# Patient Record
Sex: MALE | Race: WHITE | NOT HISPANIC OR LATINO | Employment: OTHER | URBAN - METROPOLITAN AREA
[De-identification: names, ages, dates, MRNs, and addresses within clinical notes are randomized per-mention and may not be internally consistent; named-entity substitution may affect disease eponyms.]

---

## 2017-01-10 ENCOUNTER — TELEPHONE (OUTPATIENT)
Dept: INTERNAL MEDICINE | Facility: CLINIC | Age: 72
End: 2017-01-10

## 2017-01-10 DIAGNOSIS — Z12.5 ENCOUNTER FOR SCREENING FOR MALIGNANT NEOPLASM OF PROSTATE: ICD-10-CM

## 2017-01-10 DIAGNOSIS — I10 ESSENTIAL HYPERTENSION: ICD-10-CM

## 2017-01-10 DIAGNOSIS — I25.10 CORONARY ARTERY DISEASE INVOLVING NATIVE CORONARY ARTERY OF NATIVE HEART WITHOUT ANGINA PECTORIS: Primary | ICD-10-CM

## 2017-01-10 NOTE — TELEPHONE ENCOUNTER
Patient lab orders are requested for future appointment. 5/23 or 5/24.      Please advise thanks.

## 2017-02-15 ENCOUNTER — OFFICE VISIT (OUTPATIENT)
Dept: DERMATOLOGY | Facility: CLINIC | Age: 72
End: 2017-02-15
Payer: MEDICARE

## 2017-02-15 VITALS — WEIGHT: 179 LBS | BODY MASS INDEX: 28.04 KG/M2

## 2017-02-15 DIAGNOSIS — L91.8 CUTANEOUS SKIN TAGS: ICD-10-CM

## 2017-02-15 DIAGNOSIS — L82.1 SEBORRHEIC KERATOSES: Primary | ICD-10-CM

## 2017-02-15 PROCEDURE — 1160F RVW MEDS BY RX/DR IN RCRD: CPT | Mod: S$GLB,,, | Performed by: DERMATOLOGY

## 2017-02-15 PROCEDURE — 99213 OFFICE O/P EST LOW 20 MIN: CPT | Mod: 25,S$GLB,, | Performed by: DERMATOLOGY

## 2017-02-15 PROCEDURE — 1159F MED LIST DOCD IN RCRD: CPT | Mod: S$GLB,,, | Performed by: DERMATOLOGY

## 2017-02-15 PROCEDURE — 1157F ADVNC CARE PLAN IN RCRD: CPT | Mod: S$GLB,,, | Performed by: DERMATOLOGY

## 2017-02-15 PROCEDURE — 99999 PR PBB SHADOW E&M-EST. PATIENT-LVL II: CPT | Mod: PBBFAC,,, | Performed by: DERMATOLOGY

## 2017-02-15 NOTE — MR AVS SNAPSHOT
[place] [facility]   [facility]  [place] [address]  Phone: [phone]  Fax: [phone]                  Drake Barraza   2/15/2017 10:10 AM   Office Visit    Description:  Male : 1945   Provider:  Vanessa Menon MD   Department:  [place] [facility]           Reason for Visit     Lesion     Skin Tags     Skin Check           Diagnoses this Visit        Comments    Seborrheic keratoses    -  Primary     Cutaneous skin tags                To Do List           Future Appointments        Provider Department Dept Phone    2017 8:00 AM LAB, [place] [facility] [phone]    2017 10:00 AM Catherine Middleton MD [facility] [address] [phone]      Goals (5 Years of Data)              11/23/16    5/19/16    11/10/15    HDL > 45   33  36  32    LDL CALC < 70   71.4  70.4  70.8      Follow-Up and Disposition     Return in about 1 year (around 2/15/2018).      [facility]     [facility] -  Assistance  Registered nurses in the [facility] provide clinical advisement, health education, appointment booking, and other advisory services.  Call for this free service at [phone].             Medications           Message regarding Medications     Verify the changes and/or additions to your medication regime listed below are the same as discussed with your clinician today.  If any of these changes or additions are incorrect, please notify your healthcare provider.             Verify that the below list of medications is an accurate representation of the medications you are currently taking.  If none reported, the list may be blank. If incorrect, please contact your healthcare provider. Carry this list with you in case of emergency.           Current Medications     aspirin 81 mg Tab Take 1 tablet by mouth Daily.      atorvastatin (LIPITOR) 40 MG tablet TAKE 1 TABLET EVERY DAY    CINNAMON BARK (CINNAMON  ORAL) Take 1 capsule by mouth once daily.    diclofenac (VOLTAREN) 50 MG EC tablet TAKE 1 TABLET EVERY 12 HOURS WITH FOOD AS NEEDED FOR ARTHRITIS    levothyroxine (SYNTHROID) 100 MCG tablet 1 tab daily for Thyroid    metoprolol tartrate (LOPRESSOR) 25 MG tablet Take 1 tablet (25 mg total) by mouth 2 (two) times daily.    nitroGLYCERIN (NITROSTAT) 0.4 MG SL tablet Place 1 tablet (0.4 mg total) under the tongue every 5 (five) minutes as needed for Chest pain.    omega-3 fatty acids-vitamin E (FISH OIL) 1,000 mg Cap Take 1 capsule by mouth Daily.      ramipril (ALTACE) 10 MG capsule TAKE 1 CAPSULE ONE TIME DAILY    resveratrol 100 mg Cap Take 1 capsule by mouth as needed.     tadalafil (CIALIS) 20 MG Tab Take 1 tablet (20 mg total) by mouth once daily.    UBIDECARENONE (COENZYME Q10) 100 mg Tab Take 1 tablet by mouth as needed.     vitamin D 185 MG Tab Take 185 mg by mouth once daily.             Clinical Reference Information           Your Vitals Were     Weight BMI             81.2 kg (179 lb) 28.04 kg/m2         Allergies as of 2/15/2017     Hydrocodone-acetaminophen      Immunizations Administered on Date of Encounter - 2/15/2017     None      Language Assistance Services     ATTENTION: Language assistance services are available, free of charge. Please call [phone].      ATENCIÓN: Si habla azra, tiene a childs disposición servicios gratuitos de asistencia lingüística. Llame al [phone].     SHERRON Ý: N?u b?n nói Ti?ng Vi?t, có các d?ch v? h? tr? ngôn ng? mi?n phí dành cho b?n. G?i s? [phone].         [facility] complies with applicable Federal civil rights laws and does not discriminate on the basis of race, color, national origin, age, disability, or sex.

## 2017-02-15 NOTE — PROGRESS NOTES
Subjective:       Patient ID:  Drake Barraza is a 71 y.o. male who presents for   Chief Complaint   Patient presents with    Lesion    Skin Tags    Skin Check     UBSE     HPI Comments: Irritated sk on left cheek from CPAP mask, also skin tags on neck and chest which are rubbed by collar no tx present for months.    Lesion     Skin Tags         Review of Systems   Constitutional: Negative for fever.   Skin: Negative for itching and rash.   Hematologic/Lymphatic: Does not bruise/bleed easily.        Objective:    Physical Exam   Constitutional: He appears well-developed and well-nourished. No distress.   Neurological: He is alert and oriented to person, place, and time. He is not disoriented.   Psychiatric: He has a normal mood and affect.   Skin:   Areas Examined (abnormalities noted in diagram):   Head / Face Inspection Performed  Neck Inspection Performed  Chest / Axilla Inspection Performed  Back Inspection Performed  RUE Inspected  LUE Inspection Performed                   Diagram Legend        Pigmented verrucoid papule/plaque c/w seborrheic keratosis        Pedunculated fleshy papule(s) c/w skin tag(s)       Erythematous-base heme-crusted tan verrucoid plaque consistent with inflamed seborrheic keratosis        Assessment / Plan:        Seborrheic keratoses  Reassurance..      Cutaneous skin tags  Verbal consent obtained. 5 lesions removed with scissor snip removal  No complications.               Return in about 1 year (around 2/15/2018).

## 2017-02-25 DIAGNOSIS — E03.4 HYPOTHYROIDISM DUE TO ACQUIRED ATROPHY OF THYROID: ICD-10-CM

## 2017-02-25 RX ORDER — METOPROLOL SUCCINATE 50 MG/1
TABLET, EXTENDED RELEASE ORAL
Qty: 90 TABLET | Refills: 3 | Status: SHIPPED | OUTPATIENT
Start: 2017-02-25 | End: 2017-03-03 | Stop reason: ALTCHOICE

## 2017-02-27 RX ORDER — LEVOTHYROXINE SODIUM 100 UG/1
TABLET ORAL
Qty: 90 TABLET | Refills: 0 | Status: SHIPPED | OUTPATIENT
Start: 2017-02-27 | End: 2017-05-20 | Stop reason: SDUPTHER

## 2017-03-03 ENCOUNTER — DOCUMENTATION ONLY (OUTPATIENT)
Dept: CARDIOLOGY | Facility: CLINIC | Age: 72
End: 2017-03-03

## 2017-03-03 ENCOUNTER — TELEPHONE (OUTPATIENT)
Dept: CARDIOLOGY | Facility: CLINIC | Age: 72
End: 2017-03-03

## 2017-03-03 NOTE — TELEPHONE ENCOUNTER
Di with [facility] pharmacy notified to cancel prescription for Metoprolol succinate 50mg from pt's profile. Pt no longer taking Metoprolol succinate.

## 2017-04-09 RX ORDER — RAMIPRIL 10 MG/1
CAPSULE ORAL
Qty: 90 CAPSULE | Refills: 3 | Status: SHIPPED | OUTPATIENT
Start: 2017-04-09 | End: 2018-04-02 | Stop reason: SDUPTHER

## 2017-05-20 DIAGNOSIS — E03.4 HYPOTHYROIDISM DUE TO ACQUIRED ATROPHY OF THYROID: ICD-10-CM

## 2017-05-22 RX ORDER — LEVOTHYROXINE SODIUM 100 UG/1
TABLET ORAL
Qty: 90 TABLET | Refills: 0 | Status: SHIPPED | OUTPATIENT
Start: 2017-05-22 | End: 2017-05-31

## 2017-05-23 ENCOUNTER — LAB VISIT (OUTPATIENT)
Dept: LAB | Facility: HOSPITAL | Age: 72
End: 2017-05-23
Attending: INTERNAL MEDICINE
Payer: MEDICARE

## 2017-05-23 DIAGNOSIS — Z12.5 ENCOUNTER FOR SCREENING FOR MALIGNANT NEOPLASM OF PROSTATE: ICD-10-CM

## 2017-05-23 DIAGNOSIS — I25.10 CORONARY ARTERY DISEASE INVOLVING NATIVE CORONARY ARTERY OF NATIVE HEART WITHOUT ANGINA PECTORIS: ICD-10-CM

## 2017-05-23 DIAGNOSIS — I10 ESSENTIAL HYPERTENSION: ICD-10-CM

## 2017-05-23 LAB
ALBUMIN SERPL BCP-MCNC: 4.1 G/DL
ALP SERPL-CCNC: 82 U/L
ALT SERPL W/O P-5'-P-CCNC: 48 U/L
ANION GAP SERPL CALC-SCNC: 9 MMOL/L
AST SERPL-CCNC: 37 U/L
BASOPHILS # BLD AUTO: 0.05 K/UL
BASOPHILS NFR BLD: 0.6 %
BILIRUB SERPL-MCNC: 0.9 MG/DL
BUN SERPL-MCNC: 21 MG/DL
CALCIUM SERPL-MCNC: 9.9 MG/DL
CHLORIDE SERPL-SCNC: 103 MMOL/L
CHOLEST/HDLC SERPL: 4 {RATIO}
CO2 SERPL-SCNC: 25 MMOL/L
COMPLEXED PSA SERPL-MCNC: 0.68 NG/ML
CREAT SERPL-MCNC: 1.2 MG/DL
DIFFERENTIAL METHOD: ABNORMAL
EOSINOPHIL # BLD AUTO: 0.2 K/UL
EOSINOPHIL NFR BLD: 2.7 %
ERYTHROCYTE [DISTWIDTH] IN BLOOD BY AUTOMATED COUNT: 13.4 %
EST. GFR  (AFRICAN AMERICAN): >60 ML/MIN/1.73 M^2
EST. GFR  (NON AFRICAN AMERICAN): >60 ML/MIN/1.73 M^2
GLUCOSE SERPL-MCNC: 127 MG/DL
HCT VFR BLD AUTO: 45.8 %
HDL/CHOLESTEROL RATIO: 25.2 %
HDLC SERPL-MCNC: 143 MG/DL
HDLC SERPL-MCNC: 36 MG/DL
HGB BLD-MCNC: 15.2 G/DL
LDLC SERPL CALC-MCNC: 74.8 MG/DL
LYMPHOCYTES # BLD AUTO: 3.4 K/UL
LYMPHOCYTES NFR BLD: 41.1 %
MCH RBC QN AUTO: 31.1 PG
MCHC RBC AUTO-ENTMCNC: 33.2 %
MCV RBC AUTO: 94 FL
MONOCYTES # BLD AUTO: 1.1 K/UL
MONOCYTES NFR BLD: 13.5 %
NEUTROPHILS # BLD AUTO: 3.5 K/UL
NEUTROPHILS NFR BLD: 42.1 %
NONHDLC SERPL-MCNC: 107 MG/DL
PLATELET # BLD AUTO: 185 K/UL
PMV BLD AUTO: 9.5 FL
POTASSIUM SERPL-SCNC: 5 MMOL/L
PROT SERPL-MCNC: 7.4 G/DL
RBC # BLD AUTO: 4.89 M/UL
SODIUM SERPL-SCNC: 137 MMOL/L
T4 FREE SERPL-MCNC: 0.94 NG/DL
TRIGL SERPL-MCNC: 161 MG/DL
TSH SERPL DL<=0.005 MIU/L-ACNC: 5.1 UIU/ML
WBC # BLD AUTO: 8.25 K/UL

## 2017-05-23 PROCEDURE — 80061 LIPID PANEL: CPT

## 2017-05-23 PROCEDURE — 84439 ASSAY OF FREE THYROXINE: CPT

## 2017-05-23 PROCEDURE — 36415 COLL VENOUS BLD VENIPUNCTURE: CPT | Mod: PO

## 2017-05-23 PROCEDURE — 84443 ASSAY THYROID STIM HORMONE: CPT

## 2017-05-23 PROCEDURE — 84153 ASSAY OF PSA TOTAL: CPT

## 2017-05-23 PROCEDURE — 80053 COMPREHEN METABOLIC PANEL: CPT

## 2017-05-23 PROCEDURE — 85025 COMPLETE CBC W/AUTO DIFF WBC: CPT | Mod: PO

## 2017-05-31 ENCOUNTER — OFFICE VISIT (OUTPATIENT)
Dept: INTERNAL MEDICINE | Facility: CLINIC | Age: 72
End: 2017-05-31
Payer: MEDICARE

## 2017-05-31 VITALS
OXYGEN SATURATION: 98 % | HEART RATE: 56 BPM | SYSTOLIC BLOOD PRESSURE: 114 MMHG | HEIGHT: 67 IN | BODY MASS INDEX: 28.09 KG/M2 | DIASTOLIC BLOOD PRESSURE: 80 MMHG | WEIGHT: 179 LBS

## 2017-05-31 DIAGNOSIS — E11.9 DIABETES MELLITUS WITHOUT COMPLICATION: Primary | ICD-10-CM

## 2017-05-31 DIAGNOSIS — Z29.9 PREVENTIVE MEASURE: ICD-10-CM

## 2017-05-31 DIAGNOSIS — E03.4 HYPOTHYROIDISM DUE TO ACQUIRED ATROPHY OF THYROID: ICD-10-CM

## 2017-05-31 DIAGNOSIS — Z00.00 ANNUAL PHYSICAL EXAM: ICD-10-CM

## 2017-05-31 DIAGNOSIS — M15.9 PRIMARY OSTEOARTHRITIS INVOLVING MULTIPLE JOINTS: ICD-10-CM

## 2017-05-31 PROCEDURE — 99397 PER PM REEVAL EST PAT 65+ YR: CPT | Mod: S$GLB,,, | Performed by: INTERNAL MEDICINE

## 2017-05-31 PROCEDURE — 99499 UNLISTED E&M SERVICE: CPT | Mod: S$GLB,,, | Performed by: INTERNAL MEDICINE

## 2017-05-31 PROCEDURE — 99999 PR PBB SHADOW E&M-EST. PATIENT-LVL IV: CPT | Mod: PBBFAC,,, | Performed by: INTERNAL MEDICINE

## 2017-05-31 RX ORDER — ETODOLAC 400 MG/1
400 TABLET, EXTENDED RELEASE ORAL DAILY
Qty: 90 TABLET | Refills: 0 | Status: SHIPPED | OUTPATIENT
Start: 2017-05-31 | End: 2017-10-27

## 2017-05-31 RX ORDER — LEVOTHYROXINE SODIUM 112 UG/1
112 TABLET ORAL DAILY
Qty: 90 TABLET | Refills: 1 | Status: SHIPPED | OUTPATIENT
Start: 2017-05-31 | End: 2017-12-30 | Stop reason: SDUPTHER

## 2017-05-31 NOTE — PROGRESS NOTES
Mr. Luke is a 72-year-old gentleman, known to myself, who presents today.    CHIEF COMPLAINT:  Annual wellness exam.    HISTORY OF PRESENT ILLNESS:  Mr. Luke presents noting that he has been having   a small amount of fatigue.  No chest pain, no shortness of breath, was concerned   when he saw the results of his lab work showing his thyroid to be slightly off   the normal.  He does note that he was told by his cardiologist, Dr. Mccoy to   hold his arthritis medicine due to his liver functions being slightly high, so   he has not been taking this recently.  He does get some arthritis in the ankle,   questions if there is something that he can try or should get this refilled.    Also needs to get updated on any appropriate health maintenance issues.  He is   taking medications as reviewed on medication card without problems.    PAST MEDICAL, SURGICAL, SOCIAL HISTORY:  Please see as thoroughly stated in EPIC   chart, which has been reviewed.    REVIEW OF SYSTEMS:  HEENT:  Negative for headaches or change in vision.  CARDIOPULMONARY:  No chest pain, no shortness of breath.  GASTROINTESTINAL:  No change in bowel habits.  No blood per rectum.  GENITOURINARY:  No significant BPH symptoms.  EXTREMITIES/RHEUMATOLOGIC:  Positive for discomfort in ankle felt to be due to   osteoarthritis, also osteoarthritis in the knees.  Rest of review of systems is noncontributory.    PHYSICAL EXAMINATION:  VITAL SIGNS:  Weight 179 pounds, blood pressure 114/80, pulse 56.  GENERAL:  The patient looks well.  HEENT:  Grossly unremarkable.  Sinuses nontender.  Pharynx clear.  NECK:  Supple.  Negative for masses or thyromegaly, negative for   lymphadenopathy, negative for carotid bruits.  LUNGS:  Clear bilaterally.  HEART:  Regular rate and rhythm without arrhythmias, murmurs or gallops.  ABDOMEN:  Positive bowel sounds, soft, nontender, no masses or organomegaly.  EXTREMITIES:  Negative for edema with normal 2+ DP pulses.  Of note, full    diabetic foot examination performed and showing decreased peripheral sensation   of the left foot greater than the right.  Circulation excellent.    WORKUP:  Lab work done on 05/23/2017 showing comprehensive chemistry to be   fairly unremarkable, although ALT a little high at 48.  Rest of liver functions   normal.  Renal function normal.  CBC is unremarkable.  Cholesterol 143 with LDL   74.  PSA normal.  TSH is slightly high at 5.1 with T4 on the lower side of   normal at 0.94.    ASSESSMENT AND PLAN:  1.  Primary hypothyroidism, slightly under supplemented.  A. We will increase Synthroid to 112 mcg per day.  B. Check TSH level in three months with phone review.  2.  Osteoarthritis with increased liver functions, possibly secondary to   Voltaren therapy.  A. We will change Voltaren to Lodine extended release 400 mg at one a day.  This   to be taken with food.  B. Repeat chemistry with liver panel in three months with lab and follow up at   that point.  3.  Coronary artery disease with the patient stable on present therapy, which   includes Altace 10 mg daily, Lopressor 25 mg two tabs daily, baby aspirin one a   day and Lipitor 40 mg daily.  4.  Health maintenance.  Screening:  Last colonoscopy in July 2016 and normal.  A. We will order screening abdominal aortic ultrasound.  B. Phone review after ultrasound and lab work in three months, which will   include comprehensive chemistry, TSH and hemoglobin A1c to follow up on history   of diabetes mellitus type 2.  5.  Diabetes mellitus type 2, non-insulin-dependent, with some decreased   sensation on extremity examination.  A. Hemoglobin A1c with next scheduled lab work.  B. Ensure the patient up-to-date on other health maintenance issues, go from   there.      FMS/HN  dd: 05/31/2017 18:10:12 (CDT)  td: 06/01/2017 12:59:38 (CDT)  Doc ID   #[id number]  Job ID #[id number]    CC:     This office note has been dictated.    Protective Sensation (w/ 10 gram monofilament):  Right:  Decreased  Left: Decreased    Visual Inspection:  Normal -  Bilateral    Pedal Pulses:   Right: Present  Left: Present    Posterior tibialis:   Right:Diminished  Left: Diminshed

## 2017-06-09 ENCOUNTER — TELEPHONE (OUTPATIENT)
Dept: INTERNAL MEDICINE | Facility: CLINIC | Age: 72
End: 2017-06-09

## 2017-06-09 NOTE — TELEPHONE ENCOUNTER
Notified Marlene green [facility] the correct dosage which was increased on 5/31/17 is 112 mg.

## 2017-06-09 NOTE — TELEPHONE ENCOUNTER
----- Message from Mary Carmen Mcdermott sent at 6/8/2017  1:00 PM CDT -----  Contact: Marycarmen with [facility], phone [phone]  [facility] needs to clarify the patient's current therapy on the levothyroxine.  [facility] has the patient has a prescription for Levothyroxine 112 mcg tablet, as well as 100 mcg tablet, and both are active prescriptions.  Please call call and clarify.  Fax [phone].       Thanks!

## 2017-06-14 ENCOUNTER — TELEPHONE (OUTPATIENT)
Dept: INTERNAL MEDICINE | Facility: CLINIC | Age: 72
End: 2017-06-14

## 2017-06-14 DIAGNOSIS — Z29.9 PREVENTIVE MEASURE: Primary | ICD-10-CM

## 2017-06-21 RX ORDER — METOPROLOL TARTRATE 25 MG/1
TABLET, FILM COATED ORAL
Qty: 180 TABLET | Refills: 3 | Status: SHIPPED | OUTPATIENT
Start: 2017-06-21 | End: 2018-07-08 | Stop reason: SDUPTHER

## 2017-08-24 ENCOUNTER — PATIENT MESSAGE (OUTPATIENT)
Dept: INTERNAL MEDICINE | Facility: CLINIC | Age: 72
End: 2017-08-24

## 2017-08-30 ENCOUNTER — LAB VISIT (OUTPATIENT)
Dept: LAB | Facility: HOSPITAL | Age: 72
End: 2017-08-30
Attending: INTERNAL MEDICINE
Payer: MEDICARE

## 2017-08-30 DIAGNOSIS — M15.9 PRIMARY OSTEOARTHRITIS INVOLVING MULTIPLE JOINTS: ICD-10-CM

## 2017-08-30 DIAGNOSIS — E11.9 DIABETES MELLITUS WITHOUT COMPLICATION: ICD-10-CM

## 2017-08-30 DIAGNOSIS — E03.4 HYPOTHYROIDISM DUE TO ACQUIRED ATROPHY OF THYROID: ICD-10-CM

## 2017-08-30 LAB
ALBUMIN SERPL BCP-MCNC: 3.8 G/DL
ALP SERPL-CCNC: 81 U/L
ALT SERPL W/O P-5'-P-CCNC: 43 U/L
ANION GAP SERPL CALC-SCNC: 8 MMOL/L
AST SERPL-CCNC: 36 U/L
BILIRUB SERPL-MCNC: 0.6 MG/DL
BUN SERPL-MCNC: 20 MG/DL
CALCIUM SERPL-MCNC: 9.7 MG/DL
CHLORIDE SERPL-SCNC: 103 MMOL/L
CO2 SERPL-SCNC: 27 MMOL/L
CREAT SERPL-MCNC: 1.1 MG/DL
EST. GFR  (AFRICAN AMERICAN): >60 ML/MIN/1.73 M^2
EST. GFR  (NON AFRICAN AMERICAN): >60 ML/MIN/1.73 M^2
ESTIMATED AVG GLUCOSE: 131 MG/DL
GLUCOSE SERPL-MCNC: 163 MG/DL
HBA1C MFR BLD HPLC: 6.2 %
POTASSIUM SERPL-SCNC: 4.6 MMOL/L
PROT SERPL-MCNC: 6.9 G/DL
SODIUM SERPL-SCNC: 138 MMOL/L
TSH SERPL DL<=0.005 MIU/L-ACNC: 3.26 UIU/ML

## 2017-08-30 PROCEDURE — 83036 HEMOGLOBIN GLYCOSYLATED A1C: CPT

## 2017-08-30 PROCEDURE — 36415 COLL VENOUS BLD VENIPUNCTURE: CPT | Mod: PO

## 2017-08-30 PROCEDURE — 84443 ASSAY THYROID STIM HORMONE: CPT

## 2017-08-30 PROCEDURE — 80053 COMPREHEN METABOLIC PANEL: CPT

## 2017-08-31 ENCOUNTER — PATIENT MESSAGE (OUTPATIENT)
Dept: INTERNAL MEDICINE | Facility: CLINIC | Age: 72
End: 2017-08-31

## 2017-09-04 PROBLEM — Z00.00 ANNUAL PHYSICAL EXAM: Status: RESOLVED | Noted: 2017-05-31 | Resolved: 2017-09-04

## 2017-10-20 ENCOUNTER — LAB VISIT (OUTPATIENT)
Dept: LAB | Facility: HOSPITAL | Age: 72
End: 2017-10-20
Attending: INTERNAL MEDICINE
Payer: MEDICARE

## 2017-10-20 DIAGNOSIS — E78.00 HYPERCHOLESTEREMIA: Chronic | ICD-10-CM

## 2017-10-20 DIAGNOSIS — I25.10 CORONARY ARTERY DISEASE INVOLVING NATIVE CORONARY ARTERY OF NATIVE HEART WITHOUT ANGINA PECTORIS: ICD-10-CM

## 2017-10-20 LAB
ALT SERPL W/O P-5'-P-CCNC: 44 U/L
ANION GAP SERPL CALC-SCNC: 7 MMOL/L
AST SERPL-CCNC: 36 U/L
BUN SERPL-MCNC: 15 MG/DL
CALCIUM SERPL-MCNC: 9.7 MG/DL
CHLORIDE SERPL-SCNC: 105 MMOL/L
CHOLEST SERPL-MCNC: 119 MG/DL
CHOLEST/HDLC SERPL: 3.5 {RATIO}
CO2 SERPL-SCNC: 27 MMOL/L
CREAT SERPL-MCNC: 1 MG/DL
EST. GFR  (AFRICAN AMERICAN): >60 ML/MIN/1.73 M^2
EST. GFR  (NON AFRICAN AMERICAN): >60 ML/MIN/1.73 M^2
GLUCOSE SERPL-MCNC: 144 MG/DL
HDLC SERPL-MCNC: 34 MG/DL
HDLC SERPL: 28.6 %
LDLC SERPL CALC-MCNC: 55.2 MG/DL
NONHDLC SERPL-MCNC: 85 MG/DL
POTASSIUM SERPL-SCNC: 4.7 MMOL/L
SODIUM SERPL-SCNC: 139 MMOL/L
TRIGL SERPL-MCNC: 149 MG/DL

## 2017-10-20 PROCEDURE — 36415 COLL VENOUS BLD VENIPUNCTURE: CPT | Mod: PO

## 2017-10-20 PROCEDURE — 80061 LIPID PANEL: CPT

## 2017-10-20 PROCEDURE — 84450 TRANSFERASE (AST) (SGOT): CPT

## 2017-10-20 PROCEDURE — 84460 ALANINE AMINO (ALT) (SGPT): CPT

## 2017-10-20 PROCEDURE — 80048 BASIC METABOLIC PNL TOTAL CA: CPT

## 2017-10-27 ENCOUNTER — OFFICE VISIT (OUTPATIENT)
Dept: CARDIOLOGY | Facility: CLINIC | Age: 72
End: 2017-10-27
Payer: MEDICARE

## 2017-10-27 VITALS
HEIGHT: 67 IN | WEIGHT: 179.38 LBS | SYSTOLIC BLOOD PRESSURE: 136 MMHG | BODY MASS INDEX: 28.16 KG/M2 | HEART RATE: 56 BPM | DIASTOLIC BLOOD PRESSURE: 76 MMHG

## 2017-10-27 DIAGNOSIS — E78.00 HYPERCHOLESTEREMIA: ICD-10-CM

## 2017-10-27 DIAGNOSIS — I25.10 CORONARY ARTERY DISEASE INVOLVING NATIVE CORONARY ARTERY OF NATIVE HEART WITHOUT ANGINA PECTORIS: Primary | ICD-10-CM

## 2017-10-27 DIAGNOSIS — N52.9 IMPOTENCE: ICD-10-CM

## 2017-10-27 DIAGNOSIS — Z98.61 POST PTCA: ICD-10-CM

## 2017-10-27 DIAGNOSIS — I25.2 OLD MYOCARDIAL INFARCT: ICD-10-CM

## 2017-10-27 DIAGNOSIS — I10 ESSENTIAL HYPERTENSION: ICD-10-CM

## 2017-10-27 DIAGNOSIS — G47.30 SLEEP APNEA, UNSPECIFIED TYPE: Chronic | ICD-10-CM

## 2017-10-27 PROCEDURE — 99999 PR PBB SHADOW E&M-EST. PATIENT-LVL III: CPT | Mod: PBBFAC,,, | Performed by: INTERNAL MEDICINE

## 2017-10-27 PROCEDURE — 99499 UNLISTED E&M SERVICE: CPT | Mod: S$GLB,,, | Performed by: INTERNAL MEDICINE

## 2017-10-27 PROCEDURE — 93000 ELECTROCARDIOGRAM COMPLETE: CPT | Mod: S$GLB,,, | Performed by: INTERNAL MEDICINE

## 2017-10-27 PROCEDURE — 99214 OFFICE O/P EST MOD 30 MIN: CPT | Mod: S$GLB,,, | Performed by: INTERNAL MEDICINE

## 2017-10-27 RX ORDER — TADALAFIL 20 MG/1
20 TABLET ORAL DAILY
Qty: 10 TABLET | Refills: 11 | Status: SHIPPED | OUTPATIENT
Start: 2017-10-27 | End: 2018-11-08 | Stop reason: ALTCHOICE

## 2017-10-27 NOTE — PROGRESS NOTES
Subjective:   Patient ID:  Drake Barraza is a 72 y.o. male who presents for follow-up of Coronary Artery Disease      Problem List:  CAD   Inf MI 2003   RCA and LAD stents 2003   Hypercholesteremia   HTN  Sleep apnea   Hypothyroidism   Hyperglycemia    HPI:   Drake Barraza feels generally well. He does not report angina or shortness of breath with exertion. He has not required S/L NTG.    On 40 mg of atorvastatin. LDL is <70. The HDL remains in the 30s.   He stays physically active but has not been exercising. He reports erectile dysfunction.  Using CPAP. BP is controlled w an ACE inhibitor and beta-blocker.      Review of Systems   Constitution: Negative for weakness, malaise/fatigue, weight gain and weight loss.   Cardiovascular: Negative for chest pain, claudication, dyspnea on exertion, irregular heartbeat, leg swelling, orthopnea, palpitations, paroxysmal nocturnal dyspnea and syncope.   Respiratory: Negative for cough, sputum production and wheezing.    Musculoskeletal: Positive for arthritis (both knees). Negative for falls, joint pain, muscle cramps, muscle weakness and myalgias.   Gastrointestinal: Negative for abdominal pain, heartburn and melena.   Genitourinary: Negative for frequency, hematuria and nocturia.   Neurological: Negative for dizziness, light-headedness, loss of balance and paresthesias.   Psychiatric/Behavioral: Negative for depression.       Current Outpatient Prescriptions   Medication Sig    aspirin 81 mg Tab Take 1 tablet by mouth Daily.      atorvastatin (LIPITOR) 40 MG tablet TAKE 1 TABLET EVERY DAY    CINNAMON BARK (CINNAMON ORAL) Take 1 capsule by mouth once daily.    levothyroxine (SYNTHROID) 112 MCG tablet Take 1 tablet (112 mcg total) by mouth once daily.    metoprolol tartrate (LOPRESSOR) 25 MG tablet TAKE 1 TABLET TWICE DAILY    nitroGLYCERIN (NITROSTAT) 0.4 MG SL tablet Place 1 tablet (0.4 mg total) under the tongue every 5 (five) minutes as needed for Chest pain.  "   omega-3 fatty acids-vitamin E (FISH OIL) 1,000 mg Cap Take 1 capsule by mouth Daily.      ramipril (ALTACE) 10 MG capsule TAKE 1 CAPSULE EVERY DAY    tadalafil (CIALIS) 20 MG Tab Take 1 tablet (20 mg total) by mouth once daily not using    UBIDECARENONE (COENZYME Q10) 100 mg Tab Take 1 tablet by mouth as needed.     vitamin D 185 MG Tab Take 185 mg by mouth once daily.           Social History   Substance Use Topics    Smoking status: Never Smoker    Smokeless tobacco: Not on file    Alcohol use Yes      Comment: a few glasses of wine a week         Objective:     Physical Exam   Constitutional: He is oriented to person, place, and time. He appears well-developed and well-nourished.   /76   Pulse (!) 56   Ht 5' 7" (1.702 m)   Wt 81.4 kg (179 lb 5.5 oz)   BMI 28.09 kg/m²      HENT:   Head: Normocephalic and atraumatic.   Neck: No JVD present. Carotid bruit is not present.   Cardiovascular: Normal rate, regular rhythm, S1 normal and S2 normal.  Exam reveals no gallop.    No murmur heard.  Pulses:       Radial pulses are 2+ on the right side, and 2+ on the left side.        Posterior tibial pulses are 2+ on the right side, and 2+ on the left side.   Pulmonary/Chest: Effort normal. He has no wheezes. He has no rales. Chest wall is not dull to percussion.   Abdominal: Soft. There is no splenomegaly or hepatomegaly. There is no tenderness.   Musculoskeletal:        Right lower leg: He exhibits no edema.        Left lower leg: He exhibits no edema.   Neurological: He is alert and oriented to person, place, and time. Gait normal.   Skin: Skin is warm and dry. No bruising noted. No cyanosis. Nails show no clubbing.   Psychiatric: He has a normal mood and affect. His speech is normal and behavior is normal. Judgment and thought content normal. Cognition and memory are normal.           Lab Results   Component Value Date    CHOL 119 (L) 10/20/2017    HDL 34 (L) 10/20/2017    LDLCALC 55.2 (L) 10/20/2017    " TRIG 149 10/20/2017    CHOLHDL 28.6 10/20/2017     Lab Results   Component Value Date     (H) 10/20/2017    CREATININE 1.0 10/20/2017    BUN 15 10/20/2017     10/20/2017    K 4.7 10/20/2017     10/20/2017    CO2 27 10/20/2017     Lab Results   Component Value Date    ALT 44 10/20/2017    AST 36 10/20/2017    ALKPHOS 81 08/30/2017    BILITOT 0.6 08/30/2017       ECG today reviewed by me. It reveals sinus bradycardia at 52 bpm with no ST or T abnormality.      Assessment and Plan:     1. Coronary artery disease involving native coronary artery of native heart without angina pectoris    2. 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 S/P RCA and LAD stents 2003    4. Hypercholesteremia    5. Erectile dysfunction    6. Sleep apnea, unspecified type    7. Essential hypertension        Coronary artery disease involving native coronary artery of native heart without angina pectoris  Stable.  Continue same meds.     Hypercholesteremia  Stable.  Continue same meds.     Erectile dysfunction  Counseled about the potential for severe hypotension and possible death from the simultaneous use of a type 5 phoshodiesterase (PDE) inhibitor and nitrate.  -     tadalafil (CIALIS) 20 MG Tab; Take 1/2 of a 20 mg  tablet (20 mg total) by mouth once daily.  Dispense: 10 tablet; Refill: 11     RTC in 1 yr

## 2017-10-27 NOTE — ASSESSMENT & PLAN NOTE
Counseled about the potential for severe hypotension and possible death from the simultaneous use of a type 5 phoshodiesterase (PDE) inhibitor and nitrate.

## 2017-11-02 ENCOUNTER — PATIENT MESSAGE (OUTPATIENT)
Dept: INTERNAL MEDICINE | Facility: CLINIC | Age: 72
End: 2017-11-02

## 2017-11-08 ENCOUNTER — TELEPHONE (OUTPATIENT)
Dept: INTERNAL MEDICINE | Facility: CLINIC | Age: 72
End: 2017-11-08

## 2017-11-08 DIAGNOSIS — G47.33 OSA (OBSTRUCTIVE SLEEP APNEA): Primary | ICD-10-CM

## 2017-11-08 NOTE — TELEPHONE ENCOUNTER
GATO Perez printed CPAP supplies order.  Please fax to [facility] as pt has requested.  Thanks.

## 2017-11-08 NOTE — TELEPHONE ENCOUNTER
----- Message from Nette Dugan sent at 11/8/2017 12:27 PM CST -----  Contact: self/[phone]  Patient called in regards needing to talk with Dr Middleton about the rx for cpad.please call and advise.     Thank you.

## 2017-11-08 NOTE — TELEPHONE ENCOUNTER
Spoke with pt and pt states he needs his Cpap supplies to be sent to [facility]. Phone number ([phone]) please print orders.    Thanks Ana.

## 2017-12-30 DIAGNOSIS — E03.4 HYPOTHYROIDISM DUE TO ACQUIRED ATROPHY OF THYROID: ICD-10-CM

## 2017-12-31 RX ORDER — LEVOTHYROXINE SODIUM 112 UG/1
TABLET ORAL
Qty: 90 TABLET | Refills: 1 | Status: SHIPPED | OUTPATIENT
Start: 2017-12-31 | End: 2018-01-02 | Stop reason: SDUPTHER

## 2017-12-31 RX ORDER — ATORVASTATIN CALCIUM 40 MG/1
TABLET, FILM COATED ORAL
Qty: 90 TABLET | Refills: 3 | Status: SHIPPED | OUTPATIENT
Start: 2017-12-31 | End: 2019-01-08 | Stop reason: SDUPTHER

## 2018-01-02 ENCOUNTER — TELEPHONE (OUTPATIENT)
Dept: INTERNAL MEDICINE | Facility: CLINIC | Age: 73
End: 2018-01-02

## 2018-01-02 DIAGNOSIS — I25.10 CORONARY ARTERY DISEASE, ANGINA PRESENCE UNSPECIFIED, UNSPECIFIED VESSEL OR LESION TYPE, UNSPECIFIED WHETHER NATIVE OR TRANSPLANTED HEART: Primary | ICD-10-CM

## 2018-01-02 DIAGNOSIS — E11.9 DIABETES MELLITUS WITHOUT COMPLICATION: ICD-10-CM

## 2018-01-02 DIAGNOSIS — Z12.5 ENCOUNTER FOR SCREENING FOR MALIGNANT NEOPLASM OF PROSTATE: ICD-10-CM

## 2018-01-02 DIAGNOSIS — E03.4 HYPOTHYROIDISM DUE TO ACQUIRED ATROPHY OF THYROID: ICD-10-CM

## 2018-01-02 RX ORDER — LEVOTHYROXINE SODIUM 112 UG/1
TABLET ORAL
Qty: 90 TABLET | Refills: 2 | Status: SHIPPED | OUTPATIENT
Start: 2018-01-02 | End: 2018-01-16 | Stop reason: SDUPTHER

## 2018-01-02 NOTE — TELEPHONE ENCOUNTER
----- Message from Eva Rajan sent at 1/2/2018 10:18 AM CST -----  Contact: Self,  Call [phone]  You sent a refill for his Levothyroxine (SYNTHROID) 112 MCG tablet on 12/31/17. Please refill this for 90 days and three refill for the year. If you can not do it this way please call him and explain to him why.

## 2018-01-16 ENCOUNTER — TELEPHONE (OUTPATIENT)
Dept: INTERNAL MEDICINE | Facility: CLINIC | Age: 73
End: 2018-01-16

## 2018-01-16 DIAGNOSIS — E03.4 HYPOTHYROIDISM DUE TO ACQUIRED ATROPHY OF THYROID: ICD-10-CM

## 2018-01-16 RX ORDER — LEVOTHYROXINE SODIUM 112 UG/1
TABLET ORAL
Qty: 90 TABLET | Refills: 1 | Status: SHIPPED | OUTPATIENT
Start: 2018-01-16 | End: 2018-07-08 | Stop reason: SDUPTHER

## 2018-01-16 NOTE — TELEPHONE ENCOUNTER
Spoke with patient, wants his Thyroid medication to be sent to [facility] for 90 day supply with 3 refills.  He states he would like to talk to doctor.

## 2018-01-16 NOTE — TELEPHONE ENCOUNTER
Spoke with pt and have repeat erx'd into [employer]: Synthroid 112ug tabs at 1 QD #90 tabs and 1 Refill.  Thanks

## 2018-01-16 NOTE — TELEPHONE ENCOUNTER
----- Message from Sarai Mcknight sent at 1/16/2018  9:28 AM CST -----  Contact: Patient [phone]  Patient is requesting a call about a medication. Medication was denied.    Please call and advise.    Thank You

## 2018-04-03 RX ORDER — RAMIPRIL 10 MG/1
CAPSULE ORAL
Qty: 90 CAPSULE | Refills: 3 | Status: SHIPPED | OUTPATIENT
Start: 2018-04-03 | End: 2019-04-13 | Stop reason: SDUPTHER

## 2018-04-23 ENCOUNTER — PES CALL (OUTPATIENT)
Dept: ADMINISTRATIVE | Facility: CLINIC | Age: 73
End: 2018-04-23

## 2018-04-26 ENCOUNTER — LAB VISIT (OUTPATIENT)
Dept: LAB | Facility: HOSPITAL | Age: 73
End: 2018-04-26
Attending: INTERNAL MEDICINE
Payer: MEDICARE

## 2018-04-26 DIAGNOSIS — E11.9 DIABETES MELLITUS WITHOUT COMPLICATION: ICD-10-CM

## 2018-04-26 DIAGNOSIS — Z12.5 ENCOUNTER FOR SCREENING FOR MALIGNANT NEOPLASM OF PROSTATE: ICD-10-CM

## 2018-04-26 DIAGNOSIS — E03.4 HYPOTHYROIDISM DUE TO ACQUIRED ATROPHY OF THYROID: ICD-10-CM

## 2018-04-26 DIAGNOSIS — I25.10 CORONARY ARTERY DISEASE, ANGINA PRESENCE UNSPECIFIED, UNSPECIFIED VESSEL OR LESION TYPE, UNSPECIFIED WHETHER NATIVE OR TRANSPLANTED HEART: ICD-10-CM

## 2018-04-26 LAB
ALBUMIN SERPL BCP-MCNC: 4 G/DL
ALP SERPL-CCNC: 84 U/L
ALT SERPL W/O P-5'-P-CCNC: 43 U/L
ANION GAP SERPL CALC-SCNC: 9 MMOL/L
AST SERPL-CCNC: 40 U/L
BASOPHILS # BLD AUTO: 0.06 K/UL
BASOPHILS NFR BLD: 1 %
BILIRUB SERPL-MCNC: 1.1 MG/DL
BUN SERPL-MCNC: 20 MG/DL
CALCIUM SERPL-MCNC: 9.7 MG/DL
CHLORIDE SERPL-SCNC: 105 MMOL/L
CHOLEST SERPL-MCNC: 118 MG/DL
CHOLEST/HDLC SERPL: 3.2 {RATIO}
CO2 SERPL-SCNC: 26 MMOL/L
COMPLEXED PSA SERPL-MCNC: 0.88 NG/ML
CREAT SERPL-MCNC: 1.1 MG/DL
DIFFERENTIAL METHOD: ABNORMAL
EOSINOPHIL # BLD AUTO: 0.2 K/UL
EOSINOPHIL NFR BLD: 3.2 %
ERYTHROCYTE [DISTWIDTH] IN BLOOD BY AUTOMATED COUNT: 13.2 %
EST. GFR  (AFRICAN AMERICAN): >60 ML/MIN/1.73 M^2
EST. GFR  (NON AFRICAN AMERICAN): >60 ML/MIN/1.73 M^2
ESTIMATED AVG GLUCOSE: 131 MG/DL
GLUCOSE SERPL-MCNC: 139 MG/DL
HBA1C MFR BLD HPLC: 6.2 %
HCT VFR BLD AUTO: 44.9 %
HDLC SERPL-MCNC: 37 MG/DL
HDLC SERPL: 31.4 %
HGB BLD-MCNC: 14.8 G/DL
IMM GRANULOCYTES # BLD AUTO: 0.02 K/UL
IMM GRANULOCYTES NFR BLD AUTO: 0.3 %
LDLC SERPL CALC-MCNC: 63 MG/DL
LYMPHOCYTES # BLD AUTO: 2.2 K/UL
LYMPHOCYTES NFR BLD: 35.7 %
MCH RBC QN AUTO: 31.6 PG
MCHC RBC AUTO-ENTMCNC: 33 G/DL
MCV RBC AUTO: 96 FL
MONOCYTES # BLD AUTO: 0.9 K/UL
MONOCYTES NFR BLD: 14 %
NEUTROPHILS # BLD AUTO: 2.8 K/UL
NEUTROPHILS NFR BLD: 45.8 %
NONHDLC SERPL-MCNC: 81 MG/DL
NRBC BLD-RTO: 0 /100 WBC
PLATELET # BLD AUTO: 191 K/UL
PMV BLD AUTO: 9.5 FL
POTASSIUM SERPL-SCNC: 4.3 MMOL/L
PROT SERPL-MCNC: 7 G/DL
RBC # BLD AUTO: 4.68 M/UL
SODIUM SERPL-SCNC: 140 MMOL/L
TRIGL SERPL-MCNC: 90 MG/DL
TSH SERPL DL<=0.005 MIU/L-ACNC: 3.34 UIU/ML
WBC # BLD AUTO: 6.16 K/UL

## 2018-04-26 PROCEDURE — 80053 COMPREHEN METABOLIC PANEL: CPT

## 2018-04-26 PROCEDURE — 84153 ASSAY OF PSA TOTAL: CPT

## 2018-04-26 PROCEDURE — 85025 COMPLETE CBC W/AUTO DIFF WBC: CPT

## 2018-04-26 PROCEDURE — 84443 ASSAY THYROID STIM HORMONE: CPT

## 2018-04-26 PROCEDURE — 80061 LIPID PANEL: CPT

## 2018-04-26 PROCEDURE — 36415 COLL VENOUS BLD VENIPUNCTURE: CPT | Mod: PO

## 2018-04-26 PROCEDURE — 83036 HEMOGLOBIN GLYCOSYLATED A1C: CPT

## 2018-05-03 ENCOUNTER — OFFICE VISIT (OUTPATIENT)
Dept: INTERNAL MEDICINE | Facility: CLINIC | Age: 73
End: 2018-05-03
Payer: MEDICARE

## 2018-05-03 VITALS
HEIGHT: 67 IN | OXYGEN SATURATION: 98 % | SYSTOLIC BLOOD PRESSURE: 116 MMHG | DIASTOLIC BLOOD PRESSURE: 60 MMHG | WEIGHT: 178.56 LBS | BODY MASS INDEX: 28.02 KG/M2 | HEART RATE: 62 BPM

## 2018-05-03 DIAGNOSIS — E03.4 HYPOTHYROIDISM DUE TO ACQUIRED ATROPHY OF THYROID: ICD-10-CM

## 2018-05-03 DIAGNOSIS — Z00.00 ANNUAL PHYSICAL EXAM: Primary | ICD-10-CM

## 2018-05-03 DIAGNOSIS — R73.9 HYPERGLYCEMIA: ICD-10-CM

## 2018-05-03 DIAGNOSIS — I10 ESSENTIAL HYPERTENSION: ICD-10-CM

## 2018-05-03 PROCEDURE — 99397 PER PM REEVAL EST PAT 65+ YR: CPT | Mod: S$GLB,,, | Performed by: INTERNAL MEDICINE

## 2018-05-03 PROCEDURE — 99499 UNLISTED E&M SERVICE: CPT | Mod: S$PBB,,, | Performed by: INTERNAL MEDICINE

## 2018-05-03 PROCEDURE — 3078F DIAST BP <80 MM HG: CPT | Mod: CPTII,S$GLB,, | Performed by: INTERNAL MEDICINE

## 2018-05-03 PROCEDURE — 99999 PR PBB SHADOW E&M-EST. PATIENT-LVL III: CPT | Mod: PBBFAC,,, | Performed by: INTERNAL MEDICINE

## 2018-05-03 PROCEDURE — 3074F SYST BP LT 130 MM HG: CPT | Mod: CPTII,S$GLB,, | Performed by: INTERNAL MEDICINE

## 2018-05-03 NOTE — PROGRESS NOTES
Mr. Barraza is a very sweet 73-year-old gentleman, known to myself, who presents   today for scheduled appointment.    CHIEF COMPLAINT:  Annual wellness exam and follow up of prediabetes and   hyperlipidemia.    HISTORY OF PRESENT ILLNESS:  Mr. Barraza presents for followup of the above.  He   notes that for the most part he has been doing well.  No problems.  He is   taking his medications without difficulty.  He did have lab work done.  He has   had no chest pain, no shortness of breath.  He follows with Dr. Mccoy once a   year in Cardiology.    PAST MEDICAL, SURGICAL, SOCIAL HISTORY:  Please see as thoroughly stated in EPIC   chart, which has been reviewed.    REVIEW OF SYSTEMS:  HEENT:  Negative for headaches, change in vision.  CARDIOPULMONARY:  No chest pain.  No shortness of breath.  GASTROINTESTINAL:  No change in bowel habits.  No blood per rectum.  GENITOURINARY:  No dysuria.  No BPH symptoms.  EXTREMITIES:  Positive for some mild knee aching consistent with osteoarthritis.  Rest of review of systems is noncontributory.    PHYSICAL EXAMINATION:  VITAL SIGNS:  With weight 178 pounds, blood pressure 116/60, pulse 62.  GENERAL:  The patient looks well, appears comfortable.  HEENT:  Grossly normal.  NECK:  Supple, negative for masses, thyromegaly, negative for lymphadenopathy.  LUNGS:  Clear bilaterally.  HEART:  Regular rate and rhythm without arrhythmias, murmurs, gallops.    Ventricular heart rate is 60.  ABDOMEN:  Soft, nontender.  EXTREMITIES:  Negative edema with normal 2+ DP pulses.  UROLOGICAL:  With external genitalia within normal limits.  Testes, penis   normal.  No inguinal hernias.    WORKUP:  With lab work April 26th showing hemoglobin A1c normal at 6.2.  TSH   normal.  PSA normal at 0.88.  Cholesterol is 118 with LDL 63.  CBC normal.    Comprehensive chemistry is unremarkable with liver functions normal and renal   function excellent.    ASSESSMENT AND PLAN:  1.  Coronary artery  disease/hyperlipidemia controlled.  A.  Continue on baby aspirin one a day and Lipitor 40 mg daily.  B.  Annual checkups with Dr. Mccoy in Cardiology.  2.  Prediabetes mellitus, controlled with diet.  A.  Continue to monitor with annual hemoglobin A1c with the patient to continue   on ADA diet.  3.  Essential hypertension, controlled.  A.  Continue on Altace 10 mg daily.  B.  Return to clinic by one year.  4.  Health maintenance.  Screening:  Last colonoscopy July 2016 and normal with the patient status post full physical   exam today.  A.  I have recommended immunizations including the shingles or Shingrix vaccine.  B.  Return to clinic in one year with fasting prior lab work or see the patient   sooner if needed.      FMS/HN  dd: 05/03/2018 14:16:15 (CDT)  td: 05/04/2018 05:43:50 (CDT)  Doc ID   #[id number]  Job ID #[id number]    CC:     This office note has been dictated.    Answers for HPI/ROS submitted by the patient on 5/1/2018   activity change: No  unexpected weight change: No  neck pain: No  hearing loss: No  rhinorrhea: No  trouble swallowing: No  eye discharge: No  visual disturbance: No  chest tightness: No  wheezing: No  chest pain: No  palpitations: No  blood in stool: No  constipation: No  vomiting: No  diarrhea: No  polydipsia: No  polyuria: No  difficulty urinating: No  urgency: No  hematuria: No  joint swelling: No  arthralgias: No  headaches: No  weakness: No  confusion: No  dysphoric mood: No

## 2018-06-19 ENCOUNTER — TELEPHONE (OUTPATIENT)
Dept: DERMATOLOGY | Facility: CLINIC | Age: 73
End: 2018-06-19

## 2018-06-19 NOTE — TELEPHONE ENCOUNTER
----- Message from Cara Martinez sent at 6/19/2018  9:57 AM CDT -----  Contact: patient  Please call above patient needs to get in before august waiting on a call from the nurse

## 2018-07-08 DIAGNOSIS — E03.4 HYPOTHYROIDISM DUE TO ACQUIRED ATROPHY OF THYROID: ICD-10-CM

## 2018-07-08 RX ORDER — METOPROLOL TARTRATE 25 MG/1
TABLET, FILM COATED ORAL
Qty: 180 TABLET | Refills: 3 | Status: SHIPPED | OUTPATIENT
Start: 2018-07-08 | End: 2019-08-17 | Stop reason: SDUPTHER

## 2018-07-09 RX ORDER — LEVOTHYROXINE SODIUM 112 UG/1
TABLET ORAL
Qty: 90 TABLET | Refills: 1 | Status: SHIPPED | OUTPATIENT
Start: 2018-07-09 | End: 2019-01-08 | Stop reason: SDUPTHER

## 2018-08-06 PROBLEM — Z00.00 ANNUAL PHYSICAL EXAM: Status: RESOLVED | Noted: 2017-05-31 | Resolved: 2018-08-06

## 2018-09-14 ENCOUNTER — OFFICE VISIT (OUTPATIENT)
Dept: DERMATOLOGY | Facility: CLINIC | Age: 73
End: 2018-09-14
Payer: MEDICARE

## 2018-09-14 VITALS — WEIGHT: 178 LBS | BODY MASS INDEX: 27.88 KG/M2

## 2018-09-14 DIAGNOSIS — L82.1 SEBORRHEIC KERATOSES: Primary | ICD-10-CM

## 2018-09-14 DIAGNOSIS — R20.9 DISTURBANCE OF SKIN SENSATION: ICD-10-CM

## 2018-09-14 DIAGNOSIS — L91.8 CUTANEOUS SKIN TAGS: ICD-10-CM

## 2018-09-14 PROCEDURE — 11200 RMVL SKIN TAGS UP TO&INC 15: CPT | Mod: S$PBB,,, | Performed by: DERMATOLOGY

## 2018-09-14 PROCEDURE — 11200 RMVL SKIN TAGS UP TO&INC 15: CPT | Mod: PBBFAC,PO | Performed by: DERMATOLOGY

## 2018-09-14 PROCEDURE — 1101F PT FALLS ASSESS-DOCD LE1/YR: CPT | Mod: CPTII,,, | Performed by: DERMATOLOGY

## 2018-09-14 PROCEDURE — 99999 PR PBB SHADOW E&M-EST. PATIENT-LVL II: CPT | Mod: PBBFAC,,, | Performed by: DERMATOLOGY

## 2018-09-14 PROCEDURE — 99212 OFFICE O/P EST SF 10 MIN: CPT | Mod: PBBFAC,PO | Performed by: DERMATOLOGY

## 2018-09-14 PROCEDURE — 99213 OFFICE O/P EST LOW 20 MIN: CPT | Mod: 25,S$PBB,, | Performed by: DERMATOLOGY

## 2018-09-14 NOTE — PROGRESS NOTES
Subjective:       Patient ID:  Drake Barraza is a 73 y.o. male who presents for   Chief Complaint   Patient presents with    Skin Check     UBSE    Skin Tags     neck and chest     History of Present Illness: The patient presents with chief complaint of skin tags.  Location: neck and right axilla  Duration: months  Signs/Symptoms: irritated    Prior treatments: none          Review of Systems   Constitutional: Negative for fever.   Skin: Negative for itching and rash.   Hematologic/Lymphatic: Does not bruise/bleed easily.        Objective:    Physical Exam   Constitutional: He appears well-developed and well-nourished. No distress.   Neurological: He is alert and oriented to person, place, and time. He is not disoriented.   Psychiatric: He has a normal mood and affect.   Skin:   Areas Examined (abnormalities noted in diagram):   Scalp / Hair Palpated and Inspected  Head / Face Inspection Performed  Neck Inspection Performed  Chest / Axilla Inspection Performed  Back Inspection Performed  RUE Inspected  LUE Inspection Performed                   Diagram Legend     Erythematous scaling macule/papule c/w actinic keratosis       Vascular papule c/w angioma      Pigmented verrucoid papule/plaque c/w seborrheic keratosis      Yellow umbilicated papule c/w sebaceous hyperplasia      Irregularly shaped tan macule c/w lentigo     1-2 mm smooth white papules consistent with Milia      Movable subcutaneous cyst with punctum c/w epidermal inclusion cyst      Subcutaneous movable cyst c/w pilar cyst      Firm pink to brown papule c/w dermatofibroma      Pedunculated fleshy papule(s) c/w skin tag(s)      Evenly pigmented macule c/w junctional nevus     Mildly variegated pigmented, slightly irregular-bordered macule c/w mildly atypical nevus      Flesh colored to evenly pigmented papule c/w intradermal nevus       Pink pearly papule/plaque c/w basal cell carcinoma      Erythematous hyperkeratotic cursted plaque c/w SCC       Surgical scar with no sign of skin cancer recurrence      Open and closed comedones      Inflammatory papules and pustules      Verrucoid papule consistent consistent with wart     Erythematous eczematous patches and plaques     Dystrophic onycholytic nail with subungual debris c/w onychomycosis     Umbilicated papule    Erythematous-base heme-crusted tan verrucoid plaque consistent with inflamed seborrheic keratosis     Erythematous Silvery Scaling Plaque c/w Psoriasis     See annotation      Assessment / Plan:        Seborrheic keratoses  Brochure provided  reassurance      Cutaneous skin tags  Verbal consent obtained. 5 lesions removed with scissor snip removal after anesthesia with 1% lidocaine with epinephrine. Hemostasis achieved with  hyfrecation. No complications.

## 2018-09-26 ENCOUNTER — PES CALL (OUTPATIENT)
Dept: ADMINISTRATIVE | Facility: CLINIC | Age: 73
End: 2018-09-26

## 2018-10-10 ENCOUNTER — TELEPHONE (OUTPATIENT)
Dept: DERMATOLOGY | Facility: CLINIC | Age: 73
End: 2018-10-10

## 2018-10-10 NOTE — TELEPHONE ENCOUNTER
----- Message from Vanessa Menon MD sent at 10/10/2018  1:04 PM CDT -----  Contact: pts wife at [phone]  Some companies pay and some don't we can't predict it.  However you can tell him I froze some lesions on forehead and scalp.with no charge.  ----- Message -----  From: Cara Braswell MA  Sent: 10/10/2018  12:12 PM  To: MD Dr. Sinan Quick Mrs. Barraza stated that she was billed to much for skin tags so Informed to her billing number and explain we can't control what insurance will pay or won't pay just wanted you to know her concerns.  ----- Message -----  From: Georgia Jorge  Sent: 10/10/2018  11:18 AM  To: Sinan CELIS [facility] [place] pt-Pt was seen Oct. 14.  Date of service is October 14.  Wants to know if it was billed wrong.  Bill is $72.00   Had skin tags removed.  Or if something wan's covered.  Paid $45.00.

## 2018-11-01 ENCOUNTER — LAB VISIT (OUTPATIENT)
Dept: LAB | Facility: HOSPITAL | Age: 73
End: 2018-11-01
Attending: INTERNAL MEDICINE
Payer: MEDICARE

## 2018-11-01 DIAGNOSIS — E78.00 HYPERCHOLESTEREMIA: ICD-10-CM

## 2018-11-01 DIAGNOSIS — I25.10 CORONARY ARTERY DISEASE INVOLVING NATIVE CORONARY ARTERY OF NATIVE HEART WITHOUT ANGINA PECTORIS: ICD-10-CM

## 2018-11-01 LAB
ALT SERPL W/O P-5'-P-CCNC: 35 U/L
ANION GAP SERPL CALC-SCNC: 7 MMOL/L
AST SERPL-CCNC: 34 U/L
BUN SERPL-MCNC: 23 MG/DL
CALCIUM SERPL-MCNC: 9.6 MG/DL
CHLORIDE SERPL-SCNC: 104 MMOL/L
CHOLEST SERPL-MCNC: 120 MG/DL
CHOLEST/HDLC SERPL: 3.2 {RATIO}
CO2 SERPL-SCNC: 27 MMOL/L
CREAT SERPL-MCNC: 1.1 MG/DL
EST. GFR  (AFRICAN AMERICAN): >60 ML/MIN/1.73 M^2
EST. GFR  (NON AFRICAN AMERICAN): >60 ML/MIN/1.73 M^2
GLUCOSE SERPL-MCNC: 141 MG/DL
HDLC SERPL-MCNC: 37 MG/DL
HDLC SERPL: 30.8 %
LDLC SERPL CALC-MCNC: 63.8 MG/DL
NONHDLC SERPL-MCNC: 83 MG/DL
POTASSIUM SERPL-SCNC: 4.4 MMOL/L
SODIUM SERPL-SCNC: 138 MMOL/L
TRIGL SERPL-MCNC: 96 MG/DL

## 2018-11-01 PROCEDURE — 80061 LIPID PANEL: CPT

## 2018-11-01 PROCEDURE — 36415 COLL VENOUS BLD VENIPUNCTURE: CPT | Mod: PO

## 2018-11-01 PROCEDURE — 84450 TRANSFERASE (AST) (SGOT): CPT

## 2018-11-01 PROCEDURE — 80048 BASIC METABOLIC PNL TOTAL CA: CPT

## 2018-11-01 PROCEDURE — 84460 ALANINE AMINO (ALT) (SGPT): CPT

## 2018-11-08 ENCOUNTER — OFFICE VISIT (OUTPATIENT)
Dept: CARDIOLOGY | Facility: CLINIC | Age: 73
End: 2018-11-08
Payer: MEDICARE

## 2018-11-08 VITALS
HEIGHT: 67 IN | SYSTOLIC BLOOD PRESSURE: 136 MMHG | BODY MASS INDEX: 28.18 KG/M2 | WEIGHT: 179.56 LBS | DIASTOLIC BLOOD PRESSURE: 84 MMHG

## 2018-11-08 DIAGNOSIS — N52.9 IMPOTENCE: ICD-10-CM

## 2018-11-08 DIAGNOSIS — I25.2 OLD MYOCARDIAL INFARCT: ICD-10-CM

## 2018-11-08 DIAGNOSIS — Z98.61 POST PTCA: ICD-10-CM

## 2018-11-08 DIAGNOSIS — E78.00 HYPERCHOLESTEREMIA: ICD-10-CM

## 2018-11-08 DIAGNOSIS — I10 ESSENTIAL HYPERTENSION: ICD-10-CM

## 2018-11-08 DIAGNOSIS — I25.10 CORONARY ARTERY DISEASE INVOLVING NATIVE CORONARY ARTERY OF NATIVE HEART WITHOUT ANGINA PECTORIS: Primary | ICD-10-CM

## 2018-11-08 PROCEDURE — 93010 ELECTROCARDIOGRAM REPORT: CPT | Mod: S$GLB,,, | Performed by: INTERNAL MEDICINE

## 2018-11-08 PROCEDURE — 3075F SYST BP GE 130 - 139MM HG: CPT | Mod: CPTII,S$GLB,, | Performed by: INTERNAL MEDICINE

## 2018-11-08 PROCEDURE — 1101F PT FALLS ASSESS-DOCD LE1/YR: CPT | Mod: CPTII,S$GLB,, | Performed by: INTERNAL MEDICINE

## 2018-11-08 PROCEDURE — 3079F DIAST BP 80-89 MM HG: CPT | Mod: CPTII,S$GLB,, | Performed by: INTERNAL MEDICINE

## 2018-11-08 PROCEDURE — 99999 PR PBB SHADOW E&M-EST. PATIENT-LVL III: CPT | Mod: PBBFAC,,, | Performed by: INTERNAL MEDICINE

## 2018-11-08 PROCEDURE — 93005 ELECTROCARDIOGRAM TRACING: CPT | Mod: S$GLB,,, | Performed by: INTERNAL MEDICINE

## 2018-11-08 PROCEDURE — 99214 OFFICE O/P EST MOD 30 MIN: CPT | Mod: S$GLB,,, | Performed by: INTERNAL MEDICINE

## 2018-11-08 RX ORDER — SILDENAFIL 100 MG/1
100 TABLET, FILM COATED ORAL DAILY PRN
Qty: 10 TABLET | Refills: 6 | Status: SHIPPED | OUTPATIENT
Start: 2018-11-08 | End: 2019-11-14 | Stop reason: SDUPTHER

## 2018-11-08 RX ORDER — MULTIVITAMIN
1 TABLET ORAL DAILY
COMMUNITY

## 2018-11-08 RX ORDER — NITROGLYCERIN 0.4 MG/1
0.4 TABLET SUBLINGUAL EVERY 5 MIN PRN
Qty: 100 TABLET | Refills: 2 | Status: SHIPPED | OUTPATIENT
Start: 2018-11-08 | End: 2020-11-17 | Stop reason: SDUPTHER

## 2018-11-08 NOTE — PATIENT INSTRUCTIONS
Viagra is generally safe. It is OK for you to use it. It is usually not covered by insurance and is available on a fee-for-service cost basis, and is relatively expensive. The method of use is 1 hour prior to anticipated intercourse. You should not use any more than one tablet in a 24 hour period. Side effects include possible headache, flushing, indigestion and transient changes in vision   You should not take any nitroglycerin or isosorbide if you take Viagra. This is extremely important.   If you seek medical care for any reason, you should let medical personnel know if you took Viagra with the prior 24 hours. Also blood levels of Viagra can be increased by the following meds: cimetidine, erythromycin, itraconazole or ketoconazole.   In rare cases there have been some deaths in patients after taking Viagra, felt due to the exertion of intercourse rather than the drug itself.           LDL - bad type - improves with diet and medications: typically statins; most other medications that lower LDL have not been proven to prevent heart attacks.  May not improve significantly with exercise alone.  Ideally less than 100 mg/dl.   But the lower the better. Statins (cholesterol lowering meds) lower LDL. If you have coronary artery disease or blockages anywhere else in the body, it should be well below 70 mg/dl.    HDL - good type - improves with exercise.  Ideally greater than 50 mg/dl. The proportion of HDL to the total cholesterol is more important than the absolute HDL.  This means a HDL of 45 out of a total cholesterol of 130 mg'dl is pretty good, but the same HDL out of a total of  200 mg/dl is not quite as good. A level of 30% or higher is ideal.    TGs (triglycerides) - also bad - can change very quickly and considerably with certain foods. Improve with diet, exercise and high dose fish oil.  In some cases a low carbohydrate diet will lower TGs better than a low fat diet.  Ideal range  mg/dl.    Sugar, fat and  cholesterol in food:     A sensible diet that limits the intake of sugars, saturated (bad) fats and trans fats while increasing the intake of unsaturated (good) fats and plant proteins is the basis of the current dietary recommendations.      We now recommend drastically reducing the intake of sugar. There is less emphasis on excluding fat.     Cholesterol in our food is no longer a significant concern, mainly because it is generally present in relatively small amounts. However please do not confuse this with the role of cholesterol in our blood and arteries. The liver converts certain foods into cholesterol.  It is this cholesterol and other fats that clogs up our arteries.       Most foods that are high in cholesterol are also high in saturated fat. But there is way more saturated fat than cholesterol in these foods. So its the saturated fat content that matters more than cholesterol. On the other hand, there are a handful of foods that are high in cholesterol but do not contain much saturated fat: eggs, shrimp, crab legs and crawfish are OK to eat.       Saturated fat is the bad fat - you should limit your intake of this. Deep fried foods, meats and other animal fats are high in saturated fat. Cookies, donuts and most dessert and cakes are usually high in both saturated fat and sugar.       Unsaturated fat is the good fat. It contains the same number of calories as saturated fat but these fats do not get deposited in our arteries. The Mediterranean style diet encourages the intake of unsaturated fat - olive oil, avocado and unsalted nuts. So instead of baking a piece of fish, it may be better to pan-blum it using olive oil.     You should eat a few servings of vegetables (and fruit as long as you are not diabetic) everyday. Substitute some plant proteins in place of meat: beans, lentils, quinoa and oatmeal. They are lean proteins.     Do not use stick butter or stick margarine. Butter that comes in a tub is soft  butter. It consists of 1/2 butter and 1/2 vegetable oil., either canola or olive oil It is fine to use that.       Trans fats should definitely be avoided. Most foods that are labelled as containing 0 gms of trans fat may still contain several hundred milligrams of trans fat: creamer, margarine, dough, deep fried foods, ready made frosting, potato, corn and torilla chips, cakes, cookies, pie crusts and crackers containing shortening made with hydrogenated vegetable oil.

## 2018-11-08 NOTE — PROGRESS NOTES
Subjective:   Patient ID:  Drake Barraza is a 73 y.o. male who presents for follow-up of Coronary Artery Disease      Problem List:  CAD   Inf MI 2003   RCA and LAD stents 2003   Hypercholesteremia   HTN  Sleep apnea   Hypothyroidism   Hyperglycemia    HPI:   Drake Barraza does not report angina or shortness of breath with exertion.     Lipids look good. On 40 mg of atorvastatin LDL is <70 mg/dl.   He did not like taking metoprolol succinate and switched back to the tartrate.      Review of Systems   Constitution: Negative for malaise/fatigue, weight gain and weight loss.   Cardiovascular: Negative for chest pain, dyspnea on exertion, leg swelling and palpitations.   Respiratory: Negative for cough and hemoptysis.    Hematologic/Lymphatic: Does not bruise/bleed easily.   Musculoskeletal: Negative for arthritis and muscle cramps.   Gastrointestinal: Negative for abdominal pain, heartburn and melena.   Genitourinary: Negative for hematuria and nocturia.   Neurological: Negative for headaches, light-headedness and seizures.   Psychiatric/Behavioral: Negative for depression.          Current Outpatient Medications   Medication Sig    aspirin 81 mg Tab Take 1 tablet by mouth Daily.      atorvastatin (LIPITOR) 40 MG tablet TAKE 1 TABLET EVERY DAY    CINNAMON BARK (CINNAMON ORAL) Take 1 capsule by mouth once daily.    levothyroxine (SYNTHROID) 112 MCG tablet TAKE 1 TABLET EVERY DAY    metoprolol tartrate (LOPRESSOR) 25 MG tablet TAKE 1 TABLET TWICE DAILY    multivitamin (ONE DAILY MULTIVITAMIN) per tablet Take 1 tablet by mouth once daily.    nitroGLYCERIN (NITROSTAT) 0.4 MG SL tablet Place 1 tablet (0.4 mg total) under the tongue every 5 (five) minutes as needed for Chest pain.    omega-3 fatty acids-vitamin E (FISH OIL) 1,000 mg Cap Take 1 capsule by mouth Daily.      ramipril (ALTACE) 10 MG capsule TAKE 1 CAPSULE EVERY DAY    tadalafil (CIALIS) 20 MG Tab Take 1 tablet (20 mg total) by mouth once daily.  "(Patient taking differently: Take 20 mg by mouth as needed. )    TURMERIC ROOT EXTRACT ORAL Take 1 capsule by mouth.    UBIDECARENONE (COENZYME Q10) 100 mg Tab Take 200 mg by mouth once daily.     vitamin D 185 MG Tab Take 185 mg by mouth once daily.           Social History     Tobacco Use    Smoking status: Never Smoker   Substance Use Topics    Alcohol use: Yes     Comment: a few glasses of wine a week    Drug use: No         Objective:     Physical Exam   Constitutional: He appears well-developed and well-nourished.   /84   Ht 5' 7" (1.702 m)   Wt 81.4 kg (179 lb 9 oz)   BMI 28.12 kg/m²      HENT:   Head: Normocephalic.   Cardiovascular: Normal rate, regular rhythm, S1 normal and S2 normal.   No murmur heard.  Pulses:       Radial pulses are 2+ on the right side, and 2+ on the left side.        Dorsalis pedis pulses are 2+ on the right side, and 2+ on the left side.   Pulmonary/Chest: Breath sounds normal.   Abdominal: Soft. He exhibits no mass.   Musculoskeletal:        Right lower leg: He exhibits no edema.        Left lower leg: He exhibits no edema.   Neurological: He is alert.   Psychiatric: He has a normal mood and affect. His speech is normal.           Lab Results   Component Value Date    CHOL 120 11/01/2018    HDL 37 (L) 11/01/2018    LDLCALC 63.8 11/01/2018    TRIG 96 11/01/2018    CHOLHDL 30.8 11/01/2018     Lab Results   Component Value Date     (H) 11/01/2018    CREATININE 1.1 11/01/2018    BUN 23 11/01/2018     11/01/2018    K 4.4 11/01/2018     11/01/2018    CO2 27 11/01/2018     Lab Results   Component Value Date    ALT 35 11/01/2018    AST 34 11/01/2018    ALKPHOS 84 04/26/2018    BILITOT 1.1 (H) 04/26/2018           Assessment and Plan:     1. Coronary artery disease involving native coronary artery of native heart without angina pectoris  2. 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 S/P RCA and LAD stents 2003  Stable. Continue same meds.   - nitroGLYCERIN " (NITROSTAT) 0.4 MG SL tablet; Place 1 tablet (0.4 mg total) under the tongue every 5 (five) minutes as needed for Chest pain.  Dispense: 100 tablet; Refill: 2    4. Hypercholesteremia  Stable. Continue same meds.   Cholesterol education.  Nutritional counseling.   - EKG 12-lead    5. Essential hypertension  Stable. Continue same meds.     6. Erectile dysfunction  Counseled about the potential for severe hypotension and possible death from the simultaneous use of a type 5 phoshodiesterase (PDE) inhibitor and nitrate.  - sildenafil (VIAGRA) 100 MG tablet; Take 1 tablet (100 mg total) by mouth daily as needed for Erectile Dysfunction.  Dispense: 10 tablet; Refill: 6      Follow-up in about 1 year (around 11/8/2019).

## 2019-01-08 DIAGNOSIS — E11.9 DIABETES MELLITUS WITHOUT COMPLICATION: ICD-10-CM

## 2019-01-08 DIAGNOSIS — E03.4 HYPOTHYROIDISM DUE TO ACQUIRED ATROPHY OF THYROID: ICD-10-CM

## 2019-01-08 DIAGNOSIS — Z12.5 PROSTATE CANCER SCREENING: ICD-10-CM

## 2019-01-08 DIAGNOSIS — I25.10 CORONARY ARTERY DISEASE, ANGINA PRESENCE UNSPECIFIED, UNSPECIFIED VESSEL OR LESION TYPE, UNSPECIFIED WHETHER NATIVE OR TRANSPLANTED HEART: Primary | ICD-10-CM

## 2019-01-08 RX ORDER — LEVOTHYROXINE SODIUM 112 UG/1
TABLET ORAL
Qty: 90 TABLET | Refills: 0 | Status: SHIPPED | OUTPATIENT
Start: 2019-01-08 | End: 2019-05-04 | Stop reason: SDUPTHER

## 2019-01-09 RX ORDER — ATORVASTATIN CALCIUM 40 MG/1
TABLET, FILM COATED ORAL
Qty: 90 TABLET | Refills: 3 | Status: SHIPPED | OUTPATIENT
Start: 2019-01-09 | End: 2019-11-01 | Stop reason: SDUPTHER

## 2019-01-09 NOTE — TELEPHONE ENCOUNTER
Eva, please schedule pt for a Physical in May with 1 week prior fasting lab.  I've placed lab orders.  Thanks

## 2019-01-16 ENCOUNTER — PES CALL (OUTPATIENT)
Dept: ADMINISTRATIVE | Facility: CLINIC | Age: 74
End: 2019-01-16

## 2019-01-21 ENCOUNTER — TELEPHONE (OUTPATIENT)
Dept: INTERNAL MEDICINE | Facility: CLINIC | Age: 74
End: 2019-01-21

## 2019-01-21 NOTE — TELEPHONE ENCOUNTER
Eva, please schedule pt for a Physical in May with 1 week prior fasting lab.  I've placed lab orders.  Thanks         Documentation      Patient scheduled for Phys appt/labs....mailed out to home address on filed.

## 2019-04-12 ENCOUNTER — OFFICE VISIT (OUTPATIENT)
Dept: URGENT CARE | Facility: CLINIC | Age: 74
End: 2019-04-12
Payer: MEDICARE

## 2019-04-12 VITALS
OXYGEN SATURATION: 99 % | BODY MASS INDEX: 26.68 KG/M2 | WEIGHT: 170 LBS | RESPIRATION RATE: 18 BRPM | HEIGHT: 67 IN | TEMPERATURE: 99 F | SYSTOLIC BLOOD PRESSURE: 138 MMHG | HEART RATE: 61 BPM | DIASTOLIC BLOOD PRESSURE: 74 MMHG

## 2019-04-12 DIAGNOSIS — B02.9 HERPES ZOSTER WITHOUT COMPLICATION: Primary | ICD-10-CM

## 2019-04-12 PROCEDURE — 3075F SYST BP GE 130 - 139MM HG: CPT | Mod: CPTII,S$GLB,, | Performed by: FAMILY MEDICINE

## 2019-04-12 PROCEDURE — 1101F PT FALLS ASSESS-DOCD LE1/YR: CPT | Mod: CPTII,S$GLB,, | Performed by: FAMILY MEDICINE

## 2019-04-12 PROCEDURE — 3075F PR MOST RECENT SYSTOLIC BLOOD PRESS GE 130-139MM HG: ICD-10-PCS | Mod: CPTII,S$GLB,, | Performed by: FAMILY MEDICINE

## 2019-04-12 PROCEDURE — 99213 OFFICE O/P EST LOW 20 MIN: CPT | Mod: S$GLB,,, | Performed by: FAMILY MEDICINE

## 2019-04-12 PROCEDURE — 1101F PR PT FALLS ASSESS DOC 0-1 FALLS W/OUT INJ PAST YR: ICD-10-PCS | Mod: CPTII,S$GLB,, | Performed by: FAMILY MEDICINE

## 2019-04-12 PROCEDURE — 99213 PR OFFICE/OUTPT VISIT, EST, LEVL III, 20-29 MIN: ICD-10-PCS | Mod: S$GLB,,, | Performed by: FAMILY MEDICINE

## 2019-04-12 PROCEDURE — 3078F DIAST BP <80 MM HG: CPT | Mod: CPTII,S$GLB,, | Performed by: FAMILY MEDICINE

## 2019-04-12 PROCEDURE — 3078F PR MOST RECENT DIASTOLIC BLOOD PRESSURE < 80 MM HG: ICD-10-PCS | Mod: CPTII,S$GLB,, | Performed by: FAMILY MEDICINE

## 2019-04-12 RX ORDER — VALACYCLOVIR HYDROCHLORIDE 1 G/1
1000 TABLET, FILM COATED ORAL 3 TIMES DAILY
Qty: 21 TABLET | Refills: 0 | Status: SHIPPED | OUTPATIENT
Start: 2019-04-12 | End: 2019-07-10 | Stop reason: CLARIF

## 2019-04-12 NOTE — PROGRESS NOTES
"Subjective:       Patient ID: Drake Barraza is a 74 y.o. male.    Vitals:  height is 5' 7" (1.702 m) and weight is 77.1 kg (170 lb). His oral temperature is 98.6 °F (37 °C). His blood pressure is 138/74 and his pulse is 61. His respiration is 18 and oxygen saturation is 99%.     Chief Complaint: Rash    This is a 74 y.o. male who presents today with a chief complaint of rash on right waistline, right buttock and right thigh for several days. He believes it may be shingles. He is using Mometasone Furoate 1% ointment.    Rash   This is a new problem. The current episode started in the past 7 days. The problem has been gradually worsening since onset. The affected locations include the right buttock, right upper leg and back. The rash is characterized by blistering, itchiness, pain and redness. He was exposed to nothing. Pertinent negatives include no cough, fever or sore throat. Past treatments include topical steroids. His past medical history is significant for allergies and varicella.       Constitution: Negative for chills and fever.   HENT: Negative for facial swelling and sore throat.    Neck: Negative for painful lymph nodes.   Eyes: Negative for eye itching and eyelid swelling.   Respiratory: Negative for cough.    Musculoskeletal: Negative for joint pain and joint swelling.   Skin: Positive for color change, rash and erythema. Negative for pale, wound, abrasion, laceration, lesion, skin thickening/induration, puncture wound, abscess, avulsion and hives.   Allergic/Immunologic: Negative for environmental allergies, immunocompromised state and hives.   Hematologic/Lymphatic: Negative for swollen lymph nodes.       Objective:      Physical Exam   Constitutional: He appears well-developed and well-nourished.   HENT:   Head: Normocephalic.   Eyes: Pupils are equal, round, and reactive to light. EOM are normal.   Neck: Normal range of motion. Neck supple.   Cardiovascular: Normal rate, regular rhythm and normal " heart sounds.   Skin: Rash (grouped vesicular lesions on right side hip extending down leg/gluteal region) noted. There is erythema.   Nursing note and vitals reviewed.      Assessment:       1. Herpes zoster without complication        Plan:         Herpes zoster without complication  -     valACYclovir (VALTREX) 1000 MG tablet; Take 1 tablet (1,000 mg total) by mouth 3 (three) times daily. for 7 days  Dispense: 21 tablet; Refill: 0

## 2019-04-12 NOTE — PATIENT INSTRUCTIONS

## 2019-04-15 RX ORDER — RAMIPRIL 10 MG/1
CAPSULE ORAL
Qty: 90 CAPSULE | Refills: 3 | Status: SHIPPED | OUTPATIENT
Start: 2019-04-15 | End: 2020-03-13

## 2019-05-04 DIAGNOSIS — E03.4 HYPOTHYROIDISM DUE TO ACQUIRED ATROPHY OF THYROID: ICD-10-CM

## 2019-05-06 RX ORDER — LEVOTHYROXINE SODIUM 112 UG/1
TABLET ORAL
Qty: 90 TABLET | Refills: 0 | Status: SHIPPED | OUTPATIENT
Start: 2019-05-06 | End: 2019-05-16 | Stop reason: SDUPTHER

## 2019-05-09 ENCOUNTER — LAB VISIT (OUTPATIENT)
Dept: LAB | Facility: HOSPITAL | Age: 74
End: 2019-05-09
Attending: INTERNAL MEDICINE
Payer: MEDICARE

## 2019-05-09 DIAGNOSIS — E03.4 HYPOTHYROIDISM DUE TO ACQUIRED ATROPHY OF THYROID: ICD-10-CM

## 2019-05-09 DIAGNOSIS — Z12.5 PROSTATE CANCER SCREENING: ICD-10-CM

## 2019-05-09 DIAGNOSIS — E11.9 DIABETES MELLITUS WITHOUT COMPLICATION: ICD-10-CM

## 2019-05-09 DIAGNOSIS — I25.10 CORONARY ARTERY DISEASE, ANGINA PRESENCE UNSPECIFIED, UNSPECIFIED VESSEL OR LESION TYPE, UNSPECIFIED WHETHER NATIVE OR TRANSPLANTED HEART: ICD-10-CM

## 2019-05-09 LAB
ALBUMIN SERPL BCP-MCNC: 3.9 G/DL (ref 3.5–5.2)
ALP SERPL-CCNC: 84 U/L (ref 55–135)
ALT SERPL W/O P-5'-P-CCNC: 33 U/L (ref 10–44)
ANION GAP SERPL CALC-SCNC: 8 MMOL/L (ref 8–16)
AST SERPL-CCNC: 30 U/L (ref 10–40)
BASOPHILS # BLD AUTO: 0.05 K/UL (ref 0–0.2)
BASOPHILS NFR BLD: 0.9 % (ref 0–1.9)
BILIRUB SERPL-MCNC: 0.6 MG/DL (ref 0.1–1)
BUN SERPL-MCNC: 22 MG/DL (ref 8–23)
CALCIUM SERPL-MCNC: 10 MG/DL (ref 8.7–10.5)
CHLORIDE SERPL-SCNC: 105 MMOL/L (ref 95–110)
CHOLEST SERPL-MCNC: 127 MG/DL (ref 120–199)
CHOLEST/HDLC SERPL: 3.5 {RATIO} (ref 2–5)
CO2 SERPL-SCNC: 26 MMOL/L (ref 23–29)
COMPLEXED PSA SERPL-MCNC: 0.77 NG/ML (ref 0–4)
CREAT SERPL-MCNC: 1 MG/DL (ref 0.5–1.4)
DIFFERENTIAL METHOD: ABNORMAL
EOSINOPHIL # BLD AUTO: 0.2 K/UL (ref 0–0.5)
EOSINOPHIL NFR BLD: 4.2 % (ref 0–8)
ERYTHROCYTE [DISTWIDTH] IN BLOOD BY AUTOMATED COUNT: 13.6 % (ref 11.5–14.5)
EST. GFR  (AFRICAN AMERICAN): >60 ML/MIN/1.73 M^2
EST. GFR  (NON AFRICAN AMERICAN): >60 ML/MIN/1.73 M^2
ESTIMATED AVG GLUCOSE: 137 MG/DL (ref 68–131)
GLUCOSE SERPL-MCNC: 135 MG/DL (ref 70–110)
HBA1C MFR BLD HPLC: 6.4 % (ref 4–5.6)
HCT VFR BLD AUTO: 46.2 % (ref 40–54)
HDLC SERPL-MCNC: 36 MG/DL (ref 40–75)
HDLC SERPL: 28.3 % (ref 20–50)
HGB BLD-MCNC: 15.1 G/DL (ref 14–18)
IMM GRANULOCYTES # BLD AUTO: 0.02 K/UL (ref 0–0.04)
IMM GRANULOCYTES NFR BLD AUTO: 0.3 % (ref 0–0.5)
LDLC SERPL CALC-MCNC: 76.4 MG/DL (ref 63–159)
LYMPHOCYTES # BLD AUTO: 1.9 K/UL (ref 1–4.8)
LYMPHOCYTES NFR BLD: 33.7 % (ref 18–48)
MCH RBC QN AUTO: 32 PG (ref 27–31)
MCHC RBC AUTO-ENTMCNC: 32.7 G/DL (ref 32–36)
MCV RBC AUTO: 98 FL (ref 82–98)
MONOCYTES # BLD AUTO: 0.8 K/UL (ref 0.3–1)
MONOCYTES NFR BLD: 14.4 % (ref 4–15)
NEUTROPHILS # BLD AUTO: 2.7 K/UL (ref 1.8–7.7)
NEUTROPHILS NFR BLD: 46.5 % (ref 38–73)
NONHDLC SERPL-MCNC: 91 MG/DL
NRBC BLD-RTO: 0 /100 WBC
PLATELET # BLD AUTO: 176 K/UL (ref 150–350)
PMV BLD AUTO: 9.8 FL (ref 9.2–12.9)
POTASSIUM SERPL-SCNC: 4.6 MMOL/L (ref 3.5–5.1)
PROT SERPL-MCNC: 6.9 G/DL (ref 6–8.4)
RBC # BLD AUTO: 4.72 M/UL (ref 4.6–6.2)
SODIUM SERPL-SCNC: 139 MMOL/L (ref 136–145)
T4 FREE SERPL-MCNC: 0.88 NG/DL (ref 0.71–1.51)
TRIGL SERPL-MCNC: 73 MG/DL (ref 30–150)
TSH SERPL DL<=0.005 MIU/L-ACNC: 4.44 UIU/ML (ref 0.4–4)
WBC # BLD AUTO: 5.76 K/UL (ref 3.9–12.7)

## 2019-05-09 PROCEDURE — 84443 ASSAY THYROID STIM HORMONE: CPT | Mod: HCNC

## 2019-05-09 PROCEDURE — 83036 HEMOGLOBIN GLYCOSYLATED A1C: CPT | Mod: HCNC

## 2019-05-09 PROCEDURE — 80061 LIPID PANEL: CPT | Mod: HCNC

## 2019-05-09 PROCEDURE — 84439 ASSAY OF FREE THYROXINE: CPT | Mod: HCNC

## 2019-05-09 PROCEDURE — 80053 COMPREHEN METABOLIC PANEL: CPT | Mod: HCNC

## 2019-05-09 PROCEDURE — 85025 COMPLETE CBC W/AUTO DIFF WBC: CPT | Mod: HCNC

## 2019-05-09 PROCEDURE — 84153 ASSAY OF PSA TOTAL: CPT | Mod: HCNC

## 2019-05-09 PROCEDURE — 36415 COLL VENOUS BLD VENIPUNCTURE: CPT | Mod: HCNC,PO

## 2019-05-16 ENCOUNTER — OFFICE VISIT (OUTPATIENT)
Dept: INTERNAL MEDICINE | Facility: CLINIC | Age: 74
End: 2019-05-16
Payer: MEDICARE

## 2019-05-16 VITALS
WEIGHT: 175.5 LBS | HEART RATE: 55 BPM | SYSTOLIC BLOOD PRESSURE: 116 MMHG | HEIGHT: 67 IN | BODY MASS INDEX: 27.55 KG/M2 | TEMPERATURE: 98 F | OXYGEN SATURATION: 98 % | DIASTOLIC BLOOD PRESSURE: 70 MMHG

## 2019-05-16 DIAGNOSIS — R73.03 PRE-DIABETES: ICD-10-CM

## 2019-05-16 DIAGNOSIS — I25.10 CORONARY ARTERY DISEASE INVOLVING NATIVE CORONARY ARTERY OF NATIVE HEART WITHOUT ANGINA PECTORIS: ICD-10-CM

## 2019-05-16 DIAGNOSIS — E03.4 HYPOTHYROIDISM DUE TO ACQUIRED ATROPHY OF THYROID: ICD-10-CM

## 2019-05-16 DIAGNOSIS — Z00.00 ANNUAL PHYSICAL EXAM: Primary | ICD-10-CM

## 2019-05-16 DIAGNOSIS — G47.30 SLEEP APNEA, UNSPECIFIED TYPE: Chronic | ICD-10-CM

## 2019-05-16 DIAGNOSIS — I10 ESSENTIAL HYPERTENSION: ICD-10-CM

## 2019-05-16 LAB
ALBUMIN/CREAT UR: 6.7 UG/MG (ref 0–30)
CREAT UR-MCNC: 75 MG/DL (ref 23–375)
MICROALBUMIN UR DL<=1MG/L-MCNC: 5 UG/ML

## 2019-05-16 PROCEDURE — 3078F DIAST BP <80 MM HG: CPT | Mod: HCNC,CPTII,S$GLB, | Performed by: INTERNAL MEDICINE

## 2019-05-16 PROCEDURE — 99499 RISK ADDL DX/OHS AUDIT: ICD-10-PCS | Mod: HCNC,S$GLB,, | Performed by: INTERNAL MEDICINE

## 2019-05-16 PROCEDURE — 3078F PR MOST RECENT DIASTOLIC BLOOD PRESSURE < 80 MM HG: ICD-10-PCS | Mod: HCNC,CPTII,S$GLB, | Performed by: INTERNAL MEDICINE

## 2019-05-16 PROCEDURE — 3074F PR MOST RECENT SYSTOLIC BLOOD PRESSURE < 130 MM HG: ICD-10-PCS | Mod: HCNC,CPTII,S$GLB, | Performed by: INTERNAL MEDICINE

## 2019-05-16 PROCEDURE — 82043 UR ALBUMIN QUANTITATIVE: CPT | Mod: HCNC

## 2019-05-16 PROCEDURE — 99397 PER PM REEVAL EST PAT 65+ YR: CPT | Mod: HCNC,S$GLB,, | Performed by: INTERNAL MEDICINE

## 2019-05-16 PROCEDURE — 99999 PR PBB SHADOW E&M-EST. PATIENT-LVL IV: ICD-10-PCS | Mod: PBBFAC,HCNC,, | Performed by: INTERNAL MEDICINE

## 2019-05-16 PROCEDURE — 3074F SYST BP LT 130 MM HG: CPT | Mod: HCNC,CPTII,S$GLB, | Performed by: INTERNAL MEDICINE

## 2019-05-16 PROCEDURE — 99499 UNLISTED E&M SERVICE: CPT | Mod: HCNC,S$GLB,, | Performed by: INTERNAL MEDICINE

## 2019-05-16 PROCEDURE — 99999 PR PBB SHADOW E&M-EST. PATIENT-LVL IV: CPT | Mod: PBBFAC,HCNC,, | Performed by: INTERNAL MEDICINE

## 2019-05-16 PROCEDURE — 99397 PR PREVENTIVE VISIT,EST,65 & OVER: ICD-10-PCS | Mod: HCNC,S$GLB,, | Performed by: INTERNAL MEDICINE

## 2019-05-16 RX ORDER — LEVOTHYROXINE SODIUM 125 UG/1
TABLET ORAL
Qty: 90 TABLET | Refills: 1 | Status: SHIPPED | OUTPATIENT
Start: 2019-05-16 | End: 2019-11-01 | Stop reason: SDUPTHER

## 2019-05-16 NOTE — PROGRESS NOTES
Subjective:       Patient ID: Drake Barraza is a 74 y.o. male.    Chief Complaint:   Annual Exam    HPI: Mr Barraza for annual f/u HTN/HLD/Hypothyroidism.  He is s/p Shingles of the right gluteal/thigh  Area 4/2019, which is resolved/nontender s/p treatment with valtrex. He does c/o fatigue but has been   Wearing his CPAP 6-8 hours nightly. He has no CP/SOB/Dizziness.    Past Medical, Surgical, Social History: Please see as stated in Epic chart which has been reviewed.    Current Outpatient Medications   Medication Sig Dispense Refill    aspirin 81 mg Tab Take 1 tablet by mouth Daily.        atorvastatin (LIPITOR) 40 MG tablet TAKE 1 TABLET EVERY DAY 90 tablet 3    CINNAMON BARK (CINNAMON ORAL) Take 1 capsule by mouth once daily.      levothyroxine (SYNTHROID) 125 MCG tablet 1 tab in AM on empty stomach 90 tablet 1    metoprolol tartrate (LOPRESSOR) 25 MG tablet TAKE 1 TABLET TWICE DAILY 180 tablet 3    multivitamin (ONE DAILY MULTIVITAMIN) per tablet Take 1 tablet by mouth once daily.      nitroGLYCERIN (NITROSTAT) 0.4 MG SL tablet Place 1 tablet (0.4 mg total) under the tongue every 5 (five) minutes as needed for Chest pain. 100 tablet 2    omega-3 fatty acids-vitamin E (FISH OIL) 1,000 mg Cap Take 1 capsule by mouth Daily.        ramipril (ALTACE) 10 MG capsule TAKE 1 CAPSULE EVERY DAY 90 capsule 3    sildenafil (VIAGRA) 100 MG tablet Take 1 tablet (100 mg total) by mouth daily as needed for Erectile Dysfunction. 10 tablet 6    TURMERIC ROOT EXTRACT ORAL Take 1 capsule by mouth.      UBIDECARENONE (COENZYME Q10) 100 mg Tab Take 200 mg by mouth once daily.       valACYclovir (VALTREX) 1000 MG tablet Take 1 tablet (1,000 mg total) by mouth 3 (three) times daily. for 7 days 21 tablet 0    vitamin D 185 MG Tab Take 185 mg by mouth once daily.         No current facility-administered medications for this visit.        Review of Systems   Constitutional: Positive for fatigue. Negative for activity  change and unexpected weight change.   HENT: Negative for hearing loss, rhinorrhea and trouble swallowing.    Eyes: Negative for discharge and visual disturbance.   Respiratory: Negative for chest tightness and wheezing.    Cardiovascular: Negative for chest pain and palpitations.   Gastrointestinal: Negative for blood in stool, constipation, diarrhea and vomiting.   Endocrine: Negative for polydipsia and polyuria.   Genitourinary: Negative for difficulty urinating, hematuria and urgency.   Musculoskeletal: Positive for arthralgias and joint swelling. Negative for neck pain.        +Bilateral knee OA/Stiffness   Neurological: Negative for weakness and headaches.   Psychiatric/Behavioral: Negative for confusion and dysphoric mood.   All other systems reviewed and are negative.      Objective:      Lab Results   Component Value Date    WBC 5.76 05/09/2019    HGB 15.1 05/09/2019    HCT 46.2 05/09/2019     05/09/2019    CHOL 127 05/09/2019    TRIG 73 05/09/2019    HDL 36 (L) 05/09/2019    ALT 33 05/09/2019    AST 30 05/09/2019     05/09/2019    K 4.6 05/09/2019     05/09/2019    CREATININE 1.0 05/09/2019    BUN 22 05/09/2019    CO2 26 05/09/2019    TSH 4.438 (H) 05/09/2019    PSA 0.77 05/09/2019    INR 1.0 08/11/2014    GLUF 104 04/03/2004    HGBA1C 6.4 (H) 05/09/2019     Physical Exam   Constitutional: He is oriented to person, place, and time. He appears well-developed and well-nourished.   HENT:   Head: Normocephalic and atraumatic.   Mouth/Throat: No oropharyngeal exudate.   Eyes: Conjunctivae and EOM are normal.   Neck: Normal range of motion. Neck supple. No JVD present. No thyromegaly present.   No masses     Cardiovascular: Normal rate and regular rhythm. Exam reveals no gallop.   No murmur heard.  Pulmonary/Chest: Effort normal and breath sounds normal. No respiratory distress. He has no wheezes. He exhibits no tenderness.   Abdominal: Soft. Bowel sounds are normal. He exhibits no distension  and no mass. There is no tenderness.   No Organomegaly   Musculoskeletal: He exhibits tenderness and deformity. He exhibits no edema.   Knees show OA type changes with mild pain on ROM/no warmth or effusion   Lymphadenopathy:     He has no cervical adenopathy.   Neurological: He is alert and oriented to person, place, and time. No cranial nerve deficit.   Skin: Skin is warm and dry. No rash noted.   Shingles rash resolved   Psychiatric: He has a normal mood and affect. Judgment normal.   Vitals reviewed.    Protective Sensation (w/ 10 gram monofilament):  Right: Intact  Left: Decreased    Visual Inspection:  Normal -  Bilateral    Pedal Pulses:   Right: Present  Left: Present    Posterior tibialis:   Right:Present  Left: Present      Assessment:       1. Annual physical exam    2. Hypothyroidism due to acquired atrophy of thyroid    3. Sleep apnea, unspecified type    4. Coronary artery disease involving native coronary artery of native heart without angina pectoris    5. Essential hypertension    6. Pre-diabetes        Plan:     Health Maintenance   Topic Date Due    Influenza Vaccine  08/01/2019    High Dose Statin  04/12/2020    Aspirin/Antiplatelet Therapy  04/12/2020    Lipid Panel  05/09/2024    TETANUS VACCINE  05/16/2024    Colonoscopy  07/27/2026    Hepatitis C Screening  Completed    Pneumococcal Vaccine (65+ Low/Medium Risk)  Completed        Drake was seen today for annual exam.    Diagnoses and all orders for this visit:    Annual physical exam    Hypothyroidism due to acquired atrophy of thyroid/undersupplemented  -     Increase Levothyroxine (SYNTHROID) to 125 MCG tablet; 1 tab in AM on empty stomach  -     TSH; Future x 6 weeks    Sleep apnea, unspecified type        -     Continue Home CPAP at 9cm/Face mask    Coronary artery disease involving native coronary artery of native heart without angina pectoris/stable        -      Continue Lipitor 40mg QD and Baby ASA QD        -      Annual  Cardiology f/u/Dr Mccoy    Essential hypertension/controlled        -      Continue Lopressor 25mg BID and Altace 10mg QD                *ACE Inhibitor study discussed/pt defers on change at present    Pre-diabetes/controlled with ADA diet  -     Microalbumin/creatinine urine ratio    Knee OA        - Orthopedics Appt/Dr [facility] pending

## 2019-05-31 ENCOUNTER — OFFICE VISIT (OUTPATIENT)
Dept: URGENT CARE | Facility: CLINIC | Age: 74
End: 2019-05-31
Payer: MEDICARE

## 2019-05-31 ENCOUNTER — TELEPHONE (OUTPATIENT)
Dept: ORTHOPEDICS | Facility: CLINIC | Age: 74
End: 2019-05-31

## 2019-05-31 VITALS
HEART RATE: 80 BPM | RESPIRATION RATE: 18 BRPM | SYSTOLIC BLOOD PRESSURE: 133 MMHG | OXYGEN SATURATION: 98 % | WEIGHT: 170 LBS | BODY MASS INDEX: 26.68 KG/M2 | HEIGHT: 67 IN | DIASTOLIC BLOOD PRESSURE: 85 MMHG | TEMPERATURE: 99 F

## 2019-05-31 DIAGNOSIS — S01.81XA LACERATION OF TEMPLE, INITIAL ENCOUNTER: Primary | ICD-10-CM

## 2019-05-31 PROCEDURE — 1101F PR PT FALLS ASSESS DOC 0-1 FALLS W/OUT INJ PAST YR: ICD-10-PCS | Mod: CPTII,S$GLB,, | Performed by: FAMILY MEDICINE

## 2019-05-31 PROCEDURE — 3079F DIAST BP 80-89 MM HG: CPT | Mod: CPTII,S$GLB,, | Performed by: FAMILY MEDICINE

## 2019-05-31 PROCEDURE — 99214 OFFICE O/P EST MOD 30 MIN: CPT | Mod: S$GLB,,, | Performed by: FAMILY MEDICINE

## 2019-05-31 PROCEDURE — 1101F PT FALLS ASSESS-DOCD LE1/YR: CPT | Mod: CPTII,S$GLB,, | Performed by: FAMILY MEDICINE

## 2019-05-31 PROCEDURE — 99214 PR OFFICE/OUTPT VISIT, EST, LEVL IV, 30-39 MIN: ICD-10-PCS | Mod: S$GLB,,, | Performed by: FAMILY MEDICINE

## 2019-05-31 PROCEDURE — 3075F PR MOST RECENT SYSTOLIC BLOOD PRESS GE 130-139MM HG: ICD-10-PCS | Mod: CPTII,S$GLB,, | Performed by: FAMILY MEDICINE

## 2019-05-31 PROCEDURE — 3079F PR MOST RECENT DIASTOLIC BLOOD PRESSURE 80-89 MM HG: ICD-10-PCS | Mod: CPTII,S$GLB,, | Performed by: FAMILY MEDICINE

## 2019-05-31 PROCEDURE — 3075F SYST BP GE 130 - 139MM HG: CPT | Mod: CPTII,S$GLB,, | Performed by: FAMILY MEDICINE

## 2019-05-31 NOTE — TELEPHONE ENCOUNTER
----- Message from Jose Caraballo sent at 5/31/2019  9:33 AM CDT -----  Contact: Wife - Samara  Needs Advice    Reason for call: Wife states that the patient need to be seen for traci knee pain Pt was last seen by Dr [facility] on 2/20/2015 only want to be seen by Dr [facility]        Communication Preference: Phone     Additional Information: n/a   we spoke  Will call next week when I can get soameone to open Dr's schedule

## 2019-05-31 NOTE — PATIENT INSTRUCTIONS
Face Laceration: Skin Glue  A laceration is a cut through the skin. A laceration on your face has been closed with skin glue. This is used on cuts that have smooth edges and are not infected. In some cases, a lower layer of skin may be sutured before skin glue is put on. The skin glue closes the cut within a few minutes. It also provides a water-resistant cover. No bandage is needed. Skin glue peels off on its own within 5 to 10 days.     Home care  · Your healthcare provider may prescribe an antibiotic. This is to help prevent infection. Follow all instructions for taking this medicine. Take the medicine every day until it is gone or you are told to stop. You should not have any left over.  · The healthcare provider may prescribe medicines for pain. Follow instructions for taking them.  · Follow the healthcare providers instructions on how to care for the cut.  · Keep the wound clean and dry. You may shower or bathe as usual, but do not use soaps, lotions, or ointments on the wound area. Do not scrub the wound. After bathing, pat the wound dry with a soft towel. Avoid soaking the cut in water.  · Do not scratch, rub, or pick at the film. Do not place tape directly over the film.  · Do not apply liquids (such as peroxide), ointments, or creams to the wound while the film is in place.  · Most facial skin wounds heal without problems. However, an infection sometimes occurs despite proper treatment. Therefore, watch for the signs of infection listed below.  Follow-up care  Follow up with your health care provider as advised. Stitches should be removed from the face within 5 days. Stitches and staples should be removed from other parts of the body within 7-14 days. If dissolving stitches were used in the mouth, these will fall out or dissolve without the need for removal. If tape closures were used, remove them yourself if they have not fallen off after 7 days. If skin glue was used, the film will fall off by itself  in 5-10 days. Notify your healthcare provider if you notice persistent numbness or weakness in the injured hand.  When to seek medical advice  Call your health care provider right away if any of these occur:  · Wound bleeds more than a small amount or bleeding doesn't stop  · Signs of infection:  ¨ Increasing pain in the wound  ¨ Increasing wound redness or swelling  ¨ Pus or bad odor coming from the wound  ¨ Fever of 100.4°F (38.ºC) or as directed by your healthcare provider  · Wound edges re-open  Date Last Reviewed: 6/14/2015 © 2000-2017 [employer]. [address] [place] [address]. All rights reserved. This information is not intended as a substitute for professional medical care. Always follow your healthcare professional's instructions.

## 2019-05-31 NOTE — PROGRESS NOTES
"Subjective:       Patient ID: Drake Barraza is a 74 y.o. male.    Vitals:  height is 5' 7" (1.702 m) and weight is 77.1 kg (170 lb). His oral temperature is 99.2 °F (37.3 °C). His blood pressure is 133/85 and his pulse is 80. His respiration is 18 and oxygen saturation is 98%.     Chief Complaint: Laceration (Right Side of Temple/Head)    This is a 74 y.o. male who presents today with a chief complaint of laceration to the right temple/forehead area.  Patient states he was struck by a bush.    Patient denies LOC, neck pain, or back pain.  Last Tetanus was 2014.      Laceration    The incident occurred 1 to 3 hours ago. The laceration is located on the scalp. The laceration mechanism was a blunt object. The pain is at a severity of 2/10. The pain is mild. The pain has been constant since onset. It is unknown if a foreign body is present. His tetanus status is UTD (2014).       Constitution: Negative for fatigue.   HENT: Negative for facial swelling and facial trauma.    Neck: Negative for neck stiffness.   Cardiovascular: Negative for chest trauma.   Eyes: Negative for eye trauma, double vision and blurred vision.   Gastrointestinal: Negative for abdominal trauma, abdominal pain and rectal bleeding.   Genitourinary: Negative for hematuria, genital trauma and pelvic pain.   Musculoskeletal: Negative for pain, trauma, joint swelling, abnormal ROM of joint and pain with walking.   Skin: Positive for laceration. Negative for color change, wound and abrasion.   Neurological: Negative for dizziness, history of vertigo, light-headedness, coordination disturbances, altered mental status and loss of consciousness.   Hematologic/Lymphatic: Negative for history of bleeding disorder.   Psychiatric/Behavioral: Negative for altered mental status.       Objective:      Physical Exam   Constitutional: He appears well-developed and well-nourished.   HENT:   Head: Normocephalic and atraumatic.   Cardiovascular: Normal rate, regular " rhythm and normal heart sounds.   Pulmonary/Chest: Effort normal and breath sounds normal.   Skin: Laceration (3cm almost linear laceration right temple) noted.   Nursing note and vitals reviewed.      Assessment:       1. Laceration of temple, initial encounter      with dermabond  Plan:         Laceration of temple, initial encounter  -     Laceration Repair    discusses wound care and signs and symptoms of infection

## 2019-05-31 NOTE — PROCEDURES
"Laceration Repair  Date/Time: 5/31/2019 4:05 PM  Performed by: Satinder Martinez MD  Authorized by: Satinder Martinez MD   Consent Done: Yes  Consent: Verbal consent obtained.  Risks and benefits: risks, benefits and alternatives were discussed  Consent given by: patient  Patient understanding: patient states understanding of the procedure being performed  Patient consent: the patient's understanding of the procedure matches consent given  Patient identity confirmed: verbally with patient  Time out: Immediately prior to procedure a "time out" was called to verify the correct patient, procedure, equipment, support staff and site/side marked as required.  Body area: head/neck  Laceration length: 3 cm  Foreign bodies: no foreign bodies  Tendon involvement: none  Nerve involvement: none  Vascular damage: no  Patient sedated: no  Preparation: Patient was prepped and draped in the usual sterile fashion.  Irrigation solution: saline  Irrigation method: syringe  Amount of cleaning: standard  Debridement: none  Degree of undermining: none  Skin closure: glue  Approximation: close  Approximation difficulty: simple  Dressing: open (no dressing)  Patient tolerance: Patient tolerated the procedure well with no immediate complications  Comments: Discussed wound care with patient and signs and symptoms of infection        "

## 2019-06-04 ENCOUNTER — TELEPHONE (OUTPATIENT)
Dept: ORTHOPEDICS | Facility: CLINIC | Age: 74
End: 2019-06-04

## 2019-06-13 ENCOUNTER — HOSPITAL ENCOUNTER (OUTPATIENT)
Dept: RADIOLOGY | Facility: HOSPITAL | Age: 74
Discharge: HOME | End: 2019-06-13
Attending: ORTHOPAEDIC SURGERY
Payer: MEDICARE

## 2019-06-13 ENCOUNTER — OFFICE VISIT (OUTPATIENT)
Dept: ORTHOPEDICS | Facility: CLINIC | Age: 74
End: 2019-06-13
Payer: MEDICARE

## 2019-06-13 VITALS
HEIGHT: 67 IN | SYSTOLIC BLOOD PRESSURE: 134 MMHG | BODY MASS INDEX: 27.42 KG/M2 | HEART RATE: 55 BPM | WEIGHT: 174.69 LBS | DIASTOLIC BLOOD PRESSURE: 82 MMHG

## 2019-06-13 DIAGNOSIS — M17.9 OSTEOARTHRITIS OF KNEE, UNSPECIFIED (CODE): ICD-10-CM

## 2019-06-13 DIAGNOSIS — M17.9 OSTEOARTHRITIS OF KNEE, UNSPECIFIED (CODE): Primary | ICD-10-CM

## 2019-06-13 PROCEDURE — 73562 X-RAY EXAM OF KNEE 3: CPT | Mod: TC,50,HCNC

## 2019-06-13 PROCEDURE — 99999 PR PBB SHADOW E&M-EST. PATIENT-LVL III: CPT | Mod: PBBFAC,HCNC,, | Performed by: ORTHOPAEDIC SURGERY

## 2019-06-13 PROCEDURE — 3079F PR MOST RECENT DIASTOLIC BLOOD PRESSURE 80-89 MM HG: ICD-10-PCS | Mod: HCNC,CPTII,S$GLB, | Performed by: ORTHOPAEDIC SURGERY

## 2019-06-13 PROCEDURE — 99204 OFFICE O/P NEW MOD 45 MIN: CPT | Mod: HCNC,S$GLB,, | Performed by: ORTHOPAEDIC SURGERY

## 2019-06-13 PROCEDURE — 73560 XR KNEE 1 OR 2 VIEW RIGHT: ICD-10-PCS | Mod: 26,HCNC,RT, | Performed by: RADIOLOGY

## 2019-06-13 PROCEDURE — 3075F SYST BP GE 130 - 139MM HG: CPT | Mod: HCNC,CPTII,S$GLB, | Performed by: ORTHOPAEDIC SURGERY

## 2019-06-13 PROCEDURE — 99204 PR OFFICE/OUTPT VISIT, NEW, LEVL IV, 45-59 MIN: ICD-10-PCS | Mod: HCNC,S$GLB,, | Performed by: ORTHOPAEDIC SURGERY

## 2019-06-13 PROCEDURE — 3079F DIAST BP 80-89 MM HG: CPT | Mod: HCNC,CPTII,S$GLB, | Performed by: ORTHOPAEDIC SURGERY

## 2019-06-13 PROCEDURE — 1101F PT FALLS ASSESS-DOCD LE1/YR: CPT | Mod: HCNC,CPTII,S$GLB, | Performed by: ORTHOPAEDIC SURGERY

## 2019-06-13 PROCEDURE — 73560 X-RAY EXAM OF KNEE 1 OR 2: CPT | Mod: TC,HCNC,RT

## 2019-06-13 PROCEDURE — 99999 PR PBB SHADOW E&M-EST. PATIENT-LVL III: ICD-10-PCS | Mod: PBBFAC,HCNC,, | Performed by: ORTHOPAEDIC SURGERY

## 2019-06-13 PROCEDURE — 73560 X-RAY EXAM OF KNEE 1 OR 2: CPT | Mod: 26,HCNC,RT, | Performed by: RADIOLOGY

## 2019-06-13 PROCEDURE — 73562 X-RAY EXAM OF KNEE 3: CPT | Mod: 26,50,HCNC, | Performed by: RADIOLOGY

## 2019-06-13 PROCEDURE — 1101F PR PT FALLS ASSESS DOC 0-1 FALLS W/OUT INJ PAST YR: ICD-10-PCS | Mod: HCNC,CPTII,S$GLB, | Performed by: ORTHOPAEDIC SURGERY

## 2019-06-13 PROCEDURE — 73562 XR KNEE ORTHO BILAT: ICD-10-PCS | Mod: 26,50,HCNC, | Performed by: RADIOLOGY

## 2019-06-13 PROCEDURE — 3075F PR MOST RECENT SYSTOLIC BLOOD PRESS GE 130-139MM HG: ICD-10-PCS | Mod: HCNC,CPTII,S$GLB, | Performed by: ORTHOPAEDIC SURGERY

## 2019-06-13 NOTE — PROGRESS NOTES
HPI:    Drake Barraza is a 74 y.o. male who is here today for   Chief Complaint   Patient presents with    Left Knee - Pain    Right Knee - Pain    I saw the patient back in 2015.  At that time the patient was nearing knee replacement.  Patient is now to the point where he is limited with his activity level.    Duration: 12 months  Intensity: severe  Character of pain: sharp  Location: He reports that the pain is predominately  medial  Patient's pain increases with activity.  Pain is increased with weightbearing and interferes with activities of daily living.    He has tried conservative management including NSAIDS, injections, and activity modification without relief.    He has discussed options with his family and wishes to schedule TKA.     PMSFSH reviewed per clinic record       Past Medical History:   Diagnosis Date    Arthritis     CAD (coronary artery disease) 2003    RCA Stent 11/2003, s/p LAD Stent 12/2003                                                       Hyperlipidemia     Hypertension     Old myocardial infarct 11/2003      Inferior NSTEMI     MICHELLE on CPAP     Home CPAP at 9cm/Face Mask    Thyroid disease           Current Outpatient Medications:     aspirin 81 mg Tab, Take 1 tablet by mouth Daily.  , Disp: , Rfl:     atorvastatin (LIPITOR) 40 MG tablet, TAKE 1 TABLET EVERY DAY, Disp: 90 tablet, Rfl: 3    levothyroxine (SYNTHROID) 125 MCG tablet, 1 tab in AM on empty stomach, Disp: 90 tablet, Rfl: 1    metoprolol tartrate (LOPRESSOR) 25 MG tablet, TAKE 1 TABLET TWICE DAILY, Disp: 180 tablet, Rfl: 3    multivitamin (ONE DAILY MULTIVITAMIN) per tablet, Take 1 tablet by mouth once daily., Disp: , Rfl:     nitroGLYCERIN (NITROSTAT) 0.4 MG SL tablet, Place 1 tablet (0.4 mg total) under the tongue every 5 (five) minutes as needed for Chest pain., Disp: 100 tablet, Rfl: 2    omega-3 fatty acids-vitamin E (FISH OIL) 1,000 mg Cap, Take 1 capsule by mouth Daily.  , Disp: , Rfl:     ramipril  "(ALTACE) 10 MG capsule, TAKE 1 CAPSULE EVERY DAY, Disp: 90 capsule, Rfl: 3    sildenafil (VIAGRA) 100 MG tablet, Take 1 tablet (100 mg total) by mouth daily as needed for Erectile Dysfunction., Disp: 10 tablet, Rfl: 6    TURMERIC ROOT EXTRACT ORAL, Take 1 capsule by mouth., Disp: , Rfl:     UBIDECARENONE (COENZYME Q10) 100 mg Tab, Take 200 mg by mouth once daily. , Disp: , Rfl:     vitamin D 185 MG Tab, Take 185 mg by mouth once daily.  , Disp: , Rfl:     CINNAMON BARK (CINNAMON ORAL), Take 1 capsule by mouth once daily., Disp: , Rfl:     valACYclovir (VALTREX) 1000 MG tablet, Take 1 tablet (1,000 mg total) by mouth 3 (three) times daily. for 7 days, Disp: 21 tablet, Rfl: 0     Review of patient's allergies indicates:   Allergen Reactions    Hydrocodone-acetaminophen Rash     Other reaction(s): constipated        ROS  Constitutional: Negative for fever, Negative for weight loss  HENT Negative for congestion  Cardiovascular: Negative chest pain  Respiratory: Negative Shortness of breath  Heme: Negative excessive bleeding  Skin:NegativeItching, Negative breakdown  Musculoskeletal:Negative for back pain, Positive for joint pain, Negative muscle pain, Negative muscle weakness  Neurological: Negative for numbness and paresthesias   Psychiatric/Behavioral: Negative altered mental status, Negative for depression    Physical Exam:   /82 (BP Location: Right arm, Patient Position: Sitting, BP Method: Medium (Automatic))   Pulse (!) 55   Ht 5' 7" (1.702 m)   Wt 79.2 kg (174 lb 11.4 oz)   BMI 27.36 kg/m²   General appearance: This is a well-developed, Well nourished male No obvious acute distress.  Psychiatric: normal mood and affect;  pleasant and conversant; judgment and thought content normal  Gait is coordinated. Patient has antalgic gait to the bilateral  Cardiovascular: There are no swelling or varicosities present.   Respiratory: normal respiratory effort   Lymphatic: no adenopathy   Neurologic: alert " and oriented to person, place, and time   Deep Tendon Reflexes are normal;  Coordination and Balance: Normal   Musculoskeletal   Neck    ROM shows normal flexion and extension and lateral rotation    Palpation: Non-tender    Stability is normal    Strength is normal    Skin is normal without masses and lesions    Sensation is intact to light touch   Back    ROM showsnormal flexion, extension and     rotation    Palpation shows no masses    Stability is normal    Strength to flexion and extension well maintained    Core strength is diminished    Skin shows no rashes or cafe au lait spots;     Sensation is intact to light touch  Right hip   Range of motion normal    Left Hip  Range of motionnormal    Right Knee  Swelling Mild  TendernessMedial joint line and Lateral joint line  Range of Motion:   Motion is painfulYes  Crepitance presentYes    Right Leg  Neurologic Intact  Pulses Intact    Left Knee: Swelling Mild  TendernessMedial joint line  Range of Motion: 5-120   Motion is painful Yes    Left Leg   Neurologic Intact  Pulses Intact    Radiograph: Show severe degenerative arthritis with subchondral sclerosis, periarticular osteophytes and narrowing of joint space.  Angle: mild varus    Physical therapy is contraindicated due to potential bone loss on this severe arthritic joint.    Assessment:  Knee arthritis right      He will need to be cleared in our PreOp center.         .    He  has a past medical history of Arthritis, CAD (coronary artery disease) (2003), Hyperlipidemia, Hypertension, Old myocardial infarct (11/2003  ), MICHELLE on CPAP, and Thyroid disease. . We will have to take this into account. He  will be followed by the hospitalist service while in the hospital.       We have gone over the hospitalization and recovery with him as well.  This is typically around 2 weeks on a walker and transition to a cane after that.  He will have home health likely for 3-4 weeks and then transition to outpatient if  necessary.  He is agreement with this plan of care and is ready to proceed.  I will see him back for clinical recheck at the 2-week postop shahram.  I will see him back for clinical recheck for any other questions or problems as needed and certainly for any other issues in the interim.    We have discussed risks of total knee replacement which include but are not limited to blood clots in the legs that can travel to the lungs (pulmonary embolism). Pulmonary embolism can cause shortness of breath, chest pain, and even shock. Other risks include urinary tract infection, nausea and vomiting (usually related to pain medication), chronic knee pain and stiffness, bleeding into the knee joint, nerve damage, blood vessel injury, and infection of the knee which can require re-operation. Furthermore, the risks of anesthesia include potential heart, lung, kidney, and liver damage.

## 2019-06-20 ENCOUNTER — TELEPHONE (OUTPATIENT)
Dept: INTERNAL MEDICINE | Facility: CLINIC | Age: 74
End: 2019-06-20

## 2019-06-20 ENCOUNTER — ANESTHESIA EVENT (OUTPATIENT)
Dept: SURGERY | Facility: HOSPITAL | Age: 74
End: 2019-06-20
Payer: MEDICARE

## 2019-06-20 DIAGNOSIS — Z01.818 PRE-OP EVALUATION: Primary | ICD-10-CM

## 2019-06-20 DIAGNOSIS — M79.606 PAIN OF LOWER EXTREMITY, UNSPECIFIED LATERALITY: Primary | ICD-10-CM

## 2019-06-20 NOTE — ANESTHESIA PREPROCEDURE EVALUATION
Jillian Lizarraga RN   Registered Nurse      Pre Admission Screening   Signed                       []Hide copied text    []Hover for details  Anesthesia Assessment: Preoperative EQUATION     Planned Procedure: Procedure(s) (LRB):  REPLACEMENT-KNEE-TOTAL (Right)  Requested Anesthesia Type:Femoral Block  Surgeon: [facility] L. [facility] Jr., MD  Service: Orthopedics  Known or anticipated Date of Surgery:7/22/2019       Optimization:  Anesthesia Preop Clinic Assessment  Indicated    Medical Opinion Indicated                                                      Pre-habilitation suggested:   Physical Therapy  /  Nutrition Consult /  Outpatient Case Management Consult                Plan:    Testing:  PT/INR and UA   Pre-anesthesia  visit                                        Visit focus: possible regional anesthesia and/or nerve block                            Consultation:Patient's PCP for a statement of optimization       Navigation: Tests Scheduled.                         Consults scheduled.                        Results will be tracked by Preop Clinic.                            Electronically signed by Jillian Lizarraga RN at 6/20/2019 11:51 AM                                                                                                   06/20/2019  Drake Barraza is a 74 y.o., male.    Anesthesia Evaluation    I have reviewed the Patient Summary Reports.    I have reviewed the Nursing Notes.   I have reviewed the Medications.     Review of Systems  Anesthesia Hx:  No problems with previous Anesthesia  History of prior surgery of interest to airway management or planning: Previous anesthesia: General Colonoscopy 2016 with general anesthesia.  Procedure performed at an [facility]. Personal Hx of Anesthesia complications (after hernia repair 2006)  Severe Sore Throat after Anesthesia   Social:  Non-Smoker, Alcohol Use Wine 2 glasses weekly   Hematology/Oncology:  Hematology Normal   Oncology Normal      EENT/Dental:   Contact lenses OU   Cardiovascular:   Hypertension Past MI () CAD (stent x 3 )   hyperlipidemia Cuts grass, 10,000 steps + weekly, yardwork; climb 1 FOS;  Denies CP, SOB    Followed by cardiology, Dr Mccoy, once a year    Pulmonary:   Denies Asthma.  Denies Shortness of breath. Sleep Apnea, CPAP    Renal/:  Renal/ Normal     Hepatic/GI:   Denies GERD.    Musculoskeletal:   Arthritis (OA bilat knees)     Neurological:   Denies CVA. Denies Seizures.    Endocrine:   Denies Diabetes. Hypothyroidism    Psych:  Psychiatric Normal           Physical Exam  General:  Well nourished    Airway/Jaw/Neck:  Airway Findings: Mouth Opening: Normal Tongue: Normal  General Airway Assessment: Adult  Mallampati: II  Jaw/Neck Findings:     Neck ROM: Normal ROM      Dental:  Dental Findings: Upper partial dentures, Lower partial dentures   Chest/Lungs:  Chest/Lungs Findings: Clear to auscultation, Normal Respiratory Rate     Heart/Vascular:  Heart Findings: Rate: Normal  Rhythm: Regular Rhythm  Sounds: Normal        Mental Status:  Mental Status Findings:  Cooperative, Alert and Oriented       Attended joint class 19; after care by wife, Samara and son, Paul.    Pt was seen in POC 7/10/19; DSE completed today; pending PCP clearance, Dr Read./Barbara Pringle RN    Addendum 19 1620: per Dr Middleton: Spoke with Mr Barraza ie his  Stress ECHO(7/10)-showed no ischemia and normal LVF; Lab from May and  all ok. Pt is without contraindications to TKR per Dr [facility] .  Lab reviewed-PT/INR and UA (7/10/19) final results WNL/Barbara Pringle, RICA        Anesthesia Plan  Type of Anesthesia, risks & benefits discussed:  Anesthesia Type:  CSE, epidural, general, spinal, regional, MAC  Patient's Preference:   Intra-op Monitoring Plan: standard ASA monitors  Intra-op Monitoring Plan Comments:   Post Op Pain Control Plan: multimodal analgesia  Post Op Pain Control Plan Comments:   Induction:    IV  Beta Blocker:  Patient is on a Beta-Blocker and has received one dose within the past 24 hours (No further documentation required).       Informed Consent: Patient understands risks and agrees with Anesthesia plan.  Questions answered. Anesthesia consent signed with patient.  ASA Score: 3     Day of Surgery Review of History & Physical:    H&P update referred to the surgeon.         Ready For Surgery From Anesthesia Perspective.

## 2019-06-20 NOTE — PRE ADMISSION SCREENING
Anesthesia Assessment: Preoperative EQUATION    Planned Procedure: Procedure(s) (LRB):  REPLACEMENT-KNEE-TOTAL (Right)  Requested Anesthesia Type:Femoral Block  Surgeon: [facility]. [facility] Jr., MD  Service: Orthopedics  Known or anticipated Date of Surgery:7/22/2019        Optimization:  Anesthesia Preop Clinic Assessment  Indicated    Medical Opinion Indicated           Pre-habilitation suggested:   Physical Therapy  /  Nutrition Consult /  Outpatient Case Management Consult      Plan:    Testing:  PT/INR and UA   Pre-anesthesia  visit       Visit focus: possible regional anesthesia and/or nerve block      Consultation:Patient's PCP for a statement of optimization       Navigation: Tests Scheduled.              Consults scheduled.             Results will be tracked by Preop Clinic.

## 2019-06-20 NOTE — TELEPHONE ENCOUNTER
----- Message from Evon Celeste MA sent at 6/20/2019 12:00 PM CDT -----      Hello the above patient is having Knee Replacement sx on 07/22/2019 with Dr.[facility].  Anesthesia is in need of sx clearance.  He was last seen in your clinic on 05/16/2019.  Will you be able to clear him for sx from that visit or will the patient need to be seen in your clinic again prior to sx?

## 2019-06-24 ENCOUNTER — TELEPHONE (OUTPATIENT)
Dept: INTERNAL MEDICINE | Facility: CLINIC | Age: 74
End: 2019-06-24

## 2019-06-24 ENCOUNTER — TELEPHONE (OUTPATIENT)
Dept: PREADMISSION TESTING | Facility: HOSPITAL | Age: 74
End: 2019-06-24

## 2019-06-24 NOTE — TELEPHONE ENCOUNTER
Delfino please call Mr Barraza to schedule him a  Stress ECHO and CXR,after which I can clear him for his Orthopedic surgery ()by chart since I just saw him.  Thanks

## 2019-06-24 NOTE — TELEPHONE ENCOUNTER
Appt. 7/10 spoke with patient and wife. They said the PCP know about the surgery and order the test that he is having.

## 2019-06-24 NOTE — TELEPHONE ENCOUNTER
----- Message from Evon Celeste MA sent at 6/24/2019 10:33 AM CDT -----      ----- Message -----  From: Evon Celeste MA  Sent: 6/20/2019  12:00 PM  To: Catherine Middleton MD, [facility] the above patient is having Knee Replacement sx on 07/22/2019 with Dr[facility].  Anesthesia is in need of sx clearance.  He was last seen in your clinic on 05/16/2019.  Will you be able to clear him for sx from that visit or will the patient need to be seen in your clinic again prior to sx?

## 2019-06-24 NOTE — TELEPHONE ENCOUNTER
Evon,  Mr Barraza will need a Stress test and CXR, which we will schedule. After these are resulted I should be able to get him cleared for Dr [facility]'s TKR surgery.  Dr Middleton

## 2019-06-24 NOTE — TELEPHONE ENCOUNTER
----- Message from Maryjane De sent at 6/24/2019  2:20 PM CDT -----  Contact: Patient [phone]  Patient has questions about orders for xray and echo. Request callback  Please call and advise  Thank you

## 2019-06-24 NOTE — TELEPHONE ENCOUNTER
----- Message from Jillian Lizarraga, RN sent at 6/24/2019 12:11 PM CDT -----  NEEDS POC, PT TO CALL TO MAKE APPOINTMENT FOR CLEARANCE LOW LING

## 2019-06-28 ENCOUNTER — LAB VISIT (OUTPATIENT)
Dept: LAB | Facility: HOSPITAL | Age: 74
End: 2019-06-28
Attending: INTERNAL MEDICINE
Payer: MEDICARE

## 2019-06-28 DIAGNOSIS — E03.4 HYPOTHYROIDISM DUE TO ACQUIRED ATROPHY OF THYROID: ICD-10-CM

## 2019-06-28 LAB — TSH SERPL DL<=0.005 MIU/L-ACNC: 2.6 UIU/ML (ref 0.4–4)

## 2019-06-28 PROCEDURE — 36415 COLL VENOUS BLD VENIPUNCTURE: CPT | Mod: HCNC,PO

## 2019-06-28 PROCEDURE — 84443 ASSAY THYROID STIM HORMONE: CPT | Mod: HCNC

## 2019-07-10 ENCOUNTER — HOSPITAL ENCOUNTER (OUTPATIENT)
Dept: CARDIOLOGY | Facility: CLINIC | Age: 74
Discharge: HOME | End: 2019-07-10
Attending: INTERNAL MEDICINE
Payer: MEDICARE

## 2019-07-10 ENCOUNTER — HOSPITAL ENCOUNTER (OUTPATIENT)
Dept: PREADMISSION TESTING | Facility: HOSPITAL | Age: 74
Discharge: HOME | End: 2019-07-10
Attending: ANESTHESIOLOGY
Payer: MEDICARE

## 2019-07-10 VITALS
BODY MASS INDEX: 27.47 KG/M2 | TEMPERATURE: 98 F | DIASTOLIC BLOOD PRESSURE: 66 MMHG | SYSTOLIC BLOOD PRESSURE: 121 MMHG | HEIGHT: 67 IN | RESPIRATION RATE: 16 BRPM | HEART RATE: 61 BPM | WEIGHT: 175 LBS | OXYGEN SATURATION: 97 %

## 2019-07-10 VITALS
DIASTOLIC BLOOD PRESSURE: 62 MMHG | WEIGHT: 170 LBS | BODY MASS INDEX: 26.68 KG/M2 | RESPIRATION RATE: 16 BRPM | SYSTOLIC BLOOD PRESSURE: 108 MMHG | HEIGHT: 67 IN

## 2019-07-10 DIAGNOSIS — Z01.818 PRE-OP EVALUATION: ICD-10-CM

## 2019-07-10 LAB
ASCENDING AORTA: 3.35 CM
AV INDEX (PROSTH): 1.01
AV MEAN GRADIENT: 2 MMHG
AV PEAK GRADIENT: 3 MMHG
AV VALVE AREA: 3.87 CM2
AV VELOCITY RATIO: 0.96
BSA FOR ECHO PROCEDURE: 1.91 M2
CV ECHO LV RWT: 0.27 CM
CV STRESS BASE HR: 52 BPM
DIASTOLIC BLOOD PRESSURE: 68 MMHG
DOP CALC AO PEAK VEL: 0.9 M/S
DOP CALC AO VTI: 18.71 CM
DOP CALC LVOT AREA: 3.8 CM2
DOP CALC LVOT DIAMETER: 2.21 CM
DOP CALC LVOT PEAK VEL: 0.86 M/S
DOP CALC LVOT STROKE VOLUME: 72.39 CM3
DOP CALCLVOT PEAK VEL VTI: 18.88 CM
E WAVE DECELERATION TIME: 220.86 MSEC
E/A RATIO: 1.08
E/E' RATIO: 8.67 M/S
ECHO LV POSTERIOR WALL: 0.61 CM (ref 0.6–1.1)
FRACTIONAL SHORTENING: 35 % (ref 28–44)
INTERVENTRICULAR SEPTUM: 0.7 CM (ref 0.6–1.1)
IVRT: 0.11 MSEC
LA MAJOR: 5.39 CM
LA MINOR: 5.67 CM
LA WIDTH: 3.31 CM
LEFT ATRIUM SIZE: 4.08 CM
LEFT ATRIUM VOLUME INDEX: 33.6 ML/M2
LEFT ATRIUM VOLUME: 63.44 CM3
LEFT INTERNAL DIMENSION IN SYSTOLE: 2.9 CM (ref 2.1–4)
LEFT VENTRICLE DIASTOLIC VOLUME INDEX: 48.41 ML/M2
LEFT VENTRICLE DIASTOLIC VOLUME: 91.35 ML
LEFT VENTRICLE MASS INDEX: 46 G/M2
LEFT VENTRICLE SYSTOLIC VOLUME INDEX: 17.1 ML/M2
LEFT VENTRICLE SYSTOLIC VOLUME: 32.23 ML
LEFT VENTRICULAR INTERNAL DIMENSION IN DIASTOLE: 4.48 CM (ref 3.5–6)
LEFT VENTRICULAR MASS: 87.29 G
LV LATERAL E/E' RATIO: 7.22 M/S
LV SEPTAL E/E' RATIO: 10.83 M/S
MV PEAK A VEL: 0.6 M/S
MV PEAK E VEL: 0.65 M/S
OHS CV CPX 1 MINUTE RECOVERY HEART RATE: 130 BPM
OHS CV CPX 85 PERCENT MAX PREDICTED HEART RATE MALE: 124
OHS CV CPX MAX PREDICTED HEART RATE: 146
OHS CV CPX PATIENT IS FEMALE: 0
OHS CV CPX PATIENT IS MALE: 1
OHS CV CPX PEAK DIASTOLIC BLOOD PRESSURE: 69 MMHG
OHS CV CPX PEAK HEAR RATE: 133 BPM
OHS CV CPX PEAK RATE PRESSURE PRODUCT: NORMAL
OHS CV CPX PEAK SYSTOLIC BLOOD PRESSURE: 172 MMHG
OHS CV CPX PERCENT MAX PREDICTED HEART RATE ACHIEVED: 91
OHS CV CPX RATE PRESSURE PRODUCT PRESENTING: 5876
PISA TR MAX VEL: 2.3 M/S
PULM VEIN S/D RATIO: 1.09
PV PEAK D VEL: 0.45 M/S
PV PEAK S VEL: 0.49 M/S
RA MAJOR: 5.39 CM
RA PRESSURE: 3 MMHG
RA WIDTH: 2.85 CM
RIGHT VENTRICULAR END-DIASTOLIC DIMENSION: 3.91 CM
RV TISSUE DOPPLER FREE WALL SYSTOLIC VELOCITY 1 (APICAL 4 CHAMBER VIEW): 9.1 CM/S
SINUS: 3.87 CM
STJ: 2.87 CM
SYSTOLIC BLOOD PRESSURE: 113 MMHG
TDI LATERAL: 0.09 M/S
TDI SEPTAL: 0.06 M/S
TDI: 0.08 M/S
TR MAX PG: 21 MMHG
TRICUSPID ANNULAR PLANE SYSTOLIC EXCURSION: 2.33 CM
TV REST PULMONARY ARTERY PRESSURE: 24 MMHG

## 2019-07-10 PROCEDURE — 93351 STRESS TTE COMPLETE: CPT | Mod: HCNC,S$GLB,, | Performed by: INTERNAL MEDICINE

## 2019-07-10 PROCEDURE — 93325 ECHOCARDIOGRAM STRESS TEST WITH COLOR FLOW DOPPLER (CUPID ONLY): ICD-10-PCS | Mod: HCNC,S$GLB,, | Performed by: INTERNAL MEDICINE

## 2019-07-10 PROCEDURE — 93320 DOPPLER ECHO COMPLETE: CPT | Mod: HCNC,S$GLB,, | Performed by: INTERNAL MEDICINE

## 2019-07-10 PROCEDURE — 93325 DOPPLER ECHO COLOR FLOW MAPG: CPT | Mod: HCNC,S$GLB,, | Performed by: INTERNAL MEDICINE

## 2019-07-10 PROCEDURE — 93320 ECHOCARDIOGRAM STRESS TEST WITH COLOR FLOW DOPPLER (CUPID ONLY): ICD-10-PCS | Mod: HCNC,S$GLB,, | Performed by: INTERNAL MEDICINE

## 2019-07-10 PROCEDURE — 93351 ECHOCARDIOGRAM STRESS TEST WITH COLOR FLOW DOPPLER (CUPID ONLY): ICD-10-PCS | Mod: HCNC,S$GLB,, | Performed by: INTERNAL MEDICINE

## 2019-07-10 RX ORDER — ATROPINE SULFATE 0.1 MG/ML
1 INJECTION INTRAVENOUS
Status: DISCONTINUED | OUTPATIENT
Start: 2019-07-10 | End: 2019-07-10 | Stop reason: CLARIF

## 2019-07-10 RX ORDER — DOBUTAMINE HYDROCHLORIDE 200 MG/100ML
10 INJECTION INTRAVENOUS
Status: DISCONTINUED | OUTPATIENT
Start: 2019-07-10 | End: 2019-07-10 | Stop reason: CLARIF

## 2019-07-10 RX ADMIN — DOBUTAMINE HYDROCHLORIDE 10 MCG/KG/MIN: 200 INJECTION INTRAVENOUS at 10:07

## 2019-07-10 RX ADMIN — ATROPINE SULFATE 1 MG: 0.1 INJECTION INTRAVENOUS at 10:07

## 2019-07-10 NOTE — DISCHARGE INSTRUCTIONS
Your surgery has been scheduled for:__________________________________________    You should report to:  ____[facility], located on the [employer] side of the first floor of the           [facility] ([phone])  ____[facility], located on the [facility] side of the            Second floor of the [facility] ([phone])  ____3rd Floor [facility] located on the [facility] side of the [facility] [phone]  Please Note   - Tell your doctor if you take Aspirin, products containing Aspirin, herbal medications  or blood thinners, such as Coumadin, Ticlid, or Plavix.  (Consult your provider regarding holding or stopping before surgery).  - Arrange for someone to drive you home following surgery.  You will not be allowed to leave the surgical facility alone or drive yourself home following sedation and anesthesia.  Before Surgery  - Stop taking all herbal medications 14days prior to surgery  - No Motrin/Advil (Ibuprofen) 7 days before surgery  - No Aleve (Naproxen) 7 days before surgery  - Stop Taking Asprin, products containing Asprin _____days before surgery  - Stop taking blood thinners_______days before surgery  - No Goody's/BC  Powder 7 days before surgery  - Refrain from drinking alcoholic beverages for 24hours before and after surgery  - Stop or limit smoking _________days before surgery  - You may take Tylenol for pain    Night before Surgery  NOTHING TO EAT OR DRINK AFTER MIDNIGHT OR FOLLOW SURGEON'S INSTRUCTIONS  - Take a shower or bath (shower is recommended).  Bathe with Hibiclens soap or an antibacterial soap from the neck down.  If not supplied by your surgeon, hibiclens soap will need to be purchased over the counter in pharmacy.  Rinse soap off thoroughly.  - Shampoo your hair with your regular shampoo  The Day of Surgery  ·  If you are told to take medication on the morning of surgery, it may be taken with  a sip of water.   - Take another bath or shower with hibiclens or any antibacterial soap, to reduce the chance of infection.  - Take heart and blood pressure medications with a small sip of water, as advised by the perioperative team.  - Do not take fluid pills  - You may brush your teeth and rinse your mouth, but do not swall any additional water.   - Do not apply perfumes, powder, body lotions or deodorant on the day of surgery.  - Nail polish should be removed.  - Do not wear makeup or moisturizer  - Wear comfortable clothes, such as a button front shirt and loose fitting pants.  - Leave all jewelry, including body piercings, and valuables at home.    - Bring any devices you will neeed after surgery such as crutches or canes.  - If you have sleep apnea, please bring your CPAP machine  In the event that your physical condition changes including the onset of a cold or respiratory illness, or if you have to delay or cancel your surgery, please notify your surgeon.      Anesthesia: Regional Anesthesia    Youre scheduled for surgery. During surgery, youll receive medicine called anesthesia to keep you comfortable and pain-free. Your surgeon has decided that youll receive regional anesthesia. This sheet tells you what to expect with this type of anesthesia.  What is regional anesthesia?  Regional anesthesia numbs one region of your body. The anesthesia may be given around nerves or into veins in your arms, neck, or legs (nerve block or Tiffanie block). Or it may be sent into the spinal fluid (spinal anesthesia) or into the space just outside the spinal fluid (epidural anesthesia). You may also be given sedatives to help you relax.  Nerve block or [place] block  A small area of the body, such as an arm or leg, can be numbed using a nerve block or Tiffanie block.  · Nerve block. During a nerve block, your skin is numbed. A needle is then inserted near nerves that serve the area to be numbed. Anesthetic is sent through the  needle.  · IV regional or Tiffanie block. For this type of block, an IV line is put into a vein. The blood flow to the area to be numbed is blocked for a short time. Anesthetic is sent through the IV.  Spinal anesthesia  Spinal anesthesia numbs your body from about the waist down.  · Anesthetic is injected into the spinal fluid. This is a substance that surrounds the spinal cord in your spinal column. The anesthetic blocks pain traveling from the body to the brain.  · To receive the anesthetic, your skin is numbed at the injection site on your back.  · A needle is then inserted into the spinal space. Anesthetic is sent into the spinal fluid through the needle.  Epidural anesthesia  Epidural anesthesia is most commonly used during childbirth and may also be used after surgical procedures of the chest, belly, and legs.  · Anesthetic is injected into the epidural space. This is just outside the dural sac which contains the spinal fluid.  · To receive the anesthetic, your skin is numbed at the injection site on your back.  · A needle is then inserted into the epidural space. Anesthetic is sent into the epidural space through the needle.  · A small flexible catheter may be attached to the needle and left in place. This allows for continuous injections or infusions of anesthetic.  Anesthesia tools and medicines that might be near you during your procedure  · Local anesthetic. This medicine is given through a needle numbs one region of your body.  · Electrocardiography leads (electrodes). These are used to record your heart rate and rhythm.  · Blood pressure cuff. A cuff is placed on your arm to keep track of your blood pressure.  · Pulse oximeter. This small clip is placed on the end of the finger. It measures your blood oxygen level.  · Sedatives. These medicines may be given through an IV. They help to relax you and keep you comfortable. You may stay awake or sleep lightly.  · Oxygen. You may be given oxygen through a  facemask.  Risks and possible complications  Regional anesthesia carries some risks. These include:  · Nausea and vomiting  · Headache  · Backache  · Decreased blood pressure  · Allergic reaction to the anesthetic  · Ongoing numbness (rare)  · Irregular heartbeat (rare)  · Cardiac arrest (rare)   Date Last Reviewed: 12/1/2016  © 1628-2927 [employer]. [address] [place], [place] [id number]. All rights reserved. This information is not intended as a substitute for professional medical care. Always follow your healthcare professional's instructions.

## 2019-07-11 ENCOUNTER — TELEPHONE (OUTPATIENT)
Dept: INTERNAL MEDICINE | Facility: CLINIC | Age: 74
End: 2019-07-11

## 2019-07-11 NOTE — TELEPHONE ENCOUNTER
Spoke with Mr Barraza ie his  Stress ECHO(7/10)-showed no ischemia and normal LVF; Lab from May and  all ok. Pt is without contraindications to TKR per Dr [facility] .

## 2019-07-16 ENCOUNTER — OFFICE VISIT (OUTPATIENT)
Dept: ORTHOPEDICS | Facility: CLINIC | Age: 74
End: 2019-07-16
Payer: MEDICARE

## 2019-07-16 ENCOUNTER — LAB VISIT (OUTPATIENT)
Dept: LAB | Facility: HOSPITAL | Age: 74
End: 2019-07-16
Attending: ORTHOPAEDIC SURGERY
Payer: MEDICARE

## 2019-07-16 VITALS — WEIGHT: 175.06 LBS | BODY MASS INDEX: 27.48 KG/M2 | HEIGHT: 67 IN

## 2019-07-16 DIAGNOSIS — M17.9 OSTEOARTHRITIS OF KNEE, UNSPECIFIED (CODE): ICD-10-CM

## 2019-07-16 DIAGNOSIS — M17.11 PRIMARY OSTEOARTHRITIS OF RIGHT KNEE: Primary | ICD-10-CM

## 2019-07-16 LAB
CRP SERPL-MCNC: 1.3 MG/L (ref 0–8.2)
ERYTHROCYTE [SEDIMENTATION RATE] IN BLOOD BY WESTERGREN METHOD: 4 MM/HR (ref 0–23)

## 2019-07-16 PROCEDURE — 86140 C-REACTIVE PROTEIN: CPT | Mod: HCNC

## 2019-07-16 PROCEDURE — 36415 COLL VENOUS BLD VENIPUNCTURE: CPT | Mod: HCNC

## 2019-07-16 PROCEDURE — 99999 PR PBB SHADOW E&M-EST. PATIENT-LVL III: ICD-10-PCS | Mod: PBBFAC,HCNC,, | Performed by: PHYSICIAN ASSISTANT

## 2019-07-16 PROCEDURE — 99999 PR PBB SHADOW E&M-EST. PATIENT-LVL III: CPT | Mod: PBBFAC,HCNC,, | Performed by: PHYSICIAN ASSISTANT

## 2019-07-16 PROCEDURE — 85652 RBC SED RATE AUTOMATED: CPT | Mod: HCNC

## 2019-07-16 PROCEDURE — 99499 UNLISTED E&M SERVICE: CPT | Mod: HCNC,S$GLB,, | Performed by: PHYSICIAN ASSISTANT

## 2019-07-16 PROCEDURE — 99499 NO LOS: ICD-10-PCS | Mod: HCNC,S$GLB,, | Performed by: PHYSICIAN ASSISTANT

## 2019-07-16 RX ORDER — BISACODYL 10 MG
10 SUPPOSITORY, RECTAL RECTAL EVERY 12 HOURS PRN
Status: CANCELLED | OUTPATIENT
Start: 2019-07-16

## 2019-07-16 RX ORDER — PREGABALIN 25 MG/1
75 CAPSULE ORAL NIGHTLY
Status: CANCELLED | OUTPATIENT
Start: 2019-07-16

## 2019-07-16 RX ORDER — AMOXICILLIN 250 MG
1 CAPSULE ORAL 2 TIMES DAILY
Status: CANCELLED | OUTPATIENT
Start: 2019-07-16

## 2019-07-16 RX ORDER — FAMOTIDINE 20 MG/1
20 TABLET, FILM COATED ORAL 2 TIMES DAILY
Status: CANCELLED | OUTPATIENT
Start: 2019-07-16

## 2019-07-16 RX ORDER — LIDOCAINE HYDROCHLORIDE 10 MG/ML
1 INJECTION, SOLUTION EPIDURAL; INFILTRATION; INTRACAUDAL; PERINEURAL
Status: CANCELLED | OUTPATIENT
Start: 2019-07-16

## 2019-07-16 RX ORDER — ROPIVACAINE HYDROCHLORIDE 2 MG/ML
8 INJECTION, SOLUTION EPIDURAL; INFILTRATION; PERINEURAL CONTINUOUS
Status: CANCELLED | OUTPATIENT
Start: 2019-07-16

## 2019-07-16 RX ORDER — OXYCODONE HYDROCHLORIDE 5 MG/1
5 TABLET ORAL
Status: CANCELLED | OUTPATIENT
Start: 2019-07-16

## 2019-07-16 RX ORDER — ONDANSETRON 2 MG/ML
4 INJECTION INTRAMUSCULAR; INTRAVENOUS EVERY 8 HOURS PRN
Status: CANCELLED | OUTPATIENT
Start: 2019-07-16

## 2019-07-16 RX ORDER — MUPIROCIN 20 MG/G
1 OINTMENT TOPICAL 2 TIMES DAILY
Status: CANCELLED | OUTPATIENT
Start: 2019-07-16 | End: 2019-07-21

## 2019-07-16 RX ORDER — RAMELTEON 8 MG/1
8 TABLET ORAL NIGHTLY PRN
Status: CANCELLED | OUTPATIENT
Start: 2019-07-16

## 2019-07-16 RX ORDER — ASPIRIN 81 MG/1
81 TABLET ORAL 2 TIMES DAILY
Status: CANCELLED | OUTPATIENT
Start: 2019-07-16

## 2019-07-16 RX ORDER — MUPIROCIN 20 MG/G
1 OINTMENT TOPICAL
Status: CANCELLED | OUTPATIENT
Start: 2019-07-16

## 2019-07-16 RX ORDER — ACETAMINOPHEN 500 MG
1000 TABLET ORAL EVERY 6 HOURS
Status: CANCELLED | OUTPATIENT
Start: 2019-07-16 | End: 2019-07-18

## 2019-07-16 RX ORDER — MIDAZOLAM HYDROCHLORIDE 1 MG/ML
1 INJECTION INTRAMUSCULAR; INTRAVENOUS EVERY 5 MIN PRN
Status: CANCELLED | OUTPATIENT
Start: 2019-07-16

## 2019-07-16 RX ORDER — OXYCODONE HYDROCHLORIDE 5 MG/1
10 TABLET ORAL
Status: CANCELLED | OUTPATIENT
Start: 2019-07-16

## 2019-07-16 RX ORDER — SODIUM CHLORIDE 0.9 % (FLUSH) 0.9 %
10 SYRINGE (ML) INJECTION
Status: CANCELLED | OUTPATIENT
Start: 2019-07-16

## 2019-07-16 RX ORDER — POLYETHYLENE GLYCOL 3350 17 G/17G
17 POWDER, FOR SOLUTION ORAL DAILY
Status: CANCELLED | OUTPATIENT
Start: 2019-07-16

## 2019-07-16 RX ORDER — CELECOXIB 100 MG/1
200 CAPSULE ORAL DAILY
Status: CANCELLED | OUTPATIENT
Start: 2019-07-16

## 2019-07-16 RX ORDER — SODIUM CHLORIDE 9 MG/ML
INJECTION, SOLUTION INTRAVENOUS
Status: CANCELLED | OUTPATIENT
Start: 2019-07-16

## 2019-07-16 RX ORDER — MORPHINE SULFATE 10 MG/ML
2 INJECTION, SOLUTION INTRAMUSCULAR; INTRAVENOUS
Status: CANCELLED | OUTPATIENT
Start: 2019-07-16

## 2019-07-16 RX ORDER — NALOXONE HCL 0.4 MG/ML
0.02 VIAL (ML) INJECTION
Status: CANCELLED | OUTPATIENT
Start: 2019-07-16

## 2019-07-16 RX ORDER — ACETAMINOPHEN 10 MG/ML
1000 INJECTION, SOLUTION INTRAVENOUS ONCE
Status: CANCELLED | OUTPATIENT
Start: 2019-07-16 | End: 2019-07-16

## 2019-07-16 RX ORDER — FENTANYL CITRATE 50 UG/ML
25 INJECTION, SOLUTION INTRAMUSCULAR; INTRAVENOUS EVERY 5 MIN PRN
Status: CANCELLED | OUTPATIENT
Start: 2019-07-16

## 2019-07-16 RX ORDER — SODIUM CHLORIDE 9 MG/ML
INJECTION, SOLUTION INTRAVENOUS CONTINUOUS
Status: CANCELLED | OUTPATIENT
Start: 2019-07-16 | End: 2019-07-17

## 2019-07-16 RX ORDER — PREGABALIN 25 MG/1
75 CAPSULE ORAL
Status: CANCELLED | OUTPATIENT
Start: 2019-07-16

## 2019-07-16 RX ORDER — CELECOXIB 100 MG/1
400 CAPSULE ORAL
Status: CANCELLED | OUTPATIENT
Start: 2019-07-16

## 2019-07-16 NOTE — PROGRESS NOTES
Drake Barraza is a 74 y.o. year old here today for a pre-operative visit in preparation for a Right total knee arthroplasty to be performed by  Dr. [facility] on 7/22/2019.  he was last seen and treated in the clinic on 6/13/2019. he will be medically optimized by the pre op center. There has been no significant change in medical status since last visit. No fever, chills, malaise, or unexplained weight change.      Allergies, Medications, past medical and surgical history reviewed.    Focused examination performed.    Dr. [facility] saw this patient today in clinic. All questions answered. Patient encouraged to call with questions. Contact information given.     Pre, smitha, and post operative procedures and expectations discussed. Questions were answered. Drake Barraza has been educated and is ready to proceed with surgery. Approximately 30 minutes was spent discussing surgical outcomes, plans, procedures pre, smitha, and post operative expections and care.  Surgical consent signed.    Drake Barraza will contact us if there are any questions, concerns, or changes in medical status prior to surgery.

## 2019-07-16 NOTE — H&P
CC: Right knee pain    Drake Barraza is a 74 y.o. male with 5 year history of Right knee pain. Pain is worse with activity and weight bearing.  Patient has experienced interference of activities of daily living due to decreased range of motion and an increase in joint pain and swelling.  Patient has failed non-operative treatment including NSAIDs and activity modification.  Drake Barraza currently ambulates independently.     Relevant medical conditions of significance in perioperative period:  CAD: inferior MI 2003 with stents, on ASA. Recent w/u by Dr. Read.   HTN: on meds followed by PCP   MICHELLE: uses CPAP  Hypothyroidism: on synthroid    Past Medical History:   Diagnosis Date    Arthritis     CAD (coronary artery disease) 2003    RCA Stent 11/2003, s/p LAD Stent 12/2003                                                       Hyperlipidemia     Hypertension     Old myocardial infarct 11/2003      Inferior NSTEMI     MICHELLE on CPAP     Home CPAP at 9cm/Face Mask    Thyroid disease        Past Surgical History:   Procedure Laterality Date    COLONOSCOPY N/A 7/27/2016    Performed by Ariel Cabral MD at [facility] ([facility])       Family History   Problem Relation Age of Onset    Melanoma Neg Hx        Review of patient's allergies indicates:   Allergen Reactions    Hydrocodone-acetaminophen Rash     Other reaction(s): constipated         Current Outpatient Medications:     aspirin 81 mg Tab, Take 1 tablet by mouth Daily.  , Disp: , Rfl:     atorvastatin (LIPITOR) 40 MG tablet, TAKE 1 TABLET EVERY DAY, Disp: 90 tablet, Rfl: 3    CINNAMON BARK (CINNAMON ORAL), Take 1 capsule by mouth once daily., Disp: , Rfl:     levothyroxine (SYNTHROID) 125 MCG tablet, 1 tab in AM on empty stomach, Disp: 90 tablet, Rfl: 1    metoprolol tartrate (LOPRESSOR) 25 MG tablet, TAKE 1 TABLET TWICE DAILY, Disp: 180 tablet, Rfl: 3    multivitamin (ONE DAILY MULTIVITAMIN) per tablet, Take 1 tablet by mouth once daily.,  "Disp: , Rfl:     nitroGLYCERIN (NITROSTAT) 0.4 MG SL tablet, Place 1 tablet (0.4 mg total) under the tongue every 5 (five) minutes as needed for Chest pain., Disp: 100 tablet, Rfl: 2    omega-3 fatty acids-vitamin E (FISH OIL) 1,000 mg Cap, Take 1 capsule by mouth Daily.  , Disp: , Rfl:     ramipril (ALTACE) 10 MG capsule, TAKE 1 CAPSULE EVERY DAY, Disp: 90 capsule, Rfl: 3    sildenafil (VIAGRA) 100 MG tablet, Take 1 tablet (100 mg total) by mouth daily as needed for Erectile Dysfunction., Disp: 10 tablet, Rfl: 6    TURMERIC ROOT EXTRACT ORAL, Take 1 capsule by mouth., Disp: , Rfl:     UBIDECARENONE (COENZYME Q10) 100 mg Tab, Take 200 mg by mouth once daily. , Disp: , Rfl:     vitamin D 185 MG Tab, Take 185 mg by mouth once daily.  , Disp: , Rfl:     Review of Systems:  Constitutional: no fever or chills  Eyes: no visual changes  ENT: no nasal congestion or sore throat  Respiratory: no cough or shortness of breath  Cardiovascular: no chest pain or palpitations  Gastrointestinal: no nausea or vomiting, tolerating diet  Genitourinary: no hematuria or dysuria  Integument/Breast: no rash or pruritis  Hematologic/Lymphatic: no easy bruising or lymphadenopathy  Musculoskeletal: positive for knee pain  Neurological: no seizures or tremors  Behavioral/Psych: no auditory or visual hallucinations  Endocrine: no heat or cold intolerance    PE:  Ht 5' 7" (1.702 m)   Wt 79.4 kg (175 lb 0.7 oz)   BMI 27.42 kg/m²   General: Pleasant, cooperative, NAD   Gait: antalgic  HEENT: NCAT, sclera nonicteric   Lungs: Respirations clear bilaterally; equal and unlabored.   CV: S1S2; 2+ bilateral upper and lower extremity pulses.   Skin: Intact throughout with no rashes, erythema, or lesions  Extremities: No LE edema,  no erythema or warmth of the skin in either lower extremity.    Right knee exam:  Knee Range of Motion: active, pain with passive range of motion  Effusion:none  Condition of skin:intact  Location of " tenderness:Medial joint line   Strength:5 of 5 quadriceps strength and 5 of 5 hamstring strength  Stability: stable to testing    Hip Examination:full painless range of motion, without tenderness    Radiographs: Radiographs reveal advanced degenerative changes including subchondral cyst formation, subchondral sclerosis, osteophyte formation, joint space narrowing.     Knee Alignment:  Moderate varus    Diagnosis: osteoarthritis Right knee    Plan: Right total knee arthroplasty    Due to the serious nature of total joint infection and the high prevalence of community acquired MRSA, vancomycin will be used perioperatively.

## 2019-07-19 ENCOUNTER — TELEPHONE (OUTPATIENT)
Dept: ORTHOPEDICS | Facility: CLINIC | Age: 74
End: 2019-07-19

## 2019-07-22 ENCOUNTER — HOSPITAL ENCOUNTER (OUTPATIENT)
Facility: HOSPITAL | Age: 74
LOS: 1 days | Discharge: HOME | End: 2019-07-23
Attending: ORTHOPAEDIC SURGERY | Admitting: ORTHOPAEDIC SURGERY
Payer: MEDICARE

## 2019-07-22 ENCOUNTER — ANESTHESIA (OUTPATIENT)
Dept: SURGERY | Facility: HOSPITAL | Age: 74
End: 2019-07-22
Payer: MEDICARE

## 2019-07-22 DIAGNOSIS — M17.11 PRIMARY OSTEOARTHRITIS OF RIGHT KNEE: Primary | ICD-10-CM

## 2019-07-22 LAB
ANION GAP SERPL CALC-SCNC: 8 MMOL/L (ref 8–16)
BUN SERPL-MCNC: 13 MG/DL (ref 8–23)
CALCIUM SERPL-MCNC: 8.4 MG/DL (ref 8.7–10.5)
CHLORIDE SERPL-SCNC: 110 MMOL/L (ref 95–110)
CO2 SERPL-SCNC: 22 MMOL/L (ref 23–29)
CREAT SERPL-MCNC: 0.8 MG/DL (ref 0.5–1.4)
EST. GFR  (AFRICAN AMERICAN): >60 ML/MIN/1.73 M^2
EST. GFR  (NON AFRICAN AMERICAN): >60 ML/MIN/1.73 M^2
GLUCOSE SERPL-MCNC: 136 MG/DL (ref 70–110)
POTASSIUM SERPL-SCNC: 4.7 MMOL/L (ref 3.5–5.1)
SODIUM SERPL-SCNC: 140 MMOL/L (ref 136–145)

## 2019-07-22 PROCEDURE — 71000033 HC RECOVERY, INTIAL HOUR: Mod: HCNC | Performed by: ORTHOPAEDIC SURGERY

## 2019-07-22 PROCEDURE — 63600175 PHARM REV CODE 636 W HCPCS: Mod: HCNC | Performed by: PHYSICIAN ASSISTANT

## 2019-07-22 PROCEDURE — 27447 TOTAL KNEE ARTHROPLASTY: CPT | Mod: HCNC,RT,, | Performed by: ORTHOPAEDIC SURGERY

## 2019-07-22 PROCEDURE — 37000008 HC ANESTHESIA 1ST 15 MINUTES: Mod: HCNC | Performed by: ORTHOPAEDIC SURGERY

## 2019-07-22 PROCEDURE — 97165 OT EVAL LOW COMPLEX 30 MIN: CPT | Mod: HCNC

## 2019-07-22 PROCEDURE — 25000003 PHARM REV CODE 250: Mod: HCNC | Performed by: PHYSICIAN ASSISTANT

## 2019-07-22 PROCEDURE — 63600175 PHARM REV CODE 636 W HCPCS: Mod: HCNC | Performed by: NURSE ANESTHETIST, CERTIFIED REGISTERED

## 2019-07-22 PROCEDURE — 97161 PT EVAL LOW COMPLEX 20 MIN: CPT | Mod: HCNC

## 2019-07-22 PROCEDURE — 94660 CPAP INITIATION&MGMT: CPT | Mod: HCNC

## 2019-07-22 PROCEDURE — 76942 ECHO GUIDE FOR BIOPSY: CPT | Mod: 26,HCNC,, | Performed by: ANESTHESIOLOGY

## 2019-07-22 PROCEDURE — D9220A PRA ANESTHESIA: Mod: HCNC,ANES,, | Performed by: ANESTHESIOLOGY

## 2019-07-22 PROCEDURE — 64448 NJX AA&/STRD FEM NRV NFS IMG: CPT | Mod: HCNC | Performed by: ANESTHESIOLOGY

## 2019-07-22 PROCEDURE — 27000221 HC OXYGEN, UP TO 24 HOURS: Mod: HCNC

## 2019-07-22 PROCEDURE — 94799 UNLISTED PULMONARY SVC/PX: CPT | Mod: HCNC

## 2019-07-22 PROCEDURE — 36000711: Mod: HCNC | Performed by: ORTHOPAEDIC SURGERY

## 2019-07-22 PROCEDURE — 88305 TISSUE EXAM BY PATHOLOGIST: CPT | Mod: HCNC | Performed by: PATHOLOGY

## 2019-07-22 PROCEDURE — 88305 TISSUE SPECIMEN TO PATHOLOGY - SURGERY: ICD-10-PCS | Mod: 26,HCNC,, | Performed by: PATHOLOGY

## 2019-07-22 PROCEDURE — 88311 DECALCIFY TISSUE: CPT | Mod: 26,HCNC,, | Performed by: PATHOLOGY

## 2019-07-22 PROCEDURE — 80048 BASIC METABOLIC PNL TOTAL CA: CPT | Mod: HCNC

## 2019-07-22 PROCEDURE — 27201423 OPTIME MED/SURG SUP & DEVICES STERILE SUPPLY: Mod: HCNC | Performed by: ORTHOPAEDIC SURGERY

## 2019-07-22 PROCEDURE — 25000003 PHARM REV CODE 250: Mod: HCNC | Performed by: NURSE ANESTHETIST, CERTIFIED REGISTERED

## 2019-07-22 PROCEDURE — 64450 NJX AA&/STRD OTHER PN/BRANCH: CPT | Mod: 59,HCNC,RT, | Performed by: ANESTHESIOLOGY

## 2019-07-22 PROCEDURE — 76942 IPACK SINGLE INJECTION BLOCK: ICD-10-PCS | Mod: 26,HCNC,, | Performed by: ANESTHESIOLOGY

## 2019-07-22 PROCEDURE — 63600175 PHARM REV CODE 636 W HCPCS: Mod: HCNC | Performed by: ANESTHESIOLOGY

## 2019-07-22 PROCEDURE — C1713 ANCHOR/SCREW BN/BN,TIS/BN: HCPCS | Mod: HCNC | Performed by: ORTHOPAEDIC SURGERY

## 2019-07-22 PROCEDURE — D9220A PRA ANESTHESIA: ICD-10-PCS | Mod: HCNC,ANES,, | Performed by: ANESTHESIOLOGY

## 2019-07-22 PROCEDURE — 64450 IPACK SINGLE INJECTION BLOCK: ICD-10-PCS | Mod: 59,HCNC,RT, | Performed by: ANESTHESIOLOGY

## 2019-07-22 PROCEDURE — 36415 COLL VENOUS BLD VENIPUNCTURE: CPT | Mod: HCNC

## 2019-07-22 PROCEDURE — 63600175 PHARM REV CODE 636 W HCPCS: Mod: HCNC | Performed by: ORTHOPAEDIC SURGERY

## 2019-07-22 PROCEDURE — 25000003 PHARM REV CODE 250: Mod: HCNC | Performed by: ANESTHESIOLOGY

## 2019-07-22 PROCEDURE — 71000039 HC RECOVERY, EACH ADD'L HOUR: Mod: HCNC | Performed by: ORTHOPAEDIC SURGERY

## 2019-07-22 PROCEDURE — 99900035 HC TECH TIME PER 15 MIN (STAT): Mod: HCNC

## 2019-07-22 PROCEDURE — C1776 JOINT DEVICE (IMPLANTABLE): HCPCS | Mod: HCNC | Performed by: ORTHOPAEDIC SURGERY

## 2019-07-22 PROCEDURE — 97116 GAIT TRAINING THERAPY: CPT | Mod: HCNC

## 2019-07-22 PROCEDURE — 27447 PR TOTAL KNEE ARTHROPLASTY: ICD-10-PCS | Mod: HCNC,RT,, | Performed by: ORTHOPAEDIC SURGERY

## 2019-07-22 PROCEDURE — 64448 ADDUCTOR CANAL CATHETER: ICD-10-PCS | Mod: 59,HCNC,RT, | Performed by: ANESTHESIOLOGY

## 2019-07-22 PROCEDURE — 64448 NJX AA&/STRD FEM NRV NFS IMG: CPT | Mod: 59,HCNC,RT, | Performed by: ANESTHESIOLOGY

## 2019-07-22 PROCEDURE — 76942 ECHO GUIDE FOR BIOPSY: CPT | Mod: HCNC | Performed by: ANESTHESIOLOGY

## 2019-07-22 PROCEDURE — 97530 THERAPEUTIC ACTIVITIES: CPT | Mod: HCNC

## 2019-07-22 PROCEDURE — 51798 US URINE CAPACITY MEASURE: CPT | Mod: HCNC | Performed by: ORTHOPAEDIC SURGERY

## 2019-07-22 PROCEDURE — 94761 N-INVAS EAR/PLS OXIMETRY MLT: CPT | Mod: HCNC

## 2019-07-22 PROCEDURE — 88311 TISSUE SPECIMEN TO PATHOLOGY - SURGERY: ICD-10-PCS | Mod: 26,HCNC,, | Performed by: PATHOLOGY

## 2019-07-22 PROCEDURE — 37000009 HC ANESTHESIA EA ADD 15 MINS: Mod: HCNC | Performed by: ORTHOPAEDIC SURGERY

## 2019-07-22 PROCEDURE — 64450 NJX AA&/STRD OTHER PN/BRANCH: CPT | Mod: HCNC | Performed by: ANESTHESIOLOGY

## 2019-07-22 PROCEDURE — 36000710: Mod: HCNC | Performed by: ORTHOPAEDIC SURGERY

## 2019-07-22 DEVICE — PSN FEM PS CMT STD SZ 5 R: Type: IMPLANTABLE DEVICE | Site: KNEE | Status: FUNCTIONAL

## 2019-07-22 DEVICE — PLUG BONE #5: Type: IMPLANTABLE DEVICE | Site: KNEE | Status: FUNCTIONAL

## 2019-07-22 DEVICE — PSN TIB STM 5 DEG SZ D R: Type: IMPLANTABLE DEVICE | Site: KNEE | Status: FUNCTIONAL

## 2019-07-22 DEVICE — PSN ALL POLY PAT 32MM: Type: IMPLANTABLE DEVICE | Site: KNEE | Status: FUNCTIONAL

## 2019-07-22 DEVICE — CEMENT BONE WHOLE BATCH: Type: IMPLANTABLE DEVICE | Site: KNEE | Status: FUNCTIONAL

## 2019-07-22 DEVICE — PSN ASF PS 11 MM VE R 3-5 CD: Type: IMPLANTABLE DEVICE | Site: KNEE | Status: FUNCTIONAL

## 2019-07-22 RX ORDER — ROPIVACAINE HYDROCHLORIDE 2 MG/ML
8 INJECTION, SOLUTION EPIDURAL; INFILTRATION; PERINEURAL CONTINUOUS
Status: DISCONTINUED | OUTPATIENT
Start: 2019-07-22 | End: 2019-07-23 | Stop reason: HOSPADM

## 2019-07-22 RX ORDER — NALOXONE HCL 0.4 MG/ML
0.02 VIAL (ML) INJECTION
Status: DISCONTINUED | OUTPATIENT
Start: 2019-07-22 | End: 2019-07-23 | Stop reason: HOSPADM

## 2019-07-22 RX ORDER — ASPIRIN 81 MG/1
81 TABLET ORAL 2 TIMES DAILY
Qty: 60 TABLET | Refills: 0 | Status: SHIPPED | OUTPATIENT
Start: 2019-07-22 | End: 2020-09-15

## 2019-07-22 RX ORDER — RAMELTEON 8 MG/1
8 TABLET ORAL NIGHTLY PRN
Status: DISCONTINUED | OUTPATIENT
Start: 2019-07-22 | End: 2019-07-23 | Stop reason: HOSPADM

## 2019-07-22 RX ORDER — MORPHINE SULFATE 2 MG/ML
2 INJECTION, SOLUTION INTRAMUSCULAR; INTRAVENOUS
Status: DISCONTINUED | OUTPATIENT
Start: 2019-07-22 | End: 2019-07-23 | Stop reason: HOSPADM

## 2019-07-22 RX ORDER — ONDANSETRON 2 MG/ML
INJECTION INTRAMUSCULAR; INTRAVENOUS
Status: DISCONTINUED | OUTPATIENT
Start: 2019-07-22 | End: 2019-07-22

## 2019-07-22 RX ORDER — BUPIVACAINE HYDROCHLORIDE AND EPINEPHRINE 2.5; 5 MG/ML; UG/ML
INJECTION, SOLUTION EPIDURAL; INFILTRATION; INTRACAUDAL; PERINEURAL
Status: COMPLETED | OUTPATIENT
Start: 2019-07-22 | End: 2019-07-22

## 2019-07-22 RX ORDER — LIDOCAINE HYDROCHLORIDE 10 MG/ML
1 INJECTION, SOLUTION EPIDURAL; INFILTRATION; INTRACAUDAL; PERINEURAL
Status: DISCONTINUED | OUTPATIENT
Start: 2019-07-22 | End: 2019-07-22 | Stop reason: HOSPADM

## 2019-07-22 RX ORDER — ATORVASTATIN CALCIUM 20 MG/1
40 TABLET, FILM COATED ORAL NIGHTLY
Status: DISCONTINUED | OUTPATIENT
Start: 2019-07-22 | End: 2019-07-23 | Stop reason: HOSPADM

## 2019-07-22 RX ORDER — PREGABALIN 75 MG/1
75 CAPSULE ORAL NIGHTLY
Status: DISCONTINUED | OUTPATIENT
Start: 2019-07-22 | End: 2019-07-23 | Stop reason: HOSPADM

## 2019-07-22 RX ORDER — METOPROLOL TARTRATE 25 MG/1
25 TABLET, FILM COATED ORAL 2 TIMES DAILY
Status: DISCONTINUED | OUTPATIENT
Start: 2019-07-22 | End: 2019-07-23 | Stop reason: HOSPADM

## 2019-07-22 RX ORDER — ACETAMINOPHEN 10 MG/ML
INJECTION, SOLUTION INTRAVENOUS
Status: DISCONTINUED | OUTPATIENT
Start: 2019-07-22 | End: 2019-07-22

## 2019-07-22 RX ORDER — FAMOTIDINE 20 MG/1
20 TABLET, FILM COATED ORAL 2 TIMES DAILY
Status: DISCONTINUED | OUTPATIENT
Start: 2019-07-22 | End: 2019-07-23 | Stop reason: HOSPADM

## 2019-07-22 RX ORDER — TAMSULOSIN HYDROCHLORIDE 0.4 MG/1
0.4 CAPSULE ORAL ONCE
Status: COMPLETED | OUTPATIENT
Start: 2019-07-22 | End: 2019-07-22

## 2019-07-22 RX ORDER — OXYCODONE HYDROCHLORIDE 5 MG/1
5 TABLET ORAL
Status: DISCONTINUED | OUTPATIENT
Start: 2019-07-22 | End: 2019-07-23 | Stop reason: HOSPADM

## 2019-07-22 RX ORDER — OXYCODONE HYDROCHLORIDE 10 MG/1
10 TABLET ORAL
Status: DISCONTINUED | OUTPATIENT
Start: 2019-07-22 | End: 2019-07-23 | Stop reason: HOSPADM

## 2019-07-22 RX ORDER — FENTANYL CITRATE 50 UG/ML
25 INJECTION, SOLUTION INTRAMUSCULAR; INTRAVENOUS EVERY 5 MIN PRN
Status: DISCONTINUED | OUTPATIENT
Start: 2019-07-22 | End: 2019-07-22 | Stop reason: HOSPADM

## 2019-07-22 RX ORDER — EPHEDRINE SULFATE 50 MG/ML
INJECTION, SOLUTION INTRAVENOUS
Status: DISCONTINUED | OUTPATIENT
Start: 2019-07-22 | End: 2019-07-22

## 2019-07-22 RX ORDER — ASPIRIN 81 MG/1
81 TABLET ORAL 2 TIMES DAILY
Status: DISCONTINUED | OUTPATIENT
Start: 2019-07-22 | End: 2019-07-23 | Stop reason: HOSPADM

## 2019-07-22 RX ORDER — CELECOXIB 200 MG/1
400 CAPSULE ORAL
Status: COMPLETED | OUTPATIENT
Start: 2019-07-22 | End: 2019-07-22

## 2019-07-22 RX ORDER — NITROGLYCERIN 0.4 MG/1
0.4 TABLET SUBLINGUAL EVERY 5 MIN PRN
Status: DISCONTINUED | OUTPATIENT
Start: 2019-07-22 | End: 2019-07-23 | Stop reason: HOSPADM

## 2019-07-22 RX ORDER — ACETAMINOPHEN 10 MG/ML
1000 INJECTION, SOLUTION INTRAVENOUS ONCE
Status: DISCONTINUED | OUTPATIENT
Start: 2019-07-22 | End: 2019-07-22

## 2019-07-22 RX ORDER — DOCUSATE SODIUM 100 MG/1
100 CAPSULE, LIQUID FILLED ORAL 2 TIMES DAILY PRN
Qty: 60 CAPSULE | Refills: 0 | Status: SHIPPED | OUTPATIENT
Start: 2019-07-22 | End: 2019-11-14

## 2019-07-22 RX ORDER — LIDOCAINE HCL/PF 100 MG/5ML
SYRINGE (ML) INTRAVENOUS
Status: DISCONTINUED | OUTPATIENT
Start: 2019-07-22 | End: 2019-07-22

## 2019-07-22 RX ORDER — ONDANSETRON 2 MG/ML
4 INJECTION INTRAMUSCULAR; INTRAVENOUS ONCE AS NEEDED
Status: DISCONTINUED | OUTPATIENT
Start: 2019-07-22 | End: 2019-07-22 | Stop reason: HOSPADM

## 2019-07-22 RX ORDER — SODIUM CHLORIDE 9 MG/ML
INJECTION, SOLUTION INTRAVENOUS
Status: COMPLETED | OUTPATIENT
Start: 2019-07-22 | End: 2019-07-22

## 2019-07-22 RX ORDER — POLYETHYLENE GLYCOL 3350 17 G/17G
17 POWDER, FOR SOLUTION ORAL DAILY
Status: DISCONTINUED | OUTPATIENT
Start: 2019-07-22 | End: 2019-07-23 | Stop reason: HOSPADM

## 2019-07-22 RX ORDER — PROPOFOL 10 MG/ML
VIAL (ML) INTRAVENOUS
Status: DISCONTINUED | OUTPATIENT
Start: 2019-07-22 | End: 2019-07-22

## 2019-07-22 RX ORDER — PREGABALIN 75 MG/1
75 CAPSULE ORAL
Status: COMPLETED | OUTPATIENT
Start: 2019-07-22 | End: 2019-07-22

## 2019-07-22 RX ORDER — VANCOMYCIN HYDROCHLORIDE 500 MG/10ML
INJECTION, POWDER, LYOPHILIZED, FOR SOLUTION INTRAVENOUS
Status: DISCONTINUED | OUTPATIENT
Start: 2019-07-22 | End: 2019-07-22 | Stop reason: HOSPADM

## 2019-07-22 RX ORDER — RAMIPRIL 2.5 MG/1
10 CAPSULE ORAL DAILY
Status: DISCONTINUED | OUTPATIENT
Start: 2019-07-22 | End: 2019-07-23 | Stop reason: HOSPADM

## 2019-07-22 RX ORDER — VANCOMYCIN HCL IN 5 % DEXTROSE 1G/250ML
1000 PLASTIC BAG, INJECTION (ML) INTRAVENOUS
Status: COMPLETED | OUTPATIENT
Start: 2019-07-22 | End: 2019-07-22

## 2019-07-22 RX ORDER — LEVOTHYROXINE SODIUM 125 UG/1
125 TABLET ORAL
Status: DISCONTINUED | OUTPATIENT
Start: 2019-07-23 | End: 2019-07-23 | Stop reason: HOSPADM

## 2019-07-22 RX ORDER — ONDANSETRON 2 MG/ML
4 INJECTION INTRAMUSCULAR; INTRAVENOUS EVERY 8 HOURS PRN
Status: DISCONTINUED | OUTPATIENT
Start: 2019-07-22 | End: 2019-07-23 | Stop reason: HOSPADM

## 2019-07-22 RX ORDER — MUPIROCIN 20 MG/G
1 OINTMENT TOPICAL
Status: COMPLETED | OUTPATIENT
Start: 2019-07-22 | End: 2019-07-22

## 2019-07-22 RX ORDER — GLYCOPYRROLATE 0.2 MG/ML
INJECTION INTRAMUSCULAR; INTRAVENOUS
Status: DISCONTINUED | OUTPATIENT
Start: 2019-07-22 | End: 2019-07-22

## 2019-07-22 RX ORDER — AMOXICILLIN 250 MG
1 CAPSULE ORAL 2 TIMES DAILY
Status: DISCONTINUED | OUTPATIENT
Start: 2019-07-22 | End: 2019-07-23 | Stop reason: HOSPADM

## 2019-07-22 RX ORDER — PROPOFOL 10 MG/ML
VIAL (ML) INTRAVENOUS CONTINUOUS PRN
Status: DISCONTINUED | OUTPATIENT
Start: 2019-07-22 | End: 2019-07-22

## 2019-07-22 RX ORDER — OXYCODONE AND ACETAMINOPHEN 10; 325 MG/1; MG/1
1 TABLET ORAL EVERY 4 HOURS PRN
Qty: 40 TABLET | Refills: 0 | Status: SHIPPED | OUTPATIENT
Start: 2019-07-22 | End: 2019-08-01

## 2019-07-22 RX ORDER — ONDANSETRON 4 MG/1
8 TABLET, FILM COATED ORAL EVERY 8 HOURS PRN
Qty: 20 TABLET | Refills: 0 | Status: SHIPPED | OUTPATIENT
Start: 2019-07-22 | End: 2019-11-14

## 2019-07-22 RX ORDER — CEFAZOLIN SODIUM 1 G/3ML
2 INJECTION, POWDER, FOR SOLUTION INTRAMUSCULAR; INTRAVENOUS
Status: COMPLETED | OUTPATIENT
Start: 2019-07-22 | End: 2019-07-22

## 2019-07-22 RX ORDER — ACETAMINOPHEN 500 MG
1000 TABLET ORAL EVERY 6 HOURS
Status: DISCONTINUED | OUTPATIENT
Start: 2019-07-22 | End: 2019-07-23 | Stop reason: HOSPADM

## 2019-07-22 RX ORDER — KETAMINE HYDROCHLORIDE 10 MG/ML
INJECTION, SOLUTION INTRAMUSCULAR; INTRAVENOUS
Status: DISCONTINUED | OUTPATIENT
Start: 2019-07-22 | End: 2019-07-22

## 2019-07-22 RX ORDER — MUPIROCIN 20 MG/G
1 OINTMENT TOPICAL 2 TIMES DAILY
Status: DISCONTINUED | OUTPATIENT
Start: 2019-07-22 | End: 2019-07-23 | Stop reason: HOSPADM

## 2019-07-22 RX ORDER — DEXAMETHASONE SODIUM PHOSPHATE 4 MG/ML
INJECTION, SOLUTION INTRA-ARTICULAR; INTRALESIONAL; INTRAMUSCULAR; INTRAVENOUS; SOFT TISSUE
Status: DISCONTINUED | OUTPATIENT
Start: 2019-07-22 | End: 2019-07-22

## 2019-07-22 RX ORDER — BISACODYL 10 MG
10 SUPPOSITORY, RECTAL RECTAL EVERY 12 HOURS PRN
Status: DISCONTINUED | OUTPATIENT
Start: 2019-07-22 | End: 2019-07-23 | Stop reason: HOSPADM

## 2019-07-22 RX ORDER — FENTANYL CITRATE 50 UG/ML
INJECTION, SOLUTION INTRAMUSCULAR; INTRAVENOUS
Status: DISCONTINUED | OUTPATIENT
Start: 2019-07-22 | End: 2019-07-22

## 2019-07-22 RX ORDER — ACETAMINOPHEN 500 MG
1000 TABLET ORAL EVERY 6 HOURS
Status: DISCONTINUED | OUTPATIENT
Start: 2019-07-22 | End: 2019-07-22

## 2019-07-22 RX ORDER — SODIUM CHLORIDE 0.9 % (FLUSH) 0.9 %
10 SYRINGE (ML) INJECTION
Status: DISCONTINUED | OUTPATIENT
Start: 2019-07-22 | End: 2019-07-22 | Stop reason: HOSPADM

## 2019-07-22 RX ORDER — MIDAZOLAM HYDROCHLORIDE 1 MG/ML
1 INJECTION INTRAMUSCULAR; INTRAVENOUS EVERY 5 MIN PRN
Status: DISCONTINUED | OUTPATIENT
Start: 2019-07-22 | End: 2019-07-22 | Stop reason: HOSPADM

## 2019-07-22 RX ORDER — SODIUM CHLORIDE 0.9 % (FLUSH) 0.9 %
3 SYRINGE (ML) INJECTION
Status: DISCONTINUED | OUTPATIENT
Start: 2019-07-22 | End: 2019-07-22 | Stop reason: HOSPADM

## 2019-07-22 RX ORDER — SODIUM CHLORIDE 9 MG/ML
INJECTION, SOLUTION INTRAVENOUS CONTINUOUS PRN
Status: DISCONTINUED | OUTPATIENT
Start: 2019-07-22 | End: 2019-07-22

## 2019-07-22 RX ORDER — SODIUM CHLORIDE 9 MG/ML
INJECTION, SOLUTION INTRAVENOUS CONTINUOUS
Status: ACTIVE | OUTPATIENT
Start: 2019-07-22 | End: 2019-07-23

## 2019-07-22 RX ORDER — GENTAMICIN SULFATE 40 MG/ML
INJECTION, SOLUTION INTRAMUSCULAR; INTRAVENOUS
Status: DISCONTINUED | OUTPATIENT
Start: 2019-07-22 | End: 2019-07-22 | Stop reason: HOSPADM

## 2019-07-22 RX ADMIN — BUPIVACAINE HYDROCHLORIDE AND EPINEPHRINE BITARTRATE 20 ML: 2.5; .0091 INJECTION, SOLUTION EPIDURAL; INFILTRATION; INTRACAUDAL; PERINEURAL at 06:07

## 2019-07-22 RX ADMIN — CEFAZOLIN 2 G: 1 INJECTION, POWDER, FOR SOLUTION INTRAMUSCULAR; INTRAVENOUS at 11:07

## 2019-07-22 RX ADMIN — KETAMINE HYDROCHLORIDE 20 MG: 10 INJECTION, SOLUTION INTRAMUSCULAR; INTRAVENOUS at 07:07

## 2019-07-22 RX ADMIN — FAMOTIDINE 20 MG: 20 TABLET, FILM COATED ORAL at 10:07

## 2019-07-22 RX ADMIN — ACETAMINOPHEN 1000 MG: 500 TABLET ORAL at 11:07

## 2019-07-22 RX ADMIN — FENTANYL CITRATE 25 MCG: 50 INJECTION, SOLUTION INTRAMUSCULAR; INTRAVENOUS at 07:07

## 2019-07-22 RX ADMIN — ACETAMINOPHEN 1000 MG: 10 INJECTION, SOLUTION INTRAVENOUS at 07:07

## 2019-07-22 RX ADMIN — DEXAMETHASONE SODIUM PHOSPHATE 8 MG: 4 INJECTION, SOLUTION INTRAMUSCULAR; INTRAVENOUS at 07:07

## 2019-07-22 RX ADMIN — OXYCODONE HYDROCHLORIDE 5 MG: 5 TABLET ORAL at 07:07

## 2019-07-22 RX ADMIN — VANCOMYCIN HYDROCHLORIDE 1000 MG: 1 INJECTION, POWDER, LYOPHILIZED, FOR SOLUTION INTRAVENOUS at 05:07

## 2019-07-22 RX ADMIN — MEPIVACAINE HYDROCHLORIDE 3 ML: 15 INJECTION, SOLUTION EPIDURAL; INFILTRATION at 07:07

## 2019-07-22 RX ADMIN — MUPIROCIN 1 G: 20 OINTMENT TOPICAL at 09:07

## 2019-07-22 RX ADMIN — ACETAMINOPHEN 1000 MG: 500 TABLET ORAL at 05:07

## 2019-07-22 RX ADMIN — EPHEDRINE SULFATE 10 MG: 50 INJECTION, SOLUTION INTRAMUSCULAR; INTRAVENOUS; SUBCUTANEOUS at 08:07

## 2019-07-22 RX ADMIN — ATORVASTATIN CALCIUM 40 MG: 20 TABLET, FILM COATED ORAL at 09:07

## 2019-07-22 RX ADMIN — MIDAZOLAM HYDROCHLORIDE 1 MG: 1 INJECTION, SOLUTION INTRAMUSCULAR; INTRAVENOUS at 06:07

## 2019-07-22 RX ADMIN — LIDOCAINE HYDROCHLORIDE 40 MG: 20 INJECTION, SOLUTION INTRAVENOUS at 07:07

## 2019-07-22 RX ADMIN — SENNOSIDES,DOCUSATE SODIUM 1 TABLET: 8.6; 5 TABLET, FILM COATED ORAL at 09:07

## 2019-07-22 RX ADMIN — FAMOTIDINE 20 MG: 20 TABLET, FILM COATED ORAL at 09:07

## 2019-07-22 RX ADMIN — PROPOFOL 50 MCG/KG/MIN: 10 INJECTION, EMULSION INTRAVENOUS at 07:07

## 2019-07-22 RX ADMIN — ASPIRIN 81 MG: 81 TABLET, COATED ORAL at 10:07

## 2019-07-22 RX ADMIN — GLYCOPYRROLATE 0.2 MG: 0.2 INJECTION, SOLUTION INTRAMUSCULAR; INTRAVENOUS at 07:07

## 2019-07-22 RX ADMIN — ACETAMINOPHEN 1000 MG: 500 TABLET ORAL at 01:07

## 2019-07-22 RX ADMIN — POLYETHYLENE GLYCOL 3350 17 G: 17 POWDER, FOR SOLUTION ORAL at 05:07

## 2019-07-22 RX ADMIN — SODIUM CHLORIDE: 0.9 INJECTION, SOLUTION INTRAVENOUS at 06:07

## 2019-07-22 RX ADMIN — OXYCODONE HYDROCHLORIDE 10 MG: 10 TABLET ORAL at 02:07

## 2019-07-22 RX ADMIN — MUPIROCIN 1 G: 20 OINTMENT TOPICAL at 05:07

## 2019-07-22 RX ADMIN — PROPOFOL 30 MG: 10 INJECTION, EMULSION INTRAVENOUS at 07:07

## 2019-07-22 RX ADMIN — ONDANSETRON 4 MG: 2 INJECTION INTRAMUSCULAR; INTRAVENOUS at 08:07

## 2019-07-22 RX ADMIN — CEFAZOLIN 2 G: 330 INJECTION, POWDER, FOR SOLUTION INTRAMUSCULAR; INTRAVENOUS at 07:07

## 2019-07-22 RX ADMIN — RAMIPRIL 10 MG: 2.5 CAPSULE ORAL at 09:07

## 2019-07-22 RX ADMIN — PREGABALIN 75 MG: 75 CAPSULE ORAL at 09:07

## 2019-07-22 RX ADMIN — SODIUM CHLORIDE, SODIUM GLUCONATE, SODIUM ACETATE, POTASSIUM CHLORIDE, MAGNESIUM CHLORIDE, SODIUM PHOSPHATE, DIBASIC, AND POTASSIUM PHOSPHATE: .53; .5; .37; .037; .03; .012; .00082 INJECTION, SOLUTION INTRAVENOUS at 08:07

## 2019-07-22 RX ADMIN — SENNOSIDES,DOCUSATE SODIUM 1 TABLET: 8.6; 5 TABLET, FILM COATED ORAL at 10:07

## 2019-07-22 RX ADMIN — SODIUM CHLORIDE: 0.9 INJECTION, SOLUTION INTRAVENOUS at 09:07

## 2019-07-22 RX ADMIN — PREGABALIN 75 MG: 75 CAPSULE ORAL at 06:07

## 2019-07-22 RX ADMIN — METOPROLOL TARTRATE 25 MG: 25 TABLET ORAL at 09:07

## 2019-07-22 RX ADMIN — CEFAZOLIN 2 G: 1 INJECTION, POWDER, FOR SOLUTION INTRAMUSCULAR; INTRAVENOUS at 02:07

## 2019-07-22 RX ADMIN — CELECOXIB 400 MG: 200 CAPSULE ORAL at 06:07

## 2019-07-22 RX ADMIN — ASPIRIN 81 MG: 81 TABLET, COATED ORAL at 09:07

## 2019-07-22 RX ADMIN — EPHEDRINE SULFATE 10 MG: 50 INJECTION, SOLUTION INTRAMUSCULAR; INTRAVENOUS; SUBCUTANEOUS at 07:07

## 2019-07-22 RX ADMIN — ROPIVACAINE HYDROCHLORIDE 8 ML/HR: 2 INJECTION, SOLUTION EPIDURAL; INFILTRATION at 09:07

## 2019-07-22 RX ADMIN — TAMSULOSIN HYDROCHLORIDE 0.4 MG: 0.4 CAPSULE ORAL at 12:07

## 2019-07-22 RX ADMIN — OXYCODONE HYDROCHLORIDE 5 MG: 5 TABLET ORAL at 11:07

## 2019-07-22 RX ADMIN — SODIUM CHLORIDE: 0.9 INJECTION, SOLUTION INTRAVENOUS at 08:07

## 2019-07-22 RX ADMIN — OXYCODONE HYDROCHLORIDE 10 MG: 10 TABLET ORAL at 11:07

## 2019-07-22 RX ADMIN — ROPIVACAINE HYDROCHLORIDE 8 ML/HR: 2 INJECTION, SOLUTION EPIDURAL; INFILTRATION at 08:07

## 2019-07-22 NOTE — ANESTHESIA POSTPROCEDURE EVALUATION
Anesthesia Post Evaluation    Patient: Drake Barraza    Procedure(s) Performed: Procedure(s) (LRB):  REPLACEMENT-KNEE-TOTAL (Right)    Final Anesthesia Type: spinal  Patient location during evaluation: PACU  Patient participation: Yes- Able to Participate  Level of consciousness: awake and alert and oriented  Post-procedure vital signs: reviewed and stable  Pain management: adequate  Airway patency: patent  PONV status at discharge: No PONV  Anesthetic complications: no      Cardiovascular status: blood pressure returned to baseline and hemodynamically stable  Respiratory status: unassisted, room air and spontaneous ventilation  Hydration status: euvolemic  Follow-up not needed.          Vitals Value Taken Time   /89 7/22/2019  2:13 PM   Temp 36.3 °C (97.3 °F) 7/22/2019  2:13 PM   Pulse 63 7/22/2019  3:18 PM   Resp 16 7/22/2019  2:13 PM   SpO2 99 % 7/22/2019  2:13 PM         Event Time     Out of Recovery 13:51:52          Pain/Francine Score: Pain Rating Prior to Med Admin: 7 (7/22/2019  2:50 PM)  Francine Score: 10 (7/22/2019  1:15 PM)

## 2019-07-22 NOTE — TRANSFER OF CARE
"Anesthesia Transfer of Care Note    Patient: Drake Barraza    Procedure(s) Performed: Procedure(s) (LRB):  REPLACEMENT-KNEE-TOTAL (Right)    Patient location: PACU    Anesthesia Type: MAC and regional    Transport from OR: Transported from OR on room air with adequate spontaneous ventilation. Transported from OR on 6-10 L/min O2 by face mask with adequate spontaneous ventilation    Post pain: adequate analgesia    Post assessment: no apparent anesthetic complications and tolerated procedure well    Post vital signs: stable    Level of consciousness: awake and alert    Nausea/Vomiting: no nausea/vomiting    Complications: none    Transfer of care protocol was followed      Last vitals:   Visit Vitals  /67 (BP Location: Right arm, Patient Position: Lying)   Pulse (!) 55   Temp 36.7 °C (98 °F) (Oral)   Resp 17   Ht 5' 7" (1.702 m)   Wt 79.4 kg (175 lb)   SpO2 100%   BMI 27.41 kg/m²     "

## 2019-07-22 NOTE — OP NOTE
DATE OF PROCEDURE:  07/22/2019.    SURGEON:  John Lockwood [facility], Jr., M.D.    ASSISTANT:  Anthony Razo.    OPERATION PERFORMED:  Right total knee arthroplasty.    PREOPERATIVE DIAGNOSIS:  Osteoarthritis, right knee.    POSTOPERATIVE DIAGNOSIS:  Osteoarthritis, right knee.    COMPONENTS USED:  Jayy Persona knee system.  We used a size 5 femur, right   standard posterior stabilized; a size D tibia, right 5-degree stem; an 11 mm   articular insert, posterior stabilized, vitamin E, highly cross-linked poly, and   32 mm patella.    ESTIMATED BLOOD LOSS:  100 mL.    SPECIMEN:  Resected bone and tissue sent to Pathology for routine examination.    OPERATIVE TECHNIQUE:  The patient was placed supine on the operating table.    Spinal anesthesia was introduced.  The right leg was prepped and draped in   sterile fashion.  The patient was given preoperative antibiotics.  The OR team   wore the sterile exhaust suits in order to minimize risk for infection.  A foot   pump was placed on the left foot to help prevent DVT.  Right leg was   exsanguinated and tourniquet was inflated to 300 pounds of pressure.  A straight   anterior incision was made.  Sharp dissection was carried down to the extensor   mechanism.  The extensor mechanism was opened in a straight Insall approach.    Partial release of the medial collateral ligament was performed to correct some   slight varus deformity.  The intramedullary guide was placed in the femur and   distal cut was made at 5 degrees of valgus on a +2 setting.  The proximal tibial   cut was performed and we tried to be generous, took about 4-5 from the medial   side and about the same from the lateral side.  The femur measured for a 5 and   we cut it posteriorly for a 5.  The flexion gap was too bigger than the   extension gap, so we took another 2 off the extension gap and that balanced   things nicely.  We went ahead and did the notch and chamfer-plasty on the femur,   sized the tibia to  a D and drilled and prepared it with the same rotation as   the femur.  The patella was nice and thick.  It was 24 mm and I cut it at 16.    We then Pulsavac'd, dried the surfaces, cemented the tibial baseplate, then the   femoral component, removed the excess cement, put the 11 mm trial in, put the   knee out in extension and cemented the patella.  While the cement was hardening,   we bathed the knee in the Betadine solution and Pulsavac cleared.  Then we put   the actual 11 mm insert in.  Then, I closed the extensor mechanism with 1 Vicryl   over tranexamic acid and vancomycin powder, closed the subcutaneous tissues   with 0 and 3-0 Vicryl and closed the skin with a running subcuticular 3-0   Monocryl and skin adhesive.  Sterile surgical dressing was applied.  There were   no complications to the procedure.      SCARLETT  dd: 07/22/2019 09:34:31 (CDT)  td: 07/22/2019 09:58:39 (CDT)  Doc ID   #[id number]  Job ID #[id number]    CC:

## 2019-07-22 NOTE — PT/OT/SLP EVAL
Physical Therapy Evaluation and Treatment    Patient Name:  Drake Barraza   MRN:  [id number]    Recommendations:     Discharge Recommendations:  home health PT   Discharge Equipment Recommendations: none   Barriers to discharge: Inaccessible home    Assessment:     Drake Barraza is a 74 y.o. male admitted with a medical diagnosis of s/p R TKR.  He presents with the following impairments/functional limitations:  weakness, impaired functional mobilty, gait instability, impaired balance, decreased lower extremity function, decreased ROM, impaired joint extensibility, pain, impaired skin, orthopedic precautions. Patient tolerated PT session fairly well today. He ambulated 3 steps forward/backward and side stepped to HOB with RW and CGA. He demonstrated fair + standing balance when using urinal. He would continue to benefit from PT in order to get back to PLOF.     Rehab Prognosis: Good; patient would benefit from acute skilled PT services to address these deficits and reach maximum level of function.    Recent Surgery: Procedure(s) (LRB):  REPLACEMENT-KNEE-TOTAL (Right) Day of Surgery    Plan:     During this hospitalization, patient to be seen BID to address the identified rehab impairments via gait training, therapeutic activities, therapeutic exercises and progress toward the following goals:    · Plan of Care Expires:  07/29/19    Subjective     Chief Complaint: Pain in right knee.   Patient/Family Comments/goals: To get back to PLOF.   Pain/Comfort:  · Pain Rating 1: 3/10  · Location - Side 1: Right  · Location 1: knee  · Pain Addressed 1: Pre-medicate for activity, Cessation of Activity, Nurse notified    Living Environment:  Patient lives with his wife in a H with 2 steps and no handrails to enter. Prior to admission, patients level of function was independent for functional mobility. Equipment used at home but does not use: walker, rolling, bath bench, shower chair, bedside commode. Upon discharge, patient  will have assistance from wife.    Objective:     Communicated with RN prior to session.  Patient found supine with peripheral IV, telemetry, pulse ox (continuous), perineural catheter  upon PT entry to room.    General Precautions: Standard, fall   Orthopedic Precautions:RLE weight bearing as tolerated   Braces: N/A     Exams:  · Cognitive Exam:  Patient is oriented to Person, Place, Time and Situation  · Sensation:    · -       Intact  · RLE ROM: WFL except limited at knee due to surgical dressing  · RLE Strength: WFL except limited due to surgical pain  · LLE ROM: WNL  · LLE Strength: WNL    Functional Mobility:  · Bed Mobility:     · Supine to Sit: contact guard assistance  · Sit to Supine: contact guard assistance  · Transfers:     · Sit to Stand:  contact guard assistance with rolling walker  · Gait: Patient ambulated 3 steps forward/backward and side stepped to head of bed with rolling walker and contact guard assistance. No loss of balance or SOB noted.   · Balance:  Patient demonstrated fair + standing balance while using his urinal.       Therapeutic Activities and Exercises:   Patient educated in:  -PT role and POC  -safety with transfers including hand placement  -gait sequencing and RW management  -OOB activity to maximize recovery       AM-PAC 6 CLICK MOBILITY  Total Score:17     Patient left supine with all lines intact, call button in reach, RN notified and wife and son present.    GOALS:   Multidisciplinary Problems     Physical Therapy Goals        Problem: Physical Therapy Goal    Goal Priority Disciplines Outcome Goal Variances Interventions   Physical Therapy Goal     PT, PT/OT      Description:  Goals to be met by: 19    Patient will increase functional independence with mobility by performin. Supine to sit with supervision  2. Sit to supine with supervision  3. Sit to stand transfer with Supervision  4. Gait x150 feet with Supervision using Rolling Walker  5. Ascend/Descend 2 curb  step with contact guard assistance using Rolling Walker  6. Lower extremity exercise program x30 reps per handout, with supervision                          History:     Past Medical History:   Diagnosis Date    Arthritis     CAD (coronary artery disease) 2003    RCA Stent 11/2003, s/p LAD Stent 12/2003                                                       Hyperlipidemia     Hypertension     Old myocardial infarct 11/2003      Inferior NSTEMI     MICHELLE on CPAP     Home CPAP at 9cm/Face Mask    Thyroid disease        Past Surgical History:   Procedure Laterality Date    COLONOSCOPY N/A 7/27/2016    Performed by Ariel Cabral [facility] at [facility] ([facility])    HERNIA REPAIR  2006       Time Tracking:     PT Received On: 07/22/19  PT Start Time: 1405     PT Stop Time: 1425  PT Total Time (min): 20 min     Billable Minutes: Evaluation 12 and Gait Training 8      Cele Hamilton, PT  07/22/2019

## 2019-07-22 NOTE — PLAN OF CARE
Problem: Adult Inpatient Plan of Care  Goal: Plan of Care Review  Outcome: Ongoing (interventions implemented as appropriate)  Plan of care reviewed and updated. Pt AA+O. Pt's pain is managed with the medication ordered at this time. Pt's VS are as charted.  No falls this shift. Pt is oriented to room and call system. Will continue to [facility].

## 2019-07-22 NOTE — PLAN OF CARE
Problem: Occupational Therapy Goal  Goal: Occupational Therapy Goal  Goals to be met by: 7/29/2019    Patient will increase functional independence with ADLs by performing:    UE Dressing with Supervision.  LE Dressing with Supervision with AD as needed.  Grooming while standing with Supervision.  Toileting from bedside commode with Supervision for hygiene and clothing management.   Stand pivot transfers with Supervision.  Toilet transfer to bedside commode with Supervision.       Outcome: Ongoing (interventions implemented as appropriate)  OT eval complete. Pt tolerated session well. Pt's functional outcomes have been established today based on his presenting functional level.       Comments: Cont OT POC

## 2019-07-22 NOTE — NURSING TRANSFER
Nursing Transfer Note      7/22/2019     Transfer To: 504    Transfer via stretcher    Transfer with cardiac monitoring, continuous pulse ox    Transported by: Transport    Medicines sent: IVF, PNC    Chart send with patient: Yes    Notified: spouse    Patient reassessed at: 1313

## 2019-07-22 NOTE — BRIEF OP NOTE
[facility]  Surgery Department  Operative Note    SUMMARY     Date of Procedure: 7/22/2019     Procedure: Procedure(s) (LRB):  REPLACEMENT-KNEE-TOTAL (Right)     Surgeon(s) and Role:     * John L. [facility] Jr., MD - Primary     * Anthony Razo MD - Resident - Assisting        Pre-Operative Diagnosis: Osteoarthritis of knee, unspecified (CODE) [M17.9]    Post-Operative Diagnosis: Post-Op Diagnosis Codes:     * Osteoarthritis of knee, unspecified (CODE) [M17.9]    Anesthesia: Spinal    Technical Procedures Used:  PS       Description of the Findings of the Procedure: Varus    Significant Surgical Tasks Conducted by the Assistant(s), if Applicable: closure    Complications: No    Estimated Blood Loss (EBL): 100cc             Implants:   Implant Name Type Inv. Item Serial No.  Lot No. LRB No. Used   PLUG BONE #5 - [id number]  PLUG BONE #5  [employer] ROLY. [id number] Right 1   CEMENT BONE WHOLE BATCH - [id number]  CEMENT BONE WHOLE BATCH  [employer] ROLY. [id number] Right 2   SCREW HEX HEAD - [id number]  SCREW HEX HEAD  SOPHIA,INC  Right 1   SCREW HEX HEAD 3.5X38MM - [id number]  SCREW HEX HEAD 3.5X38MM  SOPHIA,INC  Right 1   BIT DRILL PIN TROCAR 3.2MM - [id number]  BIT DRILL PIN TROCAR 3.2MM  SOPHIA,INC  Right 1   PSN TIB STM 5 DEG SZ D R - [id number]  PSN TIB STM 5 DEG SZ D R  SOPHIA,INC [id number] Right 1   PSN FEM PS CMT STD SZ 5 R - [id number]  PSN FEM PS CMT STD SZ 5 R  SOPHIA,INC [id number] Right 1   PSN ALL POLY PAT 32MM - [id number]  PSN ALL POLY PAT 32MM  SOPHIA,INC [id number] Right 1   PSN ASF PS 11 MM VE R 3-5 CD - [id number]  PSN ASF PS 11 MM VE R 3-5 CD  SOPHIA,INC [id number] Right 1       Specimens:   Specimen (12h ago, onward)    Start     Ordered    07/22/19 0823  Specimen to Pathology - Surgery  Once     Comments:  1.  Bone and soft tissue right knee - GROSS Placed in 2nd floor surgery specimen refrigerator per [facility]     Start Status     07/22/19 0823 Collected (07/22/19  0850) Order ID: [id number]       07/22/19 0851                  Condition: Good    Disposition: PACU - hemodynamically stable.    Attestation: I was present and scrubbed for the entire procedure.

## 2019-07-22 NOTE — NURSING
Pt arrived to room via stretcher. Pt rates pain as tolerable. PNC and polar ice in place. Pt denies nausea. Pt denies numbness and tingling. Pt dressing clean dry and intact. Pt oriented to room and call system. Will continue to monitor.

## 2019-07-22 NOTE — PLAN OF CARE
[facility]    HOME HEALTH ORDERS  FACE TO FACE ENCOUNTER    Patient Name: Drake Barraza  YOB: 1945    PCP: Catherine Middleton MD   PCP Address: [address], [address] / [facility] [address]  PCP Phone Number: [phone]  PCP Fax: [phone]    Encounter Date: 07/22/2019    Admit to Home Health    Diagnoses:  Active Hospital Problems    Diagnosis  POA    Primary osteoarthritis of right knee [M17.11]  Yes      Resolved Hospital Problems   No resolved problems to display.       Future Appointments   Date Time Provider Department Center   8/6/2019 10:00 AM Lina Haley PA-C [facility]     Follow-up Information     Lina Haley PA-C On 8/6/2019.    Specialty:  Orthopedic Surgery  Contact information:  [address]  [facility] [address]  [phone]                     I have seen and examined this patient face to face today. My clinical findings that support the need for the home health skilled services and home bound status are the following:  Weakness/numbness causing balance and gait disturbance due to Joint Replacement making it taxing to leave home.    Allergies:  Review of patient's allergies indicates:   Allergen Reactions    Hydrocodone-acetaminophen Rash     Other reaction(s): constipated       Diet: regular diet    Activities: activity as tolerated    Home Health Admitting Clinician:   SN/PT to complete comprehensive assessment including routine vital signs. Instruct on disease process and s/s of complications to report to MD. Follow specific home health arthoplasty protocol. Review/verify medication list sent home with the patient at time of discharge  and instruct patient/caregiver as needed. If coumadin ordered, coumadin clinic to manage INR with INR draws 2x per week with a goal to maintain INR between 1.8 and 2.2. Frequency may be adjusted depending on start of care date.    Notify MD if SBP > 160 or < 90; DBP > 90 or < 50;  HR > 120 or < 50; Temp > 101    Home Medical Equipment:  Walker, 3-1 bedside commode, transfer tub bench    CONSULTS:    Physical Therapy may admit if patient not on coumadin, PT to perform comprehensive assessment if performing admit visit and generate therapy plan of care. Evaluate for home safety and equipment needs; Establish/upgrade home exercise program. Perform/instruct on therapeutic exercises, gait training, transfer training, and Range of Motion.      OTHER: (only select if patient needs other therapy disciplines)  Occupational Therapy to evaluate and treat. Evaluate home environment for safety and equipment needs. Perform/Instruct on transfers, ADL training, ROM, and therapeutic exercises.   to evaluate for community resources/long-range planning.  Aide to provide assistance with personal care, ADLs, and vital signs.    MISCELLANEOUS CARE:  Routine Skin for Bedridden Patients: Instruct patient/caregiver to apply moisture barrier cream to all skin folds and wet areas in perineal area daily and after baths and all bowel movements.    WOUND CARE ORDERS:  Assess Surgical Incision/DSRG each TX  Aquacel AG drsg applied post-op leave on 14 days post op. Call MD if any drainage reaches border to border of drsg horizontally, s/s of infection, temp >101, induration, swelling or redness.  If dressing is removed per MD order, then apply island dressing, change/teach caregiver to perform daily dressing change if island dressing present.    Medications: Review discharge medications with patient and family and provide education.      Current Discharge Medication List      START taking these medications    Details   aspirin (ECOTRIN) 81 MG EC tablet Take 1 tablet (81 mg total) by mouth 2 (two) times daily.  Qty: 60 tablet, Refills: 0      docusate sodium (COLACE) 100 MG capsule Take 1 capsule (100 mg total) by mouth 2 (two) times daily as needed.  Qty: 60 capsule, Refills: 0      ondansetron (ZOFRAN) 4 MG  tablet Take 2 tablets (8 mg total) by mouth every 8 (eight) hours as needed.  Qty: 20 tablet, Refills: 0      oxyCODONE-acetaminophen (PERCOCET)  mg per tablet Take 1 tablet by mouth every 4 (four) hours as needed for Pain.  Qty: 40 tablet, Refills: 0         CONTINUE these medications which have NOT CHANGED    Details   atorvastatin (LIPITOR) 40 MG tablet TAKE 1 TABLET EVERY DAY  Qty: 90 tablet, Refills: 3      CINNAMON BARK (CINNAMON ORAL) Take 1 capsule by mouth once daily.      levothyroxine (SYNTHROID) 125 MCG tablet 1 tab in AM on empty stomach  Qty: 90 tablet, Refills: 1    Associated Diagnoses: Hypothyroidism due to acquired atrophy of thyroid      metoprolol tartrate (LOPRESSOR) 25 MG tablet TAKE 1 TABLET TWICE DAILY  Qty: 180 tablet, Refills: 3      multivitamin (ONE DAILY MULTIVITAMIN) per tablet Take 1 tablet by mouth once daily.      omega-3 fatty acids-vitamin E (FISH OIL) 1,000 mg Cap Take 1 capsule by mouth Daily.        ramipril (ALTACE) 10 MG capsule TAKE 1 CAPSULE EVERY DAY  Qty: 90 capsule, Refills: 3      sildenafil (VIAGRA) 100 MG tablet Take 1 tablet (100 mg total) by mouth daily as needed for Erectile Dysfunction.  Qty: 10 tablet, Refills: 6    Associated Diagnoses: Impotence      TURMERIC ROOT EXTRACT ORAL Take 1 capsule by mouth.      UBIDECARENONE (COENZYME Q10) 100 mg Tab Take 200 mg by mouth once daily.       vitamin D 185 MG Tab Take 185 mg by mouth once daily.        nitroGLYCERIN (NITROSTAT) 0.4 MG SL tablet Place 1 tablet (0.4 mg total) under the tongue every 5 (five) minutes as needed for Chest pain.  Qty: 100 tablet, Refills: 2    Comments: Dispense 25 tablets x4 bottles  Associated Diagnoses: Coronary artery disease involving native coronary artery of native heart without angina pectoris         STOP taking these medications       aspirin 81 mg Tab Comments:   Reason for Stopping:               I certify that this patient is confined to his home and needs intermittent  skilled nursing care, physical therapy and occupational therapy.

## 2019-07-22 NOTE — ANESTHESIA PROCEDURE NOTES
IPACK Single Injection Block    Patient location during procedure: pre-op   Block not for primary anesthetic.  Reason for block: at surgeon's request and post-op pain management   Post-op Pain Location: R knee pain  Start time: 7/22/2019 6:20 AM  Timeout: 7/22/2019 6:20 AM   End time: 7/22/2019 6:25 AM    Staffing  Authorizing Provider: Josephine Linton MD  Performing Provider: Kamlesh King MD    Preanesthetic Checklist  Completed: patient identified, site marked, surgical consent, pre-op evaluation, timeout performed, IV checked, risks and benefits discussed and monitors and equipment checked  Peripheral Block  Patient position: supine  Prep: ChloraPrep  Patient monitoring: heart rate, cardiac monitor, continuous pulse ox, continuous capnometry and frequent blood pressure checks  Block type: I PACK  Laterality: right  Injection technique: single shot  Needle  Needle type: Stimuplex   Needle gauge: 21 G  Needle length: 4 in  Needle localization: anatomical landmarks and ultrasound guidance   -ultrasound image captured on disc.  Assessment  Injection assessment: negative aspiration, negative parasthesia and local visualized surrounding nerve  Paresthesia pain: none  Heart rate change: no  Slow fractionated injection: yes  Additional Notes  VSS.  [facility] RN monitoring vitals throughout procedure.  Patient tolerated procedure well.

## 2019-07-22 NOTE — PLAN OF CARE
Problem: Physical Therapy Goal  Goal: Physical Therapy Goal  Goals to be met by: 19    Patient will increase functional independence with mobility by performin. Supine to sit with supervision  2. Sit to supine with supervision  3. Sit to stand transfer with Supervision  4. Gait x150 feet with Supervision using Rolling Walker  5. Ascend/Descend 2 curb step with contact guard assistance using Rolling Walker  6. Lower extremity exercise program x30 reps per handout, with supervision          Patient tolerated PT session fairly well today. He ambulated 3 steps forward/backward and side stepped to HOB with RW and CGA. He demonstrated fair + standing balance when using urinal. He would continue to benefit from PT in order to get back to PLOF.

## 2019-07-22 NOTE — ANESTHESIA PROCEDURE NOTES
Adductor Canal Catheter    Patient location during procedure: pre-op   Block not for primary anesthetic.  Reason for block: at surgeon's request and post-op pain management   Post-op Pain Location: R knee pain  Start time: 7/22/2019 6:14 AM  Timeout: 7/22/2019 6:14 AM   End time: 7/22/2019 6:20 AM    Staffing  Authorizing Provider: Josephine Linton MD  Performing Provider: Kamelsh King MD    Preanesthetic Checklist  Completed: patient identified, site marked, surgical consent, pre-op evaluation, timeout performed, IV checked, risks and benefits discussed and monitors and equipment checked  Peripheral Block  Patient position: supine  Prep: ChloraPrep and site prepped and draped  Patient monitoring: heart rate, cardiac monitor, continuous pulse ox, continuous capnometry and frequent blood pressure checks  Block type: adductor canal  Laterality: right  Injection technique: continuous  Needle  Needle type: Tuohy   Needle gauge: 17 G  Needle length: 3.5 in  Needle localization: anatomical landmarks and ultrasound guidance  Catheter type: spring wound  Catheter size: 19 G  Test dose: lidocaine 1.5% with Epi 1-to-200,000 and negative   -ultrasound image captured on disc.  Assessment  Injection assessment: negative aspiration, negative parasthesia and local visualized surrounding nerve  Paresthesia pain: none  Heart rate change: no  Slow fractionated injection: yes  Additional Notes  VSS.  [facility] RN monitoring vitals throughout procedure.  Patient tolerated procedure well.

## 2019-07-22 NOTE — ANESTHESIA PROCEDURE NOTES
Spinal    Diagnosis: osteoarthritis  Patient location during procedure: OR  Start time: 7/22/2019 7:08 AM  Timeout: 7/22/2019 7:08 AM  End time: 7/22/2019 7:11 AM    Staffing  Authorizing Provider: Kamlesh King MD  Performing Provider: Kamlesh King MD    Spinal Block  Patient position: sitting  Prep: ChloraPrep  Patient monitoring: heart rate, continuous pulse ox and frequent blood pressure checks  Approach: midline  Location: L4-5  Injection technique: single shot  CSF Fluid: clear free-flowing CSF  Needle  Needle type: pencil-tip   Needle gauge: 25 G  Needle length: 3.5 in  Additional Documentation: incremental injection, negative aspiration for heme and no paresthesia on injection  Needle localization: anatomical landmarks  Assessment  Ease of block: easy  Patient's tolerance of the procedure: comfortable throughout block and no complaints

## 2019-07-23 ENCOUNTER — PATIENT MESSAGE (OUTPATIENT)
Dept: ADMINISTRATIVE | Facility: OTHER | Age: 74
End: 2019-07-23

## 2019-07-23 VITALS
TEMPERATURE: 97 F | HEIGHT: 67 IN | SYSTOLIC BLOOD PRESSURE: 122 MMHG | BODY MASS INDEX: 27.47 KG/M2 | OXYGEN SATURATION: 97 % | WEIGHT: 175 LBS | DIASTOLIC BLOOD PRESSURE: 65 MMHG | HEART RATE: 64 BPM | RESPIRATION RATE: 16 BRPM

## 2019-07-23 PROCEDURE — 97535 SELF CARE MNGMENT TRAINING: CPT | Mod: HCNC,59

## 2019-07-23 PROCEDURE — 94799 UNLISTED PULMONARY SVC/PX: CPT | Mod: HCNC

## 2019-07-23 PROCEDURE — 99201 PR OFFICE/OUTPT VISIT,NEW,LEVL I: CPT | Mod: HCNC,,, | Performed by: ANESTHESIOLOGY

## 2019-07-23 PROCEDURE — 97116 GAIT TRAINING THERAPY: CPT | Mod: HCNC,59

## 2019-07-23 PROCEDURE — 25000003 PHARM REV CODE 250: Mod: HCNC | Performed by: PHYSICIAN ASSISTANT

## 2019-07-23 PROCEDURE — 99900035 HC TECH TIME PER 15 MIN (STAT): Mod: HCNC

## 2019-07-23 PROCEDURE — 25000003 PHARM REV CODE 250: Mod: HCNC | Performed by: ANESTHESIOLOGY

## 2019-07-23 PROCEDURE — 97530 THERAPEUTIC ACTIVITIES: CPT | Mod: HCNC

## 2019-07-23 PROCEDURE — 63600175 PHARM REV CODE 636 W HCPCS: Mod: HCNC | Performed by: PHYSICIAN ASSISTANT

## 2019-07-23 PROCEDURE — 99201 PR OFFICE/OUTPT VISIT,NEW,LEVL I: ICD-10-PCS | Mod: HCNC,,, | Performed by: ANESTHESIOLOGY

## 2019-07-23 PROCEDURE — 97110 THERAPEUTIC EXERCISES: CPT | Mod: HCNC

## 2019-07-23 PROCEDURE — 94761 N-INVAS EAR/PLS OXIMETRY MLT: CPT | Mod: HCNC

## 2019-07-23 RX ORDER — CELECOXIB 200 MG/1
200 CAPSULE ORAL DAILY
Status: DISCONTINUED | OUTPATIENT
Start: 2019-07-23 | End: 2019-07-23 | Stop reason: HOSPADM

## 2019-07-23 RX ADMIN — MUPIROCIN 1 G: 20 OINTMENT TOPICAL at 08:07

## 2019-07-23 RX ADMIN — POLYETHYLENE GLYCOL 3350 17 G: 17 POWDER, FOR SOLUTION ORAL at 08:07

## 2019-07-23 RX ADMIN — ACETAMINOPHEN 1000 MG: 500 TABLET ORAL at 05:07

## 2019-07-23 RX ADMIN — FAMOTIDINE 20 MG: 20 TABLET, FILM COATED ORAL at 08:07

## 2019-07-23 RX ADMIN — SENNOSIDES,DOCUSATE SODIUM 1 TABLET: 8.6; 5 TABLET, FILM COATED ORAL at 08:07

## 2019-07-23 RX ADMIN — LEVOTHYROXINE SODIUM 125 MCG: 125 TABLET ORAL at 05:07

## 2019-07-23 RX ADMIN — ASPIRIN 81 MG: 81 TABLET, COATED ORAL at 08:07

## 2019-07-23 RX ADMIN — RAMIPRIL 10 MG: 2.5 CAPSULE ORAL at 08:07

## 2019-07-23 RX ADMIN — METOPROLOL TARTRATE 25 MG: 25 TABLET ORAL at 08:07

## 2019-07-23 RX ADMIN — Medication: at 10:07

## 2019-07-23 RX ADMIN — CELECOXIB 200 MG: 200 CAPSULE ORAL at 08:07

## 2019-07-23 RX ADMIN — OXYCODONE HYDROCHLORIDE 5 MG: 5 TABLET ORAL at 09:07

## 2019-07-23 RX ADMIN — ROPIVACAINE HYDROCHLORIDE 8 ML/HR: 2 INJECTION, SOLUTION EPIDURAL; INFILTRATION at 09:07

## 2019-07-23 NOTE — SUBJECTIVE & OBJECTIVE
"Principal Problem:Primary osteoarthritis of right knee    Principal Orthopedic Problem: same    Interval History:  Pt seen and examined at bedside. MACARIOBRIAN. He reports pain very well controlled. Plans to work with PT today.    Review of patient's allergies indicates:   Allergen Reactions    Hydrocodone-acetaminophen Rash     Other reaction(s): constipated       Current Facility-Administered Medications   Medication    0.9%  NaCl infusion    acetaminophen tablet 1,000 mg    aspirin EC tablet 81 mg    atorvastatin tablet 40 mg    bisacodyl suppository 10 mg    celecoxib capsule 200 mg    famotidine tablet 20 mg    levothyroxine tablet 125 mcg    metoprolol tartrate (LOPRESSOR) tablet 25 mg    morphine injection 2 mg    morphine injection 2 mg    morphine injection 2 mg    mupirocin 2 % ointment 1 g    naloxone 0.4 mg/mL injection 0.02 mg    nitroGLYCERIN SL tablet 0.4 mg    ondansetron injection 4 mg    oxyCODONE immediate release tablet 10 mg    oxyCODONE immediate release tablet 5 mg    polyethylene glycol packet 17 g    pregabalin capsule 75 mg    promethazine (PHENERGAN) 6.25 mg in dextrose 5 % 50 mL IVPB    ramelteon tablet 8 mg    ramipril capsule 10 mg    ropivacaine (PF) 2 mg/ml (0.2%) infusion    ropivacaine 0.2% ON-Q C-BLOC 400 ML (SELECT A FLOW)    senna-docusate 8.6-50 mg per tablet 1 tablet     Objective:     Vital Signs (Most Recent):  Temp: 97.2 °F (36.2 °C) (07/23/19 0435)  Pulse: 69 (07/23/19 0435)  Resp: 18 (07/23/19 0435)  BP: 110/62 (07/23/19 0435)  SpO2: 99 % (07/23/19 0435) Vital Signs (24h Range):  Temp:  [95.9 °F (35.5 °C)-97.3 °F (36.3 °C)] 97.2 °F (36.2 °C)  Pulse:  [43-79] 69  Resp:  [14-20] 18  SpO2:  [96 %-100 %] 99 %  BP: ()/(57-89) 110/62     Weight: 79.4 kg (175 lb)  Height: 5' 7" (170.2 cm)  Body mass index is 27.41 kg/m².      Intake/Output Summary (Last 24 hours) at 7/23/2019 0764  Last data filed at 7/23/2019 0600  Gross per 24 hour   Intake 4707.9 ml "   Output 460 ml   Net 4247.9 ml       Ortho/SPM Exam    AAOx4  NAD  Reg rate  No increased WOB  Dressing c/d/i  Polar ice in place  SILT T/SP/DP/Song/Sa  Motor intact T/SP/DP  WWP extremities  FCDs in place and functioning      Significant Labs: All pertinent labs within the past 24 hours have been reviewed.    Significant Imaging: I have reviewed and interpreted all pertinent imaging results/findings.   Post op XR R knee shows total knee prosthesis in place with good comonent alignment. No fx or dislocation

## 2019-07-23 NOTE — PLAN OF CARE
POD # 1 R TKA. This CM met with patient at bedside. Patient lives with spouse who will provide transport at discharge. Patient has all dme in place. Discharge plan HH pending care team recommendation.      [employer] [address] [facility] [address] AT [facility] [address]  [address] [facility] [address]  Phone: [phone] Fax: [phone]    [facility] - [facility] [address] [facility]  [address] [facility] [address]  Phone: [phone] Fax: [phone]    Payor: [employer] MEDICARE / Plan: [employer] MEDICARE HMO / Product Type: Capitation /         07/23/19 0847   Discharge Assessment   Assessment Type Discharge Planning Assessment   Confirmed/corrected address and phone number on facesheet? Yes   Assessment information obtained from? Patient   Expected Length of Stay (days) 1   Communicated expected length of stay with patient/caregiver yes   Prior to hospitilization cognitive status: Alert/Oriented   Prior to hospitalization functional status: Independent   Current cognitive status: Alert/Oriented   Current Functional Status: Assistive Equipment   Facility Arrived From:   (brought in by spouse)   Lives With spouse   Able to Return to Prior Arrangements yes   Is patient able to care for self after discharge? Yes   Who are your caregiver(s) and their phone number(s)?   (spouse Samara [phone])   Patient's perception of discharge disposition home or selfcare   Readmission Within the Last 30 Days no previous admission in last 30 days   Patient currently being followed by outpatient case management? No   Patient currently receives any other outside agency services? No   Equipment Currently Used at Home CPAP;shower chair;walker, rolling;bedside commode   Do you have any problems affording any of your prescribed medications? No   Is the patient taking medications as prescribed? yes   Does the patient have transportation home? Yes    Transportation Anticipated family or friend will provide   Does the patient receive services at the Coumadin Clinic? No   Discharge Plan A Home;Home Health   DME Needed Upon Discharge  none   Patient/Family in Agreement with Plan yes     Yahaira De La Cruz RN/BSN/YUN  [facility]  [phone]  Brian/IMK/IM

## 2019-07-23 NOTE — PT/OT/SLP PROGRESS
Occupational Therapy   Treatment/ Discharge Summary    Name: Drake Barraza  MRN: [id number]  Admitting Diagnosis:  Primary osteoarthritis of right knee  1 Day Post-Op    Recommendations:     Discharge Recommendations: home with home health  Discharge Equipment Recommendations:  none  Barriers to discharge:  None    Assessment:     Drake Barraza is a 74 y.o. male with a medical diagnosis of Primary osteoarthritis of right knee.  He presents with no further acute OT needs at this time. Pt is performing all self-care and functional mobility tasks safely and appropriately s/p R TKA. Pt will benefit from further therapy w/ HH to maximize hisfunctional independence and safety w/ all functional tasks within his home environment.      Rehab Prognosis:  Good; patient would benefit from acute skilled OT services to address these deficits and reach maximum level of function.       Plan:     Patient to be seen (d/c from acute OT - please reconsult if anything changes ) to address the above listed problems via    · Plan of Care Expires:    · Plan of Care Reviewed with: patient, spouse    Subjective     Pain/Comfort:  · Pain Rating 1: (did not rate)  · Location 1: knee  · Pain Addressed 1: Reposition, Distraction  · Pain Rating Post-Intervention 1: (did not rate)    Objective:     Communicated with: RN prior to session.  Patient found HOB elevated with FCD, cryotherapy, perineural catheter upon OT entry to room.    General Precautions: Standard, fall   Orthopedic Precautions:RLE weight bearing as tolerated   Braces: N/A     Occupational Performance:     Bed Mobility:    · Patient completed Scooting/Bridging with modified independence  · Patient completed Supine to Sit with modified independence     Functional Mobility/Transfers:  · Patient completed Sit <> Stand Transfer with supervision  with  rolling walker   · Patient completed Toilet Transfer Step Transfer technique with supervision with  rolling walker  · Functional  Mobility: Pt ambulated 50ft w/ RW and SBA w/ cueing for gait sequence and RW management. Pt did not display any LOB or complications.     Activities of Daily Living:  · Grooming: modified independence standing at sink w/ good tolerance to wash hands   · Upper Body Dressing: independence standing doffing gown and donning shirt w/ no complications  · Lower Body Dressing: supervision and education on modified technique to doff/don LB garments   · Toileting: modified independence for smitha-hygiene and clothing maangement      [facility] 6 Click ADL: 23    Treatment & Education:  - OT POC  - Importance of OOB activity to maximize recovery   - safety w/ functional mobility; hand placement to ensure safe transfers to various surfaces in prep for ADLs  - Reviewed gait sequence and RW management via verbalization and demonstration   - educated on LBD adaptive techniques  - Polar ice management   - encouraged pt to ambulate household at least every hour to hour 1/2 to maximize healing    Patient left up in chair with all lines intact, call button in reach, RN notified and spouse and son presentEducation:      GOALS:   Multidisciplinary Problems     Occupational Therapy Goals     Not on file          Multidisciplinary Problems (Resolved)        Problem: Occupational Therapy Goal    Goal Priority Disciplines Outcome Interventions   Occupational Therapy Goal   (Resolved)     OT, PT/OT Outcome(s) achieved    Description:  Goals to be met by: 7/29/2019    Patient will increase functional independence with ADLs by performing:    UE Dressing with Supervision.  LE Dressing with Supervision with AD as needed.  Grooming while standing with Supervision.  Toileting from bedside commode with Supervision for hygiene and clothing management.   Stand pivot transfers with Supervision.  Toilet transfer to bedside commode with Supervision.                         Time Tracking:     OT Date of Treatment: 07/23/19  OT Start Time: 1020  OT Stop Time:  1045  OT Total Time (min): 25 min    Billable Minutes:Self Care/Home Management 15  Therapeutic Activity 10    Teresita Taylor, OT  7/23/2019

## 2019-07-23 NOTE — ADDENDUM NOTE
Addendum  created 07/23/19 1228 by Antonette Metcalf RN    Sign clinical note, Visit Navigator SmartForm Flowsheet section accepted

## 2019-07-23 NOTE — ADDENDUM NOTE
Addendum  created 07/23/19 1205 by Salo Tesfaye MD    Charge Capture section accepted, Cosign clinical note with attestation

## 2019-07-23 NOTE — ANESTHESIA POST-OP PAIN MANAGEMENT
Acute Pain Service and Perioperative Surgical Home Progress Note    HPI  Drake Barraza is a 74 y.o., male,     Interval history  Did well overnight; initially had some issues with urinary retention which subsequently resolved and he has since urinated many times. Pain well controlled, but some discomfort over top of knee. 5 cc bolus given from pump this morning in prep for PT    Surgery:  Procedure(s) (LRB):  REPLACEMENT-KNEE-TOTAL (Right)    Post Op Day #: 1    Catheter type: Perineural Adductor Canal    Infusion type: Ropivacaine 0.2%  8 ml/hr basal    Problem List:    Active Hospital Problems    Diagnosis  POA    *Primary osteoarthritis of right knee [M17.11]  Yes      Resolved Hospital Problems   No resolved problems to display.       Subjective:       General appearance of alert, oriented, no complaints   Pain with rest: 2    Numbers   Pain with movement: 6    Numbers   Side Effects    1. Pruritis No    2. Nausea No    3. Motor Blockade No, 0=Ability to raise lower extremities off bed    4. Sedation No, 1=awake and alert    Schedule Medications:    acetaminophen  1,000 mg Oral Q6H    aspirin  81 mg Oral BID    atorvastatin  40 mg Oral QHS    celecoxib  200 mg Oral Daily    famotidine  20 mg Oral BID    levothyroxine  125 mcg Oral Before breakfast    metoprolol tartrate  25 mg Oral BID    mupirocin  1 g Nasal BID    polyethylene glycol  17 g Oral Daily    pregabalin  75 mg Oral QHS    ramipril  10 mg Oral Daily    senna-docusate 8.6-50 mg  1 tablet Oral BID        Continuous Infusions:   sodium chloride 0.9% 150 mL/hr at 07/22/19 2057    ropivacaine (PF) 2 mg/ml (0.2%) 8 mL/hr (07/22/19 2056)    ropivacaine          PRN Medications:  bisacodyl, morphine, morphine, morphine, naloxone, nitroGLYCERIN, ondansetron, oxyCODONE, oxyCODONE, promethazine (PHENERGAN) IVPB, ramelteon, ropivacaine       Antibiotics:  Antibiotics (From admission, onward)    Start     Stop Route Frequency Ordered     07/22/19 2100  mupirocin 2 % ointment 1 g      07/27 2059 Nasl 2 times daily 07/22/19 8553             Objective:     Catheter site clean, dry, intact          Vital Signs (Most Recent):  Temp: 36.2 °C (97.2 °F) (07/23/19 0435)  Pulse: 69 (07/23/19 0435)  Resp: 18 (07/23/19 0435)  BP: 110/62 (07/23/19 0435)  SpO2: 99 % (07/23/19 0435) Vital Signs Range (Last 24H):  Temp:  [35.5 °C (95.9 °F)-36.3 °C (97.3 °F)]   Pulse:  [43-79]   Resp:  [14-20]   BP: ()/(57-89)   SpO2:  [96 %-100 %]          I & O (Last 24H):    Intake/Output Summary (Last 24 hours) at 7/23/2019 0838  Last data filed at 7/23/2019 0600  Gross per 24 hour   Intake 3707.9 ml   Output 460 ml   Net 3247.9 ml       Physical Exam:    GA: Alert, comfortable, no acute distress.   Pulmonary: Clear to auscultation A/P/L. No wheezing, crackles, or rhonchi.  Cardiac: RRR S1 & S2 w/o rubs/murmurs/gallops.   Abdominal:Bowel sounds present. No tenderness to palpation or distension. No appreciable hepatosplenomegaly.   Skin: No jaundice, rashes, or visible lesions.         Laboratory:  CBC: No results for input(s): WBC, RBC, HGB, HCT, PLT, MCV, MCH, MCHC in the last 72 hours.    BMP:   Recent Labs     07/22/19  0940      K 4.7   CO2 22*      BUN 13   CREATININE 0.8   *   CALCIUM 8.4*       No results for input(s): PT, INR, PROTIME, APTT in the last 72 hours.      Anticoagulant dose ASA    Assessment:         Pain control adequate    Plan:     1) Pain:    Adductor canal perineural catheter in place and infusing. Good level. Multimodal pain regimen with acetaminophen, Celebrex, Lyrica, and prn oxycodone given  Will continue to monitor. Plan to D/C perineural catheter in AM or home with On-Q if patient discharged today.   2) MICHELLE: CPAP used overnight   3) HTN: Home meds resumed    4) FEN/GI: Tolerating liquids, advance diet as tolerated. + flatus.    5) Dispo: Pt working well with PT/OT. Case management and SW for    placement. Plan for D/C  tomorrow afternoon or possibly today if patient   works well with PT and meets goals.          Evaluator Lucretia Jay MD

## 2019-07-23 NOTE — PLAN OF CARE
07/23/19 1003   Post-Acute Status   Post-Acute Authorization Placement   Post-Acute Placement Status Referrals Sent     SW met with patient at bedside to discuss the discharge plan in regard to home health and patient is requesting home health with [facility]. SW faxed the referral to [facility] and will follow up.    Jillian Musa LMSW  [facility]   [phone]

## 2019-07-23 NOTE — PLAN OF CARE
Problem: Physical Therapy Goal  Goal: Physical Therapy Goal  Goals to be met by: 19    Patient will increase functional independence with mobility by performin. Supine to sit with supervision  2. Sit to supine with supervision  3. Sit to stand transfer with Supervision  4. Gait x150 feet with Supervision using Rolling Walker  5. Ascend/Descend 2 curb step with contact guard assistance using Rolling Walker  6. Lower extremity exercise program x30 reps per handout, with supervision         Outcome: Outcome(s) achieved Date Met: 19    Patient ambulated 110ft x2 trials with RW and supervision. He ascended/descended 1 curb step with RW and CGA. He performed R LE therex x10 reps. He would benefit from  PT at discharge.

## 2019-07-23 NOTE — PROGRESS NOTES
[facility]  Orthopedics  Progress Note    Patient Name: Drake Barraza  MRN: [id number]  Admission Date: 7/22/2019  Hospital Length of Stay: 1 days  Attending Provider: [facility]., MD  Primary Care Provider: Catherine Middleton MD  Follow-up For: Procedure(s) (LRB):  REPLACEMENT-KNEE-TOTAL (Right)    Post-Operative Day: 1 Day Post-Op  Subjective:     Principal Problem:Primary osteoarthritis of right knee    Principal Orthopedic Problem: same    Interval History:  Pt seen and examined at bedside. NAEO. He reports pain very well controlled. Plans to work with PT today.    Review of patient's allergies indicates:   Allergen Reactions    Hydrocodone-acetaminophen Rash     Other reaction(s): constipated       Current Facility-Administered Medications   Medication    0.9%  NaCl infusion    acetaminophen tablet 1,000 mg    aspirin EC tablet 81 mg    atorvastatin tablet 40 mg    bisacodyl suppository 10 mg    celecoxib capsule 200 mg    famotidine tablet 20 mg    levothyroxine tablet 125 mcg    metoprolol tartrate (LOPRESSOR) tablet 25 mg    morphine injection 2 mg    morphine injection 2 mg    morphine injection 2 mg    mupirocin 2 % ointment 1 g    naloxone 0.4 mg/mL injection 0.02 mg    nitroGLYCERIN SL tablet 0.4 mg    ondansetron injection 4 mg    oxyCODONE immediate release tablet 10 mg    oxyCODONE immediate release tablet 5 mg    polyethylene glycol packet 17 g    pregabalin capsule 75 mg    promethazine (PHENERGAN) 6.25 mg in dextrose 5 % 50 mL IVPB    ramelteon tablet 8 mg    ramipril capsule 10 mg    ropivacaine (PF) 2 mg/ml (0.2%) infusion    ropivacaine 0.2% ON-Q C-BLOC 400 ML (SELECT A FLOW)    senna-docusate 8.6-50 mg per tablet 1 tablet     Objective:     Vital Signs (Most Recent):  Temp: 97.2 °F (36.2 °C) (07/23/19 0435)  Pulse: 69 (07/23/19 0435)  Resp: 18 (07/23/19 0435)  BP: 110/62 (07/23/19 0435)  SpO2: 99 % (07/23/19 0435) Vital Signs (24h  "Range):  Temp:  [95.9 °F (35.5 °C)-97.3 °F (36.3 °C)] 97.2 °F (36.2 °C)  Pulse:  [43-79] 69  Resp:  [14-20] 18  SpO2:  [96 %-100 %] 99 %  BP: ()/(57-89) 110/62     Weight: 79.4 kg (175 lb)  Height: 5' 7" (170.2 cm)  Body mass index is 27.41 kg/m².      Intake/Output Summary (Last 24 hours) at 7/23/2019 0739  Last data filed at 7/23/2019 0600  Gross per 24 hour   Intake 4707.9 ml   Output 460 ml   Net 4247.9 ml       Ortho/SPM Exam    AAOx4  NAD  Reg rate  No increased WOB  Dressing c/d/i  Polar ice in place  SILT T/SP/DP/Song/Sa  Motor intact T/SP/DP  WWP extremities  FCDs in place and functioning      Significant Labs: All pertinent labs within the past 24 hours have been reviewed.    Significant Imaging: I have reviewed and interpreted all pertinent imaging results/findings.   Post op XR R knee shows total knee prosthesis in place with good comonent alignment. No fx or dislocation    Assessment/Plan:     * Primary osteoarthritis of right knee  74 y.o. male POD1 s/p R TKA    Pain control: multimodal  PT/OT: WBAT RLE  DVT PPx: ASA 81 BID, FCDs at all times when not ambulating  Abx: postop Ancef  Labs: CMP post op with slightly decreased calcium  Drain: none  Ross: none    Dispo: f/u PT recs            Anthony Razo MD  Orthopedics  [facility]  "

## 2019-07-23 NOTE — PLAN OF CARE
Problem: Adult Inpatient Plan of Care  Goal: Plan of Care Review  Patient in bed resting well. Patient A/OX4. Patient denies any pain or discomfort. Pain will be managed by prn meds.  Polar Ice to right knee, Ropivacaine 8ml/hr. Dressing site clean, dry, and intact. IS at bedside, instruct pt to use 10X every hour. 22 g to right forearm and 18g to the left hand infusing NS at 150ml/hr., site clean, dry and intact.. Patients bed locked and in the lowest position. Instruct pt to call for assistance, call light in reach. Fall precaution maintained, no falls noted. Will continue to observe and follow the current plan of care.

## 2019-07-23 NOTE — PLAN OF CARE
07/23/19 1147   Post-Acute Status   Post-Acute Authorization Placement   Post-Acute Placement Status Authorization Obtained     Patient accepted by [facility] post discharge.    Jillian Musa LMSW  [facility]   [id number]

## 2019-07-23 NOTE — PLAN OF CARE
Patient discharge home today with spouse. No dme needed, [facility] to follow with home care.    Future Appointments   Date Time Provider Department Center   8/6/2019 10:00 AM Lina Haley PA-C [facility] [place]     Payor: [employer] MANAGED MEDICARE / Plan: [employer] MEDICARE HMO / Product Type: Capitation /         07/23/19 1310   Final Note   Assessment Type Final Discharge Note   Anticipated Discharge Disposition Home-Health   What phone number can be called within the next 1-3 days to see how you are doing after discharge?   (spouse saritha [phone])   Hospital Follow Up  Appt(s) scheduled? Yes   Discharge plans and expectations educations in teach back method with documentation complete? Yes   Right Care Referral Info   Post Acute Recommendation Home-care   Referral Type   (HH)   Facility Name   ([facility])     Yahaira De La Cruz RN/BSN/CM  [facility]  [phone]  [place]/DORIAN/KATY

## 2019-07-23 NOTE — ASSESSMENT & PLAN NOTE
74 y.o. male POD1 s/p R TKA    Pain control: multimodal  PT/OT: WBAT RLE  DVT PPx: ASA 81 BID, FCDs at all times when not ambulating  Abx: postop Ancef  Labs: CMP post op with slightly decreased calcium  Drain: none  Ross: none    Dispo: f/u PT recs

## 2019-07-23 NOTE — PT/OT/SLP PROGRESS
Physical Therapy Treatment    Patient Name:  Drake Barraza   MRN:  [id number]    Recommendations:     Discharge Recommendations:  home health PT   Discharge Equipment Recommendations: none   Barriers to discharge: None    Assessment:     Drake Barraza is a 74 y.o. male admitted with a medical diagnosis of Primary osteoarthritis of right knee.  He presents with the following impairments/functional limitations:  weakness, impaired functional mobilty, gait instability, impaired balance, decreased lower extremity function, decreased ROM, impaired joint extensibility, pain, impaired skin, orthopedic precautions. Patient ambulated 110ft x2 trials with RW and supervision. He ascended/descended 1 curb step with RW and CGA. He performed R LE therex x10 reps. He would benefit from  PT at discharge.     Rehab Prognosis: Good; patient would benefit from acute skilled PT services to address these deficits and reach maximum level of function.    Recent Surgery: Procedure(s) (LRB):  REPLACEMENT-KNEE-TOTAL (Right) 1 Day Post-Op    Plan:     During this hospitalization, patient to be seen BID to address the identified rehab impairments via gait training, therapeutic activities, therapeutic exercises and progress toward the following goals:    · Plan of Care Expires:  07/29/19    Subjective     Chief Complaint: Pain in right knee with exercises.   Patient/Family Comments/goals: To get back to PLOF.   Pain/Comfort:  · Pain Rating 1: 2/10  · Location - Side 1: Right  · Location - Orientation 1: anterior  · Location 1: knee  · Pain Addressed 1: Pre-medicate for activity, Reposition, Distraction  · Pain Rating Post-Intervention 1: 5/10      Objective:     Communicated with RN prior to session.  Patient found up in chair with FCD, perineural catheter, cryotherapy upon PT entry to room. Wife present throughout PT session.     General Precautions: Standard, fall   Orthopedic Precautions:RLE weight bearing as tolerated   Braces: N/A  "    Functional Mobility:  · Mat Mobility:     · Supine to Sit: supervision  · Sit to Supine: supervision  · Transfers:     · Sit to Stand:  supervision with rolling walker  · Gait: Patient ambulated 110ft x2 trials with supervision and rolling walker. No loss of balance noted.   · Stairs:  Pt ascended/descended 6" curb step with Rolling Walker with Contact Guard Assistance.       AM-PAC 6 CLICK MOBILITY  Turning over in bed (including adjusting bedclothes, sheets and blankets)?: 4  Sitting down on and standing up from a chair with arms (e.g., wheelchair, bedside commode, etc.): 4  Moving from lying on back to sitting on the side of the bed?: 4  Moving to and from a bed to a chair (including a wheelchair)?: 4  Need to walk in hospital room?: 4  Climbing 3-5 steps with a railing?: 3  Basic Mobility Total Score: 23       Therapeutic Activities and Exercises:   Patient educated in and performed R LE exercises x10 reps for ankle pumps, quad set, glute set, SAQ over bolster, heel slides, hip abd/add, SLR, and LAQ.      Patient educated in:  -PT role and POC  -safety with transfers including hand placement  -gait sequencing and RW management  -OOB activity to maximize recovery   -car transfer  -stair training  -HEP for therex at home with handout provided      Patient left up in chair with all lines intact, call button in reach, RN notified, and wife and son present.    GOALS:   Multidisciplinary Problems     Physical Therapy Goals        Problem: Physical Therapy Goal    Goal Priority Disciplines Outcome Goal Variances Interventions   Physical Therapy Goal     PT, PT/OT                Multidisciplinary Problems (Resolved)        Problem: Physical Therapy Goal    Goal Priority Disciplines Outcome Goal Variances Interventions   Physical Therapy Goal   (Resolved)     PT, PT/OT Outcome(s) achieved     Description:  Goals to be met by: 19    Patient will increase functional independence with mobility by performin. " Supine to sit with supervision  2. Sit to supine with supervision  3. Sit to stand transfer with Supervision  4. Gait x150 feet with Supervision using Rolling Walker  5. Ascend/Descend 2 curb step with contact guard assistance using Rolling Walker  6. Lower extremity exercise program x30 reps per handout, with supervision                          Time Tracking:     PT Received On: 07/23/19  PT Start Time: 1106     PT Stop Time: 1129  PT Total Time (min): 23 min     Billable Minutes: Gait Training 13 and Therapeutic Exercise 10    Treatment Type: Treatment  PT/PTA: PT       Cele Hamilton, PT  07/23/2019

## 2019-07-23 NOTE — PROGRESS NOTES
Pt and family present, consent to converting adductor PNC to On Q.  Site CDI.  Educated regarding continued pain management, fall risk, signs of complications, continued monitoring, as well as discontinuing catheter on 7/25.  Two numbers obtained for APS to follow up.  Understanding verbalized.

## 2019-07-23 NOTE — DISCHARGE SUMMARY
[facility]  Orthopedics  Discharge Summary      Patient Name: Drake Barraza  MRN: [id number]  Admission Date: 7/22/2019  Hospital Length of Stay: 1 days  Discharge Date and Time: 7/23/2019 12:43 PM  Attending Physician: No att. providers found   Discharging Provider: Anthony Razo MD  Primary Care Provider: Catherine Middleton MD    HPI: CC: Right knee pain     Drkae Barraza is a 74 y.o. male with 5 year history of Right knee pain. Pain is worse with activity and weight bearing.  Patient has experienced interference of activities of daily living due to decreased range of motion and an increase in joint pain and swelling.  Patient has failed non-operative treatment including NSAIDs and activity modification.  Drake Barraza currently ambulates independently.      Relevant medical conditions of significance in perioperative period:  CAD: inferior MI 2003 with stents, on ASA. Recent w/u by Dr. Read.   HTN: on meds followed by PCP   MICHELLE: uses CPAP  Hypothyroidism: on synthroid    Procedure(s) (LRB):  REPLACEMENT-KNEE-TOTAL (Right)      Hospital Course: Patient presented for above procedure.  Tolerated it well and was discharged home POD1 after voiding, tolerating diet, ambulating, pain controlled.  Discharge instructions, follow-up appointment, and med rec are below.      Consults (From admission, onward)        Status Ordering Provider     Inpatient consult to Respiratory Care  Once     Provider:  (Not yet assigned)    Acknowledged MAYELA SIDHU     Inpatient consult to Respiratory Care  Once     Provider:  (Not yet assigned)    Acknowledged HENRRY RAZO     Inpatient consult to Social Work  Once     Provider:  (Not yet assigned)    Acknowledged MAYELA SIDHU          Significant Diagnostic Studies: Labs: All labs within the past 24 hours have been reviewed    Pending Diagnostic Studies:     None        Final Active Diagnoses:    Diagnosis Date Noted POA    PRINCIPAL  "PROBLEM:  Primary osteoarthritis of right knee [M17.11] 07/22/2019 Yes      Problems Resolved During this Admission:      Discharged Condition: good    Disposition: Home or Self Care    Follow Up:  Follow-up Information     Lina Haley PA-C On 8/6/2019.    Specialty:  Orthopedic Surgery  Contact information:  [address]  [facility] [id number]  [phone]                 Patient Instructions:      3 IN 1 COMMODE FOR HOME USE     Order Specific Question Answer Comments   Type: Standard    Height: 5' 7" (1.702 m)    Weight: 79.4 kg (175 lb 0.7 oz)    Length of need (1-99 months): 99      TRANSFER TUB BENCH FOR HOME USE     Order Specific Question Answer Comments   Type of Transfer Tub Bench: Unpadded    Height: 5' 7" (1.702 m)    Weight: 79.4 kg (175 lb 0.7 oz)    Length of need (1-99 months): 99      WALKER FOR HOME USE     Order Specific Question Answer Comments   Type of Walker: Adult (5'4"-6'6")    With wheels? Yes    Height: 5' 7" (1.702 m)    Weight: 79.4 kg (175 lb 0.7 oz)    Length of need (1-99 months): 99    Please check all that apply: Walker will be used for gait training.      Diet Adult Regular     Notify your health care provider if you experience any of the following:  temperature >100.4     Notify your health care provider if you experience any of the following:  persistent nausea and vomiting or diarrhea     Notify your health care provider if you experience any of the following:  severe uncontrolled pain     Notify your health care provider if you experience any of the following:  redness, tenderness, or signs of infection (pain, swelling, redness, odor or green/yellow discharge around incision site)     Notify your health care provider if you experience any of the following:  difficulty breathing or increased cough     Notify your health care provider if you experience any of the following:  severe persistent headache     Notify your health care provider if you experience any of the " following:  worsening rash     Notify your health care provider if you experience any of the following:  persistent dizziness, light-headedness, or visual disturbances     Notify your health care provider if you experience any of the following:     Notify your health care provider if you experience any of the following:  increased confusion or weakness     Leave dressing on - Keep it clean, dry, and intact until clinic visit     Activity as tolerated     Call MD for:  difficulty breathing, headache or visual disturbances     Call MD for:  extreme fatigue     Call MD for:  hives     Call MD for:  persistent dizziness or light-headedness     Call MD for:  persistent nausea and vomiting     Call MD for:  redness, tenderness, or signs of infection (pain, swelling, redness, odor or green/yellow discharge around incision site)     Call MD for:  severe uncontrolled pain     Call MD for:  temperature >100.4     Keep surgical extremity elevated     Leave dressing on - Keep it clean, dry, and intact until clinic visit   Order Comments: Do not remove surgical dressing for 2 weeks post-op. This will be done only by MD at initial post-op visit. If dressing is completely saturated, replace with identical dressing - silver-impregnated hydrocolloid dressing.     Do not get dressings wet. Do not shower.     If dressing continues to be saturated or there are signs of infection, please call [facility] [phone] for further instructions and to make appt to be seen.     Lifting restrictions   Order Comments: No strenuous exercise or lifting of > 10 lbs     No driving, operating heavy equipment or signing legal documents while taking pain medication     Sponge bath only until clinic visit     Weight bearing as tolerated     Weight bearing restrictions (specify):   Order Comments: WBAT     Medications:  Reconciled Home Medications:      Medication List      START taking these medications    aspirin 81 MG EC tablet  Commonly known as:   ECOTRIN  Take 1 tablet (81 mg total) by mouth 2 (two) times daily.  Replaces:  aspirin 81 mg Tab     ondansetron 4 MG tablet  Commonly known as:  ZOFRAN  Take 2 tablets (8 mg total) by mouth every 8 (eight) hours as needed.     oxyCODONE-acetaminophen  mg per tablet  Commonly known as:  PERCOCET  Take 1 tablet by mouth every 4 (four) hours as needed for Pain.     STOOL SOFTENER 100 MG capsule  Generic drug:  docusate sodium  Take 1 capsule (100 mg total) by mouth 2 (two) times daily as needed.        CONTINUE taking these medications    atorvastatin 40 MG tablet  Commonly known as:  LIPITOR  TAKE 1 TABLET EVERY DAY     CINNAMON ORAL  Take 1 capsule by mouth once daily.     coenzyme Q10 100 mg Tab  Take 200 mg by mouth once daily.     FISH OIL 1,000 mg Cap  Generic drug:  omega-3 fatty acids-vitamin E  Take 1 capsule by mouth Daily.     levothyroxine 125 MCG tablet  Commonly known as:  SYNTHROID  1 tab in AM on empty stomach     metoprolol tartrate 25 MG tablet  Commonly known as:  LOPRESSOR  TAKE 1 TABLET TWICE DAILY     nitroGLYCERIN 0.4 MG SL tablet  Commonly known as:  NITROSTAT  Place 1 tablet (0.4 mg total) under the tongue every 5 (five) minutes as needed for Chest pain.     ONE DAILY MULTIVITAMIN per tablet  Generic drug:  multivitamin  Take 1 tablet by mouth once daily.     ramipril 10 MG capsule  Commonly known as:  ALTACE  TAKE 1 CAPSULE EVERY DAY     sildenafil 100 MG tablet  Commonly known as:  VIAGRA  Take 1 tablet (100 mg total) by mouth daily as needed for Erectile Dysfunction.     TURMERIC ROOT EXTRACT ORAL  Take 1 capsule by mouth.     vitamin D 1000 units Tab  Commonly known as:  VITAMIN D3  Take 185 mg by mouth once daily.        STOP taking these medications    aspirin 81 mg Tab  Replaced by:  aspirin 81 MG EC tablet            Anthony Razo MD  Orthopedics  [facility]

## 2019-07-24 ENCOUNTER — PATIENT MESSAGE (OUTPATIENT)
Dept: ADMINISTRATIVE | Facility: OTHER | Age: 74
End: 2019-07-24

## 2019-07-24 PROCEDURE — G0180 MD CERTIFICATION HHA PATIENT: HCPCS | Mod: ,,, | Performed by: INTERNAL MEDICINE

## 2019-07-24 PROCEDURE — G0180 PR HOME HEALTH MD CERTIFICATION: ICD-10-PCS | Mod: ,,, | Performed by: INTERNAL MEDICINE

## 2019-07-24 NOTE — PROGRESS NOTES
Called patient to check in on OnQ infusion. Patient denies any issues with infusion; pain is manageable and catheter site remains clean, dry and intact. Discussed with patient that we would call again tomorrow to ensure successful catheter removal. Instructed patient to call anesthesia team with any questions or concerns.    Lucretia Jay MD  11:13 AM  07/24/2019

## 2019-07-25 ENCOUNTER — PATIENT MESSAGE (OUTPATIENT)
Dept: ADMINISTRATIVE | Facility: OTHER | Age: 74
End: 2019-07-25

## 2019-07-25 NOTE — PROGRESS NOTES
Called patient and discussed removal of catheter today. Patient understands catheter to be removed today and to examine for blue tip intact at the end. Instructed to call with any issues. Questions and concerns answered.    Lucretia Jay MD  12:30 PM  07/25/2019

## 2019-07-25 NOTE — ADDENDUM NOTE
Addendum  created 07/25/19 1231 by Lucretia Jay MD    Intraprocedure Event edited, Sign clinical note

## 2019-07-30 ENCOUNTER — PATIENT MESSAGE (OUTPATIENT)
Dept: ADMINISTRATIVE | Facility: OTHER | Age: 74
End: 2019-07-30

## 2019-08-01 DIAGNOSIS — Z96.651 STATUS POST TOTAL RIGHT KNEE REPLACEMENT: Primary | ICD-10-CM

## 2019-08-01 RX ORDER — OXYCODONE AND ACETAMINOPHEN 5; 325 MG/1; MG/1
1 TABLET ORAL EVERY 6 HOURS PRN
Qty: 30 TABLET | Refills: 0 | Status: SHIPPED | OUTPATIENT
Start: 2019-08-01 | End: 2019-11-14

## 2019-08-05 ENCOUNTER — TELEPHONE (OUTPATIENT)
Dept: ORTHOPEDICS | Facility: CLINIC | Age: 74
End: 2019-08-05

## 2019-08-05 NOTE — TELEPHONE ENCOUNTER
----- Message from Anna Baumann sent at 8/5/2019  9:30 AM CDT -----  Contact: Self/ [phone]  Patient would like a call back to speak with the nurse to ask questions about his appts. Patient did not give more information.                     We spoke  He wants PT  @ [place]   His appt is tomorrow  We can do it then

## 2019-08-06 ENCOUNTER — OFFICE VISIT (OUTPATIENT)
Dept: ORTHOPEDICS | Facility: CLINIC | Age: 74
End: 2019-08-06
Payer: MEDICARE

## 2019-08-06 VITALS — BODY MASS INDEX: 27.48 KG/M2 | WEIGHT: 175.06 LBS | HEIGHT: 67 IN

## 2019-08-06 DIAGNOSIS — Z96.651 STATUS POST TOTAL RIGHT KNEE REPLACEMENT: Primary | ICD-10-CM

## 2019-08-06 PROCEDURE — 99024 POSTOP FOLLOW-UP VISIT: CPT | Mod: HCNC,S$GLB,, | Performed by: PHYSICIAN ASSISTANT

## 2019-08-06 PROCEDURE — 99999 PR PBB SHADOW E&M-EST. PATIENT-LVL IV: ICD-10-PCS | Mod: PBBFAC,HCNC,, | Performed by: PHYSICIAN ASSISTANT

## 2019-08-06 PROCEDURE — 99024 PR POST-OP FOLLOW-UP VISIT: ICD-10-PCS | Mod: HCNC,S$GLB,, | Performed by: PHYSICIAN ASSISTANT

## 2019-08-06 PROCEDURE — 99999 PR PBB SHADOW E&M-EST. PATIENT-LVL IV: CPT | Mod: PBBFAC,HCNC,, | Performed by: PHYSICIAN ASSISTANT

## 2019-08-06 NOTE — PROGRESS NOTES
"Drake Barraza presents for initial post-operative visit following a right total knee arthroplasty performed by Dr. Ochsner on 7/22/2019. Taking pain medications nightly.     Exam:   Height 5' 7" (1.702 m), weight 79.4 kg (175 lb 0.7 oz).   Ambulating well with assistive device.  Incision is clean and dry without drainage or erythema. ROM:0-95    Initial post-operative radiographs reviewed today revealing a well fixed and aligned prosthesis.    A/P:  2 weeks s/p right total knee replacement  Dr. [facility]ner interviewed and examined patient today and agrees with plan.   - The patient was advised to keep the incision clean and dry for the next 24 hours after which he may wash the area with antibacterial soap in the shower. Will not submerge until the incision is completely healed.   - Outpatient PT: Lance ordered   - Continue ASA for 1 month from surgery.  - Pain medication: Will use Tylenol  - Follow up in 4 weeks with Dr. O[facility]ner. Pt will call clinic with problems/concerns.     "

## 2019-08-13 ENCOUNTER — CLINICAL SUPPORT (OUTPATIENT)
Dept: REHABILITATION | Facility: HOSPITAL | Age: 74
End: 2019-08-13
Payer: MEDICARE

## 2019-08-13 DIAGNOSIS — M25.561 CHRONIC PAIN OF RIGHT KNEE: ICD-10-CM

## 2019-08-13 DIAGNOSIS — G89.29 CHRONIC PAIN OF RIGHT KNEE: ICD-10-CM

## 2019-08-13 DIAGNOSIS — R60.0 LOCALIZED EDEMA: ICD-10-CM

## 2019-08-13 DIAGNOSIS — M25.661 JOINT STIFFNESS OF KNEE, RIGHT: ICD-10-CM

## 2019-08-13 PROCEDURE — G8978 MOBILITY CURRENT STATUS: HCPCS | Mod: CK,HCNC | Performed by: PHYSICAL THERAPIST

## 2019-08-13 PROCEDURE — G8979 MOBILITY GOAL STATUS: HCPCS | Mod: CJ,HCNC | Performed by: PHYSICAL THERAPIST

## 2019-08-13 PROCEDURE — 97161 PT EVAL LOW COMPLEX 20 MIN: CPT | Mod: HCNC | Performed by: PHYSICAL THERAPIST

## 2019-08-13 NOTE — PLAN OF CARE
[facility]  Physical Therapy Initial Evaluation    Name: Drake Barraza  Clinic Number: [id number]    Therapy Diagnosis:   Encounter Diagnoses   Name Primary?    Chronic pain of right knee     Localized edema     Joint stiffness of knee, right      Physician: John Ochsner Jr, MD    Physician Orders: PT Eval and Treat   Medical Diagnosis from Referral: Z96.651 (ICD-10-CM) - Status post total right knee replacement  Evaluation Date: 8/13/2019  Authorization Period Expiration: 12/31/2019  Plan of Care Expiration: 9/10/2019  Visit # / Visits authorized: 1/ 20    Time In: 1500  Time Out: 1600  Total Billable Time: 60 minutes    Precautions: Standard, blood thinners and Fall,     Subjective   Date of onset: R TKA 7/22/19  History of current condition - Drake reports: He reports undergoing a TKA on 7/22 and is prepared for a L TKA early next year. He is currently 3 weeks post op presenting with his wife. He reports receiving 3 weeks of home health therapy and following all precautions set by his therapist. He has the most discomfort with sitting with the leg straight. 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    Drake has a current medication list which includes the following prescription(s): aspirin, atorvastatin, cinnamon bark, docusate sodium, levothyroxine, metoprolol tartrate, multivitamin, nitroglycerin, omega-3 fatty acids-vitamin e,  ondansetron, oxycodone-acetaminophen, ramipril, sildenafil, turmeric root extract, coenzyme q10, and vitamin d.    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 She brought his SPC and reports he is not using it at this time     Posture: Stands with forward hunch ambulating     Functional Tests:  Gait: SPC was sized for the left hand and he was taught 3 point gait. He lacks coordination at this time and was told to practice in hallway at home, continue RW in the community    Knee Passive Range of Motion:   Right  Left    Flexion 110 131   Extension +1 hyperextension +5 hyperextension     Knee Active Range of Motion:   Right  Left    Flexion 105 122   Extension 0 0     Quad Set: Good activation on the R    Joint Mobility: Clunking with med/lateral mobs, good tolerance to sup/inf mobility. Most limited inferiorly     Palpation: Tender at medial joint line and inferior incision    Sensation: Diminished over incision    Edema: See below    Girth Measurement Joint line 15 cm below 10 cm above   Right 39.7 cm 33.8 cm 39.9 cm   Left 36.1 cm 33.1 cm 37.0 cm       CMS Impairment/Limitation/Restriction for FOTO Knee Survey    Therapist reviewed FOTO scores for Drake PAGAN  Chrisit on 8/13/2019.   FOTO documents entered into [employer] - see Media section.    Limitation Score: 53%  Category: Mobility    Current : CK = at least 40% but < 60% impaired, limited or restricted  Goal: CJ = at least 20% but < 40% impaired, limited or restricted         Home Exercises and Patient Education Provided    Education provided re: Not sleeping with anything under the knee, goals for therapy, role of therapy for care, exercises/HEP    Written Home Exercises Provided: .  Exercises were reviewed and Drake was able to demonstrate them prior to the end of the session.   Pt received a written copy of exercises to perform at home. Drake demonstrated good  understanding of the education provided.     See EMR under patient instructions for exercises given.   Assessment   Drake is a 74 y.o. male referred to outpatient Physical Therapy with a medical diagnosis of R TKA. Pt presents with limited knee motion, strength deficits, poor balance, gait impairments, and inability to participate in ADLs without assistance    Pt prognosis is Excellent.   Pt will benefit from skilled outpatient Physical Therapy to address the deficits stated above and in the chart below, provide pt/family education, and to maximize pt's level of independence.     Plan of care discussed with patient: Yes  Pt's spiritual, cultural and educational needs considered and patient is agreeable to the plan of care and goals as stated below:     Anticipated Barriers for therapy: Transportation    Medical Necessity is demonstrated by the following  History  Co-morbidities and personal factors that may impact the plan of care Co-morbidities:   advanced age, CAD, HTN and prior abdominal surgery    Personal Factors:   age     low   Examination  Body Structures and Functions, activity limitations and participation restrictions that may impact the plan of care Body Regions:   lower extremities    Body Systems:     ROM  strength  balance  gait  transfers  transitions  motor control  motor learning  edema  scar formation  skin integrity    Participation Restrictions:   Transportation    Activity limitations:   Learning and applying knowledge  no deficits    General Tasks and Commands  no deficits    Communication  no deficits    Mobility  walking  driving (bike, car, motorcycle)    Self care  no deficits    Domestic Life  shopping  cooking  doing house work (cleaning house, washing dishes, laundry)    Interactions/Relationships  no deficits    Life Areas  no deficits    Community and Social Life  no deficits         low   Clinical Presentation stable and uncomplicated low   Decision Making/ Complexity Score: low     GOALS: Short Term Goals:  4 weeks  1.Report decreased Knee pain  < / =  3/10  to increase tolerance for ambulating in community  2. Increase knee ROM to 120 deg flexion in order to be able to perform ADLs without difficulty.  3. Increase strength by 1/3 MMT grade in quads  to increase tolerance for ADL and work activities.  4. Pt to tolerate HEP to improve ROM and independence with ADL's    Long Term Goals: 8 weeks  1.Report decreased knee pain < / = 1/10  to increase tolerance for returning to [facility]  2.Patient goal: Return to climbing stairs without pain  3.Increase strength to >/= 4+/5 in quads  to increase tolerance for ADL and work activities.  4. Pt will report at 36% on FOTO knee to demonstrate increase in LE function with every day tasks.     Plan   Plan of care Certification: 8/13/2019 to 9/10/2019.    Outpatient Physical Therapy 2 times weekly for 8 weeks to include the following interventions: Gait Training, Manual Therapy, Moist Heat/ Ice, Neuromuscular Re-ed, Patient Education, Self Care, Therapeutic Activites and Therapeutic Exercise.     Enzo Gillespie, PT

## 2019-08-13 NOTE — ADDENDUM NOTE
Addendum  created 08/13/19 0649 by Josephine Linton MD    Attestation recorded in Intraprocedure, Intraprocedure Attestations filed

## 2019-08-15 ENCOUNTER — TELEPHONE (OUTPATIENT)
Dept: ORTHOPEDICS | Facility: CLINIC | Age: 74
End: 2019-08-15

## 2019-08-15 ENCOUNTER — CLINICAL SUPPORT (OUTPATIENT)
Dept: REHABILITATION | Facility: HOSPITAL | Age: 74
End: 2019-08-15
Payer: MEDICARE

## 2019-08-15 DIAGNOSIS — M25.661 JOINT STIFFNESS OF KNEE, RIGHT: ICD-10-CM

## 2019-08-15 DIAGNOSIS — G89.29 CHRONIC PAIN OF RIGHT KNEE: ICD-10-CM

## 2019-08-15 DIAGNOSIS — M25.561 CHRONIC PAIN OF RIGHT KNEE: ICD-10-CM

## 2019-08-15 DIAGNOSIS — R60.0 LOCALIZED EDEMA: ICD-10-CM

## 2019-08-15 PROCEDURE — 97110 THERAPEUTIC EXERCISES: CPT | Mod: HCNC

## 2019-08-15 PROCEDURE — 97140 MANUAL THERAPY 1/> REGIONS: CPT | Mod: HCNC

## 2019-08-15 NOTE — TELEPHONE ENCOUNTER
I left a message & sent a new appointment with an explanation of an error in scheduling... I asked that he call if not a good date or time

## 2019-08-15 NOTE — PROGRESS NOTES
"  Physical Therapy Daily Treatment Note     Name: Drake Yusuf  Clinic Number: [id number]  Therapy Diagnosis:        Encounter Diagnoses   Name Primary?    Chronic pain of right knee      Localized edema      Joint stiffness of knee, right        Physician: John Ochsner Jr, MD     Physician Orders: PT Eval and Treat   Medical Diagnosis from Referral: Z96.651 (ICD-10-CM) - Status post total right knee replacement  Evaluation Date: 8/13/2019  Authorization Period Expiration: 12/31/2019  Plan of Care Expiration: 9/10/2019  Visit # / Visits authorized: 2/ 20     Time In: 1330  Time Out: 1430  Total Billable Time: 30 minutes  Tx time 60     Precautions: Standard, blood thinners and Fall,     Subjective     Pt reports: min pain in R knee at present time.   He was compliant with home exercise program.  Response to previous treatment: no problems  Functional change: ambulating with RW into therapy    Pain: 2/10  Location: right knee      Objective     Drake received therapeutic exercises to develop strength, endurance, ROM, flexibility and posture for 35 minutes including:  Stationary bike for 10' for increased ROM R knee, mm endurance and increased blood circulation   R QS under heel 3x10/3"  R SLR 3x10  SAQ 3x10    Drake received the following manual therapy techniques: Joint mobilizations and Soft tissue Mobilization were applied to the: R knee  for 15 minutes, including: patella mobs, HSS, GSS and quad stretching      Drake received cold pack for 10 minutes to to decrease circulation, pain, and swelling.    Home Exercises Provided and Patient Education Provided     Education provided:   Posture awareness, HEP and RICE     Written Home Exercises Provided: yes.  Exercises were reviewed and Drake was able to demonstrate them prior to the end of the session.  Drake demonstrated good  understanding of the education provided.       Assessment     Pt tolerating tx well with appropriated increased pain noted with " manual therapy. VC/TC for correcting form/technigue with therex along with quad activation. Continue to progress as tolerated.   Drake is progressing well towards his goals.   Pt prognosis is Good.     Pt will continue to benefit from skilled outpatient physical therapy to address the deficits listed in the problem list box on initial evaluation, provide pt/family education and to maximize pt's level of independence in the home and community environment.     Pt's spiritual, cultural and educational needs considered and pt agreeable to plan of care and goals.    Anticipated barriers to physical therapy: none     Goals:GOALS: Short Term Goals:  4 weeks  1.Report decreased Knee pain  < / =  3/10  to increase tolerance for ambulating in community  2. Increase knee ROM to 120 deg flexion in order to be able to perform ADLs without difficulty.  3. Increase strength by 1/3 MMT grade in quads  to increase tolerance for ADL and work activities.  4. Pt to tolerate HEP to improve ROM and independence with ADL's     Long Term Goals: 8 weeks  1.Report decreased knee pain < / = 1/10  to increase tolerance for returning to [facility]  2.Patient goal: Return to climbing stairs without pain  3.Increase strength to >/= 4+/5 in quads  to increase tolerance for ADL and work activities.  4. Pt will report at 36% on FOTO knee to demonstrate increase in LE function with every day tasks.      Plan     Continue with POC     Enrrique Cueto, PTA, STS

## 2019-08-18 RX ORDER — METOPROLOL TARTRATE 25 MG/1
TABLET, FILM COATED ORAL
Qty: 180 TABLET | Refills: 3 | Status: SHIPPED | OUTPATIENT
Start: 2019-08-18 | End: 2020-06-24 | Stop reason: SDUPTHER

## 2019-08-19 PROBLEM — Z00.00 ANNUAL PHYSICAL EXAM: Status: RESOLVED | Noted: 2017-05-31 | Resolved: 2019-08-19

## 2019-08-20 ENCOUNTER — CLINICAL SUPPORT (OUTPATIENT)
Dept: REHABILITATION | Facility: HOSPITAL | Age: 74
End: 2019-08-20
Payer: MEDICARE

## 2019-08-20 ENCOUNTER — EXTERNAL HOME HEALTH (OUTPATIENT)
Dept: HOME HEALTH SERVICES | Facility: HOSPITAL | Age: 74
End: 2019-08-20
Payer: MEDICARE

## 2019-08-20 DIAGNOSIS — G89.29 CHRONIC PAIN OF RIGHT KNEE: ICD-10-CM

## 2019-08-20 DIAGNOSIS — M25.661 JOINT STIFFNESS OF KNEE, RIGHT: ICD-10-CM

## 2019-08-20 DIAGNOSIS — R60.0 LOCALIZED EDEMA: ICD-10-CM

## 2019-08-20 DIAGNOSIS — M25.561 CHRONIC PAIN OF RIGHT KNEE: ICD-10-CM

## 2019-08-20 PROCEDURE — 97110 THERAPEUTIC EXERCISES: CPT | Mod: HCNC | Performed by: PHYSICAL THERAPIST

## 2019-08-20 PROCEDURE — 97140 MANUAL THERAPY 1/> REGIONS: CPT | Mod: HCNC | Performed by: PHYSICAL THERAPIST

## 2019-08-20 NOTE — PROGRESS NOTES
"  Physical Therapy Daily Treatment Note     Name: Drake Barraza  Clinic Number: [id number]  Therapy Diagnosis:        Encounter Diagnoses   Name Primary?    Chronic pain of right knee      Localized edema      Joint stiffness of knee, right        Physician: John Ochsner Jr, MD     Physician Orders: PT Eval and Treat   Medical Diagnosis from Referral: Z96.651 (ICD-10-CM) - Status post total right knee replacement  Evaluation Date: 8/13/2019  Authorization Period Expiration: 12/31/2019  Plan of Care Expiration: 9/10/2019  Visit # / Visits authorized: 3/ 20     Time In: 1400  Time Out: 1505  Total Billable Time: 55 minutes  Tx time 65     Precautions: Standard, blood thinners and Fall,     Subjective     Pt reports: Not really a pain, just a stiffness in the morning. On my feet yesterday cutting pork chops in the kitchen, hip pain  He was compliant with home exercise program.  Response to previous treatment: no soreness  Functional change: ambulating with SPC into therapy    Pain: 2/10  Location: right knee      Objective     Drake received therapeutic exercises to develop strength, endurance, ROM, flexibility and posture for 35 minutes including:  Stationary bike for 8' for increased ROM R knee, mm endurance and increased blood circulation   Gastroc stretch with band 30 sec 5x  Long sitting knee extensionn 3# above knee, straps at ankles to avoid ER 4'  R QS under heel 3x10/3"  R SLR 3x10  Leg Press 90# 30x  SL Leg Press 60# 30x  Cybex TKE 4 pl 20x  SLB 10 sec 10x on blue pad     Not today:  SAQ 3x10    Drake received the following manual therapy techniques: Joint mobilizations and Soft tissue Mobilization were applied to the: R knee  for 20 minutes, including: patella mobs, Gr IV extension mobs, HSS, GSS and quad stretching      Drake received cold pack for 10 minutes to to decrease circulation, pain, and swelling.    Home Exercises Provided and Patient Education Provided     Education provided:   Posture " awareness, HEP and RICE     Written Home Exercises Provided: yes.  Exercises were reviewed and Drake was able to demonstrate them prior to the end of the session.  Drake demonstrated good  understanding of the education provided.       Assessment     Pt with popliteal discomfort due to lacking terminal knee extension. Tolerated 4' on seated extension mob today. Continues to progress with ambulation using SPC and given TKE for new exercise at home  Drake is progressing well towards his goals.   Pt prognosis is Good.     Pt will continue to benefit from skilled outpatient physical therapy to address the deficits listed in the problem list box on initial evaluation, provide pt/family education and to maximize pt's level of independence in the home and community environment.     Pt's spiritual, cultural and educational needs considered and pt agreeable to plan of care and goals.    Anticipated barriers to physical therapy: none     Progressing, has not met his STG or LTG    Goals:GOALS: Short Term Goals:  4 weeks  1.Report decreased Knee pain  < / =  3/10  to increase tolerance for ambulating in community  2. Increase knee ROM to 120 deg flexion in order to be able to perform ADLs without difficulty.  3. Increase strength by 1/3 MMT grade in quads  to increase tolerance for ADL and work activities.  4. Pt to tolerate HEP to improve ROM and independence with ADL's     Long Term Goals: 8 weeks  1.Report decreased knee pain < / = 1/10  to increase tolerance for returning to [facility]  2.Patient goal: Return to climbing stairs without pain  3.Increase strength to >/= 4+/5 in quads  to increase tolerance for ADL and work activities.  4. Pt will report at 36% on FOTO knee to demonstrate increase in LE function with every day tasks.      Plan     Improve upon TKE to reduce poplieal discomfort    Enzo Gillespie, PT, DPT

## 2019-08-21 ENCOUNTER — PATIENT MESSAGE (OUTPATIENT)
Dept: ADMINISTRATIVE | Facility: OTHER | Age: 74
End: 2019-08-21

## 2019-08-22 ENCOUNTER — CLINICAL SUPPORT (OUTPATIENT)
Dept: REHABILITATION | Facility: HOSPITAL | Age: 74
End: 2019-08-22
Payer: MEDICARE

## 2019-08-22 DIAGNOSIS — M25.561 CHRONIC PAIN OF RIGHT KNEE: ICD-10-CM

## 2019-08-22 DIAGNOSIS — G89.29 CHRONIC PAIN OF RIGHT KNEE: ICD-10-CM

## 2019-08-22 DIAGNOSIS — M25.661 JOINT STIFFNESS OF KNEE, RIGHT: ICD-10-CM

## 2019-08-22 DIAGNOSIS — R60.0 LOCALIZED EDEMA: ICD-10-CM

## 2019-08-22 PROCEDURE — 97140 MANUAL THERAPY 1/> REGIONS: CPT | Mod: HCNC

## 2019-08-22 PROCEDURE — 97110 THERAPEUTIC EXERCISES: CPT | Mod: HCNC

## 2019-08-22 NOTE — PROGRESS NOTES
"  Physical Therapy Daily Treatment Note     Name: Drake Barraza  Clinic Number: [id number]  Therapy Diagnosis:        Encounter Diagnoses   Name Primary?    Chronic pain of right knee      Localized edema      Joint stiffness of knee, right        Physician: John Ochsner Jr, MD     Physician Orders: PT Eval and Treat   Medical Diagnosis from Referral: Z96.651 (ICD-10-CM) - Status post total right knee replacement  Evaluation Date: 8/13/2019  Authorization Period Expiration: 12/31/2019  Plan of Care Expiration: 9/10/2019  Visit # / Visits authorized: 4/ 20     Time In: 1325  Time Out: 1425  Total Billable Time: 30 minutes  Tx time 60     Precautions: Standard, blood thinners and Fall,     Subjective     Pt reports: no  Pain in R knee but soreness in hip   He was compliant with home exercise program.  Response to previous treatment: R hip soreness   Functional change: ambulating with SC     Pain: 0/10  Location: right knee      Objective   R knee PROM 0-125'     Drake received therapeutic exercises to develop strength, endurance, ROM, flexibility and posture for 40 minutes including:  Stationary bike for 10' for increased ROM R knee, mm endurance and increased blood circulation   Prone knee extension 3# 5'  Prone knee flexion 3x10  Prone TKE w/bolster 3x10  Leg Press 90# 30x  SL Leg Press 60# 30x  R QS under heel 3x10/3"  R SLR 3x10    Not today:  SAQ 3x10  Gastroc stretch with band 30 sec 5x  Long sitting knee extensionn 3# above knee, straps at ankles to avoid ER 4'  Cybex TKE 4 pl 20x  SLB 10 sec 10x on blue pad     Drake received the following manual therapy techniques: Joint mobilizations and Soft tissue Mobilization were applied to the: R knee  for 10 minutes, including: patella mobs, HSS, GSS and quad stretching      Drake received cold pack for 10 minutes to to decrease circulation, pain, and swelling.    Home Exercises Provided and Patient Education Provided     Education provided:   Posture awareness, " HEP and RICE     Written Home Exercises Provided: yes.  Exercises were reviewed and Drake was able to demonstrate them prior to the end of the session.  Drake demonstrated good  understanding of the education provided.       Assessment     Pt still with min extensor lag with AROM but achieved with passive. Continue with ROM and strengthening activity. VC/TC for correcting form/technique and quad activation with therex. Continue to progress as tolerated.   Pt prognosis is Good.     Pt will continue to benefit from skilled outpatient physical therapy to address the deficits listed in the problem list box on initial evaluation, provide pt/family education and to maximize pt's level of independence in the home and community environment.     Pt's spiritual, cultural and educational needs considered and pt agreeable to plan of care and goals.    Anticipated barriers to physical therapy: none     Progressing, has not met his STG or LTG    Goals:GOALS: Short Term Goals:  4 weeks  1.Report decreased Knee pain  < / =  3/10  to increase tolerance for ambulating in community  2. Increase knee ROM to 120 deg flexion in order to be able to perform ADLs without difficulty.  3. Increase strength by 1/3 MMT grade in quads  to increase tolerance for ADL and work activities.  4. Pt to tolerate HEP to improve ROM and independence with ADL's     Long Term Goals: 8 weeks  1.Report decreased knee pain < / = 1/10  to increase tolerance for returning to [facility]  2.Patient goal: Return to climbing stairs without pain  3.Increase strength to >/= 4+/5 in quads  to increase tolerance for ADL and work activities.  4. Pt will report at 36% on FOTO knee to demonstrate increase in LE function with every day tasks.      Plan     Improve upon TKE to reduce poplieal discomfort    Enrrique Cueto, PTA, STS

## 2019-08-27 ENCOUNTER — CLINICAL SUPPORT (OUTPATIENT)
Dept: REHABILITATION | Facility: HOSPITAL | Age: 74
End: 2019-08-27
Payer: MEDICARE

## 2019-08-27 DIAGNOSIS — M25.661 JOINT STIFFNESS OF KNEE, RIGHT: ICD-10-CM

## 2019-08-27 DIAGNOSIS — M25.561 CHRONIC PAIN OF RIGHT KNEE: ICD-10-CM

## 2019-08-27 DIAGNOSIS — G89.29 CHRONIC PAIN OF RIGHT KNEE: ICD-10-CM

## 2019-08-27 DIAGNOSIS — R60.0 LOCALIZED EDEMA: ICD-10-CM

## 2019-08-27 PROCEDURE — 97110 THERAPEUTIC EXERCISES: CPT | Mod: HCNC | Performed by: PHYSICAL THERAPIST

## 2019-08-27 PROCEDURE — 97140 MANUAL THERAPY 1/> REGIONS: CPT | Mod: HCNC | Performed by: PHYSICAL THERAPIST

## 2019-08-27 NOTE — PROGRESS NOTES
"  Physical Therapy Daily Treatment Note     Name: Drake Barraza  Clinic Number: [id number]  Therapy Diagnosis:        Encounter Diagnoses   Name Primary?    Chronic pain of right knee      Localized edema      Joint stiffness of knee, right        Physician: John Ochsner Jr, MD     Physician Orders: PT Eval and Treat   Medical Diagnosis from Referral: Z96.651 (ICD-10-CM) - Status post total right knee replacement  Evaluation Date: 8/13/2019  Authorization Period Expiration: 12/31/2019  Plan of Care Expiration: 9/10/2019  Visit # / Visits authorized: 5/ 20     Time In: 1400  Time Out: 1505  Total Billable Time: 30 minutes  Tx time 65     Precautions: Standard, blood thinners and Fall,     Subjective     Pt reports: I have been [place] in the mall past couple hours so I am tired  He was compliant with home exercise program.  Response to previous treatment: clicking in the knee  Functional change: ambulating without assistive device into clinic    Pain: 0/10  Location: right knee      Objective   R knee PROM 0-125'     Drake received therapeutic exercises to develop strength, endurance, ROM, flexibility and posture for 35 minutes including:  Stationary bike for 10' for increased ROM R knee, mm endurance and increased blood circulation   SLR 2# 2 x 10 bilaterally  Seated extension 3# above and below knee, leg elevated on 2nd chair 5'  TKE with BTB 30x on R  Leg Press 90# 30x  SL Leg Press 60# 30x  R QS under heel 3x10/3"    Not today:  Prone knee extension 3# 5'  Prone knee flexion 3x10  Prone TKE w/bolster 3x10  SAQ 3x10  Gastroc stretch with band 30 sec 5x  Long sitting knee extensionn 3# above knee, straps at ankles to avoid ER 4'  Cybex TKE 4 pl 20x  SLB 10 sec 10x on blue pad     Drake received the following manual therapy techniques: Joint mobilizations and Soft tissue Mobilization were applied to the: R knee  for 15 minutes, including: patella mobs, HSS, GSS and quad stretching      Drake received cold pack " for 10 minutes to to decrease circulation, pain, and swelling.    Home Exercises Provided and Patient Education Provided     Education provided:   Posture awareness, HEP and RICE     Written Home Exercises Provided: yes.  Exercises were reviewed and Drake was able to demonstrate them prior to the end of the session.  Drake demonstrated good  understanding of the education provided.       Assessment     Pt with increased edema in the knee following walking in the mall prior to treatment. Improved tolerance to extension mobilizations and 3# above and below knee. Reeducated on keeping knee elevated and working on extension at home.    Pt prognosis is Good.     Pt will continue to benefit from skilled outpatient physical therapy to address the deficits listed in the problem list box on initial evaluation, provide pt/family education and to maximize pt's level of independence in the home and community environment.     Pt's spiritual, cultural and educational needs considered and pt agreeable to plan of care and goals.    Anticipated barriers to physical therapy: none     Progressing, has not met his LTG    Goals:GOALS: Short Term Goals:  4 weeks  1.Report decreased Knee pain  < / =  3/10  to increase tolerance for ambulating in community (met)  2. Increase knee ROM to 120 deg flexion in order to be able to perform ADLs without difficulty.(progressing, not met)  3. Increase strength by 1/3 MMT grade in quads  to increase tolerance for ADL and work activities.(met)  4. Pt to tolerate HEP to improve ROM and independence with ADL's (met)     Long Term Goals: 8 weeks  1.Report decreased knee pain < / = 1/10  to increase tolerance for returning to [facility]  2.Patient goal: Return to climbing stairs without pain  3.Increase strength to >/= 4+/5 in quads  to increase tolerance for ADL and work activities.  4. Pt will report at 36% on FOTO knee to demonstrate increase in LE function with every day tasks.      Plan      Improve upon TKE to reduce poplieal discomfort, extension and elevation at home    Enzo Gillespie, PT, DPT

## 2019-08-29 ENCOUNTER — CLINICAL SUPPORT (OUTPATIENT)
Dept: REHABILITATION | Facility: HOSPITAL | Age: 74
End: 2019-08-29
Payer: MEDICARE

## 2019-08-29 DIAGNOSIS — M25.661 JOINT STIFFNESS OF KNEE, RIGHT: ICD-10-CM

## 2019-08-29 DIAGNOSIS — G89.29 CHRONIC PAIN OF RIGHT KNEE: ICD-10-CM

## 2019-08-29 DIAGNOSIS — M25.561 CHRONIC PAIN OF RIGHT KNEE: ICD-10-CM

## 2019-08-29 DIAGNOSIS — R60.0 LOCALIZED EDEMA: ICD-10-CM

## 2019-08-29 PROCEDURE — 97110 THERAPEUTIC EXERCISES: CPT | Mod: HCNC | Performed by: PHYSICAL THERAPIST

## 2019-08-29 PROCEDURE — 97140 MANUAL THERAPY 1/> REGIONS: CPT | Mod: HCNC | Performed by: PHYSICAL THERAPIST

## 2019-08-29 NOTE — PROGRESS NOTES
"  Physical Therapy Daily Treatment Note     Name: Drake Barraza  Clinic Number: [id number]  Therapy Diagnosis:        Encounter Diagnoses   Name Primary?    Chronic pain of right knee      Localized edema      Joint stiffness of knee, right        Physician: John Ochsner Jr, MD     Physician Orders: PT Eval and Treat   Medical Diagnosis from Referral: Z96.651 (ICD-10-CM) - Status post total right knee replacement  Evaluation Date: 8/13/2019  Authorization Period Expiration: 12/31/2019  Plan of Care Expiration: 9/10/2019  Visit # / Visits authorized: 5/ 20     Time In: 1400  Time Out: 1500  Total Billable Time: 30 minutes  Tx time 60     Precautions: Standard, blood thinners and Fall,     Subjective     Pt reports: I have been walking [place] the mall past couple hours so I am tired  He was compliant with home exercise program.  Response to previous treatment: clicking in the knee  Functional change: ambulating without assistive device into clinic    Pain: 0/10  Location: right knee      Objective   R knee PROM 0-125'     Drake received therapeutic exercises to develop strength, endurance, ROM, flexibility and posture for 35 minutes including:  Stationary bike for 10' for increased ROM R knee, mm endurance and increased blood circulation   Seated extension 4# above and below knee, leg elevated on 2nd chair 5'  LAQ 4# 3 x 10 B  Leg Press 100# 30x  SL Leg Press 70# 30x  Lateral walks RTB 2 laps    Not today:  TKE with BTB 30x on R  R QS under heel 3x10/3"  SLR 2# 2 x 10 bilaterally  Prone knee extension 3# 5'  Prone knee flexion 3x10  Prone TKE w/bolster 3x10  SAQ 3x10  Gastroc stretch with band 30 sec 5x  Long sitting knee extensionn 3# above knee, straps at ankles to avoid ER 4'  Cybex TKE 4 pl 20x  SLB 10 sec 10x on blue pad     Drake received the following manual therapy techniques: Joint mobilizations and Soft tissue Mobilization were applied to the: R knee  for 15 minutes, including: patella mobs, HSS, GSS and " quad stretching      Drake received cold pack for 10 minutes to to decrease circulation, pain, and swelling.    Home Exercises Provided and Patient Education Provided     Education provided:   Posture awareness, HEP and RICE     Written Home Exercises Provided: yes.  Exercises were reviewed and Drake was able to demonstrate them prior to the end of the session.  Drake demonstrated good  understanding of the education provided.       Assessment     Pt requires SBA with lateral walks with RTB due to balance impairments. Leg press strength improved today and seated LAQ strength. Increased tolerance to long sitting extensions for 5' with increased weight around the knee.    Pt prognosis is Good.     Pt will continue to benefit from skilled outpatient physical therapy to address the deficits listed in the problem list box on initial evaluation, provide pt/family education and to maximize pt's level of independence in the home and community environment.     Pt's spiritual, cultural and educational needs considered and pt agreeable to plan of care and goals.    Anticipated barriers to physical therapy: none     Progressing, has not met his LTG    Goals:GOALS: Short Term Goals:  4 weeks  1.Report decreased Knee pain  < / =  3/10  to increase tolerance for ambulating in community (met)  2. Increase knee ROM to 120 deg flexion in order to be able to perform ADLs without difficulty.(progressing, not met)  3. Increase strength by 1/3 MMT grade in quads  to increase tolerance for ADL and work activities.(met)  4. Pt to tolerate HEP to improve ROM and independence with ADL's (met)     Long Term Goals: 8 weeks  1.Report decreased knee pain < / = 1/10  to increase tolerance for returning to [facility]  2.Patient goal: Return to climbing stairs without pain  3.Increase strength to >/= 4+/5 in quads  to increase tolerance for ADL and work activities.  4. Pt will report at 36% on FOTO knee to demonstrate increase in LE  function with every day tasks.      Plan     Continue to progress functional activities in closed chain    Enzo Verna, PT, DPT

## 2019-09-04 ENCOUNTER — CLINICAL SUPPORT (OUTPATIENT)
Dept: REHABILITATION | Facility: HOSPITAL | Age: 74
End: 2019-09-04
Payer: MEDICARE

## 2019-09-04 DIAGNOSIS — M25.661 JOINT STIFFNESS OF KNEE, RIGHT: ICD-10-CM

## 2019-09-04 DIAGNOSIS — G89.29 CHRONIC PAIN OF RIGHT KNEE: ICD-10-CM

## 2019-09-04 DIAGNOSIS — M25.561 CHRONIC PAIN OF RIGHT KNEE: ICD-10-CM

## 2019-09-04 DIAGNOSIS — R60.0 LOCALIZED EDEMA: ICD-10-CM

## 2019-09-04 PROCEDURE — 97140 MANUAL THERAPY 1/> REGIONS: CPT | Mod: HCNC

## 2019-09-04 PROCEDURE — 97110 THERAPEUTIC EXERCISES: CPT | Mod: HCNC

## 2019-09-06 ENCOUNTER — CLINICAL SUPPORT (OUTPATIENT)
Dept: REHABILITATION | Facility: HOSPITAL | Age: 74
End: 2019-09-06
Payer: MEDICARE

## 2019-09-06 DIAGNOSIS — M25.561 CHRONIC PAIN OF RIGHT KNEE: ICD-10-CM

## 2019-09-06 DIAGNOSIS — G89.29 CHRONIC PAIN OF RIGHT KNEE: ICD-10-CM

## 2019-09-06 DIAGNOSIS — M25.661 JOINT STIFFNESS OF KNEE, RIGHT: ICD-10-CM

## 2019-09-06 DIAGNOSIS — R60.0 LOCALIZED EDEMA: ICD-10-CM

## 2019-09-06 PROCEDURE — 97140 MANUAL THERAPY 1/> REGIONS: CPT | Mod: HCNC

## 2019-09-06 PROCEDURE — 97110 THERAPEUTIC EXERCISES: CPT | Mod: HCNC

## 2019-09-06 NOTE — PROGRESS NOTES
"  Physical Therapy Daily Treatment Note     Name: Drake Barraza  Clinic Number: [id number]  Therapy Diagnosis:        Encounter Diagnoses   Name Primary?    Chronic pain of right knee      Localized edema      Joint stiffness of knee, right        Physician: John Ochsner Jr, MD     Physician Orders: PT Eval and Treat   Medical Diagnosis from Referral: Z96.651 (ICD-10-CM) - Status post total right knee replacement  Evaluation Date: 8/13/2019  Authorization Period Expiration: 12/31/2019  Plan of Care Expiration: 9/10/2019  Visit # / Visits authorized: 7/ 20     Time In: 1330  Time Out: 1430  Total Billable Time: 50 minutes  Tx time 60     Precautions: Standard, blood thinners and Fall,     Subjective     Pt reports: no pain in R knee but c/o stiffness    He was compliant with home exercise program.  Response to previous treatment: min soreness  Functional change: ambulating without assistive device    Pain: 0/10  Location: right knee      Objective   Noted limp in gait with knee edema present, minimal knee flexion     Drake received therapeutic exercises to develop strength, endurance, ROM, flexibility and posture for 40 minutes including:  Stationary bike for 10' for increased ROM R knee, mm endurance and increased blood circulation  Leg Press 100# 30x  SL Leg Press 70# 30x  TKE with yellow sports band 30x   Prone knee extension stretch 5'  R QS under heel 3x10/3"  R SLR under heel 3x10/3"      Not today:  SLR 2# 2 x 10 bilaterally  Prone knee extension 3# 5'  Prone knee flexion 3x10  Prone TKE w/bolster 3x10  SAQ 3x10  Gastroc stretch with band 30 sec 5x  Long sitting knee extensionn 3# above knee, straps at ankles to avoid ER 4'  SLB 10 sec 10x on blue pad   Seated extension 4# above and below knee, leg elevated on 2nd chair 5'  LAQ 4# 3 x 10 B  Lateral walks RTB 2 laps    Drake received the following manual therapy techniques: Joint mobilizations and Soft tissue Mobilization were applied to the: R knee  for " 10 minutes, including: patella mobs, HSS, GSS, quad stretching, and joint capsular distraction     Drake received cold pack for 10 minutes to to decrease circulation, pain, and swelling.    Home Exercises Provided and Patient Education Provided     Education provided:   Posture awareness, HEP and RICE     Written Home Exercises Provided: yes.  Exercises were reviewed and Drake was able to demonstrate them prior to the end of the session.  Drake demonstrated good  understanding of the education provided.       Assessment     Pt tolerating tx well today. Leg press strength improved today and seated LAQ strength. Tolerating prone knee hang, still with extensor lag noted. Continue to progress as tolerated.   Pt prognosis is Good.     Pt will continue to benefit from skilled outpatient physical therapy to address the deficits listed in the problem list box on initial evaluation, provide pt/family education and to maximize pt's level of independence in the home and community environment.     Pt's spiritual, cultural and educational needs considered and pt agreeable to plan of care and goals.    Anticipated barriers to physical therapy: none     Progressing, has not met his LTG    Goals:GOALS: Short Term Goals:  4 weeks  1.Report decreased Knee pain  < / =  3/10  to increase tolerance for ambulating in community (met)  2. Increase knee ROM to 120 deg flexion in order to be able to perform ADLs without difficulty.(progressing, not met)  3. Increase strength by 1/3 MMT grade in quads  to increase tolerance for ADL and work activities.(met)  4. Pt to tolerate HEP to improve ROM and independence with ADL's (met)     Long Term Goals: 8 weeks  1.Report decreased knee pain < / = 1/10  to increase tolerance for returning to [facility]  2.Patient goal: Return to climbing stairs without pain  3.Increase strength to >/= 4+/5 in quads  to increase tolerance for ADL and work activities.  4. Pt will report at 36% on FOTO  knee to demonstrate increase in LE function with every day tasks.      Plan     Continue to progress functional activities in closed chain    Enrrique Cueto, PTA, STS

## 2019-09-10 ENCOUNTER — OFFICE VISIT (OUTPATIENT)
Dept: ORTHOPEDICS | Facility: CLINIC | Age: 74
End: 2019-09-10
Payer: MEDICARE

## 2019-09-10 VITALS — HEIGHT: 67 IN | BODY MASS INDEX: 26.91 KG/M2 | WEIGHT: 171.44 LBS

## 2019-09-10 DIAGNOSIS — Z96.651 STATUS POST TOTAL RIGHT KNEE REPLACEMENT: Primary | ICD-10-CM

## 2019-09-10 PROCEDURE — 99999 PR PBB SHADOW E&M-EST. PATIENT-LVL III: ICD-10-PCS | Mod: PBBFAC,HCNC,, | Performed by: ORTHOPAEDIC SURGERY

## 2019-09-10 PROCEDURE — 99999 PR PBB SHADOW E&M-EST. PATIENT-LVL III: CPT | Mod: PBBFAC,HCNC,, | Performed by: ORTHOPAEDIC SURGERY

## 2019-09-10 PROCEDURE — 99024 POSTOP FOLLOW-UP VISIT: CPT | Mod: HCNC,S$GLB,, | Performed by: ORTHOPAEDIC SURGERY

## 2019-09-10 PROCEDURE — 99024 PR POST-OP FOLLOW-UP VISIT: ICD-10-PCS | Mod: HCNC,S$GLB,, | Performed by: ORTHOPAEDIC SURGERY

## 2019-09-11 ENCOUNTER — CLINICAL SUPPORT (OUTPATIENT)
Dept: REHABILITATION | Facility: HOSPITAL | Age: 74
End: 2019-09-11
Payer: MEDICARE

## 2019-09-11 DIAGNOSIS — M25.561 CHRONIC PAIN OF RIGHT KNEE: ICD-10-CM

## 2019-09-11 DIAGNOSIS — R60.0 LOCALIZED EDEMA: ICD-10-CM

## 2019-09-11 DIAGNOSIS — G89.29 CHRONIC PAIN OF RIGHT KNEE: ICD-10-CM

## 2019-09-11 DIAGNOSIS — M25.661 JOINT STIFFNESS OF KNEE, RIGHT: ICD-10-CM

## 2019-09-11 PROCEDURE — 97140 MANUAL THERAPY 1/> REGIONS: CPT | Mod: HCNC

## 2019-09-11 PROCEDURE — 97110 THERAPEUTIC EXERCISES: CPT | Mod: HCNC

## 2019-09-11 NOTE — PROGRESS NOTES
"  Physical Therapy Daily Treatment Note     Name: Drake Barraza  Clinic Number: [id number]  Therapy Diagnosis:        Encounter Diagnoses   Name Primary?    Chronic pain of right knee      Localized edema      Joint stiffness of knee, right        Physician: John Ochsner Jr, MD     Physician Orders: PT Eval and Treat   Medical Diagnosis from Referral: Z96.651 (ICD-10-CM) - Status post total right knee replacement  Evaluation Date: 8/13/2019  Authorization Period Expiration: 12/31/2019  Plan of Care Expiration: 9/10/2019  Visit # / Visits authorized: 8/ 20     Time In: 1250  Time Out: 1350  Total Billable Time: 40 minutes  Tx time 60     Precautions: Standard, blood thinners and Fall,     Subjective     Pt reports: no pain in R knee but c/o stiffness today  He was compliant with home exercise program.  Response to previous treatment: min soreness  Functional change: ambulating without assistive device    Pain: 0/10  Location: right knee      Objective   Noted limp in gait with minimal knee flexion     Drake received therapeutic exercises to develop strength, endurance, ROM, flexibility and posture for 40 minutes including:  Stationary bike for 10' for increased ROM R knee, mm endurance and increased blood circulation  Leg Press 110# 30x  SL Leg Press 70# 30x  TKE with yellow sports band 30x   Prone knee extension stretch 5' 5#  R QS under heel 3x10/3"  SLR 2# 2 x 10 bilaterally  GSS and HSS with strap 3x/30" ea     Not today:  Prone knee extension 3# 5'  Prone knee flexion 3x10  Prone TKE w/bolster 3x10  SAQ 3x10  Gastroc stretch with band 30 sec 5x  Long sitting knee extensionn 3# above knee, straps at ankles to avoid ER 4'  SLB 10 sec 10x on blue pad   Seated extension 4# above and below knee, leg elevated on 2nd chair 5'  LAQ 4# 3 x 10 B  Lateral walks RTB 2 laps    Drake received the following manual therapy techniques: Joint mobilizations and Soft tissue Mobilization were applied to the: R knee  for 10 " minutes, including: patella mobs, HSS, GSS, quad stretching, and joint capsular distraction     Drake received cold pack for 10 minutes to to decrease circulation, pain, and swelling.    Home Exercises Provided and Patient Education Provided     Education provided:   Posture awareness, HEP and RICE     Written Home Exercises Provided: yes.  Exercises were reviewed and Drake was able to demonstrate them prior to the end of the session.  Drake demonstrated good  understanding of the education provided.       Assessment     Pt tolerating tx well today. Tolerating prone knee hang better today. Min extensor lag noted. B quad fatigue with weighted SLR. Continue working toward TKE. R knee. Continue to progress as tolerated.   Pt prognosis is Good.     Pt will continue to benefit from skilled outpatient physical therapy to address the deficits listed in the problem list box on initial evaluation, provide pt/family education and to maximize pt's level of independence in the home and community environment.     Pt's spiritual, cultural and educational needs considered and pt agreeable to plan of care and goals.    Anticipated barriers to physical therapy: none     Progressing, has not met his LTG    Goals:GOALS: Short Term Goals:  4 weeks  1.Report decreased Knee pain  < / =  3/10  to increase tolerance for ambulating in community (met)  2. Increase knee ROM to 120 deg flexion in order to be able to perform ADLs without difficulty.(progressing, not met)  3. Increase strength by 1/3 MMT grade in quads  to increase tolerance for ADL and work activities.(met)  4. Pt to tolerate HEP to improve ROM and independence with ADL's (met)     Long Term Goals: 8 weeks  1.Report decreased knee pain < / = 1/10  to increase tolerance for returning to [facility]  2.Patient goal: Return to climbing stairs without pain  3.Increase strength to >/= 4+/5 in quads  to increase tolerance for ADL and work activities.  4. Pt will report at  36% on FOTO knee to demonstrate increase in LE function with every day tasks.      Plan     Continue to progress functional activities in closed chain    Enrrique Cueto, PTA, STS

## 2019-09-13 ENCOUNTER — CLINICAL SUPPORT (OUTPATIENT)
Dept: REHABILITATION | Facility: HOSPITAL | Age: 74
End: 2019-09-13
Payer: MEDICARE

## 2019-09-13 DIAGNOSIS — M25.661 JOINT STIFFNESS OF KNEE, RIGHT: ICD-10-CM

## 2019-09-13 DIAGNOSIS — G89.29 CHRONIC PAIN OF RIGHT KNEE: ICD-10-CM

## 2019-09-13 DIAGNOSIS — R60.0 LOCALIZED EDEMA: ICD-10-CM

## 2019-09-13 DIAGNOSIS — M25.561 CHRONIC PAIN OF RIGHT KNEE: ICD-10-CM

## 2019-09-13 PROCEDURE — 97110 THERAPEUTIC EXERCISES: CPT | Mod: HCNC

## 2019-09-13 PROCEDURE — 97140 MANUAL THERAPY 1/> REGIONS: CPT | Mod: HCNC

## 2019-09-13 NOTE — PROGRESS NOTES
"  Physical Therapy Daily Treatment Note     Name: Drake Barraza  Clinic Number: [id number]  Therapy Diagnosis:        Encounter Diagnoses   Name Primary?    Chronic pain of right knee      Localized edema      Joint stiffness of knee, right        Physician: John Ochsner Jr, MD     Physician Orders: PT Eval and Treat   Medical Diagnosis from Referral: Z96.651 (ICD-10-CM) - Status post total right knee replacement  Evaluation Date: 8/13/2019  Authorization Period Expiration: 12/31/2019  Plan of Care Expiration: 9/10/2019  Visit # / Visits authorized: 8/ 20     Time In: !430  Time Out: 1530  Total Billable Time: 40 minutes  Tx time 60     Precautions: Standard, blood thinners and Fall,     Subjective     Pt reports: no pain in R knee today and stated was able to work out in the yard yesterday.   He was compliant with home exercise program.  Response to previous treatment: min soreness  Functional change: ambulating without assistive device    Pain: 0/10  Location: right knee      Objective   Noted limp in gait with minimal knee flexion     Drake received therapeutic exercises to develop strength, endurance, ROM, flexibility and posture for 40 minutes including:  Stationary bike for 10' for increased ROM R knee, mm endurance and increased blood circulation  Leg Press 110# 30x  SL Leg Press 70# 30x  TKE with yellow sports band 30x   R QS under heel 3x10/3"  SLR 2# 2 x 10 bilaterally  L SL hip abd 2# 3x10  SAQ 3x10 2# 3x10          Not today:  Prone knee extension 3# 5'  Prone knee flexion 3x10  Prone TKE w/bolster 3x10  Gastroc stretch with band 30 sec 5x  Long sitting knee extensionn 3# above knee, straps at ankles to avoid ER 4'  SLB 10 sec 10x on blue pad   Seated extension 4# above and below knee, leg elevated on 2nd chair 5'  LAQ 4# 3 x 10 B  Lateral walks RTB 2 laps    Drake received the following manual therapy techniques: Joint mobilizations and Soft tissue Mobilization were applied to the: R knee  for 10 " minutes, including: patella mobs, HSS, GSS, quad stretching, and joint capsular distraction     Drake received cold pack for 10 minutes to to decrease circulation, pain, and swelling.    Home Exercises Provided and Patient Education Provided     Education provided:   Posture awareness, HEP and RICE     Written Home Exercises Provided: yes.  Exercises were reviewed and Drake was able to demonstrate them prior to the end of the session.  Drake demonstrated good  understanding of the education provided.       Assessment     Pt tolerating tx well today.  Min extensor lag noted but improving with quad contraction. Fatigued after tx. Continue working toward TKE. R knee. Continue to progress as tolerated.   Pt prognosis is Good.     Pt will continue to benefit from skilled outpatient physical therapy to address the deficits listed in the problem list box on initial evaluation, provide pt/family education and to maximize pt's level of independence in the home and community environment.     Pt's spiritual, cultural and educational needs considered and pt agreeable to plan of care and goals.    Anticipated barriers to physical therapy: none     Progressing, has not met his LTG    Goals:GOALS: Short Term Goals:  4 weeks  1.Report decreased Knee pain  < / =  3/10  to increase tolerance for ambulating in community (met)  2. Increase knee ROM to 120 deg flexion in order to be able to perform ADLs without difficulty.(progressing, not met)  3. Increase strength by 1/3 MMT grade in quads  to increase tolerance for ADL and work activities.(met)  4. Pt to tolerate HEP to improve ROM and independence with ADL's (met)     Long Term Goals: 8 weeks  1.Report decreased knee pain < / = 1/10  to increase tolerance for returning to [facility]  2.Patient goal: Return to climbing stairs without pain  3.Increase strength to >/= 4+/5 in quads  to increase tolerance for ADL and work activities.  4. Pt will report at 36% on FOTO knee to  demonstrate increase in LE function with every day tasks.      Plan     Continue to progress functional activities in closed chain    Enrrique Cueto, PTA, STS

## 2019-09-17 ENCOUNTER — CLINICAL SUPPORT (OUTPATIENT)
Dept: REHABILITATION | Facility: HOSPITAL | Age: 74
End: 2019-09-17
Payer: MEDICARE

## 2019-09-17 DIAGNOSIS — G89.29 CHRONIC PAIN OF RIGHT KNEE: ICD-10-CM

## 2019-09-17 DIAGNOSIS — M25.561 CHRONIC PAIN OF RIGHT KNEE: ICD-10-CM

## 2019-09-17 DIAGNOSIS — M25.661 JOINT STIFFNESS OF KNEE, RIGHT: ICD-10-CM

## 2019-09-17 DIAGNOSIS — R60.0 LOCALIZED EDEMA: ICD-10-CM

## 2019-09-17 PROCEDURE — 97110 THERAPEUTIC EXERCISES: CPT | Mod: HCNC | Performed by: PHYSICAL THERAPIST

## 2019-09-17 PROCEDURE — 97530 THERAPEUTIC ACTIVITIES: CPT | Mod: HCNC | Performed by: PHYSICAL THERAPIST

## 2019-09-17 NOTE — PROGRESS NOTES
"  Physical Therapy Daily Treatment Note     Name: Drake Barraza  Clinic Number: [id number]  Therapy Diagnosis:        Encounter Diagnoses   Name Primary?    Chronic pain of right knee      Localized edema      Joint stiffness of knee, right        Physician: John Ochsner Jr, MD     Physician Orders: PT Eval and Treat   Medical Diagnosis from Referral: Z96.651 (ICD-10-CM) - Status post total right knee replacement  Evaluation Date: 8/13/2019  Authorization Period Expiration: 12/31/2019  Plan of Care Expiration: 9/10/2019  New Plan of Care Expiration: 10/15/2019  Visit # / Visits authorized: 9/ 20     Time In: !400  Time Out: 1500  Total Billable Time: 30 minutes  Tx time 60     Precautions: Standard, blood thinners and Fall,     Subjective     Pt reports: Some burning/tingling sensations in the right knee at medial joint line. Honestly, its my left knee that's hurting right now causing me to limp   He was compliant with home exercise program.  Response to previous treatment: min soreness  Functional change: working in the yard    Pain: 0/10  Location: right knee      Objective   Arrived with gait impairments due to L Knee pain today. Limited stance time on the L with poor knee flexion on R   (Active/Passive)  Extension: -1/-4 hyperextension taken after mobilizations  Flexion: 118/124    Drake received therapeutic exercises to develop strength, endurance, ROM, flexibility and posture for 30 minutes including:  Stationary bike for 10' for increased ROM R knee, mm endurance and increased blood circulation  SLR 4# 3 x 12 bilaterally  TKE with green sports band 30x   Lateral walk GTB 2 laps 50 feet  Shuttle DL 3 black bands 3 x 15  Shuttle SL 2 black bands 2 x 15    Not today:  Leg Press 110# 30x  SL Leg Press 70# 30x  R QS under heel 3x10/3"  L SL hip abd 2# 2 x10  SAQ 3x10 2# 3x10  Prone knee extension 3# 5'  Prone knee flexion 3x10  Prone TKE w/bolster 3x10  Gastroc stretch with band 30 sec 5x  Long sitting knee " "extensionn 3# above knee, straps at ankles to avoid ER 4'  SLB 10 sec 10x on blue pad   Seated extension 4# above and below knee, leg elevated on 2nd chair 5'  LAQ 4# 3 x 10 B  Lateral walks RTB 2 laps    Drake received therapeutic activity to the R knee for 10 minutes including:  Stair climbing 3 stairs - 15x  Heel tap 3" 12x - cues for glut activation    Drake received the following manual therapy techniques: Joint mobilizations and Soft tissue Mobilization were applied to the: R knee  for 10 minutes, including: patella mobs, HSS, GSS, quad stretching, and joint capsular distraction     Drake received cold pack for 10 minutes to to decrease circulation, pain, and swelling.    Home Exercises Provided and Patient Education Provided     Education provided:   Posture awareness, HEP and RICE     Written Home Exercises Provided: yes.  Exercises were reviewed and Drake was able to demonstrate them prior to the end of the session.  Drake demonstrated good  understanding of the education provided.       Assessment     Pt extension improving in supine and passively, continues to have an extensor lag but also compensating due to L knee pain today. Tolerance to strengthening improving and work on closed chain today. His endurance was limiting factor today especially to the gluts with lateral walking. Will continue progress with functional exercises off the mat.in closed chain       Pt prognosis is Good.     Pt will continue to benefit from skilled outpatient physical therapy to address the deficits listed in the problem list box on initial evaluation, provide pt/family education and to maximize pt's level of independence in the home and community environment.     Pt's spiritual, cultural and educational needs considered and pt agreeable to plan of care and goals.    Anticipated barriers to physical therapy: none     Goals:GOALS: Short Term Goals:  4 weeks  1.Report decreased Knee pain  < / =  3/10  to increase tolerance " for ambulating in community (met)  2. Increase knee ROM to 120 deg flexion in order to be able to perform ADLs without difficulty.(met)  3. Increase strength by 1/3 MMT grade in quads  to increase tolerance for ADL and work activities.(met)  4. Pt to tolerate HEP to improve ROM and independence with ADL's (met)     Long Term Goals: 8 weeks  1.Report decreased knee pain < / = 1/10  to increase tolerance for returning to [facility] (met)  2.Patient goal: Return to climbing stairs without pain (met)  3.Increase strength to >/= 4+/5 in quads  to increase tolerance for ADL and work activities.(met)  4. Pt will report at 36% on FOTO knee to demonstrate increase in LE function with every day tasks (met on 9/13).      Plan   Certification Period: 9/17/2019 to 10/15/2019  Recommended Treatment Plan: 2 times per week for 4 weeks: Gait Training, Manual Therapy, Moist Heat/ Ice, Neuromuscular Re-ed, Patient Education, Self Care, Therapeutic Activites and Therapeutic Exercise  Other Recommendations: modalities prn, ASTYM prn    Therapist: Enzo Gillespie, PT, DPT    I CERTIFY THE NEED FOR THESE SERVICES FURNISHED UNDER THIS PLAN OF TREATMENT AND WHILE UNDER MY CARE    Physician's comments: ________________________________________________________________________________________________________________________________________________      Physician's Name: ___________________________________

## 2019-09-17 NOTE — PLAN OF CARE
"  Physical Therapy Daily Treatment Note     Name: Drake Barraza  Clinic Number: [id number]  Therapy Diagnosis:        Encounter Diagnoses   Name Primary?    Chronic pain of right knee      Localized edema      Joint stiffness of knee, right        Physician: John Ochsner Jr, MD     Physician Orders: PT Eval and Treat   Medical Diagnosis from Referral: Z96.651 (ICD-10-CM) - Status post total right knee replacement  Evaluation Date: 8/13/2019  Authorization Period Expiration: 12/31/2019  Plan of Care Expiration: 9/10/2019  New Plan of Care Expiration: 10/15/2019  Visit # / Visits authorized: 9/ 20     Time In: !400  Time Out: 1500  Total Billable Time: 30 minutes  Tx time 60     Precautions: Standard, blood thinners and Fall,     Subjective     Pt reports: Some burning/tingling sensations in the right knee at medial joint line. Honestly, its my left knee that's hurting right now causing me to limp   He was compliant with home exercise program.  Response to previous treatment: min soreness  Functional change: working in the yard    Pain: 0/10  Location: right knee      Objective   Arrived with gait impairments due to L Knee pain today. Limited stance time on the L with poor knee flexion on R   (Active/Passive)  Extension: -1/-4 hyperextension taken after mobilizations  Flexion: 118/124    Drake received therapeutic exercises to develop strength, endurance, ROM, flexibility and posture for 30 minutes including:  Stationary bike for 10' for increased ROM R knee, mm endurance and increased blood circulation  SLR 4# 3 x 12 bilaterally  TKE with green sports band 30x   Lateral walk GTB 2 laps 50 feet  Shuttle DL 3 black bands 3 x 15  Shuttle SL 2 black bands 2 x 15    Not today:  Leg Press 110# 30x  SL Leg Press 70# 30x  R QS under heel 3x10/3"  L SL hip abd 2# 2 x10  SAQ 3x10 2# 3x10  Prone knee extension 3# 5'  Prone knee flexion 3x10  Prone TKE w/bolster 3x10  Gastroc stretch with band 30 sec 5x  Long sitting knee " "extensionn 3# above knee, straps at ankles to avoid ER 4'  SLB 10 sec 10x on blue pad   Seated extension 4# above and below knee, leg elevated on 2nd chair 5'  LAQ 4# 3 x 10 B  Lateral walks RTB 2 laps    Drake received therapeutic activity to the R knee for 10 minutes including:  Stair climbing 3 stairs - 15x  Heel tap 3" 12x - cues for glut activation    Drake received the following manual therapy techniques: Joint mobilizations and Soft tissue Mobilization were applied to the: R knee  for 10 minutes, including: patella mobs, HSS, GSS, quad stretching, and joint capsular distraction     Drake received cold pack for 10 minutes to to decrease circulation, pain, and swelling.    Home Exercises Provided and Patient Education Provided     Education provided:   Posture awareness, HEP and RICE     Written Home Exercises Provided: yes.  Exercises were reviewed and Drake was able to demonstrate them prior to the end of the session.  Drake demonstrated good  understanding of the education provided.       Assessment     Pt extension improving in supine and passively, continues to have an extensor lag but also compensating due to L knee pain today. Tolerance to strengthening improving and work on closed chain today. His endurance was limiting factor today especially to the gluts with lateral walking. Will continue progress with functional exercises off the mat.in closed chain       Pt prognosis is Good.     Pt will continue to benefit from skilled outpatient physical therapy to address the deficits listed in the problem list box on initial evaluation, provide pt/family education and to maximize pt's level of independence in the home and community environment.     Pt's spiritual, cultural and educational needs considered and pt agreeable to plan of care and goals.    Anticipated barriers to physical therapy: none     Goals:GOALS: Short Term Goals:  4 weeks  1.Report decreased Knee pain  < / =  3/10  to increase tolerance " for ambulating in community (met)  2. Increase knee ROM to 120 deg flexion in order to be able to perform ADLs without difficulty.(met)  3. Increase strength by 1/3 MMT grade in quads  to increase tolerance for ADL and work activities.(met)  4. Pt to tolerate HEP to improve ROM and independence with ADL's (met)     Long Term Goals: 8 weeks  1.Report decreased knee pain < / = 1/10  to increase tolerance for returning to [facility] (met)  2.Patient goal: Return to climbing stairs without pain (met)  3.Increase strength to >/= 4+/5 in quads  to increase tolerance for ADL and work activities.(met)  4. Pt will report at 36% on FOTO knee to demonstrate increase in LE function with every day tasks (met on 9/13).      Plan   Certification Period: 9/17/2019 to 10/15/2019  Recommended Treatment Plan: 2 times per week for 4 weeks: Gait Training, Manual Therapy, Moist Heat/ Ice, Neuromuscular Re-ed, Patient Education, Self Care, Therapeutic Activites and Therapeutic Exercise  Other Recommendations: modalities prn, ASTYM prn    Therapist: Enzo Gillespie, PT, DPT    I CERTIFY THE NEED FOR THESE SERVICES FURNISHED UNDER THIS PLAN OF TREATMENT AND WHILE UNDER MY CARE    Physician's comments: ________________________________________________________________________________________________________________________________________________      Physician's Name: ___________________________________

## 2019-09-19 ENCOUNTER — CLINICAL SUPPORT (OUTPATIENT)
Dept: REHABILITATION | Facility: HOSPITAL | Age: 74
End: 2019-09-19
Payer: MEDICARE

## 2019-09-19 DIAGNOSIS — G89.29 CHRONIC PAIN OF RIGHT KNEE: ICD-10-CM

## 2019-09-19 DIAGNOSIS — M25.661 JOINT STIFFNESS OF KNEE, RIGHT: ICD-10-CM

## 2019-09-19 DIAGNOSIS — R60.0 LOCALIZED EDEMA: ICD-10-CM

## 2019-09-19 DIAGNOSIS — M25.561 CHRONIC PAIN OF RIGHT KNEE: ICD-10-CM

## 2019-09-19 PROCEDURE — 97110 THERAPEUTIC EXERCISES: CPT | Mod: HCNC | Performed by: PHYSICAL THERAPIST

## 2019-09-19 PROCEDURE — 97530 THERAPEUTIC ACTIVITIES: CPT | Mod: HCNC | Performed by: PHYSICAL THERAPIST

## 2019-09-19 NOTE — PROGRESS NOTES
"  Physical Therapy Daily Treatment Note     Name: Drake Barraza  Clinic Number: [id number]  Therapy Diagnosis:        Encounter Diagnoses   Name Primary?    Chronic pain of right knee      Localized edema      Joint stiffness of knee, right        Physician: John Ochsner Jr, MD     Physician Orders: PT Eval and Treat   Medical Diagnosis from Referral: Z96.651 (ICD-10-CM) - Status post total right knee replacement  Evaluation Date: 8/13/2019  Authorization Period Expiration: 12/31/2019  Plan of Care Expiration: 9/10/2019  New Plan of Care Expiration: 10/15/2019  Visit # / Visits authorized: 10/ 20     Time In: 1300  Time Out: 1410  Total Billable Time: 30 minutes  Tx time 70     Precautions: Standard, blood thinners and Fall,     Subjective     Pt reports: My knee feels like it is improving, I felt good after last visit  He was compliant with home exercise program.  Response to previous treatment: min soreness  Functional change: working in the yard    Pain: 0/10  Location: right knee      Objective   Arrived with gait impairments due to L Knee pain today. Limited stance time on the L with poor knee flexion on R   (Active/Passive)  Extension: -1/-4 hyperextension taken after mobilizations  Flexion: 118/124    Drake received therapeutic exercises to develop strength, endurance, ROM, flexibility and posture for 25 minutes including:    Stationary bike for 10' for increased ROM R knee, mm endurance and increased blood circulation  SLR 3# 3 x 10 bilaterally  Single leg balance on airex 10 sec 10x  TRX retro lunge 15x    Not today:  TKE with green sports band 30x   Lateral walk GTB 2 laps 50 feet  Shuttle DL 3 black bands 3 x 15  Shuttle SL 2 black bands 2 x 15  Leg Press 110# 30x  SL Leg Press 70# 30x  R QS under heel 3x10/3"  L SL hip abd 2# 2 x10  SAQ 3x10 2# 3x10  Prone knee extension 3# 5'  Prone knee flexion 3x10  Prone TKE w/bolster 3x10  Gastroc stretch with band 30 sec 5x  Long sitting knee extensionn 3# " "above knee, straps at ankles to avoid ER 4'  SLB 10 sec 10x on blue pad   Seated extension 4# above and below knee, leg elevated on 2nd chair 5'  LAQ 4# 3 x 10 B  Lateral walks RTB 2 laps    Drake received therapeutic activity to the R knee for 25 minutes including:  Stair climbing  6"   Heel tap 3" 12x - cues for glut activation  Squats on rockerboard 3 x 10  Goblet squat with 15# kettle bell 20x to bench    Drake received the following manual therapy techniques: Joint mobilizations and Soft tissue Mobilization were applied to the: R knee  for 10 minutes, including: patella mobs, HSS, GSS, quad stretching, and joint capsular distraction   Gr IV knee flexion mob  Gr IV knee extension mob    Drake received cold pack for 10 minutes to to decrease circulation, pain, and swelling.    Home Exercises Provided and Patient Education Provided     Education provided:   Posture awareness, HEP and RICE     Written Home Exercises Provided: yes.  Exercises were reviewed and Drake was able to demonstrate them prior to the end of the session.  Drake demonstrated good  understanding of the education provided.       Assessment     Pt progressed with functional exercises today in the clinic and notes the increased challenge. His squat depth and form is much improved. Required cues on the retro lunge for wider base of support and tightness felt on the quad. Continues to perform step to gait on stairs at his Wednesday meetings which is being address with eccentric strengthening to the quads in clinic      Pt prognosis is Good.     Pt will continue to benefit from skilled outpatient physical therapy to address the deficits listed in the problem list box on initial evaluation, provide pt/family education and to maximize pt's level of independence in the home and community environment.     Pt's spiritual, cultural and educational needs considered and pt agreeable to plan of care and goals.    Anticipated barriers to physical therapy: " none     Goals:GOALS: Short Term Goals:  4 weeks  1.Report decreased Knee pain  < / =  3/10  to increase tolerance for ambulating in community (met)  2. Increase knee ROM to 120 deg flexion in order to be able to perform ADLs without difficulty.(met)  3. Increase strength by 1/3 MMT grade in quads  to increase tolerance for ADL and work activities.(met)  4. Pt to tolerate HEP to improve ROM and independence with ADL's (met)     Long Term Goals: 8 weeks  1.Report decreased knee pain < / = 1/10  to increase tolerance for returning to [facility] (met)  2.Patient goal: Return to climbing stairs without pain (met)  3.Increase strength to >/= 4+/5 in quads  to increase tolerance for ADL and work activities.(met)  4. Pt will report at 36% on FOTO knee to demonstrate increase in LE function with every day tasks (met on 9/13).      Plan   Certification Period: 9/17/2019 to 10/15/2019  Recommended Treatment Plan: 2 times per week for 4 weeks: Gait Training, Manual Therapy, Moist Heat/ Ice, Neuromuscular Re-ed, Patient Education, Self Care, Therapeutic Activites and Therapeutic Exercise  Other Recommendations: modalities prn, ASTYM prn      Continue progress eccentric quad exercises    Therapist: Enzo Gillespie, PT, DPT

## 2019-09-20 ENCOUNTER — PATIENT MESSAGE (OUTPATIENT)
Dept: ADMINISTRATIVE | Facility: OTHER | Age: 74
End: 2019-09-20

## 2019-09-24 ENCOUNTER — TELEPHONE (OUTPATIENT)
Dept: HOME HEALTH SERVICES | Facility: HOSPITAL | Age: 74
End: 2019-09-24

## 2019-09-24 ENCOUNTER — CLINICAL SUPPORT (OUTPATIENT)
Dept: REHABILITATION | Facility: HOSPITAL | Age: 74
End: 2019-09-24
Payer: MEDICARE

## 2019-09-24 DIAGNOSIS — G89.29 CHRONIC PAIN OF RIGHT KNEE: ICD-10-CM

## 2019-09-24 DIAGNOSIS — R60.0 LOCALIZED EDEMA: ICD-10-CM

## 2019-09-24 DIAGNOSIS — M25.561 CHRONIC PAIN OF RIGHT KNEE: ICD-10-CM

## 2019-09-24 DIAGNOSIS — M25.661 JOINT STIFFNESS OF KNEE, RIGHT: ICD-10-CM

## 2019-09-24 PROCEDURE — 97140 MANUAL THERAPY 1/> REGIONS: CPT | Mod: HCNC

## 2019-09-24 PROCEDURE — 97110 THERAPEUTIC EXERCISES: CPT | Mod: HCNC

## 2019-09-24 NOTE — PROGRESS NOTES
"  Physical Therapy Daily Treatment Note     Name: Drake Barraza  Clinic Number: [id number]  Therapy Diagnosis:        Encounter Diagnoses   Name Primary?    Chronic pain of right knee      Localized edema      Joint stiffness of knee, right        Physician: John Ochsner Jr, MD     Physician Orders: PT Eval and Treat   Medical Diagnosis from Referral: Z96.651 (ICD-10-CM) - Status post total right knee replacement  Evaluation Date: 8/13/2019  Authorization Period Expiration: 12/31/2019  Plan of Care Expiration: 9/10/2019  New Plan of Care Expiration: 10/15/2019  Visit # / Visits authorized: 10/ 20     Time In: 1350  Time Out: 1450  Total Billable Time: 50 minutes  Tx time 60'     Precautions: Standard, blood thinners and Fall,     Subjective     Pt reports: no pain at present time but stiffness, "I worked in my yard yesterday".  He was compliant with home exercise program.  Response to previous treatment: min soreness  Functional change: able to working in the yard    Pain: 0/10  Location: right knee      Objective   Pt with noted antalgic gait patter with poor knee flexion ambulating into tx    Drake received therapeutic exercises to develop strength, endurance, ROM, flexibility and posture for 40 minutes including:    Stationary bike for 10' for increased ROM R knee, mm endurance and increased blood circulation  SLR  R LE 3# 3 x 10   Shuttle DL 3 black bands 3 x 15  Shuttle SL 2 black bands 2 x 15  Heel touch 3" step 3x10 eccentric control  Squats on rockerboard 3x10  Goblet squat with 15# kettle bell 2x15 to bench      Drake received the following manual therapy techniques: Joint mobilizations and Soft tissue Mobilization were applied to the: R knee for10 minutes, including: patella mobs, HSS, GSS, quad stretching, and joint capsular distraction       Drake received cold pack for 10 minutes to to decrease circulation, pain, and swelling.    Home Exercises Provided and Patient Education Provided     Education " provided:   Posture awareness, HEP and RICE     Written Home Exercises Provided: yes.  Exercises were reviewed and Drake was able to demonstrate them prior to the end of the session.  Drake demonstrated good  understanding of the education provided.       Assessment     Pt tolerating tx well with minimal increased knee pain but noted fatigue with increased sets and repetition  requiring rest PRN. Continue with eccentric quad control exercise. VC/TC for correcting form/tecnique.       Pt prognosis is Good.     Pt will continue to benefit from skilled outpatient physical therapy to address the deficits listed in the problem list box on initial evaluation, provide pt/family education and to maximize pt's level of independence in the home and community environment.     Pt's spiritual, cultural and educational needs considered and pt agreeable to plan of care and goals.    Anticipated barriers to physical therapy: none     Goals:GOALS: Short Term Goals:  4 weeks  1.Report decreased Knee pain  < / =  3/10  to increase tolerance for ambulating in community (met)  2. Increase knee ROM to 120 deg flexion in order to be able to perform ADLs without difficulty.(met)  3. Increase strength by 1/3 MMT grade in quads  to increase tolerance for ADL and work activities.(met)  4. Pt to tolerate HEP to improve ROM and independence with ADL's (met)     Long Term Goals: 8 weeks  1.Report decreased knee pain < / = 1/10  to increase tolerance for returning to [facility] (met)  2.Patient goal: Return to climbing stairs without pain (met)  3.Increase strength to >/= 4+/5 in quads  to increase tolerance for ADL and work activities.(met)  4. Pt will report at 36% on FOTO knee to demonstrate increase in LE function with every day tasks (met on 9/13).      Plan   Certification Period: 9/17/2019 to 10/15/2019  Recommended Treatment Plan: 2 times per week for 4 weeks: Gait Training, Manual Therapy, Moist Heat/ Ice, Neuromuscular Re-ed,  Patient Education, Self Care, Therapeutic Activites and Therapeutic Exercise  Other Recommendations: modalities prn, ASTYM prn      Continue progress eccentric quad exercises    Therapist: Enrrique Cueto, PTA, STS

## 2019-09-26 ENCOUNTER — CLINICAL SUPPORT (OUTPATIENT)
Dept: REHABILITATION | Facility: HOSPITAL | Age: 74
End: 2019-09-26
Payer: MEDICARE

## 2019-09-26 DIAGNOSIS — G89.29 CHRONIC PAIN OF RIGHT KNEE: ICD-10-CM

## 2019-09-26 DIAGNOSIS — R60.0 LOCALIZED EDEMA: ICD-10-CM

## 2019-09-26 DIAGNOSIS — M25.561 CHRONIC PAIN OF RIGHT KNEE: ICD-10-CM

## 2019-09-26 DIAGNOSIS — M25.661 JOINT STIFFNESS OF KNEE, RIGHT: ICD-10-CM

## 2019-09-26 PROCEDURE — 97140 MANUAL THERAPY 1/> REGIONS: CPT | Mod: HCNC | Performed by: PHYSICAL THERAPIST

## 2019-09-26 PROCEDURE — 97110 THERAPEUTIC EXERCISES: CPT | Mod: HCNC | Performed by: PHYSICAL THERAPIST

## 2019-09-26 NOTE — PROGRESS NOTES
"  Physical Therapy Daily Treatment Note     Name: Drake Barraza  Clinic Number: [id number]  Therapy Diagnosis:        Encounter Diagnoses   Name Primary?    Chronic pain of right knee      Localized edema      Joint stiffness of knee, right        Physician: [facility] Jr, MD     Physician Orders: PT Eval and Treat   Medical Diagnosis from Referral: Z96.651 (ICD-10-CM) - Status post total right knee replacement  Evaluation Date: 8/13/2019  Authorization Period Expiration: 12/31/2019  Plan of Care Expiration: 9/10/2019  New Plan of Care Expiration: 10/15/2019  Visit # / Visits authorized: 11/ 20     Time In: 1400  Time Out: 1500  Total Billable Time: 30 minutes  Tx time 60'     Precautions: Standard, blood thinners and Fall,     Subjective     Pt reports: gluts were fatigued after last time, doing more day to day movement in here as helped. Drove myself to therapy today  He was compliant with home exercise program.  Response to previous treatment: sore in glut  Functional change: driving    Pain: 0/10  Location: right knee      Objective   Limited knee flexion with glut soreness on arrival    Drake received therapeutic exercises to develop strength, endurance, ROM, flexibility and posture for 50 minutes including:    Stationary bike for 10' for increased ROM R knee, mm endurance and increased blood circulation Lev 5  Gastroc and hamstring stretch on wedge/stairs for 30 sec 3x each  LAQ 3# 30X B   Shuttle DL 3 black bands 3 x 15  Shuttle SL 2 black bands 2 x 15  Heel touch 4" step 3x10 eccentric control  Lateral walk GTB 3 laps on turf 50 ft  Goblet squat with 15# kettle bell 2x15 to bench  TKE on cybex 4 pl 3 x 10    Drake received the following manual therapy techniques: Joint mobilizations and Soft tissue Mobilization were applied to the: R knee for10 minutes, including:   patella mobs all planes  Gr IV flexion in sitting      Drake received cold pack for 0 minutes to to decrease circulation, pain, and " swelling.    Home Exercises Provided and Patient Education Provided     Education provided:   Posture awareness, HEP and RICE     Written Home Exercises Provided: yes.  Exercises were reviewed and Drake was able to demonstrate them prior to the end of the session.  Drake demonstrated good  understanding of the education provided.       Assessment     Patient improving with functional exercises in the clinic. He will take the next week to rehab independently and return next week for possible discharge. He notes it has been a long year for he and his wife and they're ready to be done with doctor visits      Pt prognosis is Good.     Pt will continue to benefit from skilled outpatient physical therapy to address the deficits listed in the problem list box on initial evaluation, provide pt/family education and to maximize pt's level of independence in the home and community environment.     Pt's spiritual, cultural and educational needs considered and pt agreeable to plan of care and goals.    Anticipated barriers to physical therapy: none     Goals:GOALS: Short Term Goals:  4 weeks  1.Report decreased Knee pain  < / =  3/10  to increase tolerance for ambulating in community (met)  2. Increase knee ROM to 120 deg flexion in order to be able to perform ADLs without difficulty.(met)  3. Increase strength by 1/3 MMT grade in quads  to increase tolerance for ADL and work activities.(met)  4. Pt to tolerate HEP to improve ROM and independence with ADL's (met)     Long Term Goals: 8 weeks  1.Report decreased knee pain < / = 1/10  to increase tolerance for returning to [facility] (met)  2.Patient goal: Return to climbing stairs without pain (met)  3.Increase strength to >/= 4+/5 in quads  to increase tolerance for ADL and work activities.(met)  4. Pt will report at 36% on FOTO knee to demonstrate increase in LE function with every day tasks (met on 9/13).      Plan   Certification Period: 9/17/2019 to  10/15/2019  Recommended Treatment Plan: 2 times per week for 4 weeks: Gait Training, Manual Therapy, Moist Heat/ Ice, Neuromuscular Re-ed, Patient Education, Self Care, Therapeutic Activites and Therapeutic Exercise  Other Recommendations: modalities prn, ASTYM prn      Continue progress eccentric quad exercises, possible discharge next visit    Therapist: Enzo Gillespie, PT, DPT

## 2019-10-03 ENCOUNTER — CLINICAL SUPPORT (OUTPATIENT)
Dept: REHABILITATION | Facility: HOSPITAL | Age: 74
End: 2019-10-03
Payer: MEDICARE

## 2019-10-03 DIAGNOSIS — R60.0 LOCALIZED EDEMA: ICD-10-CM

## 2019-10-03 DIAGNOSIS — M25.661 JOINT STIFFNESS OF KNEE, RIGHT: ICD-10-CM

## 2019-10-03 DIAGNOSIS — M25.561 CHRONIC PAIN OF RIGHT KNEE: ICD-10-CM

## 2019-10-03 DIAGNOSIS — G89.29 CHRONIC PAIN OF RIGHT KNEE: ICD-10-CM

## 2019-10-03 PROCEDURE — 97140 MANUAL THERAPY 1/> REGIONS: CPT | Performed by: PHYSICAL THERAPIST

## 2019-10-03 PROCEDURE — G8980 MOBILITY D/C STATUS: HCPCS | Mod: CJ | Performed by: PHYSICAL THERAPIST

## 2019-10-03 PROCEDURE — G8979 MOBILITY GOAL STATUS: HCPCS | Mod: CJ | Performed by: PHYSICAL THERAPIST

## 2019-10-03 PROCEDURE — 97110 THERAPEUTIC EXERCISES: CPT | Performed by: PHYSICAL THERAPIST

## 2019-10-03 NOTE — PROGRESS NOTES
"  Physical Therapy Daily Treatment Note     Name: Drake Barraza  Clinic Number: [id number]  Therapy Diagnosis:        Encounter Diagnoses   Name Primary?    Chronic pain of right knee      Localized edema      Joint stiffness of knee, right        Physician: [facility] Jr, MD     Physician Orders: PT Eval and Treat   Medical Diagnosis from Referral: Z96.651 (ICD-10-CM) - Status post total right knee replacement  Evaluation Date: 8/13/2019  Authorization Period Expiration: 12/31/2019  Plan of Care Expiration: 9/10/2019  New Plan of Care Expiration: 10/15/2019  Visit # / Visits authorized: 12/ 20     Time In: 1400  Time Out: 1500  Total Billable Time: 30 minutes  Tx time 60'     Precautions: Standard, blood thinners and Fall,     Subjective     Pt reports: I have been going to meetings, cutting the grass without knee pain.   He was compliant with home exercise program.  Response to previous treatment: sore in glut  Functional change: driving    Pain: 0/10  Location: right knee      Objective     Drake received therapeutic exercises to develop strength, endurance, ROM, flexibility and posture for 35 minutes including:    Stationary bike for 10' for increased ROM R knee, mm endurance and increased blood circulation Lev 5  Heel touch 4" step 3x10 eccentric control  Leg press # 30x  Leg Press SL 70# 30x B  Postural stability Lv 4 on machine for single leg balance 30 seconds 5x  Lateral walk GTB 3 laps on turf 50 ft  LAQ on hammer 12.5# 2 x 15  DL heel raise 20x  SL heel raise 10x B    Drake received the following manual therapy techniques: Joint mobilizations and Soft tissue Mobilization were applied to the: R knee for10 minutes, including:   patella mobs all planes  Gr IV flexion in sitting    Drake performed therapeutic activity for 15 minutes including:  Sit to stand no UE support 2 x 15  Stair climbing 20x     Drake received cold pack for 0 minutes to to decrease circulation, pain, and swelling.    Home " Exercises Provided and Patient Education Provided     Education provided:   Posture awareness, HEP and RICE     Written Home Exercises Provided: yes.  Exercises were reviewed and Drake was able to demonstrate them prior to the end of the session.  Drake demonstrated good  understanding of the education provided.         Assessment     Patient tolerates all functional movements without knee pain. Met his goals with therapy and prepared for discharge in community. Will return as needed and plans on having TKA on other knee early next year      Pt prognosis is Good.     Pt will continue to benefit from skilled outpatient physical therapy to address the deficits listed in the problem list box on initial evaluation, provide pt/family education and to maximize pt's level of independence in the home and community environment.     Pt's spiritual, cultural and educational needs considered and pt agreeable to plan of care and goals.    Anticipated barriers to physical therapy: none     Goals:GOALS: Short Term Goals:  4 weeks  1.Report decreased Knee pain  < / =  3/10  to increase tolerance for ambulating in community (met)  2. Increase knee ROM to 120 deg flexion in order to be able to perform ADLs without difficulty.(met)  3. Increase strength by 1/3 MMT grade in quads  to increase tolerance for ADL and work activities.(met)  4. Pt to tolerate HEP to improve ROM and independence with ADL's (met)     Long Term Goals: 8 weeks  1.Report decreased knee pain < / = 1/10  to increase tolerance for returning to [facility] (met)  2.Patient goal: Return to climbing stairs without pain (met)  3.Increase strength to >/= 4+/5 in quads  to increase tolerance for ADL and work activities.(met)  4. Pt will report at 36% on FOTO knee to demonstrate increase in LE function with every day tasks (met on 9/13).      Plan   Certification Period: 9/17/2019 to 10/15/2019  Recommended Treatment Plan: 2 times per week for 4 weeks: Gait  Training, Manual Therapy, Moist Heat/ Ice, Neuromuscular Re-ed, Patient Education, Self Care, Therapeutic Activites and Therapeutic Exercise  Other Recommendations: modalities prn, ASTYM prn      Discharge with HEP and goals met    Therapist: Enzo Gillespie, PT, DPT

## 2019-10-20 ENCOUNTER — PATIENT MESSAGE (OUTPATIENT)
Dept: ADMINISTRATIVE | Facility: OTHER | Age: 74
End: 2019-10-20

## 2019-10-22 ENCOUNTER — OFFICE VISIT (OUTPATIENT)
Dept: ORTHOPEDICS | Facility: CLINIC | Age: 74
End: 2019-10-22
Payer: MEDICARE

## 2019-10-22 VITALS
SYSTOLIC BLOOD PRESSURE: 134 MMHG | HEIGHT: 67 IN | BODY MASS INDEX: 26.99 KG/M2 | WEIGHT: 171.94 LBS | HEART RATE: 56 BPM | DIASTOLIC BLOOD PRESSURE: 78 MMHG

## 2019-10-22 DIAGNOSIS — Z96.651 STATUS POST TOTAL RIGHT KNEE REPLACEMENT: Primary | ICD-10-CM

## 2019-10-22 PROCEDURE — 99024 PR POST-OP FOLLOW-UP VISIT: ICD-10-PCS | Mod: HCNC,S$GLB,, | Performed by: ORTHOPAEDIC SURGERY

## 2019-10-22 PROCEDURE — 99024 POSTOP FOLLOW-UP VISIT: CPT | Mod: HCNC,S$GLB,, | Performed by: ORTHOPAEDIC SURGERY

## 2019-10-22 PROCEDURE — 99999 PR PBB SHADOW E&M-EST. PATIENT-LVL III: CPT | Mod: PBBFAC,HCNC,, | Performed by: ORTHOPAEDIC SURGERY

## 2019-10-22 PROCEDURE — 99999 PR PBB SHADOW E&M-EST. PATIENT-LVL III: ICD-10-PCS | Mod: PBBFAC,HCNC,, | Performed by: ORTHOPAEDIC SURGERY

## 2019-10-22 NOTE — PROGRESS NOTES
Patient is here today for 12 week PO FU of his TKA on 7/22/19. He is progressing well.      We will allow him to return as needed for repeat radiographs and certainly for any other questions.    xrays are  reviewed today with good alignment.    We will check repeat radiographs in 1 year.

## 2019-10-24 ENCOUNTER — PES CALL (OUTPATIENT)
Dept: ADMINISTRATIVE | Facility: CLINIC | Age: 74
End: 2019-10-24

## 2019-11-01 DIAGNOSIS — E03.4 HYPOTHYROIDISM DUE TO ACQUIRED ATROPHY OF THYROID: ICD-10-CM

## 2019-11-01 RX ORDER — ATORVASTATIN CALCIUM 40 MG/1
TABLET, FILM COATED ORAL
Qty: 90 TABLET | Refills: 3 | Status: SHIPPED | OUTPATIENT
Start: 2019-11-01 | End: 2020-08-31

## 2019-11-01 RX ORDER — LEVOTHYROXINE SODIUM 125 UG/1
TABLET ORAL
Qty: 90 TABLET | Refills: 1 | Status: SHIPPED | OUTPATIENT
Start: 2019-11-01 | End: 2020-03-15

## 2019-11-07 ENCOUNTER — LAB VISIT (OUTPATIENT)
Dept: LAB | Facility: HOSPITAL | Age: 74
End: 2019-11-07
Attending: INTERNAL MEDICINE
Payer: MEDICARE

## 2019-11-07 DIAGNOSIS — I10 ESSENTIAL HYPERTENSION: ICD-10-CM

## 2019-11-07 DIAGNOSIS — E78.00 HYPERCHOLESTEREMIA: ICD-10-CM

## 2019-11-07 LAB
ALT SERPL W/O P-5'-P-CCNC: 30 U/L (ref 10–44)
ANION GAP SERPL CALC-SCNC: 4 MMOL/L (ref 8–16)
AST SERPL-CCNC: 30 U/L (ref 10–40)
BUN SERPL-MCNC: 20 MG/DL (ref 8–23)
CALCIUM SERPL-MCNC: 9.7 MG/DL (ref 8.7–10.5)
CHLORIDE SERPL-SCNC: 106 MMOL/L (ref 95–110)
CHOLEST SERPL-MCNC: 128 MG/DL (ref 120–199)
CHOLEST/HDLC SERPL: 3 {RATIO} (ref 2–5)
CO2 SERPL-SCNC: 29 MMOL/L (ref 23–29)
CREAT SERPL-MCNC: 1.2 MG/DL (ref 0.5–1.4)
EST. GFR  (AFRICAN AMERICAN): >60 ML/MIN/1.73 M^2
EST. GFR  (NON AFRICAN AMERICAN): 59.2 ML/MIN/1.73 M^2
GLUCOSE SERPL-MCNC: 123 MG/DL (ref 70–110)
HDLC SERPL-MCNC: 43 MG/DL (ref 40–75)
HDLC SERPL: 33.6 % (ref 20–50)
LDLC SERPL CALC-MCNC: 65.6 MG/DL (ref 63–159)
NONHDLC SERPL-MCNC: 85 MG/DL
POTASSIUM SERPL-SCNC: 4.9 MMOL/L (ref 3.5–5.1)
SODIUM SERPL-SCNC: 139 MMOL/L (ref 136–145)
TRIGL SERPL-MCNC: 97 MG/DL (ref 30–150)

## 2019-11-07 PROCEDURE — 36415 COLL VENOUS BLD VENIPUNCTURE: CPT | Mod: HCNC

## 2019-11-07 PROCEDURE — 84450 TRANSFERASE (AST) (SGOT): CPT | Mod: HCNC

## 2019-11-07 PROCEDURE — 84460 ALANINE AMINO (ALT) (SGPT): CPT | Mod: HCNC

## 2019-11-07 PROCEDURE — 80061 LIPID PANEL: CPT | Mod: HCNC

## 2019-11-07 PROCEDURE — 80048 BASIC METABOLIC PNL TOTAL CA: CPT | Mod: HCNC

## 2019-11-11 ENCOUNTER — PATIENT OUTREACH (OUTPATIENT)
Dept: ADMINISTRATIVE | Facility: OTHER | Age: 74
End: 2019-11-11

## 2019-11-14 ENCOUNTER — OFFICE VISIT (OUTPATIENT)
Dept: CARDIOLOGY | Facility: CLINIC | Age: 74
End: 2019-11-14
Payer: MEDICARE

## 2019-11-14 VITALS
HEART RATE: 84 BPM | HEIGHT: 67 IN | DIASTOLIC BLOOD PRESSURE: 70 MMHG | BODY MASS INDEX: 27.6 KG/M2 | SYSTOLIC BLOOD PRESSURE: 134 MMHG | WEIGHT: 175.81 LBS

## 2019-11-14 DIAGNOSIS — I25.2 OLD MYOCARDIAL INFARCT: ICD-10-CM

## 2019-11-14 DIAGNOSIS — E78.00 HYPERCHOLESTEREMIA: ICD-10-CM

## 2019-11-14 DIAGNOSIS — I10 ESSENTIAL HYPERTENSION: ICD-10-CM

## 2019-11-14 DIAGNOSIS — I25.10 CORONARY ARTERY DISEASE INVOLVING NATIVE CORONARY ARTERY OF NATIVE HEART WITHOUT ANGINA PECTORIS: Primary | ICD-10-CM

## 2019-11-14 DIAGNOSIS — N52.9 IMPOTENCE: ICD-10-CM

## 2019-11-14 PROCEDURE — 93005 EKG 12-LEAD: ICD-10-PCS | Mod: HCNC,S$GLB,, | Performed by: INTERNAL MEDICINE

## 2019-11-14 PROCEDURE — 93005 ELECTROCARDIOGRAM TRACING: CPT | Mod: HCNC,S$GLB,, | Performed by: INTERNAL MEDICINE

## 2019-11-14 PROCEDURE — 99999 PR PBB SHADOW E&M-EST. PATIENT-LVL III: ICD-10-PCS | Mod: PBBFAC,HCNC,, | Performed by: INTERNAL MEDICINE

## 2019-11-14 PROCEDURE — 3078F DIAST BP <80 MM HG: CPT | Mod: HCNC,CPTII,S$GLB, | Performed by: INTERNAL MEDICINE

## 2019-11-14 PROCEDURE — 99214 PR OFFICE/OUTPT VISIT, EST, LEVL IV, 30-39 MIN: ICD-10-PCS | Mod: HCNC,S$GLB,, | Performed by: INTERNAL MEDICINE

## 2019-11-14 PROCEDURE — 93010 ELECTROCARDIOGRAM REPORT: CPT | Mod: HCNC,S$GLB,, | Performed by: INTERNAL MEDICINE

## 2019-11-14 PROCEDURE — 3078F PR MOST RECENT DIASTOLIC BLOOD PRESSURE < 80 MM HG: ICD-10-PCS | Mod: HCNC,CPTII,S$GLB, | Performed by: INTERNAL MEDICINE

## 2019-11-14 PROCEDURE — 1101F PR PT FALLS ASSESS DOC 0-1 FALLS W/OUT INJ PAST YR: ICD-10-PCS | Mod: HCNC,CPTII,S$GLB, | Performed by: INTERNAL MEDICINE

## 2019-11-14 PROCEDURE — 3075F PR MOST RECENT SYSTOLIC BLOOD PRESS GE 130-139MM HG: ICD-10-PCS | Mod: HCNC,CPTII,S$GLB, | Performed by: INTERNAL MEDICINE

## 2019-11-14 PROCEDURE — 99214 OFFICE O/P EST MOD 30 MIN: CPT | Mod: HCNC,S$GLB,, | Performed by: INTERNAL MEDICINE

## 2019-11-14 PROCEDURE — 93010 EKG 12-LEAD: ICD-10-PCS | Mod: HCNC,S$GLB,, | Performed by: INTERNAL MEDICINE

## 2019-11-14 PROCEDURE — 3075F SYST BP GE 130 - 139MM HG: CPT | Mod: HCNC,CPTII,S$GLB, | Performed by: INTERNAL MEDICINE

## 2019-11-14 PROCEDURE — 1101F PT FALLS ASSESS-DOCD LE1/YR: CPT | Mod: HCNC,CPTII,S$GLB, | Performed by: INTERNAL MEDICINE

## 2019-11-14 PROCEDURE — 99999 PR PBB SHADOW E&M-EST. PATIENT-LVL III: CPT | Mod: PBBFAC,HCNC,, | Performed by: INTERNAL MEDICINE

## 2019-11-14 RX ORDER — SILDENAFIL 100 MG/1
100 TABLET, FILM COATED ORAL DAILY PRN
Qty: 30 TABLET | Refills: 6 | Status: SHIPPED | OUTPATIENT
Start: 2019-11-14 | End: 2019-11-14 | Stop reason: SDUPTHER

## 2019-11-14 RX ORDER — SILDENAFIL 100 MG/1
100 TABLET, FILM COATED ORAL DAILY PRN
Qty: 30 TABLET | Refills: 6 | Status: SHIPPED | OUTPATIENT
Start: 2019-11-14 | End: 2020-11-17 | Stop reason: SDUPTHER

## 2019-11-14 NOTE — PATIENT INSTRUCTIONS
Viagra is generally safe. It is OK for you to use it. It is usually not covered by insurance and is available on a fee-for-service cost basis, and is relatively expensive. The method of use is 1 hour prior to anticipated intercourse. You should not use any more than one tablet in a 24 hour period. Side effects include possible headache, flushing, indigestion and transient changes in vision   You should not take any nitroglycerin or isosorbide if you take Viagra. This is extremely important.   If you seek medical care for any reason, you should let medical personnel know if you took Viagra with the prior 24 hours. Also blood levels of Viagra can be increased by the following meds: cimetidine, erythromycin, itraconazole or ketoconazole.   In rare cases there have been some deaths in patients after taking Viagra, felt due to the exertion of intercourse rather than the drug itself.

## 2019-11-14 NOTE — PROGRESS NOTES
Subjective:   Patient ID:  Drake Barraza is a 74 y.o. male who presents for follow-up of Coronary Artery Disease      Problem List:  CAD   Inf MI 2003   RCA and LAD stents 2003   Hypercholesteremia   HTN  Sleep apnea   Hypothyroidism   Hyperglycemia    HPI:   Drake Barraza does not report angina or shortness of breath with exertion.     LDL(c) is <70. Hepatic transaminases are within normal limits.       Review of Systems   Constitution: Negative for malaise/fatigue, weight gain and weight loss.   Cardiovascular: Negative for chest pain, dyspnea on exertion, leg swelling and palpitations.   Respiratory: Negative for cough and hemoptysis.    Hematologic/Lymphatic: Does not bruise/bleed easily.   Musculoskeletal: Positive for arthritis and joint pain. Negative for muscle cramps.   Gastrointestinal: Negative for abdominal pain, heartburn and melena.   Genitourinary: Negative for hematuria and nocturia.   Neurological: Negative for headaches, light-headedness and seizures.   Psychiatric/Behavioral: Negative for depression. The patient is not nervous/anxious.        Current Outpatient Medications   Medication Sig    aspirin (ECOTRIN) 81 MG EC tablet Take 1 tablet (81 mg total) by mouth 2 (two) times daily. (Patient taking differently: Take 81 mg by mouth once daily. )    atorvastatin (LIPITOR) 40 MG tablet TAKE 1 TABLET EVERY DAY    CINNAMON BARK (CINNAMON ORAL) Take 1 capsule by mouth once daily.    levothyroxine (SYNTHROID) 125 MCG tablet TAKE 1 TABLET IN THE MORNING ON AN EMPTY STOMACH    metoprolol tartrate (LOPRESSOR) 25 MG tablet TAKE 1 TABLET TWICE DAILY    multivitamin (ONE DAILY MULTIVITAMIN) per tablet Take 1 tablet by mouth once daily.    nitroGLYCERIN (NITROSTAT) 0.4 MG SL tablet Place 1 tablet (0.4 mg total) under the tongue every 5 (five) minutes as needed for Chest pain.    omega-3 fatty acids-vitamin E (FISH OIL) 1,000 mg Cap Take 1 capsule by mouth Daily.      ramipril (ALTACE) 10 MG  "capsule TAKE 1 CAPSULE EVERY DAY    sildenafil (VIAGRA) 100 MG tablet Take 1 tablet (100 mg total) by mouth daily as needed for Erectile Dysfunction.    TURMERIC ROOT EXTRACT ORAL Take 1 capsule by mouth.    UBIDECARENONE (COENZYME Q10) 100 mg Tab Take 200 mg by mouth once daily.     vitamin D 185 MG Tab Take 185 mg by mouth once daily.           Social history:  Drake Barraza  reports that he has never smoked. He has never used smokeless tobacco. He reports that he drinks alcohol. He reports that he does not use drugs.      Objective:     Physical Exam   Constitutional: He is oriented to person, place, and time. He appears well-developed and well-nourished.   /70   Pulse 84   Ht 5' 7" (1.702 m)   Wt 79.8 kg (175 lb 13.1 oz)   BMI 27.54 kg/m²      HENT:   Head: Normocephalic and atraumatic.   Neck: No JVD present. Carotid bruit is not present.   Cardiovascular: Normal rate, regular rhythm, S1 normal and S2 normal. Exam reveals no gallop.   No murmur heard.  Pulses:       Radial pulses are 2+ on the right side, and 2+ on the left side.        Dorsalis pedis pulses are 2+ on the right side, and 2+ on the left side.        Posterior tibial pulses are 2+ on the right side, and 2+ on the left side.   Pulmonary/Chest: Effort normal. He has no wheezes. He has no rales. Chest wall is not dull to percussion.   Abdominal: Soft. There is no splenomegaly or hepatomegaly. There is no tenderness.   Musculoskeletal:        Right lower leg: He exhibits no edema.        Left lower leg: He exhibits no edema.   Neurological: He is alert and oriented to person, place, and time. Gait normal.   Skin: Skin is warm and dry. No bruising noted. No cyanosis. Nails show no clubbing.   Psychiatric: He has a normal mood and affect. His speech is normal and behavior is normal. Judgment and thought content normal. Cognition and memory are normal.           Lab Results   Component Value Date    CHOL 128 11/07/2019    HDL 43 " 11/07/2019    LDLCALC 65.6 11/07/2019    TRIG 97 11/07/2019    CHOLHDL 33.6 11/07/2019     Lab Results   Component Value Date     (H) 11/07/2019    CREATININE 1.2 11/07/2019    BUN 20 11/07/2019     11/07/2019    K 4.9 11/07/2019     11/07/2019    CO2 29 11/07/2019     Lab Results   Component Value Date    ALT 30 11/07/2019    AST 30 11/07/2019    ALKPHOS 84 05/09/2019    BILITOT 0.6 05/09/2019           Assessment and Plan:     Coronary artery disease involving native coronary artery of native heart without angina pectoris  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 Continue same meds.   -     EKG 12-lead  -     Lipid panel; Future; Expected date: 11/17/2020    Essential hypertension  Stable. Continue same meds.   -     Comprehensive metabolic panel; Future; Expected date: 11/17/2020    Hypercholesteremia  Stable. Continue same meds.    Cholesterol education.   Nutritional counseling.     Erectile dysfunction  -     Refill sildenafil (VIAGRA) 100 MG tablet; Take 1 tablet (100 mg total) by mouth daily as needed for Erectile Dysfunction.  Dispense: 30 tablet; Refill: 6  Counseled about the potential for severe hypotension and possible death from the simultaneous use of a type 5 phoshodiesterase (PDE) inhibitor and nitrate.           Follow up in about 1 year (around 11/14/2020).

## 2019-11-18 ENCOUNTER — PATIENT OUTREACH (OUTPATIENT)
Dept: ADMINISTRATIVE | Facility: OTHER | Age: 74
End: 2019-11-18

## 2019-11-20 ENCOUNTER — OFFICE VISIT (OUTPATIENT)
Dept: DERMATOLOGY | Facility: CLINIC | Age: 74
End: 2019-11-20
Payer: MEDICARE

## 2019-11-20 DIAGNOSIS — L91.8 CUTANEOUS SKIN TAGS: Primary | ICD-10-CM

## 2019-11-20 DIAGNOSIS — L81.4 LENTIGINES: ICD-10-CM

## 2019-11-20 DIAGNOSIS — L82.1 SEBORRHEIC KERATOSES: ICD-10-CM

## 2019-11-20 DIAGNOSIS — R20.9 DISTURBANCE OF SKIN SENSATION: ICD-10-CM

## 2019-11-20 PROCEDURE — 1159F PR MEDICATION LIST DOCUMENTED IN MEDICAL RECORD: ICD-10-PCS | Mod: HCNC,S$GLB,, | Performed by: DERMATOLOGY

## 2019-11-20 PROCEDURE — 1101F PR PT FALLS ASSESS DOC 0-1 FALLS W/OUT INJ PAST YR: ICD-10-PCS | Mod: HCNC,CPTII,S$GLB, | Performed by: DERMATOLOGY

## 2019-11-20 PROCEDURE — 99999 PR PBB SHADOW E&M-EST. PATIENT-LVL II: ICD-10-PCS | Mod: PBBFAC,HCNC,, | Performed by: DERMATOLOGY

## 2019-11-20 PROCEDURE — 99213 OFFICE O/P EST LOW 20 MIN: CPT | Mod: 25,HCNC,S$GLB, | Performed by: DERMATOLOGY

## 2019-11-20 PROCEDURE — 99213 PR OFFICE/OUTPT VISIT, EST, LEVL III, 20-29 MIN: ICD-10-PCS | Mod: 25,HCNC,S$GLB, | Performed by: DERMATOLOGY

## 2019-11-20 PROCEDURE — 11200 RMVL SKIN TAGS UP TO&INC 15: CPT | Mod: HCNC,S$GLB,, | Performed by: DERMATOLOGY

## 2019-11-20 PROCEDURE — 1159F MED LIST DOCD IN RCRD: CPT | Mod: HCNC,S$GLB,, | Performed by: DERMATOLOGY

## 2019-11-20 PROCEDURE — 1101F PT FALLS ASSESS-DOCD LE1/YR: CPT | Mod: HCNC,CPTII,S$GLB, | Performed by: DERMATOLOGY

## 2019-11-20 PROCEDURE — 99999 PR PBB SHADOW E&M-EST. PATIENT-LVL II: CPT | Mod: PBBFAC,HCNC,, | Performed by: DERMATOLOGY

## 2019-11-20 PROCEDURE — 11200 PR REMOVAL OF SKIN TAGS, UP TO 15: ICD-10-PCS | Mod: HCNC,S$GLB,, | Performed by: DERMATOLOGY

## 2019-11-20 NOTE — PROGRESS NOTES
Subjective:       Patient ID:  Drake Barraza is a 74 y.o. male who presents for   Chief Complaint   Patient presents with    Lesion     around neck     Skin Check     UBSE     Skin tags axillae which are irritated no tx      Lesion  - Initial  Affected locations: left arm, right arm, chest, back and face  Signs / symptoms: asymptomatic  Aggravated by: nothing  Relieving factors/Treatments tried: nothing        Review of Systems   Constitutional: Negative for fever, chills, weight loss, weight gain, fatigue, night sweats and malaise.   Skin: Positive for daily sunscreen use, activity-related sunscreen use and wears hat.   Hematologic/Lymphatic: Does not bruise/bleed easily.        Objective:    Physical Exam   Constitutional: He appears well-developed and well-nourished. No distress.   Neurological: He is alert and oriented to person, place, and time. He is not disoriented.   Psychiatric: He has a normal mood and affect.   Skin:   Areas Examined (abnormalities noted in diagram):   Scalp / Hair Palpated and Inspected  Head / Face Inspection Performed  Neck Inspection Performed  Chest / Axilla Inspection Performed  Abdomen Inspection Performed  Back Inspection Performed  RUE Inspected  LUE Inspection Performed                       Diagram Legend     Erythematous scaling macule/papule c/w actinic keratosis       Vascular papule c/w angioma      Pigmented verrucoid papule/plaque c/w seborrheic keratosis      Yellow umbilicated papule c/w sebaceous hyperplasia      Irregularly shaped tan macule c/w lentigo     1-2 mm smooth white papules consistent with Milia      Movable subcutaneous cyst with punctum c/w epidermal inclusion cyst      Subcutaneous movable cyst c/w pilar cyst      Firm pink to brown papule c/w dermatofibroma      Pedunculated fleshy papule(s) c/w skin tag(s)      Evenly pigmented macule c/w junctional nevus     Mildly variegated pigmented, slightly irregular-bordered macule c/w mildly atypical  "nevus      Flesh colored to evenly pigmented papule c/w intradermal nevus       Pink pearly papule/plaque c/w basal cell carcinoma      Erythematous hyperkeratotic cursted plaque c/w SCC      Surgical scar with no sign of skin cancer recurrence      Open and closed comedones      Inflammatory papules and pustules      Verrucoid papule consistent consistent with wart     Erythematous eczematous patches and plaques     Dystrophic onycholytic nail with subungual debris c/w onychomycosis     Umbilicated papule    Erythematous-base heme-crusted tan verrucoid plaque consistent with inflamed seborrheic keratosis     Erythematous Silvery Scaling Plaque c/w Psoriasis     See annotation      Assessment / Plan:        Cutaneous skin tags  Verbal consent obtained. 7 lesions removed with scissor snip removal . No complications.      Seborrheic keratoses  reassurance      Lentigines  The "ABCD" rules to observe pigmented lesions were reviewed.                   Follow up in about 1 year (around 11/20/2020).  "

## 2019-12-17 ENCOUNTER — TELEPHONE (OUTPATIENT)
Dept: INTERNAL MEDICINE | Facility: CLINIC | Age: 74
End: 2019-12-17

## 2019-12-17 DIAGNOSIS — G47.33 OSA (OBSTRUCTIVE SLEEP APNEA): Primary | ICD-10-CM

## 2019-12-17 NOTE — TELEPHONE ENCOUNTER
----- Message from Meagan Urrutia sent at 12/17/2019 10:51 AM CST -----  Contact: [phone]  Patient is requesting orders for a new c-pap machine. The one he had is broken.  Please advise, thanks

## 2019-12-17 NOTE — TELEPHONE ENCOUNTER
----- Message from Katie Dallas MA sent at 12/17/2019 11:42 AM CST -----  Contact: pt  Please contact pt regarding regarding CPAP Machine. He is going out of town on Saturday and stated that he needs help getting a new machine.   Thanks,  Katie Dallas MA  ----- Message -----  From: Cynthia Elizabeth  Sent: 12/17/2019  11:37 AM CST  To: [facility] Staff    Pt states that his CPAP machine went out last night and needs to come in and get a new rx with all the settings.  He can be reached at [phone].    Thank you

## 2019-12-18 NOTE — TELEPHONE ENCOUNTER
Spoke with Mr Christi, who requests a new CPAP machine 2ndary to his machine breaking.He uses a full face mask and is on CPAP at 9 cm based on last sleep study done 2007 through [facility].  Delfino, I've printed pt's CPAP orders; please fax these to [facility].  Thanks

## 2019-12-23 ENCOUNTER — TELEPHONE (OUTPATIENT)
Dept: INTERNAL MEDICINE | Facility: CLINIC | Age: 74
End: 2019-12-23

## 2019-12-23 NOTE — TELEPHONE ENCOUNTER
----- Message from Ernie Mccarthy sent at 12/20/2019  3:50 PM CST -----  Regarding: Cpap  Contact: [phone]  I've received an order for a Cpap replacement but will need current office notes documenting the need for a replacement and that patient is benefiting from therapy. Any questions, please call. Thanks!

## 2019-12-23 NOTE — TELEPHONE ENCOUNTER
Delfino, please print my phone note on pt from 12/27 and fax to YUDI Mccarthy in [facility].  If this is not sufficient, pt will need an Appt in [facility] or   new Sleep study performed. He uses his CPAP nightly x 6-8 hours, does well with it and needs the broken CPAP machine replaced.  Thanks

## 2019-12-23 NOTE — TELEPHONE ENCOUNTER
Spoke with DME, they will need an office note as documentation. They can't except the phone encounter.

## 2019-12-24 NOTE — TELEPHONE ENCOUNTER
Delfino, please let pt know that [employer] is refusing coverage of a new CPAP machine unless pt is seen in clinic.  I can see him 12/27 at 11:30AM for just this issue or I can refer him to [facility] Clinic. Please let me know if I need to place a referral.  Thanks

## 2020-01-02 ENCOUNTER — TELEPHONE (OUTPATIENT)
Dept: INTERNAL MEDICINE | Facility: CLINIC | Age: 75
End: 2020-01-02

## 2020-01-02 NOTE — TELEPHONE ENCOUNTER
Pt returning call, needs to be seen before Feb. He is renting a CPAP machine and has to turn it in soon, please advise

## 2020-01-02 NOTE — TELEPHONE ENCOUNTER
----- Message from Nancy Hernandez sent at 1/2/2020  1:45 PM CST -----  Contact: self/[phone]  Pt called in regards to a missed call from the office.       Please advise

## 2020-01-08 ENCOUNTER — PATIENT OUTREACH (OUTPATIENT)
Dept: ADMINISTRATIVE | Facility: HOSPITAL | Age: 75
End: 2020-01-08

## 2020-01-23 ENCOUNTER — OFFICE VISIT (OUTPATIENT)
Dept: INTERNAL MEDICINE | Facility: CLINIC | Age: 75
End: 2020-01-23
Payer: MEDICARE

## 2020-01-23 VITALS
OXYGEN SATURATION: 97 % | WEIGHT: 177.25 LBS | BODY MASS INDEX: 27.82 KG/M2 | SYSTOLIC BLOOD PRESSURE: 110 MMHG | HEIGHT: 67 IN | HEART RATE: 58 BPM | DIASTOLIC BLOOD PRESSURE: 70 MMHG

## 2020-01-23 DIAGNOSIS — E11.9 DIABETES MELLITUS WITHOUT COMPLICATION: ICD-10-CM

## 2020-01-23 DIAGNOSIS — Z12.5 PROSTATE CANCER SCREENING: ICD-10-CM

## 2020-01-23 DIAGNOSIS — I10 ESSENTIAL HYPERTENSION: ICD-10-CM

## 2020-01-23 DIAGNOSIS — G47.33 OSA (OBSTRUCTIVE SLEEP APNEA): Primary | ICD-10-CM

## 2020-01-23 DIAGNOSIS — I25.10 CORONARY ARTERY DISEASE, ANGINA PRESENCE UNSPECIFIED, UNSPECIFIED VESSEL OR LESION TYPE, UNSPECIFIED WHETHER NATIVE OR TRANSPLANTED HEART: ICD-10-CM

## 2020-01-23 PROCEDURE — 3074F PR MOST RECENT SYSTOLIC BLOOD PRESSURE < 130 MM HG: ICD-10-PCS | Mod: HCNC,CPTII,S$GLB, | Performed by: INTERNAL MEDICINE

## 2020-01-23 PROCEDURE — 1159F MED LIST DOCD IN RCRD: CPT | Mod: HCNC,S$GLB,, | Performed by: INTERNAL MEDICINE

## 2020-01-23 PROCEDURE — 99499 UNLISTED E&M SERVICE: CPT | Mod: HCNC,S$GLB,, | Performed by: INTERNAL MEDICINE

## 2020-01-23 PROCEDURE — 3044F HG A1C LEVEL LT 7.0%: CPT | Mod: HCNC,CPTII,S$GLB, | Performed by: INTERNAL MEDICINE

## 2020-01-23 PROCEDURE — 1159F PR MEDICATION LIST DOCUMENTED IN MEDICAL RECORD: ICD-10-PCS | Mod: HCNC,S$GLB,, | Performed by: INTERNAL MEDICINE

## 2020-01-23 PROCEDURE — 1101F PR PT FALLS ASSESS DOC 0-1 FALLS W/OUT INJ PAST YR: ICD-10-PCS | Mod: HCNC,CPTII,S$GLB, | Performed by: INTERNAL MEDICINE

## 2020-01-23 PROCEDURE — 3078F PR MOST RECENT DIASTOLIC BLOOD PRESSURE < 80 MM HG: ICD-10-PCS | Mod: HCNC,CPTII,S$GLB, | Performed by: INTERNAL MEDICINE

## 2020-01-23 PROCEDURE — 3044F PR MOST RECENT HEMOGLOBIN A1C LEVEL <7.0%: ICD-10-PCS | Mod: HCNC,CPTII,S$GLB, | Performed by: INTERNAL MEDICINE

## 2020-01-23 PROCEDURE — 1101F PT FALLS ASSESS-DOCD LE1/YR: CPT | Mod: HCNC,CPTII,S$GLB, | Performed by: INTERNAL MEDICINE

## 2020-01-23 PROCEDURE — 99999 PR PBB SHADOW E&M-EST. PATIENT-LVL IV: ICD-10-PCS | Mod: PBBFAC,HCNC,, | Performed by: INTERNAL MEDICINE

## 2020-01-23 PROCEDURE — 99213 PR OFFICE/OUTPT VISIT, EST, LEVL III, 20-29 MIN: ICD-10-PCS | Mod: HCNC,S$GLB,, | Performed by: INTERNAL MEDICINE

## 2020-01-23 PROCEDURE — 1126F AMNT PAIN NOTED NONE PRSNT: CPT | Mod: HCNC,S$GLB,, | Performed by: INTERNAL MEDICINE

## 2020-01-23 PROCEDURE — 3078F DIAST BP <80 MM HG: CPT | Mod: HCNC,CPTII,S$GLB, | Performed by: INTERNAL MEDICINE

## 2020-01-23 PROCEDURE — 99999 PR PBB SHADOW E&M-EST. PATIENT-LVL IV: CPT | Mod: PBBFAC,HCNC,, | Performed by: INTERNAL MEDICINE

## 2020-01-23 PROCEDURE — 1126F PR PAIN SEVERITY QUANTIFIED, NO PAIN PRESENT: ICD-10-PCS | Mod: HCNC,S$GLB,, | Performed by: INTERNAL MEDICINE

## 2020-01-23 PROCEDURE — 3074F SYST BP LT 130 MM HG: CPT | Mod: HCNC,CPTII,S$GLB, | Performed by: INTERNAL MEDICINE

## 2020-01-23 PROCEDURE — 99213 OFFICE O/P EST LOW 20 MIN: CPT | Mod: HCNC,S$GLB,, | Performed by: INTERNAL MEDICINE

## 2020-01-23 PROCEDURE — 99499 RISK ADDL DX/OHS AUDIT: ICD-10-PCS | Mod: HCNC,S$GLB,, | Performed by: INTERNAL MEDICINE

## 2020-01-23 NOTE — PROGRESS NOTES
Subjective:       Patient ID: Drake Barraza is a 74 y.o. male.    Chief Complaint:   Follow-up and Sleep Apnea    HPI: Mr Barraza today to f/u MICHELLE/CPAP necessity. Unfortunately, his CPAP machine broke 12/16 and he has had to rent a new one x last 1-2 months but needs insurance approval to replace his broken CPAP machine.    His Sleep study was done in 2007 and showed +MICHELLE with  recommended CPAP at 9cm.  He uses the full face mask at 9cm and does very well with such. He wears it 8 or more hours nightly and feels better in AM.      Past Medical, Surgical, Social History: Please see as stated in Epic chart which has been reviewed.    Current Outpatient Medications   Medication Sig Dispense Refill    aspirin (ECOTRIN) 81 MG EC tablet Take 1 tablet (81 mg total) by mouth 2 (two) times daily. (Patient taking differently: Take 81 mg by mouth once. ) 60 tablet 0    atorvastatin (LIPITOR) 40 MG tablet TAKE 1 TABLET EVERY DAY 90 tablet 3    CINNAMON BARK (CINNAMON ORAL) Take 1 capsule by mouth once daily.      levothyroxine (SYNTHROID) 125 MCG tablet TAKE 1 TABLET IN THE MORNING ON AN EMPTY STOMACH 90 tablet 1    metoprolol tartrate (LOPRESSOR) 25 MG tablet TAKE 1 TABLET TWICE DAILY 180 tablet 3    multivitamin (ONE DAILY MULTIVITAMIN) per tablet Take 1 tablet by mouth once daily.      nitroGLYCERIN (NITROSTAT) 0.4 MG SL tablet Place 1 tablet (0.4 mg total) under the tongue every 5 (five) minutes as needed for Chest pain. 100 tablet 2    omega-3 fatty acids-vitamin E (FISH OIL) 1,000 mg Cap Take 1 capsule by mouth Daily.        ramipril (ALTACE) 10 MG capsule TAKE 1 CAPSULE EVERY DAY 90 capsule 3    sildenafil (VIAGRA) 100 MG tablet Take 1 tablet (100 mg total) by mouth daily as needed for Erectile Dysfunction. 30 tablet 6    TURMERIC ROOT EXTRACT ORAL Take 1 capsule by mouth.      UBIDECARENONE (COENZYME Q10) 100 mg Tab Take 200 mg by mouth once daily.       vitamin D 185 MG Tab Take 185 mg by mouth once  daily.         No current facility-administered medications for this visit.        Review of Systems   Constitutional: Negative for activity change and unexpected weight change.   HENT: Negative for hearing loss, rhinorrhea and trouble swallowing.    Eyes: Negative for discharge and visual disturbance.   Respiratory: Negative for chest tightness and wheezing.    Cardiovascular: Negative for chest pain and palpitations.   Gastrointestinal: Negative for blood in stool, constipation, diarrhea and vomiting.   Endocrine: Negative for polydipsia and polyuria.   Genitourinary: Negative for difficulty urinating, hematuria and urgency.   Musculoskeletal: Negative for arthralgias, joint swelling and neck pain.   Neurological: Negative for weakness and headaches.   Psychiatric/Behavioral: Negative for confusion and dysphoric mood.       Objective:      Lab Results   Component Value Date    WBC 5.76 05/09/2019    HGB 15.1 05/09/2019    HCT 46.2 05/09/2019     05/09/2019    CHOL 128 11/07/2019    TRIG 97 11/07/2019    HDL 43 11/07/2019    ALT 30 11/07/2019    AST 30 11/07/2019     11/07/2019    K 4.9 11/07/2019     11/07/2019    CREATININE 1.2 11/07/2019    BUN 20 11/07/2019    CO2 29 11/07/2019    TSH 2.600 06/28/2019    PSA 0.77 05/09/2019    INR 1.1 07/10/2019    GLUF 104 04/03/2004    HGBA1C 6.4 (H) 05/09/2019     Physical Exam   Constitutional: He is oriented to person, place, and time. He appears well-developed and well-nourished.   HENT:   Mouth/Throat: No oropharyngeal exudate.   Neck: Normal range of motion. Neck supple. No JVD present. No thyromegaly present.   No masses     Cardiovascular: Normal rate and regular rhythm. Exam reveals no gallop.   No murmur heard.  Pulmonary/Chest: Effort normal and breath sounds normal. No respiratory distress. He has no wheezes. He exhibits no tenderness.   Abdominal: Soft. Bowel sounds are normal. He exhibits no distension and no mass. There is no tenderness.   No  "Organomegaly   Musculoskeletal: Normal range of motion. He exhibits no edema or tenderness.   Lymphadenopathy:     He has no cervical adenopathy.   Neurological: He is alert and oriented to person, place, and time. No cranial nerve deficit.   Skin: Skin is warm and dry.   Psychiatric: He has a normal mood and affect. Judgment normal.   Vitals reviewed.        Vital Signs  Pulse: (!) 58  SpO2: 97 %  BP: 110/70  Pain Score: 0-No pain  Height and Weight  Height: 5' 7" (170.2 cm)  Weight: 80.4 kg (177 lb 4 oz)  BSA (Calculated - sq m): 1.95 sq meters  BMI (Calculated): 27.8  Weight in (lb) to have BMI = 25: 159.3]    Assessment:       1. MICHELLE (obstructive sleep apnea)    2. Coronary artery disease, angina presence unspecified, unspecified vessel or lesion type, unspecified whether native or transplanted heart    3. Essential hypertension    4. Diabetes mellitus without complication    5. Prostate cancer screening        Plan:     Health Maintenance   Topic Date Due    Aspirin/Antiplatelet Therapy  11/14/2020    High Dose Statin  01/23/2021    TETANUS VACCINE  05/16/2024    Lipid Panel  11/07/2024    Colonoscopy  07/27/2026    Hepatitis C Screening  Completed    Pneumococcal Vaccine (65+ Low/Medium Risk)  Completed        Drake was seen today for follow-up and sleep apnea.    Diagnoses and all orders for this visit:    MICHELLE (obstructive sleep apnea) s/p Sleep Study 2007/well treated with CPAP   -     CPAP FOR HOME USE: Auto-Titration CPAP at 4-12 cm pressure/full face mask; Request Resmed machine if available    Coronary artery disease, angina presence unspecified, unspecified vessel or lesion type, unspecified whether native or transplanted heart  -     Comprehensive metabolic panel; Future  -     CBC auto differential; Future  -     Lipid panel; Future    Essential hypertension  -     TSH; Future    Diabetes mellitus without complication  -     Hemoglobin A1c; Future    Prostate cancer screening  -     PSA, " Screening; Future

## 2020-03-12 DIAGNOSIS — E03.4 HYPOTHYROIDISM DUE TO ACQUIRED ATROPHY OF THYROID: ICD-10-CM

## 2020-03-13 RX ORDER — RAMIPRIL 10 MG/1
CAPSULE ORAL
Qty: 90 CAPSULE | Refills: 3 | Status: SHIPPED | OUTPATIENT
Start: 2020-03-13 | End: 2021-05-20

## 2020-03-15 RX ORDER — LEVOTHYROXINE SODIUM 125 UG/1
TABLET ORAL
Qty: 90 TABLET | Refills: 0 | Status: SHIPPED | OUTPATIENT
Start: 2020-03-15 | End: 2020-08-31

## 2020-04-24 ENCOUNTER — PATIENT MESSAGE (OUTPATIENT)
Dept: ADMINISTRATIVE | Facility: OTHER | Age: 75
End: 2020-04-24

## 2020-04-30 ENCOUNTER — LAB VISIT (OUTPATIENT)
Dept: LAB | Facility: HOSPITAL | Age: 75
End: 2020-04-30
Attending: INTERNAL MEDICINE
Payer: MEDICARE

## 2020-04-30 DIAGNOSIS — I10 ESSENTIAL HYPERTENSION: ICD-10-CM

## 2020-04-30 DIAGNOSIS — Z12.5 PROSTATE CANCER SCREENING: ICD-10-CM

## 2020-04-30 DIAGNOSIS — I25.10 CORONARY ARTERY DISEASE, ANGINA PRESENCE UNSPECIFIED, UNSPECIFIED VESSEL OR LESION TYPE, UNSPECIFIED WHETHER NATIVE OR TRANSPLANTED HEART: ICD-10-CM

## 2020-04-30 DIAGNOSIS — E11.9 DIABETES MELLITUS WITHOUT COMPLICATION: ICD-10-CM

## 2020-04-30 LAB
ALBUMIN SERPL BCP-MCNC: 4.4 G/DL (ref 3.5–5.2)
ALP SERPL-CCNC: 91 U/L (ref 55–135)
ALT SERPL W/O P-5'-P-CCNC: 55 U/L (ref 10–44)
ANION GAP SERPL CALC-SCNC: 7 MMOL/L (ref 8–16)
AST SERPL-CCNC: 45 U/L (ref 10–40)
BASOPHILS # BLD AUTO: 0.06 K/UL (ref 0–0.2)
BASOPHILS NFR BLD: 0.9 % (ref 0–1.9)
BILIRUB SERPL-MCNC: 0.7 MG/DL (ref 0.1–1)
BUN SERPL-MCNC: 17 MG/DL (ref 8–23)
CALCIUM SERPL-MCNC: 10.3 MG/DL (ref 8.7–10.5)
CHLORIDE SERPL-SCNC: 105 MMOL/L (ref 95–110)
CHOLEST SERPL-MCNC: 134 MG/DL (ref 120–199)
CHOLEST/HDLC SERPL: 3.5 {RATIO} (ref 2–5)
CO2 SERPL-SCNC: 28 MMOL/L (ref 23–29)
COMPLEXED PSA SERPL-MCNC: 0.71 NG/ML (ref 0–4)
CREAT SERPL-MCNC: 1.2 MG/DL (ref 0.5–1.4)
DIFFERENTIAL METHOD: ABNORMAL
EOSINOPHIL # BLD AUTO: 0.2 K/UL (ref 0–0.5)
EOSINOPHIL NFR BLD: 3.3 % (ref 0–8)
ERYTHROCYTE [DISTWIDTH] IN BLOOD BY AUTOMATED COUNT: 13.2 % (ref 11.5–14.5)
EST. GFR  (AFRICAN AMERICAN): >60 ML/MIN/1.73 M^2
EST. GFR  (NON AFRICAN AMERICAN): 58.8 ML/MIN/1.73 M^2
ESTIMATED AVG GLUCOSE: 146 MG/DL (ref 68–131)
GLUCOSE SERPL-MCNC: 150 MG/DL (ref 70–110)
HBA1C MFR BLD HPLC: 6.7 % (ref 4–5.6)
HCT VFR BLD AUTO: 51.4 % (ref 40–54)
HDLC SERPL-MCNC: 38 MG/DL (ref 40–75)
HDLC SERPL: 28.4 % (ref 20–50)
HGB BLD-MCNC: 16.1 G/DL (ref 14–18)
IMM GRANULOCYTES # BLD AUTO: 0.02 K/UL (ref 0–0.04)
IMM GRANULOCYTES NFR BLD AUTO: 0.3 % (ref 0–0.5)
LDLC SERPL CALC-MCNC: 61 MG/DL (ref 63–159)
LYMPHOCYTES # BLD AUTO: 3.2 K/UL (ref 1–4.8)
LYMPHOCYTES NFR BLD: 45.6 % (ref 18–48)
MCH RBC QN AUTO: 31 PG (ref 27–31)
MCHC RBC AUTO-ENTMCNC: 31.3 G/DL (ref 32–36)
MCV RBC AUTO: 99 FL (ref 82–98)
MONOCYTES # BLD AUTO: 1 K/UL (ref 0.3–1)
MONOCYTES NFR BLD: 14.3 % (ref 4–15)
NEUTROPHILS # BLD AUTO: 2.5 K/UL (ref 1.8–7.7)
NEUTROPHILS NFR BLD: 35.6 % (ref 38–73)
NONHDLC SERPL-MCNC: 96 MG/DL
NRBC BLD-RTO: 0 /100 WBC
PLATELET # BLD AUTO: 211 K/UL (ref 150–350)
PMV BLD AUTO: 10.2 FL (ref 9.2–12.9)
POTASSIUM SERPL-SCNC: 5.2 MMOL/L (ref 3.5–5.1)
PROT SERPL-MCNC: 7.6 G/DL (ref 6–8.4)
RBC # BLD AUTO: 5.19 M/UL (ref 4.6–6.2)
SODIUM SERPL-SCNC: 140 MMOL/L (ref 136–145)
T4 FREE SERPL-MCNC: 1.11 NG/DL (ref 0.71–1.51)
TRIGL SERPL-MCNC: 175 MG/DL (ref 30–150)
TSH SERPL DL<=0.005 MIU/L-ACNC: 4.63 UIU/ML (ref 0.4–4)
WBC # BLD AUTO: 6.99 K/UL (ref 3.9–12.7)

## 2020-04-30 PROCEDURE — 85025 COMPLETE CBC W/AUTO DIFF WBC: CPT | Mod: HCNC

## 2020-04-30 PROCEDURE — 80061 LIPID PANEL: CPT | Mod: HCNC

## 2020-04-30 PROCEDURE — 36415 COLL VENOUS BLD VENIPUNCTURE: CPT | Mod: HCNC

## 2020-04-30 PROCEDURE — 84153 ASSAY OF PSA TOTAL: CPT | Mod: HCNC

## 2020-04-30 PROCEDURE — 80053 COMPREHEN METABOLIC PANEL: CPT | Mod: HCNC

## 2020-04-30 PROCEDURE — 84439 ASSAY OF FREE THYROXINE: CPT | Mod: HCNC

## 2020-04-30 PROCEDURE — 83036 HEMOGLOBIN GLYCOSYLATED A1C: CPT | Mod: HCNC

## 2020-04-30 PROCEDURE — 84443 ASSAY THYROID STIM HORMONE: CPT | Mod: HCNC

## 2020-05-07 ENCOUNTER — LAB VISIT (OUTPATIENT)
Dept: LAB | Facility: HOSPITAL | Age: 75
End: 2020-05-07
Attending: INTERNAL MEDICINE
Payer: MEDICARE

## 2020-05-07 ENCOUNTER — OFFICE VISIT (OUTPATIENT)
Dept: INTERNAL MEDICINE | Facility: CLINIC | Age: 75
End: 2020-05-07
Payer: MEDICARE

## 2020-05-07 VITALS
WEIGHT: 179.25 LBS | HEART RATE: 61 BPM | DIASTOLIC BLOOD PRESSURE: 72 MMHG | HEIGHT: 67 IN | OXYGEN SATURATION: 97 % | SYSTOLIC BLOOD PRESSURE: 134 MMHG | BODY MASS INDEX: 28.13 KG/M2

## 2020-05-07 DIAGNOSIS — E87.5 HYPERKALEMIA: ICD-10-CM

## 2020-05-07 DIAGNOSIS — R74.8 ELEVATED LIVER ENZYMES: ICD-10-CM

## 2020-05-07 DIAGNOSIS — I10 ESSENTIAL HYPERTENSION: Primary | ICD-10-CM

## 2020-05-07 DIAGNOSIS — G47.30 SLEEP APNEA, UNSPECIFIED TYPE: ICD-10-CM

## 2020-05-07 DIAGNOSIS — Z00.00 ANNUAL PHYSICAL EXAM: ICD-10-CM

## 2020-05-07 DIAGNOSIS — I25.10 CORONARY ARTERY DISEASE INVOLVING NATIVE CORONARY ARTERY OF NATIVE HEART WITHOUT ANGINA PECTORIS: ICD-10-CM

## 2020-05-07 LAB
ALBUMIN SERPL BCP-MCNC: 4.5 G/DL (ref 3.5–5.2)
ALP SERPL-CCNC: 97 U/L (ref 55–135)
ALT SERPL W/O P-5'-P-CCNC: 56 U/L (ref 10–44)
AST SERPL-CCNC: 45 U/L (ref 10–40)
BILIRUB DIRECT SERPL-MCNC: 0.2 MG/DL (ref 0.1–0.3)
BILIRUB SERPL-MCNC: 0.5 MG/DL (ref 0.1–1)
POTASSIUM SERPL-SCNC: 4.5 MMOL/L (ref 3.5–5.1)
PROT SERPL-MCNC: 8 G/DL (ref 6–8.4)

## 2020-05-07 PROCEDURE — 99499 UNLISTED E&M SERVICE: CPT | Mod: HCNC,S$GLB,, | Performed by: INTERNAL MEDICINE

## 2020-05-07 PROCEDURE — 3075F PR MOST RECENT SYSTOLIC BLOOD PRESS GE 130-139MM HG: ICD-10-PCS | Mod: HCNC,CPTII,S$GLB, | Performed by: INTERNAL MEDICINE

## 2020-05-07 PROCEDURE — 3075F SYST BP GE 130 - 139MM HG: CPT | Mod: HCNC,CPTII,S$GLB, | Performed by: INTERNAL MEDICINE

## 2020-05-07 PROCEDURE — 84132 ASSAY OF SERUM POTASSIUM: CPT | Mod: HCNC

## 2020-05-07 PROCEDURE — 3078F DIAST BP <80 MM HG: CPT | Mod: HCNC,CPTII,S$GLB, | Performed by: INTERNAL MEDICINE

## 2020-05-07 PROCEDURE — 99397 PER PM REEVAL EST PAT 65+ YR: CPT | Mod: HCNC,S$GLB,, | Performed by: INTERNAL MEDICINE

## 2020-05-07 PROCEDURE — 80076 HEPATIC FUNCTION PANEL: CPT | Mod: HCNC

## 2020-05-07 PROCEDURE — 99397 PR PREVENTIVE VISIT,EST,65 & OVER: ICD-10-PCS | Mod: HCNC,S$GLB,, | Performed by: INTERNAL MEDICINE

## 2020-05-07 PROCEDURE — 36415 COLL VENOUS BLD VENIPUNCTURE: CPT | Mod: HCNC

## 2020-05-07 PROCEDURE — 99499 RISK ADDL DX/OHS AUDIT: ICD-10-PCS | Mod: HCNC,S$GLB,, | Performed by: INTERNAL MEDICINE

## 2020-05-07 PROCEDURE — 99999 PR PBB SHADOW E&M-EST. PATIENT-LVL V: ICD-10-PCS | Mod: PBBFAC,HCNC,, | Performed by: INTERNAL MEDICINE

## 2020-05-07 PROCEDURE — 99999 PR PBB SHADOW E&M-EST. PATIENT-LVL V: CPT | Mod: PBBFAC,HCNC,, | Performed by: INTERNAL MEDICINE

## 2020-05-07 PROCEDURE — 3078F PR MOST RECENT DIASTOLIC BLOOD PRESSURE < 80 MM HG: ICD-10-PCS | Mod: HCNC,CPTII,S$GLB, | Performed by: INTERNAL MEDICINE

## 2020-05-07 NOTE — PROGRESS NOTES
Subjective:       Patient ID: Drake Barraza is a 75 y.o. male.    Chief Complaint:   Annual Exam    HPI: Mr Barraza presents today for annual f/u HTN/CAD/DM.  He has been doing well with the COVID quarantine-no COVID sx or exposures.  He is doing well except worsening left knee pain/2ndary to OA;  he will be seeing [facility]/Orthopedics soon      Past Medical, Surgical, Social History: Please see as stated in Epic chart which has been reviewed.    Current Outpatient Medications   Medication Sig Dispense Refill    atorvastatin (LIPITOR) 40 MG tablet TAKE 1 TABLET EVERY DAY 90 tablet 3    CINNAMON BARK (CINNAMON ORAL) Take 1 capsule by mouth once daily.      levothyroxine (SYNTHROID) 125 MCG tablet TAKE 1 TABLET IN THE MORNING ON AN EMPTY STOMACH 90 tablet 0    metoprolol tartrate (LOPRESSOR) 25 MG tablet TAKE 1 TABLET TWICE DAILY 180 tablet 3    multivitamin (ONE DAILY MULTIVITAMIN) per tablet Take 1 tablet by mouth once daily.      nitroGLYCERIN (NITROSTAT) 0.4 MG SL tablet Place 1 tablet (0.4 mg total) under the tongue every 5 (five) minutes as needed for Chest pain. 100 tablet 2    omega-3 fatty acids-vitamin E (FISH OIL) 1,000 mg Cap Take 1 capsule by mouth Daily.        ramipriL (ALTACE) 10 MG capsule TAKE 1 CAPSULE EVERY DAY 90 capsule 3    sildenafil (VIAGRA) 100 MG tablet Take 1 tablet (100 mg total) by mouth daily as needed for Erectile Dysfunction. 30 tablet 6    TURMERIC ROOT EXTRACT ORAL Take 1 capsule by mouth.      UBIDECARENONE (COENZYME Q10) 100 mg Tab Take 200 mg by mouth once daily.       vitamin D 185 MG Tab Take 185 mg by mouth once daily.        aspirin (ECOTRIN) 81 MG EC tablet Take 1 tablet (81 mg total) by mouth 2 (two) times daily. (Patient taking differently: Take 81 mg by mouth once. ) 60 tablet 0     No current facility-administered medications for this visit.        Review of Systems   Constitutional: Negative for activity change and unexpected weight change.   HENT:  Negative for hearing loss, rhinorrhea and trouble swallowing.    Eyes: Negative for discharge and visual disturbance.   Respiratory: Positive for apnea. Negative for chest tightness and wheezing.         Pt doing well wearing CPAP   Cardiovascular: Negative for chest pain and palpitations.   Gastrointestinal: Negative for blood in stool, constipation, diarrhea and vomiting.   Endocrine: Negative for polydipsia and polyuria.   Genitourinary: Negative for difficulty urinating, hematuria and urgency.        + Nocturia x 1   Musculoskeletal: Negative for arthralgias, joint swelling and neck pain.   Neurological: Negative for weakness and headaches.   Psychiatric/Behavioral: Negative for confusion and dysphoric mood.       Objective:      Lab Results   Component Value Date    WBC 6.99 04/30/2020    HGB 16.1 04/30/2020    HCT 51.4 04/30/2020     04/30/2020    CHOL 134 04/30/2020    TRIG 175 (H) 04/30/2020    HDL 38 (L) 04/30/2020    ALT 55 (H) 04/30/2020    AST 45 (H) 04/30/2020     04/30/2020    K 5.2 (H) 04/30/2020     04/30/2020    CREATININE 1.2 04/30/2020    BUN 17 04/30/2020    CO2 28 04/30/2020    TSH 4.626 (H) 04/30/2020    PSA 0.71 04/30/2020    INR 1.1 07/10/2019    GLUF 104 04/03/2004    HGBA1C 6.7 (H) 04/30/2020     Physical Exam   Constitutional: He is oriented to person, place, and time. He appears well-developed and well-nourished.   HENT:   Head: Normocephalic and atraumatic.   Mouth/Throat: No oropharyngeal exudate.   Eyes: Conjunctivae and EOM are normal.   Neck: Normal range of motion. Neck supple. No JVD present. No thyromegaly present.   No masses     Cardiovascular: Normal rate and regular rhythm. Exam reveals no gallop.   No murmur heard.  Pulmonary/Chest: Effort normal and breath sounds normal. No respiratory distress. He has no wheezes. He exhibits no tenderness.   Abdominal: Soft. Bowel sounds are normal. He exhibits no distension and no mass. There is no tenderness.   No  Organomegaly   Musculoskeletal: Normal range of motion. He exhibits deformity. He exhibits no edema or tenderness.   +Mild OA changes/Left knee pain   Lymphadenopathy:     He has no cervical adenopathy.   Neurological: He is alert and oriented to person, place, and time. No cranial nerve deficit.   Skin: Skin is warm and dry.   Psychiatric: He has a normal mood and affect. Judgment normal.   Vitals reviewed.            Assessment:       1. Essential hypertension    2. Coronary artery disease involving native coronary artery of native heart without angina pectoris    3. Sleep apnea, unspecified type    4. Elevated liver enzymes    5. Hyperkalemia    6. Annual physical exam        Plan:     Health Maintenance   Topic Date Due    Aspirin/Antiplatelet Therapy  03/14/1963    High Dose Statin  05/07/2021    TETANUS VACCINE  05/16/2024    Lipid Panel  04/30/2025    Colonoscopy  07/27/2026    Hepatitis C Screening  Completed    Pneumococcal Vaccine (65+ Low/Medium Risk)  Completed        Drake was seen today for annual exam      Diagnoses and all orders for this visit:    Essential hypertension/controlled       -    Continue Altace 10mg QD and Lopressor 25mg BID    Coronary artery disease involving native coronary artery of native heart without angina pectoris       -     Continue Statin Tx/Lipitor 40mg QD and ASA QD    Sleep apnea, unspecified type/doing well       -      Continue CPAP at 9cm/Face mask    Elevated liver enzymes/? 2ndary lab error vs one of supplements  -     Hepatic function panel; Future/today    Hyperkalemia/R/O lab error  -     Potassium; Future    DMII/controlled        -    Continue with ADA diet    Annual physical exam  -     CV AAA Screening; Future if covered  -     Recommend Shingles vaccination after COVID pandemic not acute

## 2020-05-11 ENCOUNTER — TELEPHONE (OUTPATIENT)
Dept: INTERNAL MEDICINE | Facility: CLINIC | Age: 75
End: 2020-05-11

## 2020-05-11 DIAGNOSIS — R74.8 ABNORMAL LIVER ENZYMES: Primary | ICD-10-CM

## 2020-05-11 NOTE — TELEPHONE ENCOUNTER
Spoke with Mr Barraza ie his Lab(5/7)-showed K+ back to normal but LFTs still mildly high .  I have recommended he D/D his Tumeric and Cinnamon  Bark OTC and will do a recheck 6/17.  Delfino, please schedule pt a lab appt 6/17 at 8AM at [facility] and mail to him.  Thanks

## 2020-05-29 ENCOUNTER — TELEPHONE (OUTPATIENT)
Dept: ORTHOPEDICS | Facility: CLINIC | Age: 75
End: 2020-05-29

## 2020-05-29 NOTE — TELEPHONE ENCOUNTER
----- Message from Karoline Russo sent at 5/29/2020  3:06 PM CDT -----  Contact: pt   Pt would like to receive a call back regarding when he should schedule his next appt. Please contact the pt to advise.    Contact info [phone] or    we spoke I booked his 1 yr p/o visit for June 16 9:40 am H

## 2020-06-15 ENCOUNTER — PATIENT OUTREACH (OUTPATIENT)
Dept: ADMINISTRATIVE | Facility: OTHER | Age: 75
End: 2020-06-15

## 2020-06-15 DIAGNOSIS — Z96.659 TOTAL KNEE REPLACEMENT STATUS, UNSPECIFIED LATERALITY: Primary | ICD-10-CM

## 2020-06-16 ENCOUNTER — OFFICE VISIT (OUTPATIENT)
Dept: ORTHOPEDICS | Facility: CLINIC | Age: 75
End: 2020-06-16
Payer: MEDICARE

## 2020-06-16 ENCOUNTER — HOSPITAL ENCOUNTER (OUTPATIENT)
Dept: RADIOLOGY | Facility: HOSPITAL | Age: 75
Discharge: HOME | End: 2020-06-16
Attending: ORTHOPAEDIC SURGERY
Payer: MEDICARE

## 2020-06-16 VITALS — WEIGHT: 159.38 LBS | HEIGHT: 67 IN | BODY MASS INDEX: 25.01 KG/M2

## 2020-06-16 DIAGNOSIS — Z96.659 TOTAL KNEE REPLACEMENT STATUS, UNSPECIFIED LATERALITY: ICD-10-CM

## 2020-06-16 DIAGNOSIS — M17.12 PRIMARY OSTEOARTHRITIS OF LEFT KNEE: Primary | ICD-10-CM

## 2020-06-16 PROCEDURE — 1101F PT FALLS ASSESS-DOCD LE1/YR: CPT | Mod: HCNC,CPTII,S$GLB, | Performed by: ORTHOPAEDIC SURGERY

## 2020-06-16 PROCEDURE — 99999 PR PBB SHADOW E&M-EST. PATIENT-LVL III: CPT | Mod: PBBFAC,HCNC,, | Performed by: ORTHOPAEDIC SURGERY

## 2020-06-16 PROCEDURE — 73562 XR KNEE ORTHO RIGHT: ICD-10-PCS | Mod: 26,HCNC,59,RT | Performed by: RADIOLOGY

## 2020-06-16 PROCEDURE — 99999 PR PBB SHADOW E&M-EST. PATIENT-LVL III: ICD-10-PCS | Mod: PBBFAC,HCNC,, | Performed by: ORTHOPAEDIC SURGERY

## 2020-06-16 PROCEDURE — 73562 X-RAY EXAM OF KNEE 3: CPT | Mod: TC,HCNC,RT

## 2020-06-16 PROCEDURE — 99214 PR OFFICE/OUTPT VISIT, EST, LEVL IV, 30-39 MIN: ICD-10-PCS | Mod: HCNC,S$GLB,, | Performed by: ORTHOPAEDIC SURGERY

## 2020-06-16 PROCEDURE — 1126F AMNT PAIN NOTED NONE PRSNT: CPT | Mod: HCNC,S$GLB,, | Performed by: ORTHOPAEDIC SURGERY

## 2020-06-16 PROCEDURE — 1126F PR PAIN SEVERITY QUANTIFIED, NO PAIN PRESENT: ICD-10-PCS | Mod: HCNC,S$GLB,, | Performed by: ORTHOPAEDIC SURGERY

## 2020-06-16 PROCEDURE — 1159F MED LIST DOCD IN RCRD: CPT | Mod: HCNC,S$GLB,, | Performed by: ORTHOPAEDIC SURGERY

## 2020-06-16 PROCEDURE — 73562 X-RAY EXAM OF KNEE 3: CPT | Mod: 26,HCNC,59,RT | Performed by: RADIOLOGY

## 2020-06-16 PROCEDURE — 73560 XR KNEE ORTHO RIGHT: ICD-10-PCS | Mod: 26,HCNC,LT, | Performed by: RADIOLOGY

## 2020-06-16 PROCEDURE — 1159F PR MEDICATION LIST DOCUMENTED IN MEDICAL RECORD: ICD-10-PCS | Mod: HCNC,S$GLB,, | Performed by: ORTHOPAEDIC SURGERY

## 2020-06-16 PROCEDURE — 99214 OFFICE O/P EST MOD 30 MIN: CPT | Mod: HCNC,S$GLB,, | Performed by: ORTHOPAEDIC SURGERY

## 2020-06-16 PROCEDURE — 73560 X-RAY EXAM OF KNEE 1 OR 2: CPT | Mod: 26,HCNC,LT, | Performed by: RADIOLOGY

## 2020-06-16 PROCEDURE — 1101F PR PT FALLS ASSESS DOC 0-1 FALLS W/OUT INJ PAST YR: ICD-10-PCS | Mod: HCNC,CPTII,S$GLB, | Performed by: ORTHOPAEDIC SURGERY

## 2020-06-16 PROCEDURE — 73560 X-RAY EXAM OF KNEE 1 OR 2: CPT | Mod: TC,HCNC,LT

## 2020-06-16 NOTE — PROGRESS NOTES
HPI:    Drake Barraza is a 75 y.o. male who is here today for   Chief Complaint   Patient presents with    Post-op Evaluation     1 yr p/o R tk 7/22/19    .  Patient doing well with right total knee.  Now would like to be considered for a left total knee.    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      Inferior NSTEMI     MICHELLE on CPAP     Home CPAP at 9cm/Face Mask    Thyroid disease           Current Outpatient Medications:     atorvastatin (LIPITOR) 40 MG tablet, TAKE 1 TABLET EVERY DAY, Disp: 90 tablet, Rfl: 3    levothyroxine (SYNTHROID) 125 MCG tablet, TAKE 1 TABLET IN THE MORNING ON AN EMPTY STOMACH, Disp: 90 tablet, Rfl: 0    metoprolol tartrate (LOPRESSOR) 25 MG tablet, TAKE 1 TABLET TWICE DAILY, Disp: 180 tablet, Rfl: 3    multivitamin (ONE DAILY MULTIVITAMIN) per tablet, Take 1 tablet by mouth once daily., Disp: , Rfl:     nitroGLYCERIN (NITROSTAT) 0.4 MG SL tablet, Place 1 tablet (0.4 mg total) under the tongue every 5 (five) minutes as needed for Chest pain., Disp: 100 tablet, Rfl: 2    omega-3 fatty acids-vitamin E (FISH OIL) 1,000 mg Cap, Take 1 capsule by mouth Daily.  , Disp: , Rfl:     ramipriL (ALTACE) 10 MG capsule, TAKE 1 CAPSULE EVERY DAY, Disp: 90 capsule, Rfl: 3    sildenafil (VIAGRA) 100 MG tablet, Take 1 tablet (100 mg total) by mouth daily as needed for Erectile Dysfunction., Disp: 30 tablet, Rfl: 6    UBIDECARENONE (COENZYME Q10) 100 mg Tab, Take 200 mg by mouth once daily. , Disp: , Rfl:     vitamin D 185 MG Tab, Take 185 mg by mouth once daily.  , Disp: , Rfl:  "    aspirin (ECOTRIN) 81 MG EC tablet, Take 1 tablet (81 mg total) by mouth 2 (two) times daily. (Patient taking differently: Take 81 mg by mouth once. ), Disp: 60 tablet, Rfl: 0    CINNAMON BARK (CINNAMON ORAL), Take 1 capsule by mouth once daily., Disp: , Rfl:     TURMERIC ROOT EXTRACT ORAL, Take 1 capsule by mouth., Disp: , Rfl:      Review of patient's allergies indicates:   Allergen Reactions    Hydrocodone-acetaminophen Rash     Other reaction(s): constipated        ROS  Constitutional: Negative for fever, Negative for weight loss  HENT Negative for congestion  Cardiovascular: Negative chest pain  Respiratory: Negative Shortness of breath  Heme: Negative excessive bleeding  Skin:NegativeItching, Negative breakdown  Musculoskeletal:Negative for back pain, Positive for joint pain, Positive muscle pain, Negative muscle weakness  Neurological: Negative for numbness and paresthesias   Psychiatric/Behavioral: Negative altered mental status, Negative for depression    Physical Exam:   Ht 5' 7" (1.702 m)   Wt 72.3 kg (159 lb 6.3 oz)   BMI 24.96 kg/m²   General appearance: This is a well-developed, Well nourished male No obvious acute distress.  Psychiatric: normal mood and affect;  pleasant and conversant; judgment and thought content normal  Gait is coordinated. Patient has antalgic gait to the left  Cardiovascular: There are no swelling or varicosities present.   Respiratory: normal respiratory effort   Lymphatic: no adenopathy   Neurologic: alert and oriented to person, place, and time   Deep Tendon Reflexes are normal;  Coordination and Balance: Normal   Musculoskeletal   Neck    ROM shows normal flexion and extension and lateral rotation    Palpation: Non-tender    Stability is normal    Strength is normal    Skin is normal without masses and lesions    Sensation is intact to light touch   Back    ROM showsnormal flexion, extension    and  rotation    Palpation shows no masses    Stability is " normal    Strength to flexion and extension well maintained    Core strength is diminished    Skin shows no rashes or cafe au lait spots;     Sensation is intact to light touch    Right Knee  Swelling None  TendernessNone  Range of Motion: 125    Left Knee: Swelling Mild  TendernessMedial joint line  Range of Motion: 125    Radiograph   Osteoarthritis: severe  Angle: mild varus    Assessment:  Right total knee stable.  Left knee has marked osteoarthritis.    Plan:  Patient would like to have a left total knee.  He will see Dr. Poon and discussed his options.

## 2020-06-17 ENCOUNTER — LAB VISIT (OUTPATIENT)
Dept: LAB | Facility: HOSPITAL | Age: 75
End: 2020-06-17
Attending: INTERNAL MEDICINE
Payer: MEDICARE

## 2020-06-17 DIAGNOSIS — R74.8 ABNORMAL LIVER ENZYMES: ICD-10-CM

## 2020-06-17 LAB
ALBUMIN SERPL BCP-MCNC: 4.2 G/DL (ref 3.5–5.2)
ALP SERPL-CCNC: 87 U/L (ref 55–135)
ALT SERPL W/O P-5'-P-CCNC: 61 U/L (ref 10–44)
AST SERPL-CCNC: 49 U/L (ref 10–40)
BILIRUB DIRECT SERPL-MCNC: 0.4 MG/DL (ref 0.1–0.3)
BILIRUB SERPL-MCNC: 0.9 MG/DL (ref 0.1–1)
PROT SERPL-MCNC: 7.2 G/DL (ref 6–8.4)

## 2020-06-17 PROCEDURE — 36415 COLL VENOUS BLD VENIPUNCTURE: CPT | Mod: HCNC

## 2020-06-17 PROCEDURE — 80076 HEPATIC FUNCTION PANEL: CPT | Mod: HCNC

## 2020-06-24 RX ORDER — METOPROLOL TARTRATE 25 MG/1
25 TABLET, FILM COATED ORAL 2 TIMES DAILY
Qty: 180 TABLET | Refills: 3 | Status: SHIPPED | OUTPATIENT
Start: 2020-06-24 | End: 2021-08-19 | Stop reason: SDUPTHER

## 2020-07-14 ENCOUNTER — OFFICE VISIT (OUTPATIENT)
Dept: ORTHOPEDICS | Facility: CLINIC | Age: 75
End: 2020-07-14
Payer: MEDICARE

## 2020-07-14 VITALS — WEIGHT: 179.25 LBS | BODY MASS INDEX: 28.13 KG/M2 | HEIGHT: 67 IN

## 2020-07-14 DIAGNOSIS — M17.12 PRIMARY OSTEOARTHRITIS OF LEFT KNEE: Primary | ICD-10-CM

## 2020-07-14 PROCEDURE — 99999 PR PBB SHADOW E&M-EST. PATIENT-LVL III: CPT | Mod: PBBFAC,HCNC,, | Performed by: ORTHOPAEDIC SURGERY

## 2020-07-14 PROCEDURE — 1126F AMNT PAIN NOTED NONE PRSNT: CPT | Mod: HCNC,S$GLB,, | Performed by: ORTHOPAEDIC SURGERY

## 2020-07-14 PROCEDURE — 99213 OFFICE O/P EST LOW 20 MIN: CPT | Mod: HCNC,S$GLB,, | Performed by: ORTHOPAEDIC SURGERY

## 2020-07-14 PROCEDURE — 1126F PR PAIN SEVERITY QUANTIFIED, NO PAIN PRESENT: ICD-10-PCS | Mod: HCNC,S$GLB,, | Performed by: ORTHOPAEDIC SURGERY

## 2020-07-14 PROCEDURE — 99213 PR OFFICE/OUTPT VISIT, EST, LEVL III, 20-29 MIN: ICD-10-PCS | Mod: HCNC,S$GLB,, | Performed by: ORTHOPAEDIC SURGERY

## 2020-07-14 PROCEDURE — 1101F PT FALLS ASSESS-DOCD LE1/YR: CPT | Mod: HCNC,CPTII,S$GLB, | Performed by: ORTHOPAEDIC SURGERY

## 2020-07-14 PROCEDURE — 1101F PR PT FALLS ASSESS DOC 0-1 FALLS W/OUT INJ PAST YR: ICD-10-PCS | Mod: HCNC,CPTII,S$GLB, | Performed by: ORTHOPAEDIC SURGERY

## 2020-07-14 PROCEDURE — 1159F PR MEDICATION LIST DOCUMENTED IN MEDICAL RECORD: ICD-10-PCS | Mod: HCNC,S$GLB,, | Performed by: ORTHOPAEDIC SURGERY

## 2020-07-14 PROCEDURE — 99999 PR PBB SHADOW E&M-EST. PATIENT-LVL III: ICD-10-PCS | Mod: PBBFAC,HCNC,, | Performed by: ORTHOPAEDIC SURGERY

## 2020-07-14 PROCEDURE — 1159F MED LIST DOCD IN RCRD: CPT | Mod: HCNC,S$GLB,, | Performed by: ORTHOPAEDIC SURGERY

## 2020-07-14 NOTE — LETTER
July 17, 2020      [facility]  [address]  [facility] [address]           [facility]  [address]  [facility] [address]  Phone: [phone]          Patient: Drake Barraza   MR Number: [id number]   YOB: 1945   Date of Visit: 7/14/2020       Dear Dr. [facility].:    Thank you for referring Drake Barraza to me for evaluation. Attached you will find relevant portions of my assessment and plan of care.    If you have questions, please do not hesitate to call me. I look forward to following Drake Barraza along with you.    Sincerely,    Chris Israel MD    Enclosure  CC:  No Recipients    If you would like to receive this communication electronically, please contact [email or web address] or [phone] to request more information on [email or web address] Link access.    For providers and/or their staff who would like to refer a patient to [facility], please contact us through our one-stop-shop provider referral line, [facility], at [phone].    If you feel you have received this communication in error or would no longer like to receive these types of communications, please e-mail [email or web address]

## 2020-07-17 NOTE — PROGRESS NOTES
Subjective:      Patient ID: Drake Barraza is a 75 y.o. male.    Chief Complaint:   Consult of the Left Knee    HPI  He comes in today to discuss a left knee replacement he has been followed here for some time for chronic osteoarthritis pain in the knee.  He has daily pain with ADLs and night pain interfering with sleep.  He is no longer experiencing adequate relief from medications and injections.  He has a previous successful right knee replacement done by Dr. [facility], who has retired from surgery.      Objective:        Ortho/SPM Exam    Left knee:  There is is a mild varus deformity, which is partially passively correctable.  Active range of motion is 0-115 degrees.  Anterior crepitus with active extension.  Patellar mobility is limited.  Boggy synovitis without effusion.  Medial joint line tenderness predominates.  No instability to varus/valgus/Lachman's stressing.  No pain in the groin with flexion and internal rotation of the hip which is not limited.  Skin intact.  Distal neurovascular intact.  Flip test negative.        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  Assessment:     DJD, left knee    Plan:     Left TKA.  He thinks he will probably choose a date in October, and he will give us adequately time to get this scheduled for him.  We reviewed the risks and benefits of the surgery with the patient and/or family prior to surgery including but not limited to risk of infection, bleeding, injury to nerves and blood vessels, need for revision surgery, continued pain, blood clots, cardiopulmonary complications, death.  After discussing the risks and benefits of the procedure they would like to proceed with surgery.     The patient's pathophysiology was explained in detail with reference to x-rays, models, other visual aids as appropriate.  Treatment options were discussed in detail.   Questions were invited and answered to the patient's satisfaction.    Greater than 50% of this 15 minute visit was devoted to counseling and coordination of care      Chris Israel MD  Orthopedic Surgery

## 2020-08-14 ENCOUNTER — TELEPHONE (OUTPATIENT)
Dept: INTERNAL MEDICINE | Facility: CLINIC | Age: 75
End: 2020-08-14

## 2020-08-14 NOTE — TELEPHONE ENCOUNTER
Review of LFTs(6/17/20)showed AST=49(last at 45) and ALT=61(last at 56)-about the same as prior. 'Could be 2ndary to Lipitor 40mg but with hx CAD, hesitate to decrease dose.  'Will order Abdomen U/S and message Cardiology/Dr Mccoy.

## 2020-08-17 ENCOUNTER — PATIENT MESSAGE (OUTPATIENT)
Dept: INTERNAL MEDICINE | Facility: CLINIC | Age: 75
End: 2020-08-17

## 2020-08-17 ENCOUNTER — TELEPHONE (OUTPATIENT)
Dept: INTERNAL MEDICINE | Facility: CLINIC | Age: 75
End: 2020-08-17

## 2020-08-17 DIAGNOSIS — I25.10 CORONARY ARTERY DISEASE, ANGINA PRESENCE UNSPECIFIED, UNSPECIFIED VESSEL OR LESION TYPE, UNSPECIFIED WHETHER NATIVE OR TRANSPLANTED HEART: Primary | ICD-10-CM

## 2020-08-17 DIAGNOSIS — R74.8 ELEVATED LIVER ENZYMES: Primary | ICD-10-CM

## 2020-08-17 NOTE — TELEPHONE ENCOUNTER
Hi Dr Mccoy    Mr Barraza'd LFTs have been a little high x last few 3-4 months.  He is asymptomatic and is on lipitor 40mg for his HLD/CAD. I've ordered an abdomen U/S but hesitate to change lipitor without your input. What do you recommend?  Normally I would decrease it or change it to crestor  Thanks for any help, Catherine Middleton MD

## 2020-08-17 NOTE — TELEPHONE ENCOUNTER
Spoke with Mr Barraza ie his LFTs which have been mildly high since April.  Pt is asymptomatic and drinks minimal Etoh.  I believe it may be from his lipitor but given CAD, hesitate to change without discussing with Cardiology/Dr Mccoy.  'Will order Abdomen U/S and message Dr Mccoy.  Delfino, please call pt to schedule an Abdomen U/S.  Thanks

## 2020-08-18 ENCOUNTER — OFFICE VISIT (OUTPATIENT)
Dept: DERMATOLOGY | Facility: CLINIC | Age: 75
End: 2020-08-18
Payer: MEDICARE

## 2020-08-18 VITALS — WEIGHT: 179 LBS | BODY MASS INDEX: 28.04 KG/M2

## 2020-08-18 DIAGNOSIS — L81.4 LENTIGINES: ICD-10-CM

## 2020-08-18 DIAGNOSIS — L91.8 CUTANEOUS SKIN TAGS: ICD-10-CM

## 2020-08-18 DIAGNOSIS — L82.1 SEBORRHEIC KERATOSES: Primary | ICD-10-CM

## 2020-08-18 PROCEDURE — 99213 PR OFFICE/OUTPT VISIT, EST, LEVL III, 20-29 MIN: ICD-10-PCS | Mod: HCNC,S$GLB,, | Performed by: DERMATOLOGY

## 2020-08-18 PROCEDURE — 99213 OFFICE O/P EST LOW 20 MIN: CPT | Mod: HCNC,S$GLB,, | Performed by: DERMATOLOGY

## 2020-08-18 PROCEDURE — 1101F PR PT FALLS ASSESS DOC 0-1 FALLS W/OUT INJ PAST YR: ICD-10-PCS | Mod: HCNC,CPTII,S$GLB, | Performed by: DERMATOLOGY

## 2020-08-18 PROCEDURE — 1126F AMNT PAIN NOTED NONE PRSNT: CPT | Mod: HCNC,S$GLB,, | Performed by: DERMATOLOGY

## 2020-08-18 PROCEDURE — 1159F MED LIST DOCD IN RCRD: CPT | Mod: HCNC,S$GLB,, | Performed by: DERMATOLOGY

## 2020-08-18 PROCEDURE — 99999 PR PBB SHADOW E&M-EST. PATIENT-LVL III: CPT | Mod: PBBFAC,HCNC,, | Performed by: DERMATOLOGY

## 2020-08-18 PROCEDURE — 1101F PT FALLS ASSESS-DOCD LE1/YR: CPT | Mod: HCNC,CPTII,S$GLB, | Performed by: DERMATOLOGY

## 2020-08-18 PROCEDURE — 1126F PR PAIN SEVERITY QUANTIFIED, NO PAIN PRESENT: ICD-10-PCS | Mod: HCNC,S$GLB,, | Performed by: DERMATOLOGY

## 2020-08-18 PROCEDURE — 1159F PR MEDICATION LIST DOCUMENTED IN MEDICAL RECORD: ICD-10-PCS | Mod: HCNC,S$GLB,, | Performed by: DERMATOLOGY

## 2020-08-18 PROCEDURE — 99999 PR PBB SHADOW E&M-EST. PATIENT-LVL III: ICD-10-PCS | Mod: PBBFAC,HCNC,, | Performed by: DERMATOLOGY

## 2020-08-18 NOTE — PROGRESS NOTES
Subjective:       Patient ID:  Drake Barraza is a 75 y.o. male who presents for   Chief Complaint   Patient presents with    Skin Check     UBSE    Spot     Spots on face for months some times itch no tx.  Would like skin check.     Spot - Follow-up  Affected locations: left arm, right arm, face, chest, torso, abdomen and back  Signs / symptoms: asymptomatic        Review of Systems   Constitutional: Negative for fever, chills, weight loss, weight gain, fatigue, night sweats and malaise.   Skin: Positive for wears hat. Negative for daily sunscreen use and activity-related sunscreen use.   Hematologic/Lymphatic: Does not bruise/bleed easily.        Objective:    Physical Exam   Constitutional: He appears well-developed and well-nourished. No distress.   Neurological: He is alert and oriented to person, place, and time. He is not disoriented.   Psychiatric: He has a normal mood and affect.   Skin:   Areas Examined (abnormalities noted in diagram):   Scalp / Hair Palpated and Inspected  Head / Face Inspection Performed  Neck Inspection Performed  Chest / Axilla Inspection Performed  Abdomen Inspection Performed  Back Inspection Performed  RUE Inspected  LUE Inspection Performed                       Diagram Legend     Erythematous scaling macule/papule c/w actinic keratosis       Vascular papule c/w angioma      Pigmented verrucoid papule/plaque c/w seborrheic keratosis      Yellow umbilicated papule c/w sebaceous hyperplasia      Irregularly shaped tan macule c/w lentigo     1-2 mm smooth white papules consistent with Milia      Movable subcutaneous cyst with punctum c/w epidermal inclusion cyst      Subcutaneous movable cyst c/w pilar cyst      Firm pink to brown papule c/w dermatofibroma      Pedunculated fleshy papule(s) c/w skin tag(s)      Evenly pigmented macule c/w junctional nevus     Mildly variegated pigmented, slightly irregular-bordered macule c/w mildly atypical nevus      Flesh colored to evenly  "pigmented papule c/w intradermal nevus       Pink pearly papule/plaque c/w basal cell carcinoma      Erythematous hyperkeratotic cursted plaque c/w SCC      Surgical scar with no sign of skin cancer recurrence      Open and closed comedones      Inflammatory papules and pustules      Verrucoid papule consistent consistent with wart     Erythematous eczematous patches and plaques     Dystrophic onycholytic nail with subungual debris c/w onychomycosis     Umbilicated papule    Erythematous-base heme-crusted tan verrucoid plaque consistent with inflamed seborrheic keratosis     Erythematous Silvery Scaling Plaque c/w Psoriasis     See annotation      Assessment / Plan:        Seborrheic keratoses  reassurance  Brochure provided      Cutaneous skin tags  reassurance      Lentigines  The "ABCD" rules to observe pigmented lesions were reviewed.  sunscreen             Follow up in about 1 year (around 8/18/2021).  "

## 2020-08-24 ENCOUNTER — HOSPITAL ENCOUNTER (OUTPATIENT)
Dept: RADIOLOGY | Facility: HOSPITAL | Age: 75
Discharge: HOME | End: 2020-08-24
Attending: INTERNAL MEDICINE
Payer: MEDICARE

## 2020-08-24 ENCOUNTER — TELEPHONE (OUTPATIENT)
Dept: INTERNAL MEDICINE | Facility: CLINIC | Age: 75
End: 2020-08-24

## 2020-08-24 ENCOUNTER — TELEPHONE (OUTPATIENT)
Dept: ORTHOPEDICS | Facility: CLINIC | Age: 75
End: 2020-08-24

## 2020-08-24 DIAGNOSIS — R74.8 ELEVATED LIVER ENZYMES: ICD-10-CM

## 2020-08-24 PROCEDURE — 76700 US EXAM ABDOM COMPLETE: CPT | Mod: TC,HCNC

## 2020-08-24 PROCEDURE — 76700 US ABDOMEN COMPLETE: ICD-10-PCS | Mod: 26,HCNC,, | Performed by: RADIOLOGY

## 2020-08-24 PROCEDURE — 76700 US EXAM ABDOM COMPLETE: CPT | Mod: 26,HCNC,, | Performed by: RADIOLOGY

## 2020-08-24 NOTE — TELEPHONE ENCOUNTER
Spoke with Mr Barraza ie his Abdomen U/S(8/24)-showed fatty liver and +gallstone vs gallbladder polyp.  He will work on low fat diet; he should be able to exercise more after he's had his knee replaced. Should repeat LFTs by 6 months.  If he develops acute cholelithiasis sx, he will call/RTC or ER.

## 2020-08-24 NOTE — TELEPHONE ENCOUNTER
Spoke to patient and informed him Dr. Israel is not in the office today, I will speak with him tomorrow regarding his surgery and then call patient back. Patient was agreeable.  ----- Message from Malvin Braswell sent at 8/24/2020 10:31 AM CDT -----  Contact: self  Pt is asking for a call back in regards to scheduling his surgery       Contact info- [phone]

## 2020-08-25 DIAGNOSIS — Z01.818 PREOP TESTING: Primary | ICD-10-CM

## 2020-08-26 DIAGNOSIS — M17.12 PRIMARY OSTEOARTHRITIS OF LEFT KNEE: Primary | ICD-10-CM

## 2020-08-31 RX ORDER — ROSUVASTATIN CALCIUM 20 MG/1
20 TABLET, COATED ORAL DAILY
Qty: 90 TABLET | Refills: 1 | Status: SHIPPED | OUTPATIENT
Start: 2020-08-31 | End: 2021-02-09

## 2020-09-15 DIAGNOSIS — M79.605 PAIN OF LEFT LOWER EXTREMITY: ICD-10-CM

## 2020-09-15 DIAGNOSIS — E03.9 ACQUIRED HYPOTHYROIDISM: Primary | ICD-10-CM

## 2020-09-15 DIAGNOSIS — Z01.818 PRE-OP TESTING: ICD-10-CM

## 2020-09-15 NOTE — ANESTHESIA PAT ROS NOTE
09/15/2020  Drake Barraza is a 75 y.o., male.      Pre-op Assessment          Review of Systems         Anesthesia Assessment: Preoperative EQUATION    Planned Procedure: Procedure(s) (LRB):  ARTHROPLASTY, KNEE, TOTAL Jayy NexGen Cemented Tibia (Left)  Requested Anesthesia Type:Regional  Surgeon: Chris Israel MD  Service: Orthopedics  Known or anticipated Date of Surgery:10/5/2020    Surgeon notes: reviewed    Previous anesthesia records:REPLACEMENT-KNEE-TOTAL (Right Knee) 7/22/19; (h/o severe sore throat following hernia repair 2006)    [facility]  Subspecialty notes: Cardiology: General, Dermatology; Dr Mccoy cardiology 11/14/19    Other important co-morbidities: HLD, HTN, Hypothyroid, MICHELLE and NSTEMI 2003, CAD- RCA, LAD STENTS 2003; pt denies DM and not listed on problem list but is noted in Dr Read's note of 5/7/20; A1C 6.7 (4/30/20).     Tests already available:  EKG 11/14/19; DSE 7/10/19 (EF 60%, norm LV func)             Plan:    Testing:  BMP, Hematology Profile, PT/INR and TSH   Pre-anesthesia  visit       Visit focus: possible regional anesthesia and/or nerve block      Consultation:POC NP for anesthesia and clearance      Navigation: Tests to be scheduled.              Consults to be scheduled.             Results will be tracked by Preop Clinic.     Addendum 9/24/20: pt optimized for surgery by Ms Celeste NP; A1C 7.1 drawn 9/22/20-pt to f/u with PCP after surgery./Norma STRAUSS

## 2020-09-15 NOTE — PRE-PROCEDURE INSTRUCTIONS
Chart review; triage plan initiated.  Phone contact-medication reconciliation completed; instructed to hold vitamins, supplements and NSAIDs for one week prior to surgery. Informed that the remaining medication instructions will be provided at the POC visit and that the  from POC will call to schedule appointments. Pt verbalized understanding.

## 2020-09-16 ENCOUNTER — TELEPHONE (OUTPATIENT)
Dept: PREADMISSION TESTING | Facility: HOSPITAL | Age: 75
End: 2020-09-16

## 2020-09-16 NOTE — TELEPHONE ENCOUNTER
----- Message from Barbara Pringle, RN sent at 9/15/2020  3:39 PM CDT -----  ORTHO TOTAL KNEE  10/5  DR RAMIREZ    Please schedule LAB (BMP, CBC, PT/INR, TSH), POC NP for anesthesia and clearance.    Thanks,  Lauren

## 2020-09-16 NOTE — TELEPHONE ENCOUNTER
Spoke to pt [facility], and Np appt on 09/22 pt get inform from [facility] pt reguesting to have this appt on the same date as his ortho appt

## 2020-09-21 ENCOUNTER — PATIENT OUTREACH (OUTPATIENT)
Dept: ADMINISTRATIVE | Facility: OTHER | Age: 75
End: 2020-09-21

## 2020-09-21 PROBLEM — K76.0 FATTY LIVER: Status: ACTIVE | Noted: 2020-09-21

## 2020-09-22 ENCOUNTER — OFFICE VISIT (OUTPATIENT)
Dept: ORTHOPEDICS | Facility: CLINIC | Age: 75
End: 2020-09-22
Payer: MEDICARE

## 2020-09-22 ENCOUNTER — INITIAL CONSULT (OUTPATIENT)
Dept: INTERNAL MEDICINE | Facility: CLINIC | Age: 75
End: 2020-09-22
Payer: MEDICARE

## 2020-09-22 ENCOUNTER — LAB VISIT (OUTPATIENT)
Dept: LAB | Facility: HOSPITAL | Age: 75
End: 2020-09-22
Attending: ANESTHESIOLOGY
Payer: MEDICARE

## 2020-09-22 VITALS
HEART RATE: 56 BPM | HEIGHT: 67 IN | OXYGEN SATURATION: 97 % | DIASTOLIC BLOOD PRESSURE: 76 MMHG | TEMPERATURE: 98 F | BODY MASS INDEX: 27.94 KG/M2 | WEIGHT: 178 LBS | SYSTOLIC BLOOD PRESSURE: 132 MMHG

## 2020-09-22 VITALS — BODY MASS INDEX: 28.04 KG/M2 | WEIGHT: 178.63 LBS | HEIGHT: 67 IN

## 2020-09-22 DIAGNOSIS — E78.00 HYPERCHOLESTEREMIA: Chronic | ICD-10-CM

## 2020-09-22 DIAGNOSIS — E03.9 ACQUIRED HYPOTHYROIDISM: ICD-10-CM

## 2020-09-22 DIAGNOSIS — M79.605 PAIN OF LEFT LOWER EXTREMITY: ICD-10-CM

## 2020-09-22 DIAGNOSIS — I25.10 CORONARY ARTERY DISEASE INVOLVING NATIVE CORONARY ARTERY OF NATIVE HEART WITHOUT ANGINA PECTORIS: ICD-10-CM

## 2020-09-22 DIAGNOSIS — K76.0 FATTY LIVER: ICD-10-CM

## 2020-09-22 DIAGNOSIS — Z01.818 PRE-OP TESTING: ICD-10-CM

## 2020-09-22 DIAGNOSIS — E11.9 TYPE 2 DIABETES MELLITUS WITHOUT COMPLICATION, WITHOUT LONG-TERM CURRENT USE OF INSULIN: ICD-10-CM

## 2020-09-22 DIAGNOSIS — R73.9 HYPERGLYCEMIA: ICD-10-CM

## 2020-09-22 DIAGNOSIS — I10 ESSENTIAL HYPERTENSION: ICD-10-CM

## 2020-09-22 DIAGNOSIS — G47.30 SLEEP APNEA, UNSPECIFIED TYPE: Chronic | ICD-10-CM

## 2020-09-22 DIAGNOSIS — E03.4 HYPOTHYROIDISM DUE TO ACQUIRED ATROPHY OF THYROID: ICD-10-CM

## 2020-09-22 DIAGNOSIS — M17.12 LEFT KNEE DJD: ICD-10-CM

## 2020-09-22 DIAGNOSIS — M17.12 PRIMARY OSTEOARTHRITIS OF LEFT KNEE: Primary | ICD-10-CM

## 2020-09-22 LAB
ANION GAP SERPL CALC-SCNC: 8 MMOL/L (ref 8–16)
BUN SERPL-MCNC: 13 MG/DL (ref 8–23)
CALCIUM SERPL-MCNC: 10.1 MG/DL (ref 8.7–10.5)
CHLORIDE SERPL-SCNC: 103 MMOL/L (ref 95–110)
CO2 SERPL-SCNC: 29 MMOL/L (ref 23–29)
CREAT SERPL-MCNC: 1.1 MG/DL (ref 0.5–1.4)
ERYTHROCYTE [DISTWIDTH] IN BLOOD BY AUTOMATED COUNT: 12.9 % (ref 11.5–14.5)
EST. GFR  (AFRICAN AMERICAN): >60 ML/MIN/1.73 M^2
EST. GFR  (NON AFRICAN AMERICAN): >60 ML/MIN/1.73 M^2
ESTIMATED AVG GLUCOSE: 157 MG/DL (ref 68–131)
GLUCOSE SERPL-MCNC: 127 MG/DL (ref 70–110)
HBA1C MFR BLD HPLC: 7.1 % (ref 4–5.6)
HCT VFR BLD AUTO: 47.3 % (ref 40–54)
HGB BLD-MCNC: 15.7 G/DL (ref 14–18)
INR PPP: 0.9 (ref 0.8–1.2)
MCH RBC QN AUTO: 31.8 PG (ref 27–31)
MCHC RBC AUTO-ENTMCNC: 33.2 G/DL (ref 32–36)
MCV RBC AUTO: 96 FL (ref 82–98)
PLATELET # BLD AUTO: 188 K/UL (ref 150–350)
PMV BLD AUTO: 9.7 FL (ref 9.2–12.9)
POTASSIUM SERPL-SCNC: 4.8 MMOL/L (ref 3.5–5.1)
PROTHROMBIN TIME: 10.5 SEC (ref 9–12.5)
RBC # BLD AUTO: 4.93 M/UL (ref 4.6–6.2)
SODIUM SERPL-SCNC: 140 MMOL/L (ref 136–145)
TSH SERPL DL<=0.005 MIU/L-ACNC: 3.59 UIU/ML (ref 0.4–4)
WBC # BLD AUTO: 7.02 K/UL (ref 3.9–12.7)

## 2020-09-22 PROCEDURE — 85027 COMPLETE CBC AUTOMATED: CPT | Mod: HCNC

## 2020-09-22 PROCEDURE — 99999 PR PBB SHADOW E&M-EST. PATIENT-LVL III: ICD-10-PCS | Mod: PBBFAC,HCNC,, | Performed by: NURSE PRACTITIONER

## 2020-09-22 PROCEDURE — 99999 PR PBB SHADOW E&M-EST. PATIENT-LVL IV: ICD-10-PCS | Mod: PBBFAC,HCNC,, | Performed by: NURSE PRACTITIONER

## 2020-09-22 PROCEDURE — 3078F PR MOST RECENT DIASTOLIC BLOOD PRESSURE < 80 MM HG: ICD-10-PCS | Mod: HCNC,CPTII,S$GLB, | Performed by: NURSE PRACTITIONER

## 2020-09-22 PROCEDURE — 99499 UNLISTED E&M SERVICE: CPT | Mod: HCNC,S$GLB,, | Performed by: NURSE PRACTITIONER

## 2020-09-22 PROCEDURE — 1101F PT FALLS ASSESS-DOCD LE1/YR: CPT | Mod: HCNC,CPTII,S$GLB, | Performed by: NURSE PRACTITIONER

## 2020-09-22 PROCEDURE — 99499 NO LOS: ICD-10-PCS | Mod: HCNC,S$GLB,, | Performed by: NURSE PRACTITIONER

## 2020-09-22 PROCEDURE — 84443 ASSAY THYROID STIM HORMONE: CPT | Mod: HCNC

## 2020-09-22 PROCEDURE — 1159F PR MEDICATION LIST DOCUMENTED IN MEDICAL RECORD: ICD-10-PCS | Mod: HCNC,S$GLB,, | Performed by: NURSE PRACTITIONER

## 2020-09-22 PROCEDURE — 99999 PR PBB SHADOW E&M-EST. PATIENT-LVL IV: CPT | Mod: PBBFAC,HCNC,, | Performed by: NURSE PRACTITIONER

## 2020-09-22 PROCEDURE — 85610 PROTHROMBIN TIME: CPT | Mod: HCNC

## 2020-09-22 PROCEDURE — 83036 HEMOGLOBIN GLYCOSYLATED A1C: CPT | Mod: HCNC

## 2020-09-22 PROCEDURE — 3078F DIAST BP <80 MM HG: CPT | Mod: HCNC,CPTII,S$GLB, | Performed by: NURSE PRACTITIONER

## 2020-09-22 PROCEDURE — 99214 OFFICE O/P EST MOD 30 MIN: CPT | Mod: HCNC,S$GLB,, | Performed by: NURSE PRACTITIONER

## 2020-09-22 PROCEDURE — 3075F SYST BP GE 130 - 139MM HG: CPT | Mod: HCNC,CPTII,S$GLB, | Performed by: NURSE PRACTITIONER

## 2020-09-22 PROCEDURE — 99999 PR PBB SHADOW E&M-EST. PATIENT-LVL III: CPT | Mod: PBBFAC,HCNC,, | Performed by: NURSE PRACTITIONER

## 2020-09-22 PROCEDURE — 1101F PR PT FALLS ASSESS DOC 0-1 FALLS W/OUT INJ PAST YR: ICD-10-PCS | Mod: HCNC,CPTII,S$GLB, | Performed by: NURSE PRACTITIONER

## 2020-09-22 PROCEDURE — 99499 RISK ADDL DX/OHS AUDIT: ICD-10-PCS | Mod: HCNC,S$GLB,, | Performed by: NURSE PRACTITIONER

## 2020-09-22 PROCEDURE — 3075F PR MOST RECENT SYSTOLIC BLOOD PRESS GE 130-139MM HG: ICD-10-PCS | Mod: HCNC,CPTII,S$GLB, | Performed by: NURSE PRACTITIONER

## 2020-09-22 PROCEDURE — 99214 PR OFFICE/OUTPT VISIT, EST, LEVL IV, 30-39 MIN: ICD-10-PCS | Mod: HCNC,S$GLB,, | Performed by: NURSE PRACTITIONER

## 2020-09-22 PROCEDURE — 80048 BASIC METABOLIC PNL TOTAL CA: CPT | Mod: HCNC

## 2020-09-22 PROCEDURE — 36415 COLL VENOUS BLD VENIPUNCTURE: CPT | Mod: HCNC

## 2020-09-22 PROCEDURE — 1159F MED LIST DOCD IN RCRD: CPT | Mod: HCNC,S$GLB,, | Performed by: NURSE PRACTITIONER

## 2020-09-22 RX ORDER — ACETAMINOPHEN 500 MG
1000 TABLET ORAL EVERY 6 HOURS
Status: CANCELLED | OUTPATIENT
Start: 2020-09-22 | End: 2020-09-24

## 2020-09-22 RX ORDER — BISACODYL 10 MG
10 SUPPOSITORY, RECTAL RECTAL EVERY 12 HOURS PRN
Status: CANCELLED | OUTPATIENT
Start: 2020-09-22

## 2020-09-22 RX ORDER — OXYCODONE HYDROCHLORIDE 5 MG/1
5 TABLET ORAL
Status: CANCELLED | OUTPATIENT
Start: 2020-09-22

## 2020-09-22 RX ORDER — CELECOXIB 100 MG/1
200 CAPSULE ORAL DAILY
Status: CANCELLED | OUTPATIENT
Start: 2020-09-22

## 2020-09-22 RX ORDER — ONDANSETRON 2 MG/ML
4 INJECTION INTRAMUSCULAR; INTRAVENOUS EVERY 8 HOURS PRN
Status: CANCELLED | OUTPATIENT
Start: 2020-09-22

## 2020-09-22 RX ORDER — FENTANYL CITRATE 50 UG/ML
25 INJECTION, SOLUTION INTRAMUSCULAR; INTRAVENOUS EVERY 5 MIN PRN
Status: CANCELLED | OUTPATIENT
Start: 2020-09-22

## 2020-09-22 RX ORDER — CELECOXIB 100 MG/1
400 CAPSULE ORAL
Status: CANCELLED | OUTPATIENT
Start: 2020-09-22

## 2020-09-22 RX ORDER — OXYCODONE HYDROCHLORIDE 5 MG/1
10 TABLET ORAL
Status: CANCELLED | OUTPATIENT
Start: 2020-09-22

## 2020-09-22 RX ORDER — MUPIROCIN 20 MG/G
1 OINTMENT TOPICAL
Status: CANCELLED | OUTPATIENT
Start: 2020-09-22

## 2020-09-22 RX ORDER — MIDAZOLAM HYDROCHLORIDE 1 MG/ML
1 INJECTION INTRAMUSCULAR; INTRAVENOUS EVERY 5 MIN PRN
Status: CANCELLED | OUTPATIENT
Start: 2020-09-22

## 2020-09-22 RX ORDER — ROPIVACAINE HYDROCHLORIDE 2 MG/ML
8 INJECTION, SOLUTION EPIDURAL; INFILTRATION; PERINEURAL CONTINUOUS
Status: CANCELLED | OUTPATIENT
Start: 2020-09-22

## 2020-09-22 RX ORDER — FAMOTIDINE 20 MG/1
20 TABLET, FILM COATED ORAL 2 TIMES DAILY
Status: CANCELLED | OUTPATIENT
Start: 2020-09-22

## 2020-09-22 RX ORDER — SODIUM CHLORIDE 9 MG/ML
INJECTION, SOLUTION INTRAVENOUS CONTINUOUS
Status: CANCELLED | OUTPATIENT
Start: 2020-09-22 | End: 2020-09-23

## 2020-09-22 RX ORDER — SODIUM CHLORIDE 9 MG/ML
INJECTION, SOLUTION INTRAVENOUS
Status: CANCELLED | OUTPATIENT
Start: 2020-09-22

## 2020-09-22 RX ORDER — NALOXONE HCL 0.4 MG/ML
0.02 VIAL (ML) INJECTION
Status: CANCELLED | OUTPATIENT
Start: 2020-09-22

## 2020-09-22 RX ORDER — PREGABALIN 25 MG/1
75 CAPSULE ORAL
Status: CANCELLED | OUTPATIENT
Start: 2020-09-22

## 2020-09-22 RX ORDER — SODIUM CHLORIDE 0.9 % (FLUSH) 0.9 %
10 SYRINGE (ML) INJECTION
Status: CANCELLED | OUTPATIENT
Start: 2020-09-22

## 2020-09-22 RX ORDER — POLYETHYLENE GLYCOL 3350 17 G/17G
17 POWDER, FOR SOLUTION ORAL DAILY
Status: CANCELLED | OUTPATIENT
Start: 2020-09-22

## 2020-09-22 RX ORDER — LIDOCAINE HYDROCHLORIDE 10 MG/ML
1 INJECTION, SOLUTION EPIDURAL; INFILTRATION; INTRACAUDAL; PERINEURAL
Status: CANCELLED | OUTPATIENT
Start: 2020-09-22

## 2020-09-22 RX ORDER — MORPHINE SULFATE 10 MG/ML
2 INJECTION, SOLUTION INTRAMUSCULAR; INTRAVENOUS
Status: CANCELLED | OUTPATIENT
Start: 2020-09-22

## 2020-09-22 RX ORDER — AMOXICILLIN 250 MG
1 CAPSULE ORAL 2 TIMES DAILY
Status: CANCELLED | OUTPATIENT
Start: 2020-09-22

## 2020-09-22 RX ORDER — PREGABALIN 25 MG/1
75 CAPSULE ORAL NIGHTLY
Status: CANCELLED | OUTPATIENT
Start: 2020-09-22

## 2020-09-22 RX ORDER — TALC
6 POWDER (GRAM) TOPICAL NIGHTLY PRN
Status: CANCELLED | OUTPATIENT
Start: 2020-09-22

## 2020-09-22 RX ORDER — ACETAMINOPHEN 500 MG
1000 TABLET ORAL
Status: CANCELLED | OUTPATIENT
Start: 2020-09-22

## 2020-09-22 RX ORDER — MUPIROCIN 20 MG/G
1 OINTMENT TOPICAL 2 TIMES DAILY
Status: CANCELLED | OUTPATIENT
Start: 2020-09-22 | End: 2020-09-27

## 2020-09-22 RX ORDER — ASPIRIN 81 MG/1
81 TABLET ORAL 2 TIMES DAILY
Status: CANCELLED | OUTPATIENT
Start: 2020-09-22

## 2020-09-22 NOTE — PROGRESS NOTES
Drake Barraza is a 75 y.o. year old here today for a pre-operative visit in preparation for a Left total knee arthroplasty to be performed by Dr. Israel  on 10/5/2020.  he was last seen and treated in the clinic on 7/14/2020. he will be medically optimized by the pre op center. There has been no significant change in medical status since last visit. No fever, chills, malaise, or unexplained weight change.      Allergies, Medications, past medical and surgical history reviewed.    Focused examination performed.    Patient declined to see surgeon today. All questions answered. Patient encouraged to call with questions. Contact information given.     Pre, smitha, and post operative procedures and expectations discussed. Questions were answered. Drake Barraza has been educated and is ready to proceed with surgery. Approximately 30 minutes was spent discussing surgical outcomes, plans, procedures pre, smitha, and post operative expections and care.  Surgical consent signed.    Drake Barraza will contact us if there are any questions, concerns, or changes in medical status prior to surgery.       Joint class: no class due to other knee replaced in July 2019    COVID-19 test date: 10/2/2020     patient will be scheduled with [facility]. Scheduled on 10/7 at [facility] location

## 2020-09-22 NOTE — OUTPATIENT SUBJECTIVE & OBJECTIVE
Outpatient Subjective & Objective      Chief Complaint: Preoperative evaulation, perioperative medical management, and complication reduction plan.     Functional Capacity: Able to walk up one flight of stairs      Anesthesia issues: None  Difficulty mouth opening: No  Steroid use in the last 12 months:  No      Family anesthesia difficulty: None       Active Cardiac Conditions: None    Revised Cardiac Risk Index Predictors: History of ischemic heart disease (MI/positive exercise test/chest pain)       Family Hx of Thrombosis: Brother: Fatal DVT; no genetic testing done.     Past Medical History:   Diagnosis Date    Arthritis     Asymptomatic cholelithiasis     CAD (coronary artery disease) 2003    RCA Stent 11/2003, s/p LAD Stent 12/2003                                                       Fatty liver     History of total knee replacement, right     7/22/19-[facility]    Hyperlipidemia     Hypertension     Old myocardial infarct 11/2003      Inferior NSTEMI     MICHELLE on CPAP     Home CPAP at 9cm/Face Mask    Thyroid disease          Past Medical History Pertinent Negatives:   Diagnosis Date Noted    Anemia 09/22/2020    Asthma 09/22/2020    Basal cell carcinoma 06/04/2015    CHF (congestive heart failure) 09/22/2020    COPD (chronic obstructive pulmonary disease) 09/22/2020    Disorder of kidney and ureter 09/22/2020    GERD (gastroesophageal reflux disease) 09/22/2020    Melanoma 06/04/2015    Pulmonary embolism 09/22/2020    Seizures 09/22/2020    Squamous cell carcinoma 06/04/2015    Stroke 09/22/2020         Past Surgical History:   Procedure Laterality Date    COLONOSCOPY N/A 7/27/2016    Procedure: COLONOSCOPY;  Surgeon: Ariel Cabral MD;  Location: [address]);  Service: Endoscopy;  Laterality: N/A;    HERNIA REPAIR  2006    JOINT REPLACEMENT      TOTAL KNEE ARTHROPLASTY Right 7/22/2019    Procedure: REPLACEMENT-KNEE-TOTAL;  Surgeon: [facility] Jr., MD;   "Location: [facility] OR [address] [place] [address];  Service: Orthopedics;  Laterality: Right;       Review of Systems   Constitutional: Negative for chills, fatigue, fever and unexpected weight change.   HENT: Positive for postnasal drip (occasional). Negative for dental problem, hearing loss, rhinorrhea, sore throat, tinnitus and trouble swallowing.    Eyes: Negative for photophobia, pain, discharge and visual disturbance.   Respiratory: Negative for apnea, cough, chest tightness, shortness of breath and wheezing.    Cardiovascular: Negative for chest pain, palpitations and leg swelling.   Gastrointestinal: Negative for abdominal pain, blood in stool, constipation, nausea and vomiting.        Denies Fatty liver, Hepatitis   Endocrine: Negative for cold intolerance, heat intolerance, polydipsia, polyphagia and polyuria.   Genitourinary: Negative for decreased urine volume, difficulty urinating, dysuria, frequency, hematuria and urgency.   Musculoskeletal: Negative for arthralgias, back pain, neck pain and neck stiffness.   Skin: Negative for rash and wound.   Allergic/Immunologic: Negative for immunocompromised state.   Neurological: Negative for dizziness, tremors, seizures, syncope, weakness, numbness and headaches.   Hematological: Negative for adenopathy. Does not bruise/bleed easily.   Psychiatric/Behavioral: Negative for confusion, hallucinations, sleep disturbance and suicidal ideas.              VITALS  Visit Vitals  /76 (BP Location: Left arm, Patient Position: Sitting)   Pulse (!) 56   Temp 98.3 °F (36.8 °C) (Oral)   Ht 5' 7" (1.702 m)   Wt 80.7 kg (178 lb)   SpO2 97%   BMI 27.88 kg/m²          Physical Exam  Vitals signs reviewed.   Constitutional:       General: He is not in acute distress.     Appearance: He is well-developed.   HENT:      Head: Normocephalic.      Nose: Nose normal.      Mouth/Throat:      Pharynx: No oropharyngeal exudate.   Eyes:      General:         Right eye: No discharge.         Left eye: No " discharge.      Conjunctiva/sclera: Conjunctivae normal.      Pupils: Pupils are equal, round, and reactive to light.   Neck:      Musculoskeletal: Normal range of motion.      Thyroid: No thyromegaly.      Vascular: No carotid bruit or JVD.      Trachea: No tracheal deviation.   Cardiovascular:      Rate and Rhythm: Regular rhythm. Bradycardia present.      Pulses:           Carotid pulses are 2+ on the right side and 2+ on the left side.       Dorsalis pedis pulses are 2+ on the right side and 2+ on the left side.        Posterior tibial pulses are 2+ on the right side and 2+ on the left side.      Heart sounds: Normal heart sounds. No murmur.   Pulmonary:      Effort: Pulmonary effort is normal. No respiratory distress.      Breath sounds: Normal breath sounds. No stridor. No wheezing, rhonchi or rales.   Abdominal:      General: Bowel sounds are normal. There is no distension.      Palpations: Abdomen is soft.      Tenderness: There is no guarding.   Musculoskeletal:      Right lower le+ Pitting Edema present.      Left lower le+ Pitting Edema present.   Lymphadenopathy:      Cervical: No cervical adenopathy.   Skin:     General: Skin is warm and dry.      Capillary Refill: Capillary refill takes less than 2 seconds.      Findings: No erythema or rash.   Neurological:      Mental Status: He is alert and oriented to person, place, and time.      Coordination: Coordination normal.          Significant Labs:  Lab Results   Component Value Date    WBC 7.02 2020    HGB 15.7 2020    HCT 47.3 2020     2020    CHOL 134 2020    TRIG 175 (H) 2020    HDL 38 (L) 2020    ALT 61 (H) 2020    AST 49 (H) 2020     2020    K 4.8 2020     2020    CREATININE 1.1 2020    BUN 13 2020    CO2 29 2020    TSH 3.586 2020    PSA 0.71 2020    INR 0.9 2020    GLUF 104 2004    HGBA1C 7.1 (H) 2020        Diagnostic Studies: No relevant studies.    EKG:   Results for orders placed or performed in visit on 11/14/19   EKG 12-lead    Collection Time: 11/14/19  9:33 AM    Narrative    Test Reason : I25.10,    Vent. Rate : 081 BPM     Atrial Rate : 081 BPM     P-R Int : 164 ms          QRS Dur : 080 ms      QT Int : 378 ms       P-R-T Axes : 075 054 058 degrees     QTc Int : 439 ms    Normal sinus rhythm  Normal ECG  When compared with ECG of 10-JUL-2019 09:55,  No significant change was found  Confirmed by ROSEANN RODRIGUEZ MD ([id number]) on 11/14/2019 3:16:25 PM    Referred By:  tariq           Confirmed By:ROSEANN RODRIGUEZ MD       2D ECHO:  TTE: 7/10/19    The patient reached the end of the protocol.  The stress echo portion of this study is negative for myocardial ischemia.  The EKG portion of this study is negative for myocardial ischemia.  Normal left ventricular systolic function. The estimated ejection fraction is 60%  Normal LV diastolic function.  Normal right ventricular systolic function.  Normal central venous pressure (3 mm Hg).  The estimated PA systolic pressure is 24 mm Hg        ORI:  No results found for this or any previous visit.     Imaging : Old Xray L-Spine 2011  RESULTS: THERE IS A RETROLISTHESIS OF A MODERATE DEGREE OF L1 ON L2  AND   L2 ON L3 AND L3 ON L4.  MODERATE ANTEROLISTHESIS AT L4-5 SEEN.     INTERSPACES AT L4-5 AND L5-S1 ARE NARROWED.  VACUUM DISC SEEN AT L4-5 AND   L5-S1.  SMALL MARGINAL OSTEOPHYTES SEEN AT MULTIPLE LEVELS.  NO ACUTE   CHANGES SEEN.  THE AORTA IS CALCIFIED AND ECTATIC.          Revised Cardiac Risk Index   High -Risk Surgery  Intraperitoneal; Intrathoracic; suprainguinal vascular Yes- + 1 No- 0   History of Ischemic Heart Disease   (Hx of MI/positive exercise test/current chest pain due to ischemia/use of nitrate therapy/EKG with pathological Q waves) Yes- + 1 No- 0   History of CHF  (Pulmonary edema/bilateral rales or S3 gallop/PND/CXR showing pulmonary vascular  redistribution) Yes- + 1 No- 0   History of CVA   (Prior stroke or TIA) Yes- + 1 No- 0   Pre-operative treatment with insulin Yes- + 1 No- 0   Pre-operative creatinine > 2mg/dl Yes- + 1 No- 0   Total: 1      Risk Status:  Estimated risk of cardiac complications after non-cardiac surgery using the Revised Cardiac Risk Index for Preoperative risk is 6.0 %      ARISCAT (Canet) risk index: Low    American Society of Anesthesiologists Physical Status classification (ASA): 3    [facility] respiratory failure index: 0.5 %           No further cardiac workup needed prior to surgery. Did well with right knee arthroplasty under spinal anesthesia.    Outpatient Subjective & Objective

## 2020-09-22 NOTE — ASSESSMENT & PLAN NOTE
CPAP compliance yes. Encouraged increased exercise.   Recommend caution with sedating medication that can cause respiratory depression. Informed patient that with untreated MICHELLE there is an increased risk of stroke, HTN, and heart disease.

## 2020-09-22 NOTE — HPI
This is a 75 y.o. male  who presents today for a preoperative evaulation in preparation for Orthopedic  surgery. Scheduled for left knee arthroplasty.  States  surgery is indicated for chronic left knee pain.   Patient is new to me.  Details of current problem: The duration of problem is > six months .   Reports symptoms of sharp pain to medial aspect of knee .  Aggravating Factors include:  decreased ADLs and lack of sleep .  Relieving factors are Advil prn and rest .  Does not use assistive devices. Denies pain today.   The history has been obtained by speaking with the patient and reviewing the electronic medical record and/or outside health information. Significant health conditions for the perioperative period are discussed below in assessment and plan.     Patient reports current health status to be Good.  Denies any new symptoms before surgery.

## 2020-09-22 NOTE — H&P
CC: Left knee pain    Drake Barraza is a 75 y.o. male with history of Left knee pain. Pain is worse with activity and weight bearing.  Patient has experienced interference of activities of daily living due to decreased range of motion and an increase in joint pain and swelling.  Patient has failed non-operative treatment including NSAIDs, corticosteroid injections, viscosupplement injections, and activity modification.  Drake Barraza currently ambulates independently.     Relevant medical conditions of significance in perioperative period:  CAD with stent- followed an managed medically by cardiology  HTN- on medication managed by cardiology  HLD- on medication managed by cardiology      Past Medical History:   Diagnosis Date    Arthritis     Asymptomatic cholelithiasis     CAD (coronary artery disease) 2003    RCA Stent 11/2003, s/p LAD Stent 12/2003                                                       Fatty liver     History of total knee replacement, right     7/22/19-[facility]    Hyperlipidemia     Hypertension     Old myocardial infarct 11/2003      Inferior NSTEMI     MICHELLE on CPAP     Home CPAP at 9cm/Face Mask    Thyroid disease        Past Surgical History:   Procedure Laterality Date    COLONOSCOPY N/A 7/27/2016    Procedure: COLONOSCOPY;  Surgeon: Ariel Cabral MD;  Location: [facility] (4TH FLR);  Service: Endoscopy;  Laterality: N/A;    HERNIA REPAIR  2006    JOINT REPLACEMENT      TOTAL KNEE ARTHROPLASTY Right 7/22/2019    Procedure: REPLACEMENT-KNEE-TOTAL;  Surgeon: [facility] Jr., MD;  Location: [facility] OR [address] [place] [address];  Service: Orthopedics;  Laterality: Right;       Family History   Problem Relation Age of Onset    Melanoma Neg Hx        Review of patient's allergies indicates:   Allergen Reactions    Hydrocodone-acetaminophen Rash     Other reaction(s): constipated         Current Outpatient Medications:     CINNAMON BARK (CINNAMON ORAL), Take 1 capsule by mouth once  daily., Disp: , Rfl:     levothyroxine (SYNTHROID) 125 MCG tablet, TAKE 1 TABLET IN THE MORNING ON AN EMPTY STOMACH, Disp: 90 tablet, Rfl: 1    metoprolol tartrate (LOPRESSOR) 25 MG tablet, Take 1 tablet (25 mg total) by mouth 2 (two) times daily., Disp: 180 tablet, Rfl: 3    multivitamin (ONE DAILY MULTIVITAMIN) per tablet, Take 1 tablet by mouth once daily., Disp: , Rfl:     nitroGLYCERIN (NITROSTAT) 0.4 MG SL tablet, Place 1 tablet (0.4 mg total) under the tongue every 5 (five) minutes as needed for Chest pain., Disp: 100 tablet, Rfl: 2    omega-3 fatty acids-vitamin E (FISH OIL) 1,000 mg Cap, Take 1 capsule by mouth Daily.  , Disp: , Rfl:     ramipriL (ALTACE) 10 MG capsule, TAKE 1 CAPSULE EVERY DAY, Disp: 90 capsule, Rfl: 3    rosuvastatin (CRESTOR) 20 MG tablet, Take 1 tablet (20 mg total) by mouth once daily., Disp: 90 tablet, Rfl: 1    sildenafil (VIAGRA) 100 MG tablet, Take 1 tablet (100 mg total) by mouth daily as needed for Erectile Dysfunction., Disp: 30 tablet, Rfl: 6    TURMERIC ROOT EXTRACT ORAL, Take 1 capsule by mouth., Disp: , Rfl:     UBIDECARENONE (COENZYME Q10) 100 mg Tab, Take 200 mg by mouth once daily. , Disp: , Rfl:     vitamin D 185 MG Tab, Take 185 mg by mouth once daily.  , Disp: , Rfl:     aspirin (ECOTRIN) 81 MG EC tablet, Take 1 tablet (81 mg total) by mouth 2 (two) times daily. (Patient taking differently: Take 81 mg by mouth once. ), Disp: 60 tablet, Rfl: 0    Review of Systems:  Constitutional: no fever or chills  Eyes: no visual changes  ENT: no nasal congestion or sore throat  Respiratory: no cough or shortness of breath  Cardiovascular: no chest pain or palpitations  Gastrointestinal: no nausea or vomiting, tolerating diet  Genitourinary: no hematuria or dysuria  Integument/Breast: no rash or pruritis  Hematologic/Lymphatic: no easy bruising or lymphadenopathy  Musculoskeletal: positive for knee pain  Neurological: no seizures or tremors  Behavioral/Psych: no auditory  "or visual hallucinations  Endocrine: no heat or cold intolerance    PE:  Ht 5' 7" (1.702 m)   Wt 81 kg (178 lb 9.6 oz)   BMI 27.97 kg/m²   General: Pleasant, cooperative, NAD   Gait: antalgic  HEENT: NCAT, sclera nonicteric   Lungs: Respirations clear bilaterally; equal and unlabored.   CV: S1S2; 2+ bilateral upper and lower extremity pulses.   Skin: Intact throughout with no rashes, erythema, or lesions  Extremities: No LE edema,  no erythema or warmth of the skin in either lower extremity.    Left knee exam:  Knee Range of Motion:normal, pain with passive range of motion  Effusion:none  Condition of skin:intact  Location of tenderness:Medial joint line   Strength:normal  Stability: stable to testing    Hip Examination: painless PROM of hip     Radiographs: Radiographs reveal advanced degenerative changes including subchondral cyst formation, subchondral sclerosis, osteophyte formation, joint space narrowing.     Knee Alignment: normal    Diagnosis: osteoarthritis Left knee    Plan: Left total knee arthroplasty    Due to the serious nature of total joint infection and the high prevalence of community acquired MRSA, vancomycin will be used perioperatively.            "

## 2020-09-22 NOTE — ASSESSMENT & PLAN NOTE
Diet controlled.  Most recent A1c is 7.1.   No regular accuchecks.  Denies peripheral neuropathy. Denies visual changes. Recent eye exam 5/2020.   Maintain healthy weight. Exercise at least 150 minutes weekly. Encouraged diet rich in nutrients such as fruits, vegetables, and whole grains; reduce sugar intake from cakes, candy, and sugared drinks.    Denies- Retinopathy, Nephropathy , Neuropathy   Denies:  Carotid, Peripheral disease

## 2020-09-22 NOTE — ASSESSMENT & PLAN NOTE
History of NSTEMI 2003. Reports stents to RCA in 11/2003 and LAD in 12/2003.  Denies CP/SOB/PND/Orthopnea/Syncope/Palpitations  Taking ASA 81 mg daily- OK to stay on during the perioperative period  Followed per cardiology; last seen 11/14/2019 with follow up this November.

## 2020-09-22 NOTE — PROGRESS NOTES
[facility]  Progress Note    Patient Name: Drake Barraza  MRN: [id number]  Date of Evaluation- 09/23/2020  PCP- Catherine Middleton MD    Future cases for Drake Barraza [[id number]]     Case ID Status Date Time Jose Miguel Procedure Provider Location    [id number] [facility] 10/5/2020  7:00  ARTHROPLASTY, KNEE, TOTAL Jyay [employer] Cemented Tibia Chris Israel MD [[id number]] [facility] OR          HPI:  This is a 75 y.o. male  who presents today for a preoperative evaulation in preparation for Orthopedic  surgery. Scheduled for left knee arthroplasty.  States  surgery is indicated for chronic left knee pain.   Patient is new to me.  Details of current problem: The duration of problem is > six months .   Reports symptoms of sharp pain to medial aspect of knee .  Aggravating Factors include:  decreased ADLs and lack of sleep .  Relieving factors are Advil prn and rest .  Does not use assistive devices. Denies pain today.   The history has been obtained by speaking with the patient and reviewing the electronic medical record and/or outside health information. Significant health conditions for the perioperative period are discussed below in assessment and plan.     Patient reports current health status to be Good.  Denies any new symptoms before surgery.       Subjective/ Objective:     Chief Complaint: Preoperative evaulation, perioperative medical management, and complication reduction plan.     Functional Capacity: Able to walk up one flight of stairs      Anesthesia issues: None  Difficulty mouth opening: No  Steroid use in the last 12 months:  No      Family anesthesia difficulty: None       Active Cardiac Conditions: None    Revised Cardiac Risk Index Predictors: History of ischemic heart disease (MI/positive exercise test/chest pain)       Family Hx of Thrombosis: Brother: Fatal DVT; no genetic testing done.     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/2003      Inferior NSTEMI     MICHELLE on CPAP     Home CPAP at 9cm/Face Mask    Thyroid disease          Past Medical History Pertinent Negatives:   Diagnosis Date Noted    Anemia 09/22/2020    Asthma 09/22/2020    Basal cell carcinoma 06/04/2015    CHF (congestive heart failure) 09/22/2020    COPD (chronic obstructive pulmonary disease) 09/22/2020    Disorder of kidney and ureter 09/22/2020    GERD (gastroesophageal reflux disease) 09/22/2020    Melanoma 06/04/2015    Pulmonary embolism 09/22/2020    Seizures 09/22/2020    Squamous cell carcinoma 06/04/2015    Stroke 09/22/2020         Past Surgical History:   Procedure Laterality Date    COLONOSCOPY N/A 7/27/2016    Procedure: COLONOSCOPY;  Surgeon: Ariel Cabral MD;  Location: [facility] (4TH FLR);  Service: Endoscopy;  Laterality: N/A;    HERNIA REPAIR  2006    JOINT REPLACEMENT      TOTAL KNEE ARTHROPLASTY Right 7/22/2019    Procedure: REPLACEMENT-KNEE-TOTAL;  Surgeon: John L. Ochsner Jr., MD;  Location: [facility] OR 2ND FLR;  Service: Orthopedics;  Laterality: Right;       Review of Systems   Constitutional: Negative for chills, fatigue, fever and unexpected weight change.   HENT: Positive for postnasal drip (occasional). Negative for dental problem, hearing loss, rhinorrhea, sore throat, tinnitus and trouble swallowing.    Eyes: Negative for photophobia, pain, discharge and visual disturbance.   Respiratory: Negative for apnea, cough, chest tightness, shortness of breath and wheezing.    Cardiovascular: Negative for chest pain, palpitations and leg swelling.   Gastrointestinal: Negative for abdominal pain, blood in stool, constipation, nausea and vomiting.        Denies Fatty  "liver, Hepatitis   Endocrine: Negative for cold intolerance, heat intolerance, polydipsia, polyphagia and polyuria.   Genitourinary: Negative for decreased urine volume, difficulty urinating, dysuria, frequency, hematuria and urgency.   Musculoskeletal: Negative for arthralgias, back pain, neck pain and neck stiffness.   Skin: Negative for rash and wound.   Allergic/Immunologic: Negative for immunocompromised state.   Neurological: Negative for dizziness, tremors, seizures, syncope, weakness, numbness and headaches.   Hematological: Negative for adenopathy. Does not bruise/bleed easily.   Psychiatric/Behavioral: Negative for confusion, hallucinations, sleep disturbance and suicidal ideas.              VITALS  Visit Vitals  /76 (BP Location: Left arm, Patient Position: Sitting)   Pulse (!) 56   Temp 98.3 °F (36.8 °C) (Oral)   Ht 5' 7" (1.702 m)   Wt 80.7 kg (178 lb)   SpO2 97%   BMI 27.88 kg/m²          Physical Exam  Vitals signs reviewed.   Constitutional:       General: He is not in acute distress.     Appearance: He is well-developed.   HENT:      Head: Normocephalic.      Nose: Nose normal.      Mouth/Throat:      Pharynx: No oropharyngeal exudate.    Eyes:      General:         Right eye: No discharge.         Left eye: No discharge.      Conjunctiva/sclera: Conjunctivae normal.      Pupils: Pupils are equal, round, and reactive to light.   Neck:      Musculoskeletal: Normal range of motion.      Thyroid: No thyromegaly.      Vascular: No carotid bruit or JVD.      Trachea: No tracheal deviation.   Cardiovascular:      Rate and Rhythm: Regular rhythm. Bradycardia present.      Pulses:           Carotid pulses are 2+ on the right side and 2+ on the left side.       Dorsalis pedis pulses are 2+ on the right side and 2+ on the left side.        Posterior tibial pulses are 2+ on the right side and 2+ on the left side.      Heart sounds: Normal heart sounds. No murmur.   Pulmonary:      Effort: Pulmonary " effort is normal. No respiratory distress.      Breath sounds: Normal breath sounds. No stridor. No wheezing, rhonchi or rales.   Abdominal:      General: Bowel sounds are normal. There is no distension.      Palpations: Abdomen is soft.      Tenderness: There is no guarding.   Musculoskeletal:      Right lower le+ Pitting Edema present.      Left lower le+ Pitting Edema present.   Lymphadenopathy:      Cervical: No cervical adenopathy.    Skin:     General: Skin is warm and dry.      Capillary Refill: Capillary refill takes less than 2 seconds.      Findings: No erythema or rash.   Neurological:      Mental Status: He is alert and oriented to person, place, and time.      Coordination: Coordination normal.          Significant Labs:  Lab Results   Component Value Date    WBC 7.02 2020    HGB 15.7 2020    HCT 47.3 2020     2020    CHOL 134 2020    TRIG 175 (H) 2020    HDL 38 (L) 2020    ALT 61 (H) 2020    AST 49 (H) 2020     2020    K 4.8 2020     2020    CREATININE 1.1 2020    BUN 13 2020    CO2 29 2020    TSH 3.586 2020    PSA 0.71 2020    INR 0.9 2020    GLUF 104 2004    HGBA1C 7.1 (H) 2020       Diagnostic Studies: No relevant studies.    EKG:   Results for orders placed or performed in visit on 19   EKG 12-lead    Collection Time: 19  9:33 AM    Narrative    Test Reason : I25.10,    Vent. Rate : 081 BPM     Atrial Rate : 081 BPM     P-R Int : 164 ms          QRS Dur : 080 ms      QT Int : 378 ms       P-R-T Axes : 075 054 058 degrees     QTc Int : 439 ms    Normal sinus rhythm  Normal ECG  When compared with ECG of 10-JUL-2019 09:55,  No significant change was found  Confirmed by ROSEANN RODRIGUEZ MD ([id number]) on 2019 3:16:25 PM    Referred By:  tariq           Confirmed By:ROSEANN RODRIGUEZ MD       2D ECHO:  TTE: 7/10/19    The patient reached the end of  the protocol.  The stress echo portion of this study is negative for myocardial ischemia.  The EKG portion of this study is negative for myocardial ischemia.  Normal left ventricular systolic function. The estimated ejection fraction is 60%  Normal LV diastolic function.  Normal right ventricular systolic function.  Normal central venous pressure (3 mm Hg).  The estimated PA systolic pressure is 24 mm Hg        ORI:  No results found for this or any previous visit.     Imaging : Old Xray L-Spine 2011  RESULTS: THERE IS A RETROLISTHESIS OF A MODERATE DEGREE OF L1 ON L2  AND   L2 ON L3 AND L3 ON L4.  MODERATE ANTEROLISTHESIS AT L4-5 SEEN.     INTERSPACES AT L4-5 AND L5-S1 ARE NARROWED.  VACUUM DISC SEEN AT L4-5 AND   L5-S1.  SMALL MARGINAL OSTEOPHYTES SEEN AT MULTIPLE LEVELS.  NO ACUTE   CHANGES SEEN.  THE AORTA IS CALCIFIED AND ECTATIC.          Revised Cardiac Risk Index   High -Risk Surgery  Intraperitoneal; Intrathoracic; suprainguinal vascular Yes- + 1 No- 0   History of Ischemic Heart Disease   (Hx of MI/positive exercise test/current chest pain due to ischemia/use of nitrate therapy/EKG with pathological Q waves) Yes- + 1 No- 0   History of CHF  (Pulmonary edema/bilateral rales or S3 gallop/PND/CXR showing pulmonary vascular redistribution) Yes- + 1 No- 0   History of CVA   (Prior stroke or TIA) Yes- + 1 No- 0   Pre-operative treatment with insulin Yes- + 1 No- 0   Pre-operative creatinine > 2mg/dl Yes- + 1 No- 0   Total: 1      Risk Status:  Estimated risk of cardiac complications after non-cardiac surgery using the Revised Cardiac Risk Index for Preoperative risk is 6.0 %      ARISCAT ([place]et) risk index: Low    American Society of Anesthesiologists Physical Status classification (ASA): 3    [facility]: 0.[facility] %           No further cardiac workup needed prior to surgery. Did well with right knee arthroplasty under spinal anesthesia.          Orders Placed This Encounter    Hemoglobin  A1C    Ambulatory referral/consult to Diabetes Education       Assessment/Plan:     Coronary artery disease involving native coronary artery of native heart without angina pectoris  History of NSTEMI 2003. Reports stents to RCA in 11/2003 and LAD in 12/2003.  Denies CP/SOB/PND/Orthopnea/Syncope/Palpitations  Taking ASA 81 mg daily- OK to stay on during the perioperative period  Followed per cardiology; last seen 11/14/2019 with follow up this November.    Essential hypertension  Current BP  at goal today. Taking Lopressor 25 mg and Altace 10 mg daily. 132/76 @ POC visit.   Patient reports occasional home BP readings of: usually  around 130s/70s  Encouraged keeping a healthy weight and BMI  Education was provided regarding lifestyle changes to reduce systolic BP;  Exercise 30 minutes per day,  5 days per week or 150 minutes weekly;  Sodium reduction and avoidance of high salt foods such as processed meats, frozen meals, fast foods.         Hypercholesteremia  Encouraged weight loss, healthy diet (DASH/Mediterranean) and exercise. Patient should exercise 30 minutes at least five times weekly. Limit alcohol.  Taking Crestor 20 mg daily.     Type 2 diabetes mellitus without complication, without long-term current use of insulin   Diet controlled.  Most recent A1c is 7.1.   No regular accuchecks.  Denies peripheral neuropathy. Denies visual changes. Recent eye exam 5/2020.   Maintain healthy weight. Exercise at least 150 minutes weekly. Encouraged diet rich in nutrients such as fruits, vegetables, and whole grains; reduce sugar intake from cakes, candy, and sugared drinks.    Denies- Retinopathy, Nephropathy , Neuropathy   Denies:  Carotid, Peripheral disease       Hypothyroidism  Currently treated with Levothyroxine 125 mcg daily.  Last TSH normal.  Denies Hx of nodules. Denies heat/cold intolerance. Followed by PCP.    Sleep apnea  CPAP compliance yes. Encouraged increased exercise.   Recommend caution with sedating  medication that can cause respiratory depression. Informed patient that with untreated MICHELLE there is an increased risk of stroke, HTN, and heart disease.    Fatty liver  Found on US Abd. 8/24/20; also has gallbladder stone vs. polyp. Recommendation for repeat in one year.     Does not have significant coagulopathy. Recent INR: 0.9    Encouraged healthy diet: avoid sugar intake and  fatty foods; avoid alcohol consumption. Increase physical activity; exercise at least 150 minutes weekly.  Would benefit from weight loss. Not followed per Hepatology.  Most recent LFTs abnormal: possibly attributed to statin therapy. Recent change in Statin medication: now taking Crestor 20 mg instead of Atorvastatin. Can also be related to gallstone/polyp.   Will have repeat LFTs in about six months per PCP note.             Discussion/Management of Perioperative Care    Thromboembolic prophylaxis (VTE) Care: Risk factors for thrombosis include: age and surgical procedure.  I recommend prophylaxis of thromboembolism with the use of compression stockings/pneumatic devices, and/or pharmacologic agents. The benefits should outweigh the risks for pharmacologic prophylaxis in the perioperative period. I also encourage early ambulation if not contraindicated during the post-operative period.    Risk factors for post-operative pulmonary complications include:age, diabetes mellitus and HTN. To reduce the risk of pulmonary complications, prophylactic recommendations include: incentive spirometry use/deep breathing, early ambulation and pain control.    Risk factors for renal complications include: age, diabetes mellitus and HTN. To reduce the risk of postoperative renal complications, I recommend the patient maintain adequate fluid volume status by drinking 2 liters of water daily.  Avoid/reduce NSAIDS and SANDOVAL-2 inhibitors use as well as IV contrast for renal protection.    I recommend the use of appropriate prophylactic antibiotics to reduce the  risk of surgical site infections.    Delirium risk factors include advanced age. I recommend to avoid/reduce use of benzodiazepine use (not for patients who take on a regular basis), anticholinergics, Benadryl,  and agents that may cause postoperative serotonin syndrome.  Controlled pain can decrease the risk for postop delirium and since opioids are used for postoperative pain control, I suggest using the lowest dose for the shortest amount of time necessary for pain management.       Diabetes Mellitus-I recommend monitoring the glucose in the perioperative period ( Before meals and bed time,if the patient is on oral feeds or every 6 hourly ,if the patient is NPO ).   Blood glucose target in hospitalized patients is 140-180. Oral Hypoglycemic agents are generally avoided during the hospital stay . If glucose is consistently elevated ,I recommend using a basal prandial Insulin regimen to control the glucose - as elevated glucose can be associated with adverse surgical outcomes. Please consider involving Hospital Medicine or Endocrinology , if any help is needed with Glucose control. Patient will be instructed based on the pre op clinic guidelines about adjustment of diabetic treatment (If applicable). These guidelines are per recommendations of the Endocrinology department.          This visit was focused on Preoperative evaluation, Perioperative Medical management, complication reduction plans. I suggest that the patient follows up with primary care or relevant sub specialists for ongoing health care.    I appreciate the opportunity to be involved in this patients care. Please feel free to contact me if there were any questions about this consultation.    Patient is optimized for surgery with Dr. Israel.    Recommend follow-up with PCP for elevated A1c- may need to start on medication.  Will discuss case with Dr. Blakely.    Discussed A1c results with patient: doing a good job with eating healthy foods.  Does not  snack a lot and  drinks diet tea- commercially prepared. Recommend diabetic education. Will not start patient on medication close to surgery. A1c is acceptable for surgery. Will notify PCP.     Zara Celeste NP  Perioperative Medicine  [facility]

## 2020-09-22 NOTE — ASSESSMENT & PLAN NOTE
Found on US Abd. 8/24/20; also has gallbladder stone vs. polyp. Recommendation for repeat in one year.     Does not have significant coagulopathy. Recent INR: 0.9    Encouraged healthy diet: avoid sugar intake and  fatty foods; avoid alcohol consumption. Increase physical activity; exercise at least 150 minutes weekly.  Would benefit from weight loss. Not followed per Hepatology.  Most recent LFTs abnormal: possibly attributed to statin therapy. Recent change in Statin medication: now taking Crestor 20 mg instead of Atorvastatin. Can also be related to gallstone/polyp.   Will have repeat LFTs in about six months per PCP note.

## 2020-09-22 NOTE — ASSESSMENT & PLAN NOTE
Currently treated with Levothyroxine 125 mcg daily.  Last TSH normal.  Denies Hx of nodules. Denies heat/cold intolerance. Followed by PCP.

## 2020-09-22 NOTE — PATIENT INSTRUCTIONS
Your surgery has been scheduled for:10/5    You should report to:    ____[facility], located on the [place] side of the first floor of the           [facility] ([phone])    ____[facility], located on the [facility] side of the            Second floor of the [facility] ([phone])    ___[facility] [place] [facility] [place] building A     Please Note   - Tell your doctor if you take Aspirin, products containing Aspirin, herbal medications or blood thinners, such as Coumadin, Ticlid, or Plavix.  (Consult your provider regarding holding or stopping before surgery).  - Arrange for someone to drive you home following surgery.  You will not be allowed to leave the surgical facility alone or drive yourself home following sedation and anesthesia.    Before Surgery  - Stop taking all vitamins/ herbal medications 7 days prior to surgery  - No Motrin/Advil (Ibuprofen) 7 days before surgery  - No Aleve (Naproxen) 7 days before surgery  - Stop taking blood thinners_7_days before surgery  - No Goody's/BC  Powder 7 days before surgery  - Refrain from drinking alcoholic beverages for 24 hours before and after surgery  - Stop or limit smoking 14 days before surgery  - You may take Tylenol for pain    Night before Surgery-NOTHING TO  DRINK OR EAT AFTER MIDNIGHT   - Take a shower or bath (shower is recommended).  Bathe with Hibiclens soap or an antibacterial soap from the neck down.  If not supplied by your surgeon, hibiclens soap will need to be purchased over the counter in pharmacy.  Rinse soap off thoroughly.  - Shampoo your hair with your regular shampoo    The Day of Surgery  - Take another bath or shower with hibiclens or any antibacterial soap, to reduce the chance of infection.  - Take heart and blood pressure medications with a small sip of water, as advised by the perioperative team.  - Do not take fluid pills  - You may brush your teeth and rinse your  mouth, but do not swallow any additional water.   - Do not apply perfumes, powder, body lotions or deodorant on the day of surgery.  - Nail polish should be removed.  - Do not wear makeup or moisturizer.  - Wear comfortable clothes, such as a button front shirt and loose fitting pants.  - Leave all jewelry, including body piercings, and valuables at home.    - Bring any devices you will neeed after surgery such as crutches or canes.  - If you have sleep apnea, please bring your CPAP machine.    In the event that your physical condition changes including the onset of a cold or respiratory illness, or if you have to delay or cancel your surgery, please notify your surgeon.  Anesthesia: Regional Anesthesia    Youre scheduled for surgery. During surgery, youll receive medicine called anesthesia to keep you comfortable and pain-free. Your surgeon has decided that youll receive regional anesthesia. This sheet tells you what to expect with this type of anesthesia.  What is regional anesthesia?  Regional anesthesia numbs one region of your body. The anesthesia may be given around nerves or into veins in your arms, neck, or legs (nerve block or [place] block). Or it may be sent into the spinal fluid (spinal anesthesia) or into the space just outside the spinal fluid (epidural anesthesia). You may also be given sedatives to help you relax.  Nerve block or [place] block  A small area of the body, such as an arm or leg, can be numbed using a nerve block or [place] block.  · Nerve block. During a nerve block, your skin is numbed. A needle is then inserted near nerves that serve the area to be numbed. Anesthetic is sent through the needle.  · IV regional or [place] block. For this type of block, an IV line is put into a vein. The blood flow to the area to be numbed is blocked for a short time. Anesthetic is sent through the IV.  Spinal anesthesia  Spinal anesthesia numbs your body from about the waist down.  · Anesthetic is injected into  the spinal fluid. This is a substance that surrounds the spinal cord in your spinal column. The anesthetic blocks pain traveling from the body to the brain.  · To receive the anesthetic, your skin is numbed at the injection site on your back.  · A needle is then inserted into the spinal space. Anesthetic is sent into the spinal fluid through the needle.  Epidural anesthesia  Epidural anesthesia is most commonly used during childbirth and may also be used after surgical procedures of the chest, belly, and legs.  · Anesthetic is injected into the epidural space. This is just outside the dural sac which contains the spinal fluid.  · To receive the anesthetic, your skin is numbed at the injection site on your back.  · A needle is then inserted into the epidural space. Anesthetic is sent into the epidural space through the needle.  · A small flexible catheter may be attached to the needle and left in place. This allows for continuous injections or infusions of anesthetic.  Anesthesia tools and medicines that might be near you during your procedure  · Local anesthetic. This medicine is given through a needle numbs one region of your body.  · Electrocardiography leads (electrodes). These are used to record your heart rate and rhythm.  · Blood pressure cuff. A cuff is placed on your arm to keep track of your blood pressure.  · Pulse oximeter. This small clip is placed on the end of the finger. It measures your blood oxygen level.  · Sedatives. These medicines may be given through an IV. They help to relax you and keep you comfortable. You may stay awake or sleep lightly.  · Oxygen. You may be given oxygen through a facemask.  Risks and possible complications  Regional anesthesia carries some risks. These include:  · Nausea and vomiting  · Headache  · Backache  · Decreased blood pressure  · Allergic reaction to the anesthetic  · Ongoing numbness (rare)  · Irregular heartbeat (rare)  · Cardiac arrest (rare)   Date Last  Reviewed: 12/1/2016  © 2938-7167 [employer] [address] [place] [address], [place] [address]. All rights reserved. This information is not intended as a substitute for professional medical care. Always follow your healthcare professional's instructions.

## 2020-09-22 NOTE — ASSESSMENT & PLAN NOTE
Current BP  at goal today. Taking Lopressor 25 mg and Altace 10 mg daily. 132/76 @ POC visit.   Patient reports occasional home BP readings of: usually  around 130s/70s  Encouraged keeping a healthy weight and BMI  Education was provided regarding lifestyle changes to reduce systolic BP;  Exercise 30 minutes per day,  5 days per week or 150 minutes weekly;  Sodium reduction and avoidance of high salt foods such as processed meats, frozen meals, fast foods.

## 2020-09-22 NOTE — ASSESSMENT & PLAN NOTE
Encouraged weight loss, healthy diet (DASH/Mediterranean) and exercise. Patient should exercise 30 minutes at least five times weekly. Limit alcohol.  Taking Crestor 20 mg daily.

## 2020-09-22 NOTE — LETTER
September 22, 2020      Chris Israel MD  [address]  [facility] [address]           [facility] [address]  [address] [facility] [address]  Phone: [phone]          Patient: Drake Barraza   MR Number: [id number]   YOB: 1945   Date of Visit: 9/22/2020       Dear Dr. Chris Israel:    Thank you for referring Drake Barraza to me for evaluation. Attached you will find relevant portions of my assessment and plan of care.    If you have questions, please do not hesitate to call me. I look forward to following Drake Barraza along with you.    Sincerely,    Zara Celeste, NP    Enclosure  CC:  No Recipients    If you would like to receive this communication electronically, please contact [email or web address] or [phone] to request more information on [employer] access.    For providers and/or their staff who would like to refer a patient to [facility], please contact us through our one-stop-shop provider referral line, [facility], [place], at [phone].    If you feel you have received this communication in error or would no longer like to receive these types of communications, please e-mail [email or web address]

## 2020-09-24 ENCOUNTER — PATIENT MESSAGE (OUTPATIENT)
Dept: ADMINISTRATIVE | Facility: OTHER | Age: 75
End: 2020-09-24

## 2020-09-25 ENCOUNTER — PATIENT OUTREACH (OUTPATIENT)
Dept: ENDOCRINOLOGY | Facility: CLINIC | Age: 75
End: 2020-09-25

## 2020-09-25 NOTE — PROGRESS NOTES
Recd in basket message from pre-op regarding scheduling a DM appt.  Spoke to patient and he wants to wait to make the appt until he has his PT schedule down.  He was provided with the phone number to call when he is ready.  Notified Referral coordinator team.

## 2020-09-28 ENCOUNTER — PATIENT MESSAGE (OUTPATIENT)
Dept: ADMINISTRATIVE | Facility: OTHER | Age: 75
End: 2020-09-28

## 2020-09-29 ENCOUNTER — PATIENT MESSAGE (OUTPATIENT)
Dept: OTHER | Facility: OTHER | Age: 75
End: 2020-09-29

## 2020-10-01 ENCOUNTER — ANESTHESIA EVENT (OUTPATIENT)
Dept: SURGERY | Facility: HOSPITAL | Age: 75
End: 2020-10-01
Payer: MEDICARE

## 2020-10-02 ENCOUNTER — LAB VISIT (OUTPATIENT)
Dept: SPORTS MEDICINE | Facility: CLINIC | Age: 75
End: 2020-10-02
Payer: MEDICARE

## 2020-10-02 DIAGNOSIS — Z01.818 PREOP TESTING: ICD-10-CM

## 2020-10-02 LAB — SARS-COV-2 RNA RESP QL NAA+PROBE: NOT DETECTED

## 2020-10-02 PROCEDURE — U0003 INFECTIOUS AGENT DETECTION BY NUCLEIC ACID (DNA OR RNA); SEVERE ACUTE RESPIRATORY SYNDROME CORONAVIRUS 2 (SARS-COV-2) (CORONAVIRUS DISEASE [COVID-19]), AMPLIFIED PROBE TECHNIQUE, MAKING USE OF HIGH THROUGHPUT TECHNOLOGIES AS DESCRIBED BY CMS-2020-01-R: HCPCS | Mod: HCNC

## 2020-10-03 DIAGNOSIS — Z96.652 STATUS POST TOTAL LEFT KNEE REPLACEMENT: Primary | ICD-10-CM

## 2020-10-03 RX ORDER — ASPIRIN 81 MG/1
81 TABLET ORAL 2 TIMES DAILY
Qty: 60 TABLET | Refills: 0 | Status: SHIPPED | OUTPATIENT
Start: 2020-10-03 | End: 2023-12-14

## 2020-10-03 RX ORDER — DEXTROMETHORPHAN HYDROBROMIDE, GUAIFENESIN 5; 100 MG/5ML; MG/5ML
650 LIQUID ORAL EVERY 8 HOURS PRN
Qty: 120 TABLET | Refills: 0 | Status: SHIPPED | OUTPATIENT
Start: 2020-10-03 | End: 2021-04-21

## 2020-10-03 RX ORDER — OXYCODONE HYDROCHLORIDE 5 MG/1
TABLET ORAL
Qty: 50 TABLET | Refills: 0 | Status: SHIPPED | OUTPATIENT
Start: 2020-10-03 | End: 2020-10-19 | Stop reason: SDUPTHER

## 2020-10-03 RX ORDER — MELOXICAM 15 MG/1
15 TABLET ORAL DAILY
Qty: 30 TABLET | Refills: 1 | Status: SHIPPED | OUTPATIENT
Start: 2020-10-03 | End: 2020-11-17

## 2020-10-03 RX ORDER — DOCUSATE SODIUM 100 MG/1
100 CAPSULE, LIQUID FILLED ORAL 2 TIMES DAILY PRN
Qty: 60 CAPSULE | Refills: 0 | Status: SHIPPED | OUTPATIENT
Start: 2020-10-03 | End: 2020-11-17

## 2020-10-05 ENCOUNTER — PATIENT MESSAGE (OUTPATIENT)
Dept: ADMINISTRATIVE | Facility: OTHER | Age: 75
End: 2020-10-05

## 2020-10-05 ENCOUNTER — ANESTHESIA (OUTPATIENT)
Dept: SURGERY | Facility: HOSPITAL | Age: 75
End: 2020-10-05
Payer: MEDICARE

## 2020-10-05 ENCOUNTER — HOSPITAL ENCOUNTER (OUTPATIENT)
Facility: HOSPITAL | Age: 75
Discharge: HOME | End: 2020-10-06
Attending: ORTHOPAEDIC SURGERY | Admitting: ORTHOPAEDIC SURGERY
Payer: MEDICARE

## 2020-10-05 DIAGNOSIS — M17.12 PRIMARY OSTEOARTHRITIS OF LEFT KNEE: ICD-10-CM

## 2020-10-05 DIAGNOSIS — M17.12 LEFT KNEE DJD: ICD-10-CM

## 2020-10-05 LAB
POCT GLUCOSE: 195 MG/DL (ref 70–110)
POCT GLUCOSE: 266 MG/DL (ref 70–110)

## 2020-10-05 PROCEDURE — 97535 SELF CARE MNGMENT TRAINING: CPT

## 2020-10-05 PROCEDURE — 94761 N-INVAS EAR/PLS OXIMETRY MLT: CPT

## 2020-10-05 PROCEDURE — 36000710: Performed by: ORTHOPAEDIC SURGERY

## 2020-10-05 PROCEDURE — 94799 UNLISTED PULMONARY SVC/PX: CPT

## 2020-10-05 PROCEDURE — C1713 ANCHOR/SCREW BN/BN,TIS/BN: HCPCS | Performed by: ORTHOPAEDIC SURGERY

## 2020-10-05 PROCEDURE — 94660 CPAP INITIATION&MGMT: CPT

## 2020-10-05 PROCEDURE — 25000003 PHARM REV CODE 250: Performed by: NURSE ANESTHETIST, CERTIFIED REGISTERED

## 2020-10-05 PROCEDURE — S0028 INJECTION, FAMOTIDINE, 20 MG: HCPCS | Performed by: NURSE ANESTHETIST, CERTIFIED REGISTERED

## 2020-10-05 PROCEDURE — 27447 PR TOTAL KNEE ARTHROPLASTY: ICD-10-PCS | Mod: LT,,, | Performed by: ORTHOPAEDIC SURGERY

## 2020-10-05 PROCEDURE — 99900035 HC TECH TIME PER 15 MIN (STAT)

## 2020-10-05 PROCEDURE — D9220A PRA ANESTHESIA: ICD-10-PCS | Mod: ANES,,, | Performed by: ANESTHESIOLOGY

## 2020-10-05 PROCEDURE — 97161 PT EVAL LOW COMPLEX 20 MIN: CPT

## 2020-10-05 PROCEDURE — 97165 OT EVAL LOW COMPLEX 30 MIN: CPT

## 2020-10-05 PROCEDURE — 27201423 OPTIME MED/SURG SUP & DEVICES STERILE SUPPLY: Performed by: ORTHOPAEDIC SURGERY

## 2020-10-05 PROCEDURE — D9220A PRA ANESTHESIA: Mod: CRNA,,, | Performed by: NURSE ANESTHETIST, CERTIFIED REGISTERED

## 2020-10-05 PROCEDURE — 76942 PR U/S GUIDANCE FOR NEEDLE GUIDANCE: ICD-10-PCS | Mod: 26,,, | Performed by: ANESTHESIOLOGY

## 2020-10-05 PROCEDURE — C1751 CATH, INF, PER/CENT/MIDLINE: HCPCS | Performed by: ANESTHESIOLOGY

## 2020-10-05 PROCEDURE — 37000008 HC ANESTHESIA 1ST 15 MINUTES: Performed by: ORTHOPAEDIC SURGERY

## 2020-10-05 PROCEDURE — 27000190 HC CPAP FULL FACE MASK W/VALVE

## 2020-10-05 PROCEDURE — D9220A PRA ANESTHESIA: ICD-10-PCS | Mod: CRNA,,, | Performed by: NURSE ANESTHETIST, CERTIFIED REGISTERED

## 2020-10-05 PROCEDURE — 71000033 HC RECOVERY, INTIAL HOUR: Performed by: ORTHOPAEDIC SURGERY

## 2020-10-05 PROCEDURE — 36000711: Performed by: ORTHOPAEDIC SURGERY

## 2020-10-05 PROCEDURE — 63600175 PHARM REV CODE 636 W HCPCS: Performed by: ORTHOPAEDIC SURGERY

## 2020-10-05 PROCEDURE — 64448 NJX AA&/STRD FEM NRV NFS IMG: CPT | Mod: 59,LT,, | Performed by: ANESTHESIOLOGY

## 2020-10-05 PROCEDURE — 71000039 HC RECOVERY, EACH ADD'L HOUR: Performed by: ORTHOPAEDIC SURGERY

## 2020-10-05 PROCEDURE — 94770 HC EXHALED C02 TEST: CPT

## 2020-10-05 PROCEDURE — 64448 NJX AA&/STRD FEM NRV NFS IMG: CPT | Mod: 59,LT | Performed by: ANESTHESIOLOGY

## 2020-10-05 PROCEDURE — 63600175 PHARM REV CODE 636 W HCPCS: Performed by: NURSE PRACTITIONER

## 2020-10-05 PROCEDURE — 25000003 PHARM REV CODE 250: Performed by: STUDENT IN AN ORGANIZED HEALTH CARE EDUCATION/TRAINING PROGRAM

## 2020-10-05 PROCEDURE — 27447 TOTAL KNEE ARTHROPLASTY: CPT | Mod: LT,,, | Performed by: ORTHOPAEDIC SURGERY

## 2020-10-05 PROCEDURE — 97116 GAIT TRAINING THERAPY: CPT

## 2020-10-05 PROCEDURE — 64448 PR NERVE BLOCK INJ, ANES/STEROID, FEMORAL, CONT INFUSION, INCL IMAG GUIDANCE: ICD-10-PCS | Mod: 59,LT,, | Performed by: ANESTHESIOLOGY

## 2020-10-05 PROCEDURE — 25000003 PHARM REV CODE 250: Performed by: NURSE PRACTITIONER

## 2020-10-05 PROCEDURE — 76942 ECHO GUIDE FOR BIOPSY: CPT | Performed by: ANESTHESIOLOGY

## 2020-10-05 PROCEDURE — 63600175 PHARM REV CODE 636 W HCPCS: Performed by: NURSE ANESTHETIST, CERTIFIED REGISTERED

## 2020-10-05 PROCEDURE — 63600175 PHARM REV CODE 636 W HCPCS: Performed by: ANESTHESIOLOGY

## 2020-10-05 PROCEDURE — 76942 ECHO GUIDE FOR BIOPSY: CPT | Mod: 26,,, | Performed by: ANESTHESIOLOGY

## 2020-10-05 PROCEDURE — D9220A PRA ANESTHESIA: Mod: ANES,,, | Performed by: ANESTHESIOLOGY

## 2020-10-05 PROCEDURE — C1776 JOINT DEVICE (IMPLANTABLE): HCPCS | Performed by: ORTHOPAEDIC SURGERY

## 2020-10-05 PROCEDURE — 37000009 HC ANESTHESIA EA ADD 15 MINS: Performed by: ORTHOPAEDIC SURGERY

## 2020-10-05 PROCEDURE — 97110 THERAPEUTIC EXERCISES: CPT

## 2020-10-05 DEVICE — IMPLANTABLE DEVICE: Type: IMPLANTABLE DEVICE | Site: KNEE | Status: FUNCTIONAL

## 2020-10-05 DEVICE — PSN ALL POLY PAT 32MM: Type: IMPLANTABLE DEVICE | Site: KNEE | Status: FUNCTIONAL

## 2020-10-05 DEVICE — TIBIAL NEXGEN PRECOAT STEM: Type: IMPLANTABLE DEVICE | Site: KNEE | Status: FUNCTIONAL

## 2020-10-05 DEVICE — CEMENT BONE W/GENT SIMPLEX HV: Type: IMPLANTABLE DEVICE | Site: KNEE | Status: FUNCTIONAL

## 2020-10-05 DEVICE — PLUG TAPER: Type: IMPLANTABLE DEVICE | Site: KNEE | Status: FUNCTIONAL

## 2020-10-05 RX ORDER — INSULIN ASPART 100 [IU]/ML
0-5 INJECTION, SOLUTION INTRAVENOUS; SUBCUTANEOUS
Status: DISCONTINUED | OUTPATIENT
Start: 2020-10-05 | End: 2020-10-06 | Stop reason: HOSPADM

## 2020-10-05 RX ORDER — DEXAMETHASONE SODIUM PHOSPHATE 10 MG/ML
INJECTION INTRAMUSCULAR; INTRAVENOUS
Status: DISPENSED
Start: 2020-10-05 | End: 2020-10-05

## 2020-10-05 RX ORDER — ACETAMINOPHEN 500 MG
1000 TABLET ORAL EVERY 6 HOURS
Status: DISCONTINUED | OUTPATIENT
Start: 2020-10-05 | End: 2020-10-06 | Stop reason: HOSPADM

## 2020-10-05 RX ORDER — MIDAZOLAM HYDROCHLORIDE 1 MG/ML
1 INJECTION INTRAMUSCULAR; INTRAVENOUS EVERY 5 MIN PRN
Status: DISCONTINUED | OUTPATIENT
Start: 2020-10-05 | End: 2020-10-05 | Stop reason: HOSPADM

## 2020-10-05 RX ORDER — LIDOCAINE HYDROCHLORIDE 10 MG/ML
1 INJECTION, SOLUTION EPIDURAL; INFILTRATION; INTRACAUDAL; PERINEURAL
Status: DISCONTINUED | OUTPATIENT
Start: 2020-10-05 | End: 2020-10-05 | Stop reason: HOSPADM

## 2020-10-05 RX ORDER — LEVOTHYROXINE SODIUM 125 UG/1
125 TABLET ORAL
Status: DISCONTINUED | OUTPATIENT
Start: 2020-10-06 | End: 2020-10-06 | Stop reason: HOSPADM

## 2020-10-05 RX ORDER — MORPHINE SULFATE 2 MG/ML
2 INJECTION, SOLUTION INTRAMUSCULAR; INTRAVENOUS
Status: DISCONTINUED | OUTPATIENT
Start: 2020-10-05 | End: 2020-10-06 | Stop reason: HOSPADM

## 2020-10-05 RX ORDER — ONDANSETRON 2 MG/ML
INJECTION INTRAMUSCULAR; INTRAVENOUS
Status: DISCONTINUED | OUTPATIENT
Start: 2020-10-05 | End: 2020-10-05

## 2020-10-05 RX ORDER — CEFAZOLIN SODIUM 1 G/3ML
INJECTION, POWDER, FOR SOLUTION INTRAMUSCULAR; INTRAVENOUS
Status: DISCONTINUED | OUTPATIENT
Start: 2020-10-05 | End: 2020-10-05

## 2020-10-05 RX ORDER — OXYCODONE HYDROCHLORIDE 5 MG/1
5 TABLET ORAL
Status: DISCONTINUED | OUTPATIENT
Start: 2020-10-05 | End: 2020-10-06 | Stop reason: HOSPADM

## 2020-10-05 RX ORDER — TRANEXAMIC ACID 100 MG/ML
INJECTION, SOLUTION INTRAVENOUS
Status: DISCONTINUED | OUTPATIENT
Start: 2020-10-05 | End: 2020-10-05

## 2020-10-05 RX ORDER — EPINEPHRINE 1 MG/ML
INJECTION, SOLUTION INTRACARDIAC; INTRAMUSCULAR; INTRAVENOUS; SUBCUTANEOUS
Status: DISPENSED
Start: 2020-10-05 | End: 2020-10-05

## 2020-10-05 RX ORDER — MIDAZOLAM HYDROCHLORIDE 1 MG/ML
INJECTION, SOLUTION INTRAMUSCULAR; INTRAVENOUS
Status: DISCONTINUED | OUTPATIENT
Start: 2020-10-05 | End: 2020-10-05

## 2020-10-05 RX ORDER — AMOXICILLIN 250 MG
1 CAPSULE ORAL 2 TIMES DAILY
Status: DISCONTINUED | OUTPATIENT
Start: 2020-10-05 | End: 2020-10-06 | Stop reason: HOSPADM

## 2020-10-05 RX ORDER — DEXAMETHASONE SODIUM PHOSPHATE 4 MG/ML
INJECTION, SOLUTION INTRA-ARTICULAR; INTRALESIONAL; INTRAMUSCULAR; INTRAVENOUS; SOFT TISSUE
Status: DISCONTINUED | OUTPATIENT
Start: 2020-10-05 | End: 2020-10-05

## 2020-10-05 RX ORDER — ROPIVACAINE HYDROCHLORIDE 2 MG/ML
6 INJECTION, SOLUTION EPIDURAL; INFILTRATION; PERINEURAL CONTINUOUS
Status: DISCONTINUED | OUTPATIENT
Start: 2020-10-05 | End: 2020-10-06 | Stop reason: HOSPADM

## 2020-10-05 RX ORDER — FENTANYL CITRATE 50 UG/ML
25 INJECTION, SOLUTION INTRAMUSCULAR; INTRAVENOUS EVERY 5 MIN PRN
Status: DISCONTINUED | OUTPATIENT
Start: 2020-10-05 | End: 2020-10-05 | Stop reason: HOSPADM

## 2020-10-05 RX ORDER — CEFAZOLIN SODIUM 1 G/3ML
2 INJECTION, POWDER, FOR SOLUTION INTRAMUSCULAR; INTRAVENOUS
Status: DISCONTINUED | OUTPATIENT
Start: 2020-10-05 | End: 2020-10-05 | Stop reason: HOSPADM

## 2020-10-05 RX ORDER — GLUCAGON 1 MG
1 KIT INJECTION
Status: DISCONTINUED | OUTPATIENT
Start: 2020-10-05 | End: 2020-10-06 | Stop reason: HOSPADM

## 2020-10-05 RX ORDER — NALOXONE HCL 0.4 MG/ML
0.02 VIAL (ML) INJECTION
Status: DISCONTINUED | OUTPATIENT
Start: 2020-10-05 | End: 2020-10-06 | Stop reason: HOSPADM

## 2020-10-05 RX ORDER — SODIUM CHLORIDE 9 MG/ML
INJECTION, SOLUTION INTRAVENOUS CONTINUOUS
Status: ACTIVE | OUTPATIENT
Start: 2020-10-05 | End: 2020-10-06

## 2020-10-05 RX ORDER — CLONIDINE 100 UG/ML
INJECTION, SOLUTION EPIDURAL
Status: DISPENSED
Start: 2020-10-05 | End: 2020-10-05

## 2020-10-05 RX ORDER — ACETAMINOPHEN 500 MG
1000 TABLET ORAL
Status: COMPLETED | OUTPATIENT
Start: 2020-10-05 | End: 2020-10-05

## 2020-10-05 RX ORDER — PREGABALIN 75 MG/1
75 CAPSULE ORAL
Status: COMPLETED | OUTPATIENT
Start: 2020-10-05 | End: 2020-10-05

## 2020-10-05 RX ORDER — KETAMINE HCL IN 0.9 % NACL 50 MG/5 ML
SYRINGE (ML) INTRAVENOUS
Status: DISCONTINUED | OUTPATIENT
Start: 2020-10-05 | End: 2020-10-05

## 2020-10-05 RX ORDER — TRANEXAMIC ACID 100 MG/ML
1000 INJECTION, SOLUTION INTRAVENOUS
Status: DISCONTINUED | OUTPATIENT
Start: 2020-10-05 | End: 2020-10-05 | Stop reason: HOSPADM

## 2020-10-05 RX ORDER — PROPOFOL 10 MG/ML
VIAL (ML) INTRAVENOUS CONTINUOUS PRN
Status: DISCONTINUED | OUTPATIENT
Start: 2020-10-05 | End: 2020-10-05

## 2020-10-05 RX ORDER — MUPIROCIN 20 MG/G
1 OINTMENT TOPICAL
Status: COMPLETED | OUTPATIENT
Start: 2020-10-05 | End: 2020-10-05

## 2020-10-05 RX ORDER — ASPIRIN 81 MG/1
81 TABLET ORAL 2 TIMES DAILY
Status: DISCONTINUED | OUTPATIENT
Start: 2020-10-05 | End: 2020-10-06 | Stop reason: HOSPADM

## 2020-10-05 RX ORDER — ROPIVACAINE HYDROCHLORIDE 5 MG/ML
INJECTION, SOLUTION EPIDURAL; INFILTRATION; PERINEURAL
Status: DISPENSED
Start: 2020-10-05 | End: 2020-10-05

## 2020-10-05 RX ORDER — ONDANSETRON 2 MG/ML
4 INJECTION INTRAMUSCULAR; INTRAVENOUS EVERY 8 HOURS PRN
Status: DISCONTINUED | OUTPATIENT
Start: 2020-10-05 | End: 2020-10-06 | Stop reason: HOSPADM

## 2020-10-05 RX ORDER — CELECOXIB 200 MG/1
200 CAPSULE ORAL DAILY
Status: DISCONTINUED | OUTPATIENT
Start: 2020-10-06 | End: 2020-10-06 | Stop reason: HOSPADM

## 2020-10-05 RX ORDER — FAMOTIDINE 10 MG/ML
INJECTION INTRAVENOUS
Status: DISCONTINUED | OUTPATIENT
Start: 2020-10-05 | End: 2020-10-05

## 2020-10-05 RX ORDER — BISACODYL 10 MG
10 SUPPOSITORY, RECTAL RECTAL EVERY 12 HOURS PRN
Status: DISCONTINUED | OUTPATIENT
Start: 2020-10-05 | End: 2020-10-06 | Stop reason: HOSPADM

## 2020-10-05 RX ORDER — SODIUM CHLORIDE 0.9 % (FLUSH) 0.9 %
10 SYRINGE (ML) INJECTION
Status: DISCONTINUED | OUTPATIENT
Start: 2020-10-05 | End: 2020-10-05 | Stop reason: HOSPADM

## 2020-10-05 RX ORDER — OXYCODONE HYDROCHLORIDE 10 MG/1
10 TABLET ORAL
Status: DISCONTINUED | OUTPATIENT
Start: 2020-10-05 | End: 2020-10-06 | Stop reason: HOSPADM

## 2020-10-05 RX ORDER — IBUPROFEN 200 MG
24 TABLET ORAL
Status: DISCONTINUED | OUTPATIENT
Start: 2020-10-05 | End: 2020-10-06 | Stop reason: HOSPADM

## 2020-10-05 RX ORDER — CEFAZOLIN SODIUM 1 G/3ML
2 INJECTION, POWDER, FOR SOLUTION INTRAMUSCULAR; INTRAVENOUS
Status: DISPENSED | OUTPATIENT
Start: 2020-10-05 | End: 2020-10-05

## 2020-10-05 RX ORDER — PREGABALIN 75 MG/1
75 CAPSULE ORAL NIGHTLY
Status: DISCONTINUED | OUTPATIENT
Start: 2020-10-05 | End: 2020-10-06 | Stop reason: HOSPADM

## 2020-10-05 RX ORDER — SODIUM CHLORIDE 9 MG/ML
INJECTION, SOLUTION INTRAVENOUS CONTINUOUS PRN
Status: DISCONTINUED | OUTPATIENT
Start: 2020-10-05 | End: 2020-10-05

## 2020-10-05 RX ORDER — ROPIVACAINE HYDROCHLORIDE 2 MG/ML
5 INJECTION, SOLUTION EPIDURAL; INFILTRATION; PERINEURAL ONCE
Status: COMPLETED | OUTPATIENT
Start: 2020-10-05 | End: 2020-10-05

## 2020-10-05 RX ORDER — RAMIPRIL 10 MG/1
10 CAPSULE ORAL DAILY
Status: DISCONTINUED | OUTPATIENT
Start: 2020-10-06 | End: 2020-10-06 | Stop reason: HOSPADM

## 2020-10-05 RX ORDER — SODIUM CHLORIDE 9 MG/ML
INJECTION, SOLUTION INTRAVENOUS
Status: COMPLETED | OUTPATIENT
Start: 2020-10-05 | End: 2020-10-05

## 2020-10-05 RX ORDER — VANCOMYCIN HYDROCHLORIDE 1 G/20ML
INJECTION, POWDER, LYOPHILIZED, FOR SOLUTION INTRAVENOUS
Status: DISCONTINUED | OUTPATIENT
Start: 2020-10-05 | End: 2020-10-05 | Stop reason: HOSPADM

## 2020-10-05 RX ORDER — FAMOTIDINE 20 MG/1
20 TABLET, FILM COATED ORAL 2 TIMES DAILY
Status: DISCONTINUED | OUTPATIENT
Start: 2020-10-05 | End: 2020-10-06 | Stop reason: HOSPADM

## 2020-10-05 RX ORDER — METOPROLOL TARTRATE 25 MG/1
25 TABLET, FILM COATED ORAL 2 TIMES DAILY
Status: DISCONTINUED | OUTPATIENT
Start: 2020-10-05 | End: 2020-10-06 | Stop reason: HOSPADM

## 2020-10-05 RX ORDER — PHENYLEPHRINE HYDROCHLORIDE 10 MG/ML
INJECTION INTRAVENOUS
Status: DISCONTINUED | OUTPATIENT
Start: 2020-10-05 | End: 2020-10-05

## 2020-10-05 RX ORDER — POLYETHYLENE GLYCOL 3350 17 G/17G
17 POWDER, FOR SOLUTION ORAL DAILY
Status: DISCONTINUED | OUTPATIENT
Start: 2020-10-05 | End: 2020-10-06 | Stop reason: HOSPADM

## 2020-10-05 RX ORDER — MUPIROCIN 20 MG/G
1 OINTMENT TOPICAL 2 TIMES DAILY
Status: DISCONTINUED | OUTPATIENT
Start: 2020-10-05 | End: 2020-10-06 | Stop reason: HOSPADM

## 2020-10-05 RX ORDER — TALC
6 POWDER (GRAM) TOPICAL NIGHTLY PRN
Status: DISCONTINUED | OUTPATIENT
Start: 2020-10-05 | End: 2020-10-05 | Stop reason: HOSPADM

## 2020-10-05 RX ORDER — CELECOXIB 200 MG/1
400 CAPSULE ORAL
Status: COMPLETED | OUTPATIENT
Start: 2020-10-05 | End: 2020-10-05

## 2020-10-05 RX ORDER — IBUPROFEN 200 MG
16 TABLET ORAL
Status: DISCONTINUED | OUTPATIENT
Start: 2020-10-05 | End: 2020-10-06 | Stop reason: HOSPADM

## 2020-10-05 RX ADMIN — PHENYLEPHRINE HYDROCHLORIDE 100 MCG: 10 INJECTION INTRAVENOUS at 07:10

## 2020-10-05 RX ADMIN — OXYCODONE 5 MG: 5 TABLET ORAL at 01:10

## 2020-10-05 RX ADMIN — DOCUSATE SODIUM 50MG AND SENNOSIDES 8.6MG 1 TABLET: 8.6; 5 TABLET, FILM COATED ORAL at 09:10

## 2020-10-05 RX ADMIN — ACETAMINOPHEN 1000 MG: 500 TABLET ORAL at 05:10

## 2020-10-05 RX ADMIN — Medication 30 MG: at 07:10

## 2020-10-05 RX ADMIN — PROPOFOL 50 MCG/KG/MIN: 10 INJECTION, EMULSION INTRAVENOUS at 07:10

## 2020-10-05 RX ADMIN — OXYCODONE 5 MG: 5 TABLET ORAL at 04:10

## 2020-10-05 RX ADMIN — FAMOTIDINE 20 MG: 20 TABLET, FILM COATED ORAL at 09:10

## 2020-10-05 RX ADMIN — CELECOXIB 400 MG: 200 CAPSULE ORAL at 05:10

## 2020-10-05 RX ADMIN — VANCOMYCIN HYDROCHLORIDE 1500 MG: 1.5 INJECTION, POWDER, LYOPHILIZED, FOR SOLUTION INTRAVENOUS at 05:10

## 2020-10-05 RX ADMIN — SODIUM CHLORIDE: 0.9 INJECTION, SOLUTION INTRAVENOUS at 10:10

## 2020-10-05 RX ADMIN — PHENYLEPHRINE HYDROCHLORIDE 50 MCG: 10 INJECTION INTRAVENOUS at 08:10

## 2020-10-05 RX ADMIN — SODIUM CHLORIDE, SODIUM GLUCONATE, SODIUM ACETATE, POTASSIUM CHLORIDE, MAGNESIUM CHLORIDE, SODIUM PHOSPHATE, DIBASIC, AND POTASSIUM PHOSPHATE: .53; .5; .37; .037; .03; .012; .00082 INJECTION, SOLUTION INTRAVENOUS at 07:10

## 2020-10-05 RX ADMIN — PHENYLEPHRINE HYDROCHLORIDE 100 MCG: 10 INJECTION INTRAVENOUS at 08:10

## 2020-10-05 RX ADMIN — ACETAMINOPHEN 1000 MG: 500 TABLET ORAL at 06:10

## 2020-10-05 RX ADMIN — FENTANYL CITRATE 25 MCG: 50 INJECTION INTRAMUSCULAR; INTRAVENOUS at 11:10

## 2020-10-05 RX ADMIN — OXYCODONE 5 MG: 5 TABLET ORAL at 10:10

## 2020-10-05 RX ADMIN — DOCUSATE SODIUM 50MG AND SENNOSIDES 8.6MG 1 TABLET: 8.6; 5 TABLET, FILM COATED ORAL at 02:10

## 2020-10-05 RX ADMIN — TRANEXAMIC ACID 1000 MG: 100 INJECTION, SOLUTION INTRAVENOUS at 07:10

## 2020-10-05 RX ADMIN — TRANEXAMIC ACID 1000 MG: 100 INJECTION, SOLUTION INTRAVENOUS at 08:10

## 2020-10-05 RX ADMIN — ONDANSETRON 4 MG: 2 INJECTION, SOLUTION INTRAMUSCULAR; INTRAVENOUS at 07:10

## 2020-10-05 RX ADMIN — SODIUM CHLORIDE: 0.9 INJECTION, SOLUTION INTRAVENOUS at 07:10

## 2020-10-05 RX ADMIN — ACETAMINOPHEN 1000 MG: 500 TABLET ORAL at 01:10

## 2020-10-05 RX ADMIN — SODIUM CHLORIDE: 0.9 INJECTION, SOLUTION INTRAVENOUS at 05:10

## 2020-10-05 RX ADMIN — ROPIVACAINE HYDROCHLORIDE 8 ML/HR: 2 INJECTION, SOLUTION EPIDURAL; INFILTRATION at 10:10

## 2020-10-05 RX ADMIN — POLYETHYLENE GLYCOL 3350 17 G: 17 POWDER, FOR SOLUTION ORAL at 12:10

## 2020-10-05 RX ADMIN — MUPIROCIN 1 G: 20 OINTMENT TOPICAL at 02:10

## 2020-10-05 RX ADMIN — MIDAZOLAM HYDROCHLORIDE 2 MG: 1 INJECTION, SOLUTION INTRAMUSCULAR; INTRAVENOUS at 07:10

## 2020-10-05 RX ADMIN — ROPIVACAINE HYDROCHLORIDE 5 ML: 2 INJECTION, SOLUTION EPIDURAL; INFILTRATION at 11:10

## 2020-10-05 RX ADMIN — INSULIN ASPART 1 UNITS: 100 INJECTION, SOLUTION INTRAVENOUS; SUBCUTANEOUS at 09:10

## 2020-10-05 RX ADMIN — FENTANYL CITRATE 100 MCG: 50 INJECTION INTRAMUSCULAR; INTRAVENOUS at 06:10

## 2020-10-05 RX ADMIN — CEFAZOLIN 2 G: 330 INJECTION, POWDER, FOR SOLUTION INTRAMUSCULAR; INTRAVENOUS at 07:10

## 2020-10-05 RX ADMIN — FAMOTIDINE 20 MG: 10 INJECTION, SOLUTION INTRAVENOUS at 07:10

## 2020-10-05 RX ADMIN — FAMOTIDINE 20 MG: 20 TABLET, FILM COATED ORAL at 12:10

## 2020-10-05 RX ADMIN — PREGABALIN 75 MG: 75 CAPSULE ORAL at 09:10

## 2020-10-05 RX ADMIN — MIDAZOLAM HYDROCHLORIDE 2 MG: 1 INJECTION, SOLUTION INTRAMUSCULAR; INTRAVENOUS at 06:10

## 2020-10-05 RX ADMIN — ASPIRIN 81 MG: 81 TABLET, COATED ORAL at 09:10

## 2020-10-05 RX ADMIN — MUPIROCIN 1 G: 20 OINTMENT TOPICAL at 09:10

## 2020-10-05 RX ADMIN — METOPROLOL TARTRATE 25 MG: 25 TABLET, FILM COATED ORAL at 05:10

## 2020-10-05 RX ADMIN — PREGABALIN 75 MG: 75 CAPSULE ORAL at 05:10

## 2020-10-05 RX ADMIN — DEXAMETHASONE SODIUM PHOSPHATE 8 MG: 4 INJECTION, SOLUTION INTRAMUSCULAR; INTRAVENOUS at 07:10

## 2020-10-05 RX ADMIN — MUPIROCIN 1 G: 20 OINTMENT TOPICAL at 05:10

## 2020-10-05 RX ADMIN — CEFAZOLIN 2 G: 330 INJECTION, POWDER, FOR SOLUTION INTRAMUSCULAR; INTRAVENOUS at 02:10

## 2020-10-05 NOTE — HPI
CC: Left knee pain     Drake Barraza is a 75 y.o. male with history of Left knee pain. Pain is worse with activity and weight bearing.  Patient has experienced interference of activities of daily living due to decreased range of motion and an increase in joint pain and swelling.  Patient has failed non-operative treatment including NSAIDs, corticosteroid injections, viscosupplement injections, and activity modification.  Drake Barraza currently ambulates independently.      Relevant medical conditions of significance in perioperative period:  CAD with stent- followed an managed medically by cardiology  HTN- on medication managed by cardiology  HLD- on medication managed by cardiology

## 2020-10-05 NOTE — ASSESSMENT & PLAN NOTE
75 y.o. male s/p L TKA on 10/5 by Dr. Israel    OR plan:  done  Nerve block:  L adductor canal PNC, currently at 6cc/hr  WBS:  WBAT LLE  DVT ppx:  ASA 81mg BID while inpatient and for 30 days at discharge  PT/OT:  Outpatient PT at [facility]  Labs: POC glucose 165-266  Cultures:  none  Abx:  periop ancef   Drain:  none  Ross:  none  Dispo:  Home today  F/u:  [facility] 10/19         Attending Only

## 2020-10-05 NOTE — PLAN OF CARE
Problem: Physical Therapy Goal  Goal: Physical Therapy Goal  Description: Goals to be met by: 10/7/2020     Patient will increase functional independence with mobility by performin. Supine to sit with supervision  2. Sit to stand transfer with Supervision with rolling walker  3. Bed to chair transfer with Supervision using Rolling Walker  4. Pt to amb 175 ft with SBA with RW WBAT LLE  5. Ascend/Descend 6 inch curb step with Contact Guard Assistance using Rolling Walker.  6. Lower extremity exercise program x 20 reps per handout, with assistance as needed  7. CGA to perform simulated car running board transfer with RW    Outcome: Ongoing, Progressing   PT evaluation performed and plan of care initiated.  Jahaira Harman PT  10/5/2020

## 2020-10-05 NOTE — ANESTHESIA POSTPROCEDURE EVALUATION
Anesthesia Post Evaluation    Patient: Drake Barraza    Procedure(s) Performed: Procedure(s) (LRB):  ARTHROPLASTY, KNEE, TOTAL Jayy NexGen Cemented Tibia (Left)    Final Anesthesia Type: general    Patient location during evaluation: PACU  Patient participation: Yes- Able to Participate  Level of consciousness: awake and alert  Post-procedure vital signs: reviewed and stable  Pain management: adequate  Airway patency: patent    PONV status at discharge: No PONV  Anesthetic complications: no      Cardiovascular status: blood pressure returned to baseline  Respiratory status: unassisted  Hydration status: euvolemic  Follow-up not needed.          Vitals Value Taken Time   /84 10/05/20 1149   Temp 36.6 °C (97.8 °F) 10/05/20 1317   Pulse 90 10/05/20 1149   Resp 16 10/05/20 1317   SpO2 97 % 10/05/20 1149         Event Time   Out of Recovery 11:30:00         Pain/Francine Score: Pain Rating Prior to Med Admin: 5 (10/5/2020  1:17 PM)  Pain Rating Post Med Admin: 4 (10/5/2020 11:30 AM)  Francine Score: 10 (10/5/2020 10:30 AM)

## 2020-10-05 NOTE — PT/OT/SLP EVAL
Occupational Therapy   Evaluation    Name: Drake Barraza  MRN: [id number]  Admitting Diagnosis:  Left knee DJD Day of Surgery    Recommendations:     Discharge Recommendations: home  Discharge Equipment Recommendations:  none  Barriers to discharge:  None    Assessment:     Drake Barraza is a 75 y.o. male with a medical diagnosis of Left knee DJD.  He was able to perform sit/stand T/F and walk to bathroom c CGA and RW.  Performed toilet hygiene and grooming c set-up and UB dressing c total assist.  B UE are WFL.  Pt c c/o pain and LE weakness but was able to tolerate assessment well. Performance deficits affecting function: impaired self care skills, impaired functional mobilty, orthopedic precautions.      Rehab Prognosis: Good; patient would benefit from acute skilled OT services to address these deficits and reach maximum level of function.       Plan:     Patient to be seen daily to address the above listed problems via self-care/home management, therapeutic activities, therapeutic exercises  · Plan of Care Expires: 10/06/20  · Plan of Care Reviewed with: patient, son    Subjective     Chief Complaint: Pt is s/p L TKA and is WBAT L LE  Patient/Family Comments/goals: To get better.    Occupational Profile:  Living Environment: Pt lives in a one story house c 1 step to an alcove and then 1 more JOSE house.  Has a tub/shower combo.  Previous level of function: I PTA  Roles and Routines: Retired  Equipment Used at Home:  walker, rolling, bedside commode, bath bench, shower chair  Assistance upon Discharge: Wife    Pain/Comfort:  · Pain Rating 1: 8/10    Patients cultural, spiritual, Yazdanism conflicts given the current situation: no    Objective:     Communicated with: RN prior to session.  Patient found up in chair with cryotherapy, perineural catheter, peripheral IV upon OT entry to room.    General Precautions: Standard, fall   Orthopedic Precautions:LLE weight bearing as tolerated   Braces: N/A      Occupational Performance:    Functional Mobility/Transfers:  · Patient completed Sit <> Stand Transfer with contact guard assistance  with  rolling walker   · Patient completed Toilet Transfer Stand Pivot technique with contact guard assistance with  rolling walker  · Functional Mobility: Pt was able to walk to bathroom and then to sink c CGA and RW.  Has good static/dynamic standing balance at this time.    Activities of Daily Living:  · Grooming: stand by assistance to wash hands while standing at sink.  · Upper Body Dressing: total assistance to [facility] gown.  · Toileting: stand by assistance for toilet hygiene.    Cognitive/Visual Perceptual:  Cognitive/Psychosocial Skills:     -       Oriented to: Person, Place, Time and Situation   -       Follows Commands/attention:Follows multistep  commands    Physical Exam:  Upper Extremity Range of Motion:     -       Right Upper Extremity: WFL  -       Left Upper Extremity: WFL  Upper Extremity Strength:    -       Right Upper Extremity: WFL  -       Left Upper Extremity: WFL    AMPAC 6 Click ADL:  AMPAC Total Score: 15  Education:    Patient left up in chair with all lines intact, call button in reach, RN notified and son present    GOALS:   Multidisciplinary Problems     Occupational Therapy Goals        Problem: Occupational Therapy Goal    Goal Priority Disciplines Outcome Interventions   Occupational Therapy Goal     OT, PT/OT Ongoing, Progressing    Description: Goals to be met by: 10/19/20     Patient will increase functional independence with ADLs by performing:    UE Dressing with [place].  LE Dressing with Modified [place] and Assistive Devices as needed.  Grooming while standing at sink with Modified [place].  Toileting from bedside commode with Modified [place] for hygiene and clothing management.   Bathing from  shower chair/bench with Modified [place].  Toilet transfer to bedside commode with Modified  [place].  Increased functional strength to WFL for B UE.  Upper extremity exercise program x15 reps per handout, with assistance as needed.                     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., MD;  [facility];  Service: Orthopedics;  Laterality: Right;       Time Tracking:     OT Date of Treatment: 10/05/20  OT Start Time: 1345  OT Stop Time: 1412  OT Total Time (min): 27 min    Billable Minutes:Evaluation 14  Self Care/Home Management 13    KATHI Stewart  10/5/2020

## 2020-10-05 NOTE — OP NOTE
Lindy Left TKA  OPERATIVE NOTE    Date of Operation:   10/5/2020    Providers Performing:   Surgeon(s):  Chris Israel MD  Assistant: Macy Romo MD    Operative Procedure:   Left Total Knee Arthroplasty, CPT 46776    Preoperative Diagnosis:   Left Knee Osteoarthritis, ICD-10 M17.12    Postoperative Diagnosis:   SAME    Components Used:     Implant Name Type Inv. Item Serial No.  Lot No. LRB No. Used Action   CEMENT BONE W/GENT SIMPLEX HV - [id number]  CEMENT BONE W/GENT SIMPLEX HV  [employer]. [id number] Left 2 Implanted   BONE SCREW SELF TAPPING 6.5MM DIAMETER 30MM LENGTH    SOPHIA,INC [id number] Left 1 Implanted   NEXGEN COMPLETE KNEE SOLUTION LEGACY KNEE POSTEIOR STABILIZED PROLONG HIGHLY CROSSLINKED POLYETHYLENE  SIZE C D    SOPHIA,INC [id number] Left 1 Implanted   TIBIAL NEXGEN PRECOAT STEM - [id number]  TIBIAL NEXGEN PRECOAT STEM  SOPHIA,INC [id number] Left 1 Implanted   FEMORAL POST STBLZD SZ D LEFT - [id number]  FEMORAL POST STBLZD SZ D LEFT  SOPHIA,INC [id number] Left 1 Implanted   PLUG TAPER - [id number]  PLUG TAPER  SOPHIA,INC [id number] Left 1 Implanted   PSN ALL POLY PAT 32MM - [id number]  PSN ALL POLY PAT 32MM  SOPHIA,INC [id number] Left 1 Implanted       Anesthesia:   Spinal  Adductor canal block with catheter    Estimated Blood Loss:   100 cc    Specimens:   None    Complications:   None    Indications:   The patient has failed non-operative measures including medications, injections and activity modifications for their left knee. Previous successful right TKA last year.  After an explanation of risks and benefits of knee arthroplasty surgery, the patient wishes to proceed with surgery.    Operative Notes:   PROCEDURE IN DETAIL: After the induction of satisfactory anesthesia, the patient's left lower extremity was prepped and draped in the usual sterile fashion with the tourniquet cuff in place. The extremity was exsanguinated with an Esmarch bandage, and the tourniquet inflated. An  anterior midline incision was made, and the dissection was carried sharply through the subcutaneous tissues and prepatellar bursa layer which was reflected medially. A modified medial parapatellar arthrotomy was performed, and the patella was subluxated laterally. There was a large amount of clear joint fluid. Most of the fat pad was excised, and the medial release was completed around to the mid coronal point. A drill hole was made in the distal femur, and the canal was suctioned. The sword was placed in 5 degrees of valgus. The distal femoral cut was made. These wafers of bone were removed, and the PCL and ACL were released with the Bovie. The knee was hyperflexed, and a drill hole was made in the footprint of the ACL. The canal was suctioned. An intramedullary alignment stacy was placed with good purchase in the isthmus. The upper tibial cut was made perpendicular in both planes. This wafer of bone was excised, and the extension gap was checked and found to be symmetric and satisfactory. The femur was sized, and the secondary femoral cuts were made in slight external rotation. The flexion gap was checked after removing cruciate and meniscal remnants, and was found to be satisfactory with a spacer block. Flexion and extension gaps were symmetric. The femoral finishing guide was used, and trials were inserted. We used a tibial baseplate template which gave us good coverage. The rotational position of it was marked with the Bovie. The patella was measured with a caliper and a free hand cut was made such that the post reconstruction thickness would equal precut thickness. Three holes were drilled for an all poly patella. The trials were removed, and the tibia was prepared. The bone was cleaned with pulsatile lavage and dried thoroughly. The components were impacted in the usual fashion with Simplex HV cement. Patellar tracking was central without lateral release. A lateral patellaplasty was performed. The knee was  irrigated with Betadine and 3 liters of pulsatile lavage.  The arthrotomy was repaired with interrupted figure-of-eight and running sutures of #1 Vicryl. The prepatellar bursa tissue was closed with interrupted 2-0 Vicryl. The skin was closed with interrupted, inverted 2-0 Vicryl, running 4-0 Monocryl, and Dermabond. A Dockery dressing was applied, and the patient was taken to the PACU in stable condition without apparent orthopedic or anesthesia.    Sponge and needle counts were correct.    The first-assistant was critical to all steps of the operation, including retraction and leg stabilization during exposure and bone preparation, as well as the deep and superficial wound closure.  Dr. Israel performed and/or supervised the key portions of this surgical procedure, including evaluation of the bone cuts, reshaping of the bony elements, and insertion of the provisional and final components.    Postoperative Plan:  The patient will be anticoagulated with 81mg aspirin twice daily for one month.   They will receive 24 hours of post-operative antibiotics.    Activity will be weight bearing as tolerated.    Signed by: Chris Israel MD

## 2020-10-05 NOTE — PLAN OF CARE
Pre-op complete, all questions answered, pt is stable and ready for next phase of care. Pt states he has a walker. Pt's belongings are with his son .

## 2020-10-05 NOTE — PROGRESS NOTES
Acute Pain Service and Perioperative Surgical Home Progress Note    HPI  Drake Barraza is a 75 y.o., male    Interval history  No acute events overnight.     Surgery:  Procedure(s) (LRB):  ARTHROPLASTY, KNEE, TOTAL Jayy NexGen Cemented Tibia (Left)    Post Op Day #: 1    Catheter type: Perineural Adductor Canal    Infusion type: Ropivacaine 0.2%  6 ml/hr basal    Problem List:    Active Hospital Problems    Diagnosis  POA    *Left knee DJD [M17.12]  Yes      Resolved Hospital Problems   No resolved problems to display.       Subjective:       General appearance of alert, oriented, no complaints   Pain with rest: 4    Numbers   Pain with movement: 5    Numbers   Side Effects    1. Pruritis No    2. Nausea No    3. Motor Blockade No, 0=Ability to raise lower extremities off bed    4. Sedation No, 1=awake and alert    Schedule Medications:    acetaminophen  1,000 mg Oral Q6H    aspirin  81 mg Oral BID    celecoxib  200 mg Oral Daily    famotidine  20 mg Oral BID    levothyroxine  125 mcg Oral Before breakfast    metoprolol tartrate  25 mg Oral BID    mupirocin  1 g Nasal BID    polyethylene glycol  17 g Oral Daily    pregabalin  75 mg Oral QHS    ramipriL  10 mg Oral Daily    senna-docusate 8.6-50 mg  1 tablet Oral BID        Continuous Infusions:   sodium chloride 0.9% 150 mL/hr at 10/05/20 1001    ropivacaine (PF) 2 mg/ml (0.2%) 6 mL/hr (10/06/20 0009)    ropivacaine          PRN Medications:  bisacodyL, dextrose 50%, dextrose 50%, glucagon (human recombinant), glucose, glucose, insulin aspart U-100, morphine, naloxone, ondansetron, oxyCODONE, oxyCODONE, promethazine (PHENERGAN) IVPB, ropivacaine       Antibiotics:  Antibiotics (From admission, onward)    Start     Stop Route Frequency Ordered    10/05/20 1215  mupirocin 2 % ointment 1 g      10/10 0859 Nasl 2 times daily 10/05/20 1210    10/05/20 0918  ceFAZolin injection 2 g  (MEDICATIONS - ANTIBIOTICS NO PENICILLIN ALLERGY TOTAL KNEE PATHWAY  POST-OP)      10/05 2259 IV Every 8 hours (non-standard times) 10/05/20 0918             Objective:     Catheter site clean, dry, intact          Vital Signs (Most Recent):  Temp: 98.5 °F (36.9 °C) (10/06/20 0436)  Pulse: 70 (10/06/20 0436)  Resp: 16 (10/06/20 0436)  BP: 126/67 (10/06/20 0436)  SpO2: 97 % (10/06/20 0436) Vital Signs Range (Last 24H):  Temp:  [95.8 °F (35.4 °C)-98.6 °F (37 °C)]   Pulse:  [53-90]   Resp:  [8-29]   BP: ()/(60-84)   SpO2:  [95 %-100 %]          I & O (Last 24H):    Intake/Output Summary (Last 24 hours) at 10/6/2020 0519  Last data filed at 10/5/2020 1700  Gross per 24 hour   Intake 4281.37 ml   Output --   Net 4281.37 ml       Physical Exam:    GA: Alert, comfortable, no acute distress.   Pulmonary: Clear to auscultation A/P/L. No wheezing, crackles, or rhonchi.  Cardiac: RRR S1 & S2 w/o rubs/murmurs/gallops.   Abdominal:Bowel sounds present. No tenderness to palpation or distension. No appreciable hepatosplenomegaly.   Skin: No jaundice, rashes, or visible lesions.         Laboratory:  CBC: No results for input(s): WBC, RBC, HGB, HCT, PLT, MCV, MCH, MCHC in the last 72 hours.    BMP: No results for input(s): NA, K, CO2, CL, BUN, CREATININE, GLU, MG, PHOS, CALCIUM in the last 72 hours.    No results for input(s): PT, INR, PROTIME, APTT in the last 72 hours.      Anticoagulant dose 81 mg ASA at 2136 10/5/2020.    Assessment:         Pain control adequate    Plan:     1) Pain:    Adductor canal perineural catheter in place and infusing. Good level. Multimodal pain regimen with acetaminophen, Celebrex, Lyrica, and prn oxycodone given  Will continue to monitor. Plan to discharge with On-Q on POD #1.   2) CAD: No acute events.    3) HTN: BP stable overnight. Continue home medications.    4) DM: low dose sliding scale while in hospital.    4) FEN/GI: Tolerating liquids, advance diet as tolerated. (+) flatus. (-) BM.   5) Dispo: Pt working well with PT/OT. Case management and SW for  placement. Plan for discharge POD #1.        Evaluator SAHARA Morris

## 2020-10-05 NOTE — NURSING TRANSFER
Nursing Transfer Note      10/5/2020     Transfer To: Recovery suites Room 302 from PACU.    Transfer via bed    Transfer with sapphire pump, alaris pump, fcd sleeves, polar ice.    Transported by RHODA Liu, Casey STRAUSS.    Medicines sent: NS, ropivacaine 0.2%.    Chart send with patient: Yes    Notified: son    Son received post op pain medications via bedside delivery. Patient states that he has a walker at home.

## 2020-10-05 NOTE — ANESTHESIA PROCEDURE NOTES
Left adductor canal catheter    Patient location during procedure: holding area   Block not for primary anesthetic.  Reason for block: at surgeon's request and post-op pain management   Post-op Pain Location: Left knee pain  Start time: 10/5/2020 6:32 AM  Timeout: 10/5/2020 6:32 AM   End time: 10/5/2020 6:45 AM    Staffing  Authorizing Provider: Sergio Jean-Baptiste MD  Performing Provider: Sergio Jean-Baptiste MD    Preanesthetic Checklist  Completed: patient identified, site marked, surgical consent, pre-op evaluation, timeout performed, IV checked, risks and benefits discussed and monitors and equipment checked  Peripheral Block  Patient position: supine  Prep: ChloraPrep and site prepped and draped  Patient monitoring: heart rate, cardiac monitor, continuous pulse ox, continuous capnometry and frequent blood pressure checks  Block type: adductor canal  Laterality: left  Injection technique: continuous  Needle  Needle type: Tuohy   Needle gauge: 17 G  Needle length: 3.5 in  Needle localization: anatomical landmarks and ultrasound guidance  Catheter type: spring wound  Catheter size: 19 G  Test dose: lidocaine 1.5% with Epi 1-to-200,000 and negative   -ultrasound image captured on disc.  Assessment  Injection assessment: negative aspiration, negative parasthesia and local visualized surrounding nerve  Paresthesia pain: none  Heart rate change: no  Slow fractionated injection: yes  Additional Notes  VSS.  [facility] RN monitoring vitals throughout procedure.  Patient tolerated procedure well.     15ml 0.25% ropivacaine with epi

## 2020-10-05 NOTE — TRANSFER OF CARE
"Anesthesia Transfer of Care Note    Patient: Drake Barraza    Procedure(s) Performed: Procedure(s) (LRB):  ARTHROPLASTY, KNEE, TOTAL Jayy NexGen Cemented Tibia (Left)    Patient location: PACU    Anesthesia Type: general and regional    Transport from OR: Transported from OR on room air with adequate spontaneous ventilation. Transported from OR on 6-10 L/min O2 by face mask with adequate spontaneous ventilation    Post pain: adequate analgesia    Post assessment: no apparent anesthetic complications and tolerated procedure well    Post vital signs: stable    Level of consciousness: awake    Nausea/Vomiting: no nausea/vomiting    Complications: none    Transfer of care protocol was followed      Last vitals:   Visit Vitals  BP (!) 149/83 (BP Location: Right leg, Patient Position: Lying)   Pulse (!) 55   Temp 37 °C (98.6 °F) (Oral)   Resp (!) 21   Ht 5' 7" (1.702 m)   Wt 78.5 kg (173 lb)   SpO2 100%   BMI 27.10 kg/m²     "

## 2020-10-05 NOTE — PLAN OF CARE
Problem: Occupational Therapy Goal  Goal: Occupational Therapy Goal  Description: Goals to be met by: 10/19/20     Patient will increase functional independence with ADLs by performing:    UE Dressing with [place].  LE Dressing with Modified [place] and Assistive Devices as needed.  Grooming while standing at sink with Modified [place].  Toileting from bedside commode with Modified [place] for hygiene and clothing management.   Bathing from  shower chair/bench with Modified [place].  Toilet transfer to bedside commode with Modified [place].  Increased functional strength to WFL for B UE.  Upper extremity exercise program x15 reps per handout, with assistance as needed.    Outcome: Ongoing, Progressing

## 2020-10-05 NOTE — H&P
CC: Left knee pain     Drake Barraza is a 75 y.o. male with history of Left knee pain. Pain is worse with activity and weight bearing.  Patient has experienced interference of activities of daily living due to decreased range of motion and an increase in joint pain and swelling.  Patient has failed non-operative treatment including NSAIDs, corticosteroid injections, viscosupplement injections, and activity modification.  Drake Barraza currently ambulates independently.      Relevant medical conditions of significance in perioperative period:  CAD with stent- followed an managed medically by cardiology  HTN- on medication managed by cardiology  HLD- on medication managed by cardiology             Past Medical History:   Diagnosis Date    Arthritis      Asymptomatic cholelithiasis      CAD (coronary artery disease) 2003     RCA Stent 11/2003, s/p LAD Stent 12/2003                                                       Fatty liver      History of total knee replacement, right       7/22/19-Dr L [facility]    Hyperlipidemia      Hypertension      Old myocardial 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 Ochsner Jr., MD;  Location: [facility] OR [address] [place] [address];  Service: Orthopedics;  Laterality: Right;               Family History   Problem Relation Age of Onset    Melanoma Neg Hx                 Review of patient's allergies indicates:   Allergen Reactions    Hydrocodone-acetaminophen Rash       Other reaction(s): constipated            Current Outpatient Medications:     CINNAMON  BARK (CINNAMON ORAL), Take 1 capsule by mouth once daily., Disp: , Rfl:     levothyroxine (SYNTHROID) 125 MCG tablet, TAKE 1 TABLET IN THE MORNING ON AN EMPTY STOMACH, Disp: 90 tablet, Rfl: 1    metoprolol tartrate (LOPRESSOR) 25 MG tablet, Take 1 tablet (25 mg total) by mouth 2 (two) times daily., Disp: 180 tablet, Rfl: 3    multivitamin (ONE DAILY MULTIVITAMIN) per tablet, Take 1 tablet by mouth once daily., Disp: , Rfl:     nitroGLYCERIN (NITROSTAT) 0.4 MG SL tablet, Place 1 tablet (0.4 mg total) under the tongue every 5 (five) minutes as needed for Chest pain., Disp: 100 tablet, Rfl: 2    omega-3 fatty acids-vitamin E (FISH OIL) 1,000 mg Cap, Take 1 capsule by mouth Daily.  , Disp: , Rfl:     ramipriL (ALTACE) 10 MG capsule, TAKE 1 CAPSULE EVERY DAY, Disp: 90 capsule, Rfl: 3    rosuvastatin (CRESTOR) 20 MG tablet, Take 1 tablet (20 mg total) by mouth once daily., Disp: 90 tablet, Rfl: 1    sildenafil (VIAGRA) 100 MG tablet, Take 1 tablet (100 mg total) by mouth daily as needed for Erectile Dysfunction., Disp: 30 tablet, Rfl: 6    TURMERIC ROOT EXTRACT ORAL, Take 1 capsule by mouth., Disp: , Rfl:     UBIDECARENONE (COENZYME Q10) 100 mg Tab, Take 200 mg by mouth once daily. , Disp: , Rfl:     vitamin D 185 MG Tab, Take 185 mg by mouth once daily.  , Disp: , Rfl:     aspirin (ECOTRIN) 81 MG EC tablet, Take 1 tablet (81 mg total) by mouth 2 (two) times daily. (Patient taking differently: Take 81 mg by mouth once. ), Disp: 60 tablet, Rfl: 0     Review of Systems:  Constitutional: no fever or chills  Eyes: no visual changes  ENT: no nasal congestion or sore throat  Respiratory: no cough or shortness of breath  Cardiovascular: no chest pain or palpitations  Gastrointestinal: no nausea or vomiting, tolerating diet  Genitourinary: no hematuria or dysuria  Integument/Breast: no rash or pruritis  Hematologic/Lymphatic: no easy bruising or lymphadenopathy  Musculoskeletal: positive for knee pain  Neurological:  "no seizures or tremors  Behavioral/Psych: no auditory or visual hallucinations  Endocrine: no heat or cold intolerance     PE:  Ht 5' 7" (1.702 m)   Wt 81 kg (178 lb 9.6 oz)   BMI 27.97 kg/m²   General: Pleasant, cooperative, NAD   Gait: antalgic  HEENT: NCAT, sclera nonicteric   Lungs: Respirations clear bilaterally; equal and unlabored.   CV: S1S2; 2+ bilateral upper and lower extremity pulses.   Skin: Intact throughout with no rashes, erythema, or lesions  Extremities: No LE edema,  no erythema or warmth of the skin in either lower extremity.     Left knee exam:  Knee Range of Motion:normal, pain with passive range of motion  Effusion:none  Condition of skin:intact  Location of tenderness:Medial joint line   Strength:normal  Stability: stable to testing     Hip Examination: painless PROM of hip      Radiographs: Radiographs reveal advanced degenerative changes including subchondral cyst formation, subchondral sclerosis, osteophyte formation, joint space narrowing.      Knee Alignment: normal     Diagnosis: osteoarthritis Left knee     Plan: Left total knee arthroplasty     Due to the serious nature of total joint infection and the high prevalence of community acquired MRSA, vancomycin will be used perioperatively.       "

## 2020-10-05 NOTE — PROGRESS NOTES
Dr. Jean-Baptiste at bedside.  Patient c/o pain 7/10 anterior knee.  New orders to follow for 5 cc bolus from bag of ropivacaine 0.2%.

## 2020-10-05 NOTE — ANESTHESIA PROCEDURE NOTES
Spinal    Diagnosis: Left knee osteoarthritis  Patient location during procedure: OR  Start time: 10/5/2020 7:08 AM  Timeout: 10/5/2020 7:08 AM  End time: 10/5/2020 7:12 AM    Staffing  Authorizing Provider: Sergio Jean-Baptiste MD  Performing Provider: Sergio Jean-Baptiste MD    Preanesthetic Checklist  Completed: patient identified, site marked, surgical consent, pre-op evaluation, timeout performed, IV checked, risks and benefits discussed and monitors and equipment checked  Spinal Block  Patient position: sitting  Prep: ChloraPrep  Patient monitoring: continuous pulse ox and frequent blood pressure checks  Approach: midline  Location: L3-4  Injection technique: single shot  CSF Fluid: clear free-flowing CSF  Needle  Needle type: Nohelia   Needle gauge: 25 G  Needle length: 3.5 in  Additional Documentation: negative aspiration for heme and no paresthesia on injection  Needle localization: anatomical landmarks  Assessment  Sensory level: T6   Dermatomal levels determined by alcohol wipe  Ease of block: easy  Patient's tolerance of the procedure: comfortable throughout block and no complaints  Additional Notes  3ml 1.5% mepivacaine

## 2020-10-05 NOTE — PROGRESS NOTES
Ortho POD#0    Subjective   s/p TKA  doing well, no acute events postop     Objective   General appearance - no acute distress  Musculoskeletal - up walking  Dressing C/D/I  Fires quad/TA/EHL/GSC  SILT SP/DP/TN  WWP distally  Calves soft, nontender bilateral           Assessment   s/p left TKA      Plan   Continue PT - WBAT    Continue anticoagulation - ASA/FP    D/c planning - home with outpatient PT when clears PT

## 2020-10-05 NOTE — PLAN OF CARE
Problem: Adult Inpatient Plan of Care  Goal: Plan of Care Review  10/5/2020 1723 by Natalee Owen RN  Outcome: Ongoing, Progressing  10/5/2020 1722 by Natalee Owen RN  Outcome: Ongoing, Progressing  10/5/2020 1241 by Natalee Owen RN  Outcome: Ongoing, Progressing  Flowsheets (Taken 10/5/2020 1241)  Plan of Care Reviewed With:   patient   son     Problem: Adult Inpatient Plan of Care  Goal: Patient-Specific Goal (Individualization)  10/5/2020 1722 by Natalee Owen RN  Outcome: Ongoing, Progressing     Problem: Pain Acute  Goal: Optimal Pain Control  Intervention: Develop Pain Management Plan  Flowsheets (Taken 10/5/2020 1241)  Pain Management Interventions:   cold applied   pain management plan reviewed with patient/caregiver    Problem: Pain (Knee Arthroplasty)  Goal: Acceptable Pain Control  10/5/2020 1241 by Natalee Owen RN  Outcome: Ongoing, Progressing

## 2020-10-05 NOTE — PT/OT/SLP EVAL
Physical Therapy Evaluation    Patient Name:  Drake Barraza   MRN:  [id number]    Recommendations:     Discharge Recommendations:  home, outpatient PT   Discharge Equipment Recommendations: none   Barriers to discharge: Instruction on curb and car transfer with running board    Assessment:     Drake Barraza is a 75 y.o. male admitted with a medical diagnosis of Left knee DJD.  He presents with the following impairments/functional limitations:  impaired functional mobilty, weakness, gait instability, impaired balance, impaired self care skills, decreased lower extremity function, decreased ROM, edema, orthopedic precautions, pain. Pt tolerated PT session well. SBA for sit<>stand transfer with RW and to amb with RW WBAT LLE 20 ft X 2 to bathroom and 90 ft. Pt is okay to amb with RW and assist of 1 person overnight.    Rehab Prognosis: Good; patient would benefit from acute skilled PT services to address these deficits and reach maximum level of function.    Recent Surgery: Procedure(s) (LRB):  ARTHROPLASTY, KNEE, TOTAL Jayy NexGen Cemented Tibia (Left) Day of Surgery    Plan:     During this hospitalization, patient to be seen daily to address the identified rehab impairments via gait training, therapeutic exercises, therapeutic activities and progress toward the following goals:    · Plan of Care Expires:  11/05/20    Subjective     Chief Complaint: L knee pain  Patient/Family Comments/goals: Want to be really active again including doing yard work  Pain/Comfort:  · Pain Rating 1: 5/10  · Location - Side 1: Left  · Location - Orientation 1: generalized  · Location 1: knee  · Pain Addressed 1: Pre-medicate for activity, Reposition, Distraction  · Pain Rating Post-Intervention 1: 8/10    Patients cultural, spiritual, Latter-day conflicts given the current situation: no    Living Environment:  Lives with wife who is experiencing vertigo in a 1 story house with 2 steps with platform between at entrance of house. Has  tub shower combo with hand rail on side of tub.  Prior to admission, patients level of function was amb IND in community without AD. He is retired [employer] ; still drives.  Equipment used at home: walker, rolling, bedside commode, shower chair.  DME owned (not currently used): none.  Upon discharge, patient will have assistance from son who is in town from [place] for 2 weeks.    Objective:     Communicated with NSG prior to session.  Patient found HOB elevated with perineural catheter, cryotherapy, peripheral IV, telemetry, FCD  upon PT entry to room.    General Precautions: Standard, fall   Orthopedic Precautions:LLE weight bearing as tolerated   Braces: N/A     Exams:  · Cognitive Exam:  Patient is oriented to Person, Place, Time and Situation  · Gross Motor Coordination:  WFL  · Postural Exam:  Patient presented with the following abnormalities:    · -       No postural abnormalities identified  · RLE ROM: WNL  · RLE Strength: WNL  · LLE ROM: Deficits: knee flex and ext due to pain and surgical dressings  · LLE Strength: Deficits: knee ext and flex functionally tested 3+/5    Functional Mobility:  · Bed Mobility:     · Supine to Sit: stand by assistance with extra time  · Transfers:     · Sit to Stand:  contact guard assistance decreasing to SBA with rolling walker  · Toilet Transfer: stand by assistance with  rolling walker  using  Step Transfer and using armrests 3 in 1 commode over toilet  · Gait: initially CGA decreasing to SBA to amb 20 ft X 2 to bathroom and 90ft with RW WBAT LLE; step to gait pattern initially improving to step through gait pattern  · Balance: CGA for balance standing at sink to wash hands with RW available    Therapeutic Activities and Exercises:   Patient and pt's son were educated in:  - PT role, Plan of Care and goals prior to discharge  -safety with transfers including hand placement  -gait sequencing and RW management  -OOB activity to maximize recovery including ambulating  with nursing staff assistance and RW while in the hospital  -polar ice use and management  -performing ankle pumps, quad sets and glute sets overnight  -verbal instruction in car transfer      AM-PAC 6 CLICK MOBILITY  Total Score:17     Patient left up in chair with all lines intact, call button in reach, RN notified and son present.    GOALS:   Multidisciplinary Problems     Physical Therapy Goals        Problem: Physical Therapy Goal    Goal Priority Disciplines Outcome Goal Variances Interventions   Physical Therapy Goal     PT, PT/OT Ongoing, Progressing     Description: Goals to be met by: 10/7/2020     Patient will increase functional independence with mobility by performin. Supine to sit with supervision  2. Sit to stand transfer with Supervision with rolling walker  3. Bed to chair transfer with Supervision using Rolling Walker  4. Pt to amb 175 ft with SBA with RW WBAT LLE  5. Ascend/Descend 6 inch curb step with Contact Guard Assistance using Rolling Walker.  6. Lower extremity exercise program x 20 reps per handout, with assistance as needed  7. CGA to perform simulated car running board transfer with RW                     History:     Past Medical History:   Diagnosis Date    Arthritis     Asymptomatic cholelithiasis     CAD (coronary artery disease)     RCA Stent 2003, s/p LAD Stent 2003                                                       Fatty liver     History of total knee replacement, right     19-Dr L [facility]    Hyperlipidemia     Hypertension     Old myocardial infarct 2003      Inferior NSTEMI     MICHELLE on CPAP     Home CPAP at 9cm/Face Mask    Thyroid disease        Past Surgical History:   Procedure Laterality Date    COLONOSCOPY N/A 2016    Procedure: COLONOSCOPY;  Surgeon: Ariel Cabral MD;  Location: [facility] ([address] [place] [address]);  Service: Endoscopy;  Laterality: N/A;    HERNIA REPAIR  2006    JOINT REPLACEMENT      TOTAL KNEE ARTHROPLASTY Right  7/22/2019    Procedure: REPLACEMENT-KNEE-TOTAL;  Surgeon: John L. Ochsner Jr., MD;  Location: [facility] OR [address] [place] [address];  Service: Orthopedics;  Laterality: Right;       Time Tracking:     PT Received On: 10/05/20  PT Start Time: 1311     PT Stop Time: 1349  PT Total Time (min): 38 min     Billable Minutes: Evaluation 8, Gait Training 15 and Therapeutic Exercise 15      Jahaira Harman, PT  10/05/2020

## 2020-10-05 NOTE — BRIEF OP NOTE
[facility]  Brief Operative Note  Cardiology    SUMMARY     Surgery Date: 10/5/2020     Surgeon(s) and Role:     * Chris Israel MD - Primary    Assisting Surgeon: None    Pre-op Diagnosis:  Primary osteoarthritis of left knee [M17.12]    Post-op Diagnosis: Post-Op Diagnosis Codes:     * Primary osteoarthritis of left knee [M17.12]    Procedure Performed:    Procedure(s) (LRB):  ARTHROPLASTY, KNEE, TOTAL Jayy NexGen Cemented Tibia (Left)    Technical Procedures Used: see op note    Description of the findings of the procedure: see op note    Estimated Blood Loss: 100cc         Specimens: none  Specimen (12h ago, onward)    None

## 2020-10-05 NOTE — ANESTHESIA PREPROCEDURE EVALUATION
10/05/2020  Drake Barraza is a 75 y.o., male.    Anesthesia Evaluation    I have reviewed the Patient Summary Reports.     I have reviewed the Nursing Notes. I have reviewed the NPO Status.   I have reviewed the Medications.     Review of Systems  Anesthesia Hx:  No problems with previous Anesthesia  History of prior surgery of interest to airway management or planning: Previous anesthesia: General Denies Family Hx of Anesthesia complications.   Denies Personal Hx of Anesthesia complications.   Social:  Non-Smoker    Hematology/Oncology:  Hematology Normal   Oncology Normal   Hematology Comments: Denies recent use of anticoagulant, other than aspirin.    EENT/Dental:EENT/Dental Normal   Cardiovascular:   Exercise tolerance: good Hypertension Past MI CAD   hyperlipidemia    Pulmonary:   Sleep Apnea, CPAP    Renal/:  Renal/ Normal     Hepatic/GI:   Liver Disease,    Musculoskeletal:   Arthritis     Neurological:  Neurology Normal    Endocrine:   Diabetes, type 2 Hypothyroidism    Dermatological:  Skin Normal    Psych:  Psychiatric Normal           Physical Exam  General:  Well nourished    Airway/Jaw/Neck:  Airway Findings: Mouth Opening: Normal Tongue: Normal  General Airway Assessment: Adult, Average  Mallampati: II  Improves to I with phonation.  TM Distance: Normal, at least 6 cm        Eyes/Ears/Nose:  EYES/EARS/NOSE FINDINGS: Normal   Dental:  Dental Findings: Upper Dentures, Lower Dentures   Chest/Lungs:  Chest/Lungs Findings: Clear to auscultation, Normal Respiratory Rate     Heart/Vascular:  Heart Findings: Rate: Normal  Rhythm: Regular Rhythm  Sounds: Normal  Heart murmur: negative Vascular Findings: Normal    Abdomen:  Abdomen Findings: Normal    Musculoskeletal:  Musculoskeletal Findings: Normal   Skin:  Skin Findings: Normal    Mental Status:  Mental Status Findings:  Cooperative, Alert and  Oriented         Anesthesia Plan  Type of Anesthesia, risks & benefits discussed:  Anesthesia Type:  CSE, epidural, spinal, general  Patient's Preference:   Intra-op Monitoring Plan: standard ASA monitors  Intra-op Monitoring Plan Comments:   Post Op Pain Control Plan:   Post Op Pain Control Plan Comments:   Induction:   IV  Beta Blocker:  Patient is on a Beta-Blocker and has received one dose within the past 24 hours (No further documentation required).       Informed Consent: Patient understands risks and agrees with Anesthesia plan.  Questions answered. Anesthesia consent signed with patient.  ASA Score: 3     Day of Surgery Review of History & Physical:            Ready For Surgery From Anesthesia Perspective.

## 2020-10-05 NOTE — NURSING
PT received AAO x 4. VSS. Bed locked and in lowest position. Oriented to room and callbell.  IV intact and infusing free of redness and irritation. Fall precautions maintained. Non skid socks on while out of bed. Pt instructed to call for assistance, skin integrity maintained, polar ice maintained . No other complaints or concerns, will continue to follow careplan. Callbell placed within reach and use encouraged.

## 2020-10-06 VITALS
TEMPERATURE: 98 F | SYSTOLIC BLOOD PRESSURE: 98 MMHG | WEIGHT: 173 LBS | BODY MASS INDEX: 27.15 KG/M2 | HEART RATE: 61 BPM | DIASTOLIC BLOOD PRESSURE: 57 MMHG | HEIGHT: 67 IN | RESPIRATION RATE: 16 BRPM | OXYGEN SATURATION: 98 %

## 2020-10-06 LAB
POCT GLUCOSE: 165 MG/DL (ref 70–110)
POCT GLUCOSE: 187 MG/DL (ref 70–110)
POCT GLUCOSE: 215 MG/DL (ref 70–110)
POCT GLUCOSE: 294 MG/DL (ref 70–110)

## 2020-10-06 PROCEDURE — 25000003 PHARM REV CODE 250: Performed by: STUDENT IN AN ORGANIZED HEALTH CARE EDUCATION/TRAINING PROGRAM

## 2020-10-06 PROCEDURE — 94761 N-INVAS EAR/PLS OXIMETRY MLT: CPT

## 2020-10-06 PROCEDURE — 97110 THERAPEUTIC EXERCISES: CPT | Mod: CQ

## 2020-10-06 PROCEDURE — 99213 OFFICE O/P EST LOW 20 MIN: CPT | Mod: ICN,,, | Performed by: ANESTHESIOLOGY

## 2020-10-06 PROCEDURE — 97116 GAIT TRAINING THERAPY: CPT | Mod: CQ,59

## 2020-10-06 PROCEDURE — 25000003 PHARM REV CODE 250: Performed by: NURSE PRACTITIONER

## 2020-10-06 PROCEDURE — 97530 THERAPEUTIC ACTIVITIES: CPT | Mod: CQ

## 2020-10-06 PROCEDURE — 99213 PR OFFICE/OUTPT VISIT, EST, LEVL III, 20-29 MIN: ICD-10-PCS | Mod: ICN,,, | Performed by: ANESTHESIOLOGY

## 2020-10-06 PROCEDURE — 63600175 PHARM REV CODE 636 W HCPCS: Performed by: ANESTHESIOLOGY

## 2020-10-06 PROCEDURE — 25000003 PHARM REV CODE 250: Performed by: ANESTHESIOLOGY

## 2020-10-06 PROCEDURE — 97535 SELF CARE MNGMENT TRAINING: CPT | Mod: 59

## 2020-10-06 PROCEDURE — 99900035 HC TECH TIME PER 15 MIN (STAT)

## 2020-10-06 RX ORDER — TAMSULOSIN HYDROCHLORIDE 0.4 MG/1
0.4 CAPSULE ORAL DAILY
Status: COMPLETED | OUTPATIENT
Start: 2020-10-06 | End: 2020-10-06

## 2020-10-06 RX ORDER — TAMSULOSIN HYDROCHLORIDE 0.4 MG/1
0.4 CAPSULE ORAL DAILY
Qty: 30 CAPSULE | Refills: 11 | Status: SHIPPED | OUTPATIENT
Start: 2020-10-06 | End: 2020-11-17

## 2020-10-06 RX ADMIN — POLYETHYLENE GLYCOL 3350 17 G: 17 POWDER, FOR SOLUTION ORAL at 08:10

## 2020-10-06 RX ADMIN — ACETAMINOPHEN 1000 MG: 500 TABLET ORAL at 06:10

## 2020-10-06 RX ADMIN — MUPIROCIN 1 G: 20 OINTMENT TOPICAL at 08:10

## 2020-10-06 RX ADMIN — INSULIN ASPART 2 UNITS: 100 INJECTION, SOLUTION INTRAVENOUS; SUBCUTANEOUS at 12:10

## 2020-10-06 RX ADMIN — ROPIVACAINE HYDROCHLORIDE 6 ML/HR: 2 INJECTION, SOLUTION EPIDURAL; INFILTRATION at 12:10

## 2020-10-06 RX ADMIN — CELECOXIB 200 MG: 200 CAPSULE ORAL at 08:10

## 2020-10-06 RX ADMIN — LEVOTHYROXINE SODIUM 125 MCG: 125 TABLET ORAL at 06:10

## 2020-10-06 RX ADMIN — METOPROLOL TARTRATE 25 MG: 25 TABLET, FILM COATED ORAL at 08:10

## 2020-10-06 RX ADMIN — ACETAMINOPHEN 1000 MG: 500 TABLET ORAL at 12:10

## 2020-10-06 RX ADMIN — ASPIRIN 81 MG: 81 TABLET, COATED ORAL at 08:10

## 2020-10-06 RX ADMIN — RAMIPRIL 10 MG: 10 CAPSULE ORAL at 08:10

## 2020-10-06 RX ADMIN — TAMSULOSIN HYDROCHLORIDE 0.4 MG: 0.4 CAPSULE ORAL at 08:10

## 2020-10-06 RX ADMIN — FAMOTIDINE 20 MG: 20 TABLET, FILM COATED ORAL at 08:10

## 2020-10-06 RX ADMIN — DOCUSATE SODIUM 50MG AND SENNOSIDES 8.6MG 1 TABLET: 8.6; 5 TABLET, FILM COATED ORAL at 08:10

## 2020-10-06 RX ADMIN — ROPIVACAINE HYDROCHLORIDE: 2 INJECTION, SOLUTION EPIDURAL; INFILTRATION at 11:10

## 2020-10-06 NOTE — DISCHARGE SUMMARY
[facility]  Orthopedics  Discharge Summary      Patient Name: Drake Barraza  MRN: [id number]  Admission Date: 10/5/2020  Hospital Length of Stay: 0 days  Discharge Date and Time:  10/06/2020 2:23 PM  Attending Physician: Chris Israel MD   Discharging Provider: Macy Romo MD  Primary Care Provider: Catherine Middleton MD    HPI:   CC: Left knee pain     Drake Barraza is a 75 y.o. male with history of Left knee pain. Pain is worse with activity and weight bearing.  Patient has experienced interference of activities of daily living due to decreased range of motion and an increase in joint pain and swelling.  Patient has failed non-operative treatment including NSAIDs, corticosteroid injections, viscosupplement injections, and activity modification.  Drake Barraza currently ambulates independently.      Relevant medical conditions of significance in perioperative period:  CAD with stent- followed an managed medically by cardiology  HTN- on medication managed by cardiology  HLD- on medication managed by cardiology       Procedure(s) (LRB):  ARTHROPLASTY, KNEE, TOTAL Jayy NexGen Cemented Tibia (Left)      Hospital Course:  75 y.o. male s/p L TKA on 10/5 by Dr. Israel. Tolerated it well. On POD1 at 7AM had post void bladder scan showing RV 1000cc. Was given flomax and urinary retention resolved. Discharged home with rx for flomax.     Nerve block:  L adductor canal PNC  WBS:  WBAT LLE  DVT ppx:  ASA 81mg BID while inpatient and for 30 days at discharge  PT/OT:  Outpatient PT at [facility]  Labs: POC glucose 165-266  Cultures:  none  Abx:  periop ancef   Drain:  none  Ross:  none  Dispo:  Home today  F/u:  Ct Davis 10/19       Consults (From admission, onward)        Status Ordering Provider     Inpatient consult to Pain Management  Once     Provider:  (Not yet assigned)    Acknowledged CT DÍAZ     Inpatient consult to Respiratory Care  Once     Provider:  (Not yet assigned)     CRYS Catalan     Inpatient consult to Respiratory Care  Once     Provider:  (Not yet assigned)    CRYS Catalan     Inpatient consult to Respiratory Care  Once     Provider:  (Not yet assigned)    CRYS Catalan     Inpatient consult to Social Work  Once     Provider:  (Not yet assigned)    CRYS Catalan          Significant Diagnostic Studies: reviewed  Pending Diagnostic Studies:     None        Final Active Diagnoses:    Diagnosis Date Noted POA    PRINCIPAL PROBLEM:  Left knee DJD [M17.12] 10/05/2020 Yes      Problems Resolved During this Admission:      Discharged Condition: good    Disposition: Home or Self Care    Follow Up:    Patient Instructions:      Activity as tolerated     Call MD for:  difficulty breathing, headache or visual disturbances     Call MD for:  extreme fatigue     Call MD for:  hives     Call MD for:  persistent dizziness or light-headedness     Call MD for:  persistent nausea and vomiting     Call MD for:  redness, tenderness, or signs of infection (pain, swelling, redness, odor or green/yellow discharge around incision site)     Call MD for:  severe uncontrolled pain     Call MD for:  temperature >100.4     Keep surgical extremity elevated     Leave dressing on - Keep it clean, dry, and intact until clinic visit   Order Comments: Do not remove surgical dressing for 2 weeks post-op. This will be done only by MD at initial post-op visit. If dressing is completely saturated, replace with identical dressing - silver-impregnated hydrocolloid dressing.     Do not get dressings wet. Do not shower.     If dressing continues to be saturated or there are signs of infection, please call [facility] [phone] for further instructions and to make appt to be seen.     Lifting restrictions   Order Comments: No strenuous exercise or lifting of > 10 lbs     No driving, operating heavy equipment or signing legal documents while  taking pain medication     Sponge bath only until clinic visit     Weight bearing as tolerated     Medications:  Reconciled Home Medications:      Medication List      START taking these medications    tamsulosin 0.4 mg Cap  Commonly known as: FLOMAX  Take 1 capsule (0.4 mg total) by mouth once daily.        CONTINUE taking these medications    aspirin 81 MG EC tablet  Commonly known as: ECOTRIN  Take 1 tablet (81 mg total) by mouth 2 (two) times daily.     CINNAMON ORAL  Take 1 capsule by mouth once daily.     coenzyme Q10 100 mg Tab  Take 200 mg by mouth once daily.     FISH OIL 1,000 mg Cap  Generic drug: omega-3 fatty acids-vitamin E  Take 1 capsule by mouth Daily.     levothyroxine 125 MCG tablet  Commonly known as: SYNTHROID  TAKE 1 TABLET IN THE MORNING ON AN EMPTY STOMACH     MAPAP ARTHRITIS PAIN 650 MG Tbsr  Generic drug: acetaminophen  Take 1 tablet (650 mg total) by mouth every 8 (eight) hours as needed.     meloxicam 15 MG tablet  Commonly known as: MOBIC  Take 1 tablet (15 mg total) by mouth once daily.     metoprolol tartrate 25 MG tablet  Commonly known as: LOPRESSOR  Take 1 tablet (25 mg total) by mouth 2 (two) times daily.     nitroGLYCERIN 0.4 MG SL tablet  Commonly known as: NITROSTAT  Place 1 tablet (0.4 mg total) under the tongue every 5 (five) minutes as needed for Chest pain.     ONE DAILY MULTIVITAMIN per tablet  Generic drug: multivitamin  Take 1 tablet by mouth once daily.     oxyCODONE 5 MG immediate release tablet  Commonly known as: ROXICODONE  Take 1-2 tabs by mouth every 4-6 hours as needed for pain     ramipriL 10 MG capsule  Commonly known as: ALTACE  TAKE 1 CAPSULE EVERY DAY     rosuvastatin 20 MG tablet  Commonly known as: CRESTOR  Take 1 tablet (20 mg total) by mouth once daily.     sildenafiL 100 MG tablet  Commonly known as: VIAGRA  Take 1 tablet (100 mg total) by mouth daily as needed for Erectile Dysfunction.     STOOL SOFTENER 100 MG capsule  Generic drug: docusate  sodium  Take 1 capsule (100 mg total) by mouth 2 (two) times daily as needed for Constipation.     TURMERIC ROOT EXTRACT ORAL  Take 1 capsule by mouth.     vitamin D 1000 units Tab  Commonly known as: VITAMIN D3  Take 185 mg by mouth once daily.            Macy Romo MD  Orthopedics  [facility]

## 2020-10-06 NOTE — PT/OT/SLP PROGRESS
Occupational Therapy   Treatment/Discharge    Name: Drake Barraza  MRN: [id number]  Admitting Diagnosis:  Left knee DJD  1 Day Post-Op    Recommendations:     Discharge Recommendations: home  Discharge Equipment Recommendations:  none  Barriers to discharge:       Assessment:     Drake Barraza is a 75 y.o. male with a medical diagnosis of Left knee DJD.  Patient is s/p left TKA. Patient completed lower body dressing at supervision. Patient overall is doing well and is functioning near baseline level of function s/p left TKA for ADLs.  Performance deficits affecting function are weakness, impaired self care skills, impaired functional mobilty, gait instability, impaired balance, decreased lower extremity function, decreased ROM.     Rehab Prognosis:  Good; patient would benefit from acute skilled OT services to address these deficits and reach maximum level of function.       Plan:     · DC from OT    Subjective     Patient reported he did not get the best night sleep.   Pain/Comfort:  · Pain Rating 1: 0/10    Objective:     Communicated with: RN prior to session.  Patient found supine with cryotherapy, FCD, perineural catheter upon OT entry to room.    General Precautions: Standard, fall   Orthopedic Precautions:LLE weight bearing as tolerated   Braces: N/A     Occupational Performance:     Bed Mobility:    · Patient completed Scooting/Bridging with modified independence  · Patient completed Supine to Sit with modified independence     Functional Mobility/Transfers:  · Patient completed Sit <> Stand Transfer with modified independence  with  rolling walker   · Patient completed Toilet Transfer Step Transfer technique with modified independence with  rolling walker   · Patient completed chair transfer with step transfer technique with modified independence with rolling walker   · Functional Mobility: patient ambulated to/from bathroom with use of rolling walker at supervision     Activities of Daily  Living:  · Grooming: modified independence standing at sink to brush teeth, wash hands  · Upper Body Dressing: modified independence sitting edge of bed to don overhead shirt  · Lower Body Dressing: supervision sitting edge of bed to don underwear/pants, doff socks and don shoes  · Toileting: reviewed how to roll rolling walker over toilet to complete urinating in standing, and reviewed transfer to bedside commode placed over toilet       [facility] 6 Click ADL: 23    Treatment & Education:  -Patient educated to dress surgical side first and further dressing techniques s/p surgery   -Patient educated on hand placement for transfers   -Patient educated on rolling walker placement for ADLs  -Patient educated on proper foot wear s/p surgery   -Patient educated on car transfers s/p surgery  -Patient educated on polar [place] use  -Patient educated on role OT and plan of care s/p surgery at [facility], white board updated as needed     Patient left up in chair with all lines intact, call button in reach, RN notified and son presentEducation:      GOALS:   Multidisciplinary Problems     Occupational Therapy Goals     Not on file          Multidisciplinary Problems (Resolved)        Problem: Occupational Therapy Goal    Goal Priority Disciplines Outcome Interventions   Occupational Therapy Goal   (Resolved)     OT, PT/OT Met    Description: Goals to be met by: 10/19/20     Patient will increase functional independence with ADLs by performing:    UE Dressing with [place].  LE Dressing with Modified [place] and Assistive Devices as needed.  Grooming while standing at sink with Modified [place].  Toileting from bedside commode with Modified [place] for hygiene and clothing management.   Bathing from  shower chair/bench with Modified [place].  Toilet transfer to bedside commode with Modified [place].  Increased functional strength to WFL for B UE.  Upper extremity exercise program x15 reps per  handout, with assistance as needed.                     Time Tracking:     OT Date of Treatment: 10/06/20  OT Start Time: 0819  OT Stop Time: 0857  OT Total Time (min): 38 min    Billable Minutes:Self Care/Home Management 38    Ct Soriano OT  10/6/2020

## 2020-10-06 NOTE — PROGRESS NOTES
Patient discharged to home. Discharge instructions given to pt who voiced understanding.  IV removed catheter intact. Denies pain or discomfort. Respirations even and unlabored. No distress noted. Pt stable. Left unit in wheelchair with RN/PCT to meet son for transport home.

## 2020-10-06 NOTE — PLAN OF CARE
Safety Precautions maintained. Call bell within reach. Instructed pt to call for assistance as needed and pt voiced understanding. Dressing to the left knee clean, dry and intact. Complains of intermittent pain and is being medicated as needed. Accu checks ac/hs

## 2020-10-06 NOTE — PROGRESS NOTES
Notified Dr. Israel of patient voiding 650 ml of clear, yellow urine.  And Post void bladder scan was 108.  Orders given okay for patient to be discharged home on flomax.

## 2020-10-06 NOTE — HOSPITAL COURSE
75 y.o. male s/p L TKA on 10/5 by Dr. Israel. Tolerated it well. On POD1 at 7AM had post void bladder scan showing RV 1000cc. Was given flomax and urinary retention resolved. Discharged home with rx for flomax.     Nerve block:  L adductor canal PNC  WBS:  WBAT LLE  DVT ppx:  ASA 81mg BID while inpatient and for 30 days at discharge  PT/OT:  Outpatient PT at [facility]  Labs: POC glucose 165-266  Cultures:  none  Abx:  periop ancef   Drain:  none  Ross:  none  Dispo:  Home today  F/u:  Ct Davis 10/19

## 2020-10-06 NOTE — PLAN OF CARE
Problem: Occupational Therapy Goal  Goal: Occupational Therapy Goal  Description: Goals to be met by: 10/19/20     Patient will increase functional independence with ADLs by performing:    UE Dressing with [place].  LE Dressing with supervision  Grooming while standing at sink with Modified [place].  Toileting from bedside commode with Modified [place] for hygiene and clothing management.   Toilet transfer to bedside commode with Modified [place].      Outcome: Met    OT goals met for discharge home.     Ct Soriano, OT  10/6/2020

## 2020-10-06 NOTE — PT/OT/SLP PROGRESS
Physical Therapy Treatment    Patient Name:  Drake Barraza   MRN:  [id number]    Recommendations:     Discharge Recommendations:  home, outpatient PT   Discharge Equipment Recommendations: none   Barriers to discharge: None    Assessment:     Drake Barraza is a 75 y.o. male admitted with a medical diagnosis of Left knee DJD.  He presents with the following impairments/functional limitations:  weakness, gait instability, decreased ROM, impaired endurance, impaired balance, decreased safety awareness, pain, orthopedic precautions, edema, impaired functional mobilty.Patient tolerated treatment well as evidence of no increase pain during session. Patient tolerated increase distance with gait training without difficulty.  He demonstrated good L LE stability and balance with OOB mobility using RW.  Noted fair strength while performing exercises. Patient safe to be discharged home and will require family assistance.         Rehab Prognosis: Good; patient would benefit from acute skilled PT services to address these deficits and reach maximum level of function.    Recent Surgery: Procedure(s) (LRB):  ARTHROPLASTY, KNEE, TOTAL Jayy NexGen Cemented Tibia (Left) 1 Day Post-Op    Plan:     During this hospitalization, patient to be seen daily to address the identified rehab impairments via gait training, therapeutic activities, therapeutic exercises and progress toward the following goals:    · Plan of Care Expires:  11/05/20    Subjective     Chief Complaint: Minimal L knee pain  Patient/Family Comments/goals: I had my other knee done.  Pain/Comfort:  · Pain Rating 1: 4/10  · Location - Side 1: Left  · Location 1: knee      Objective:     Patient found up in chair with perineural catheter, cryotherapy upon PT entry to room. Patient had worked with OT prior to PT treatment session.    General Precautions: Standard, fall   Orthopedic Precautions:LLE weight bearing as tolerated   Braces:       Functional Mobility:  · Bed  Mobility:     · Supine to Sit: modified independence  · Sit to Supine: modified independence  · Transfers:     · Sit to Stand:  supervision with rolling walker.  VC's for technique  · Gait: Amb 140 feet and 120 feet with RW CGA/SBA.  No LOB or SOB.  Patient demonstrated step through gait pattern, B heel strike and fair L knee extension during stance phase. Occasional VC for walker mgmnt and decrease mesfin.  · Balance: Good  · Stairs:  Ascend and descend 7inch curb step ( FWD and BWD approach)with RW CGA.  VC's for technique and sequence      AM-PAC 6 CLICK MOBILITY  Turning over in bed (including adjusting bedclothes, sheets and blankets)?: 4  Sitting down on and standing up from a chair with arms (e.g., wheelchair, bedside commode, etc.): 4  Moving from lying on back to sitting on the side of the bed?: 4  Moving to and from a bed to a chair (including a wheelchair)?: 4  Need to walk in hospital room?: 4  Climbing 3-5 steps with a railing?: 3  Basic Mobility Total Score: 23       Therapeutic Activities and Exercises:  Patient performed established TKR exercises AP,GS,QS,hip ABD/ADD, HS, SAQ, LAQ and SLR x10 reps LLE  Patient given HEP  Patient and patient's son educated on car transfers  Patient and patient's son educated on bed mobility, transfers, HEP, curb step,and safety with OOB mobility.  Patient stated he felt comfortable being discharged home and had no concerns.            Patient left up in chair with all lines intact, call button in reach and NSG notified..    GOALS:   Multidisciplinary Problems     Physical Therapy Goals        Problem: Physical Therapy Goal    Goal Priority Disciplines Outcome Goal Variances Interventions   Physical Therapy Goal     PT, PT/OT Ongoing, Progressing     Description: Goals to be met by: 10/7/2020     Patient will increase functional independence with mobility by performin. Supine to sit with supervision  2. Sit to stand transfer with Supervision with rolling  walker  3. Bed to chair transfer with Supervision using Rolling Walker  4. Pt to amb 175 ft with SBA with RW WBAT LLE  5. Ascend/Descend 6 inch curb step with Contact Guard Assistance using Rolling Walker.  6. Lower extremity exercise program x 20 reps per handout, with assistance as needed  7. CGA to perform simulated car running board transfer with RW                     Time Tracking:     PT Received On: 10/06/20  PT Start Time: 0915     PT Stop Time: 1010  PT Total Time (min): 55 min     Billable Minutes: Gait Training 15, Therapeutic Activity 25 and Therapeutic Exercise 15    Treatment Type: Treatment  PT/PTA: PTA     PTA Visit Number: 1     Andi Ramon II, PTA  10/06/2020

## 2020-10-06 NOTE — PROGRESS NOTES
Pt c/o of feeling like bladder full after voiding this A.M. Bladder scan revealed 1003 mL. Dr. Royer omalley. Straighted cath and flomax ordered. See flowsheet.

## 2020-10-06 NOTE — PROGRESS NOTES
[facility]  Orthopedics  Progress Note    Patient Name: Drake Barraza  MRN: [id number]  Admission Date: 10/5/2020  Hospital Length of Stay: 0 days  Attending Provider: Chris Israel MD  Primary Care Provider: Catherine Middleton MD  Follow-up For: Procedure(s) (LRB):  ARTHROPLASTY, KNEE, TOTAL Jayy NexGen Cemented Tibia (Left)    Post-Operative Day: 1 Day Post-Op  Subjective:     Principal Problem:Left knee DJD    Principal Orthopedic Problem: sp left TKA 10/5/2020 by Dr. Israel    Interval History: NAEON. Pain controlled. Ambulated 110 feet with PT yesterday. Denies N/V, chest pain, or SOB. Voiding without difficulty.     Review of patient's allergies indicates:   Allergen Reactions    Hydrocodone-acetaminophen Rash     Other reaction(s): constipated       Current Facility-Administered Medications   Medication    0.9%  NaCl infusion    acetaminophen tablet 1,000 mg    aspirin EC tablet 81 mg    bisacodyL suppository 10 mg    celecoxib capsule 200 mg    dextrose 50% injection 12.5 g    dextrose 50% injection 25 g    famotidine tablet 20 mg    glucagon (human recombinant) injection 1 mg    glucose chewable tablet 16 g    glucose chewable tablet 24 g    insulin aspart U-100 pen 0-5 Units    levothyroxine tablet 125 mcg    metoprolol tartrate (LOPRESSOR) tablet 25 mg    morphine injection 2 mg    mupirocin 2 % ointment 1 g    naloxone 0.4 mg/mL injection 0.02 mg    ondansetron injection 4 mg    oxyCODONE immediate release tablet 10 mg    oxyCODONE immediate release tablet 5 mg    polyethylene glycol packet 17 g    pregabalin capsule 75 mg    promethazine (PHENERGAN) 6.25 mg in dextrose 5 % 50 mL IVPB    ramipriL capsule 10 mg    ropivacaine (PF) 2 mg/ml (0.2%) infusion    ropivacaine 0.2% ON-Q C-BLOC 400 ML (SELECT A FLOW)    senna-docusate 8.6-50 mg per tablet 1 tablet    tamsulosin 24 hr capsule 0.4 mg     Objective:     Vital Signs (Most Recent):  Temp: 98.5 °F  "(36.9 °C) (10/06/20 0436)  Pulse: 70 (10/06/20 0436)  Resp: 16 (10/06/20 0436)  BP: 126/67 (10/06/20 0436)  SpO2: 97 % (10/06/20 0436) Vital Signs (24h Range):  Temp:  [95.8 °F (35.4 °C)-98.5 °F (36.9 °C)] 98.5 °F (36.9 °C)  Pulse:  [53-90] 70  Resp:  [8-21] 16  SpO2:  [95 %-100 %] 97 %  BP: ()/(60-84) 126/67     Weight: 78.5 kg (173 lb)  Height: 5' 7" (170.2 cm)  Body mass index is 27.1 kg/m².      Intake/Output Summary (Last 24 hours) at 10/6/2020 0641  Last data filed at 10/5/2020 1700  Gross per 24 hour   Intake 4281.37 ml   Output --   Net 4281.37 ml       Ortho/SPM Exam  Left knee exam  Polar back in place over bulky dressings   Bulky dressings removed and surgical dressing inspected- c/d/i with no strike through  Able to straight leg raise  5/5 DF/PF ankle and hallux  Toes WWP    Adductor canal catheter in place at 6cc/hr      Significant Labs: All pertinent labs within the past 24 hours have been reviewed.     POC glucose 165-266    Significant Imaging: I have reviewed all pertinent imaging results/findings.    Assessment/Plan:     * Left knee DJD  75 y.o. male s/p L TKA on 10/5 by Dr. Israel    OR plan:  done  Nerve block:  L adductor canal PNC, currently at 6cc/hr  WBS:  WBAT LLE  DVT ppx:  ASA 81mg BID while inpatient and for 30 days at discharge  PT/OT:  Outpatient PT at [facility]  Labs: POC glucose 165-266  Cultures:  none  Abx:  periop ancef   Drain:  none  Ross:  none  Dispo:  Home today  F/u:  Ct Davis 10/19                Macy Romo MD  Orthopedics  [facility]  "

## 2020-10-06 NOTE — NURSING
Called patient at home and notified him that his Flomax prescription has been sent to his preferred pharmacy, [facility] in [place], pt verb understanding

## 2020-10-06 NOTE — PROGRESS NOTES
On-Q teaching done w/patient & patient's son, Paul Barraza, Both verbalized understanding.  Patient's son, Paul agrees to stay with pt for next 48 hrs while med infusing. All questions answered. On-Q contract signed, home care instxn pamphlet, home care supplies provided to patient upon discharge. Encouraged to contact anesthesia using # on back of brochure with any questions/concerns. Informed that pt/fmly will receive follow up calls on the next 2 days.

## 2020-10-06 NOTE — PLAN OF CARE
Problem: Physical Therapy Goal  Goal: Physical Therapy Goal  Description: Goals to be met by: 10/7/2020     Patient will increase functional independence with mobility by performin. Supine to sit with supervision Met  2. Sit to stand transfer with Supervision with rolling walker  3. Bed to chair transfer with Supervision using Rolling Walker  4. Pt to amb 175 ft with SBA with RW WBAT LLE Met  5. Ascend/Descend 6 inch curb step with Contact Guard Assistance using Rolling Walker. Met  6. Lower extremity exercise program x 20 reps per handout, with assistance as needed  7. CGA to perform simulated car running board transfer with RW Met    Outcome: Ongoing, Progressing   Andi Ramon,PTA

## 2020-10-07 ENCOUNTER — PATIENT MESSAGE (OUTPATIENT)
Dept: ADMINISTRATIVE | Facility: HOSPITAL | Age: 75
End: 2020-10-07

## 2020-10-07 ENCOUNTER — TELEPHONE (OUTPATIENT)
Dept: INTERNAL MEDICINE | Facility: CLINIC | Age: 75
End: 2020-10-07

## 2020-10-07 ENCOUNTER — PATIENT MESSAGE (OUTPATIENT)
Dept: ADMINISTRATIVE | Facility: OTHER | Age: 75
End: 2020-10-07

## 2020-10-07 ENCOUNTER — CLINICAL SUPPORT (OUTPATIENT)
Dept: REHABILITATION | Facility: HOSPITAL | Age: 75
End: 2020-10-07
Attending: ORTHOPAEDIC SURGERY
Payer: MEDICARE

## 2020-10-07 ENCOUNTER — HOSPITAL ENCOUNTER (EMERGENCY)
Facility: HOSPITAL | Age: 75
Discharge: HOME | End: 2020-10-07
Attending: EMERGENCY MEDICINE
Payer: MEDICARE

## 2020-10-07 VITALS
WEIGHT: 173 LBS | SYSTOLIC BLOOD PRESSURE: 146 MMHG | HEIGHT: 67 IN | RESPIRATION RATE: 15 BRPM | TEMPERATURE: 98 F | DIASTOLIC BLOOD PRESSURE: 74 MMHG | HEART RATE: 77 BPM | OXYGEN SATURATION: 98 % | BODY MASS INDEX: 27.15 KG/M2

## 2020-10-07 DIAGNOSIS — G89.29 CHRONIC PAIN OF RIGHT KNEE: ICD-10-CM

## 2020-10-07 DIAGNOSIS — M17.12 PRIMARY OSTEOARTHRITIS OF LEFT KNEE: ICD-10-CM

## 2020-10-07 DIAGNOSIS — M25.561 CHRONIC PAIN OF RIGHT KNEE: ICD-10-CM

## 2020-10-07 DIAGNOSIS — R60.0 LOCALIZED EDEMA: ICD-10-CM

## 2020-10-07 DIAGNOSIS — E11.9 DIABETES MELLITUS WITHOUT COMPLICATION: Primary | ICD-10-CM

## 2020-10-07 DIAGNOSIS — R29.898 WEAKNESS OF LEFT LOWER EXTREMITY: ICD-10-CM

## 2020-10-07 DIAGNOSIS — M25.661 JOINT STIFFNESS OF KNEE, RIGHT: ICD-10-CM

## 2020-10-07 DIAGNOSIS — Z51.89 VISIT FOR WOUND CHECK: Primary | ICD-10-CM

## 2020-10-07 DIAGNOSIS — M25.662 DECREASED RANGE OF MOTION OF LEFT KNEE: ICD-10-CM

## 2020-10-07 DIAGNOSIS — R26.9 ABNORMALITY OF GAIT AND MOBILITY: ICD-10-CM

## 2020-10-07 LAB — POCT GLUCOSE: 140 MG/DL (ref 70–110)

## 2020-10-07 PROCEDURE — 82962 GLUCOSE BLOOD TEST: CPT | Mod: HCNC

## 2020-10-07 PROCEDURE — 99282 EMERGENCY DEPT VISIT SF MDM: CPT | Mod: 25,HCNC

## 2020-10-07 PROCEDURE — 97162 PT EVAL MOD COMPLEX 30 MIN: CPT | Mod: HCNC

## 2020-10-07 PROCEDURE — 99283 PR EMERGENCY DEPT VISIT,LEVEL III: ICD-10-PCS | Mod: HCNC,,, | Performed by: EMERGENCY MEDICINE

## 2020-10-07 PROCEDURE — 99283 EMERGENCY DEPT VISIT LOW MDM: CPT | Mod: HCNC,,, | Performed by: EMERGENCY MEDICINE

## 2020-10-07 RX ORDER — LANCETS
1 EACH MISCELLANEOUS
Qty: 100 EACH | Refills: 3 | Status: SHIPPED | OUTPATIENT
Start: 2020-10-07 | End: 2022-10-19 | Stop reason: SDUPTHER

## 2020-10-07 RX ORDER — INSULIN PUMP SYRINGE, 3 ML
EACH MISCELLANEOUS
Qty: 1 EACH | Refills: 0 | Status: SHIPPED | OUTPATIENT
Start: 2020-10-07 | End: 2023-12-14

## 2020-10-07 NOTE — PROGRESS NOTES
10/7/2020 1049    Patient called at home. Reports pain well controlled with On-Q. Patient denies signs of local anesthetic toxicity. PNC dressing remains clean, dry, and intact. He does report a small area of skin irritation on his abdomen around the tegaderm bandage securing PNC. States that he noticed the area oozing clear fluid this morning. States that the skin irritation is limited to this one small area on his skin. He denies any adhesive allergies. Patient plans to submit picture of the skin irritation to ortho clinic to be evaluated by anesthesia to assess if PNC should be removed or left in place at this time. Also encouraged patient to contact anesthesia should skin irritation worsen. All questions answered. Will follow back up.

## 2020-10-07 NOTE — ED PROVIDER NOTES
"Source of History:  Patient  Son    Chief complaint:  Wound Check (states total knee replacement Monday-- bleeding around nerve block infusion site/wants "checked")    HPI:  Drake Barraza is a 75 y.o. male with history of CAD, type 2 diabetes, hypothyroidism, hyperlipidemia presenting to emergency department with complaint of bleeding from the site of his On-Q pump.    Patient had left knee arthroplasty on 10/05/2020.  The on Q pump was placed.  He was discharged home and actually had physical therapy today.  After physical therapy, he noted that there was some leakage of serosanguineous fluid from the site of his pump.  His pain is well controlled.  He was just concerned that there was leakage.  He has not been losing a significant amount of blood.  He is only on aspirin in terms of blood thinners.    His postoperative course was complicated by some urinary retention for which she was started on Flomax.  He continues to take the Flomax and is not having any urinary retention issues.    No other complaints at this time.    They contacted anesthesia today who told him to come to the emergency department for evaluation.    ROS: As per HPI and below:  Review of Systems   Constitutional: Negative for chills and fever.   Musculoskeletal: Negative for falls and joint pain.   Skin: Negative for rash.   Neurological: Negative for sensory change and focal weakness.       Review of patient's allergies indicates:   Allergen Reactions    Hydrocodone-acetaminophen Rash     Other reaction(s): constipated       No current facility-administered medications on file prior to encounter.      Current Outpatient Medications on File Prior to Encounter   Medication Sig Dispense Refill    aspirin (ECOTRIN) 81 MG EC tablet Take 1 tablet (81 mg total) by mouth 2 (two) times daily. 60 tablet 0    levothyroxine (SYNTHROID) 125 MCG tablet TAKE 1 TABLET IN THE MORNING ON AN EMPTY STOMACH 90 tablet 1    meloxicam (MOBIC) 15 MG tablet Take " 1 tablet (15 mg total) by mouth once daily. 30 tablet 1    ramipriL (ALTACE) 10 MG capsule TAKE 1 CAPSULE EVERY DAY 90 capsule 3    rosuvastatin (CRESTOR) 20 MG tablet Take 1 tablet (20 mg total) by mouth once daily. 90 tablet 1    acetaminophen (TYLENOL) 650 MG TbSR Take 1 tablet (650 mg total) by mouth every 8 (eight) hours as needed. 120 tablet 0    blood sugar diagnostic Strp 1 strip by Misc.(Non-Drug; Combo Route) route 2 (two) times daily before meals. 100 strip 3    blood-glucose meter kit Check glucose 1-2x/day after meals 1 each 0    CINNAMON BARK (CINNAMON ORAL) Take 1 capsule by mouth once daily.      docusate sodium (COLACE) 100 MG capsule Take 1 capsule (100 mg total) by mouth 2 (two) times daily as needed for Constipation. 60 capsule 0    lancets Misc 1 lancet by Misc.(Non-Drug; Combo Route) route 2 (two) times daily before meals. 100 each 3    metoprolol tartrate (LOPRESSOR) 25 MG tablet Take 1 tablet (25 mg total) by mouth 2 (two) times daily. 180 tablet 3    multivitamin (ONE DAILY MULTIVITAMIN) per tablet Take 1 tablet by mouth once daily.      nitroGLYCERIN (NITROSTAT) 0.4 MG SL tablet Place 1 tablet (0.4 mg total) under the tongue every 5 (five) minutes as needed for Chest pain. 100 tablet 2    omega-3 fatty acids-vitamin E (FISH OIL) 1,000 mg Cap Take 1 capsule by mouth Daily.        oxyCODONE (ROXICODONE) 5 MG immediate release tablet Take 1-2 tabs by mouth every 4-6 hours as needed for pain 50 tablet 0    sildenafil (VIAGRA) 100 MG tablet Take 1 tablet (100 mg total) by mouth daily as needed for Erectile Dysfunction. 30 tablet 6    tamsulosin (FLOMAX) 0.4 mg Cap Take 1 capsule (0.4 mg total) by mouth once daily. 30 capsule 11    TURMERIC ROOT EXTRACT ORAL Take 1 capsule by mouth.      UBIDECARENONE (COENZYME Q10) 100 mg Tab Take 200 mg by mouth once daily.       vitamin D 185 MG Tab Take 185 mg by mouth once daily.           PMH:  As per HPI and below:  Past Medical History:    Diagnosis Date    Arthritis     Asymptomatic cholelithiasis     CAD (coronary artery disease) 2003    RCA Stent 11/2003, s/p LAD Stent 12/2003                                                       Fatty liver     History of total knee replacement, right     7/22/19-[facility]    Hyperlipidemia     Hypertension     Old myocardial infarct 11/2003      Inferior NSTEMI     MICHELLE on CPAP     Home CPAP at 9cm/Face Mask    Thyroid disease      Past Surgical History:   Procedure Laterality Date    COLONOSCOPY N/A 7/27/2016    Procedure: COLONOSCOPY;  Surgeon: Ariel Cabral MD;  Location: [facility] ENDO (4TH FLR);  Service: Endoscopy;  Laterality: N/A;    HERNIA REPAIR  2006    JOINT REPLACEMENT      TOTAL KNEE ARTHROPLASTY Right 7/22/2019    Procedure: REPLACEMENT-KNEE-TOTAL;  Surgeon: John L. [facility] Jr., MD;  Location: [facility] 2ND FLR;  Service: Orthopedics;  Laterality: Right;    TOTAL KNEE ARTHROPLASTY Left 10/5/2020    Procedure: ARTHROPLASTY, KNEE, TOTAL Jayy NexGen Cemented Tibia;  Surgeon: Chris Israel MD;  Location: [facility];  Service: Orthopedics;  Laterality: Left;       Social History     Socioeconomic History    Marital status:      Spouse name: Not on file    Number of children: Not on file    Years of education: Not on file    Highest education level: Not on file   Occupational History    Occupation: Retired     Employer: OTHER   Social Needs    Financial resource strain: Not hard at all    Food insecurity     Worry: Never true     Inability: Never true    Transportation needs     Medical: No     Non-medical: No   Tobacco Use    Smoking status: Never Smoker    Smokeless tobacco: Never Used   Substance and Sexual Activity    Alcohol use: Yes     Frequency: 2-4 times a month     Drinks per session: 1 or 2     Binge frequency: Never     Comment: a few glasses of wine a week    Drug use: No    Sexual activity: Not on file   Lifestyle    Physical activity     Days per  week: 2 days     Minutes per session: Not on file    Stress: Not at all   Relationships    Social connections     Talks on phone: Twice a week     Gets together: Once a week     Attends Judaism service: More than 4 times per year     Active member of club or organization: Yes     Attends meetings of clubs or organizations: More than 4 times per year     Relationship status:    Other Topics Concern    Not on file   Social History Narrative    Mr Barraza is  to Tiff; he is retired from [employer]; he has 1 grown son, Paul who is  and living in [place]-no kids       Family History   Problem Relation Age of Onset    Hypertension Father     Stroke Father     No Known Problems Sister     No Known Problems Son     Thrombosis Brother     Melanoma Neg Hx        Physical Exam:      Vitals:    10/07/20 1802   BP: (!) 146/74   Pulse: 77   Resp: 15   Temp: 98.3 °F (36.8 °C)     Gen: No acute distress. Nontoxic.  Mental Status:  Alert and oriented x 3.  Appropriate, conversant.  Skin: Warm, dry. No rashes seen.   Pulm: No increased work of breathing.  CV: Normal peripheral perfusion.  MSK:  Left lower extremity UA is appropriate for 2 days postoperative.  There is mild edema throughout the extremity.  The left knee bandage is clean dry and intact.  The On-Q pump site has slow drainage of serosanguineous fluid.  There is no active hemorrhage noted.  There is a small skin tear just medial and superior to the site of the pump.  Neuro: Awake. Speech normal. No focal neuro deficit observed.    Laboratory Studies:  Labs Reviewed   POCT GLUCOSE - Abnormal; Notable for the following components:       Result Value    POCT Glucose 140 (*)     All other components within normal limits       Chart reviewed.     Imaging Results    None       Medications Given:  Medications - No data to display    MDM:    75 y.o. male with small amount of serosanguineous drainage from the site of his On-Q pump.  No evidence of  significant hemorrhage, infection, or other severe complication at this time.    He is afebrile, stable, nontoxic.  His pain is well controlled.  No evidence of urinary retention which was an issue postoperatively for him.    I offered him removal of the pump early, as it is post to come out tomorrow at noon, verses change of his dressing with close monitoring.  Patient elected to have the dressing changed.    While the dressing was changed the patient had a small vasovagal episode which resolved with no intervention.  He was observed for a period in the ER with no change.    Patient's son who accompanies him feels comfortable with removing the On-Q catheter tomorrow.    Discharged home in stable condition.  Return precautions discussed at bedside.    Diagnostic Impression:    1. Visit for wound check         ED Disposition Condition    Discharge Stable        ED Prescriptions     None        Follow-up Information     Follow up With Specialties Details Why Contact Info    Catherine Middleton MD Internal Medicine Schedule an appointment as soon as possible for a visit in 1 week As needed [address]  [address], [address]  [facility] [address]  [phone]                          Patient and/or family understands the plan and is in agreement, verbalized understanding, questions answered    Amy Marcus MD  Emergency Medicine       Amy Marcus MD  10/07/20 0957

## 2020-10-07 NOTE — ED TRIAGE NOTES
Patient states had total knee Monday, has pain blocker above left total knee that is suppodsed to come out tomorrow, states began bleeding today, called anastesiology and was told to come to ED. Oxycodone immed prior to ED. Bleeding worse after PT today,.

## 2020-10-07 NOTE — PLAN OF CARE
[facility]  Physical Therapy Initial Evaluation    Name: Drake Barraza  Clinic Number: [id number]    Therapy Diagnosis:   Encounter Diagnoses   Name Primary?    Primary osteoarthritis of left knee     Chronic pain of right knee     Localized edema     Joint stiffness of knee, right     Abnormality of gait and mobility     Decreased range of motion of left knee     Weakness of left lower extremity      Physician: Chris Israel MD    Physician Orders: PT Eval and Treat   Medical Diagnosis from Referral: M17.12 (ICD-10-CM) - Primary osteoarthritis of left knee DOS 10/5/2020  Evaluation Date: 10/7/2020  Authorization Period Expiration: 10/20/2020  Plan of Care Expiration: 1/7/2021  Visit # / Visits authorized: 1/ 1  FOTO: 1/10      Visit:  88  Total: 88    Time In: 1:00pm  Time Out: 1:45pm  Total Billable Time: 45 minutes     Precautions: Standard     Subjective     Date of onset: DOS: 10/5/2020.     History of current condition - Drake reports: he had a left TKA on 10/5/2020. His knee feels stiff. He reports h/o R TKA July 2019. He belongs to [employer] and meetings are up 2 flights of stairs.        Past Medical History:   Diagnosis Date    Arthritis     Asymptomatic cholelithiasis     CAD (coronary artery disease) 2003    RCA Stent 11/2003, s/p LAD Stent 12/2003                                                       Fatty liver     History of total knee replacement, right     7/22/19-[facility]    Hyperlipidemia     Hypertension     Old myocardial infarct 11/2003      Inferior NSTEMI     MICHELLE on CPAP     Home CPAP at 9cm/Face Mask    Thyroid disease      Drake Barraza  has a past surgical history that includes Colonoscopy (N/A, 7/27/2016); Hernia repair (2006); Total knee arthroplasty (Right, 7/22/2019); Joint replacement; and Total knee arthroplasty (Left, 10/5/2020).    Drake has a current medication list which includes the following prescription(s):  acetaminophen, aspirin, cinnamon bark, docusate sodium, levothyroxine, meloxicam, metoprolol tartrate, multivitamin, nitroglycerin, omega-3 fatty acids-vitamin e, oxycodone, ramipril, rosuvastatin, sildenafil, tamsulosin, turmeric root extract, coenzyme q10, and vitamin d.    Review of patient's allergies indicates:   Allergen Reactions    Hydrocodone-acetaminophen Rash     Other reaction(s): constipated        Imaging, none since surgery     Prior Therapy: PT 1 year ago  Social History:  lives with their spouse & adult son is visiting for 1 week   Occupation: n/a  Prior Level of Function: (I) with 2 steps to enter   Current Level of Function: ambulating with RW    Pain:  Current 5/10, worst 5/10, best 5/10   Location: left knee   Description: stiffness  Aggravating Factors: Standing, Walking and Getting out of bed/chair  Easing Factors: pain medication, ice and rest    Pts goals: to be able to go up stairs & be more mobile, cutting the grass without much pain     Objective       Posture: good    AROM: *denotes pain  left knee: -5-0-98 deg*  R: 0/120 deg    PROM: *denotes pain  left knee: -3-0-99 deg*    L/E Strength w/ MicroFET Muscle Pradip Dynamometer Right Left Pain/Dysfunction with Movement   (approx 4 sec hold w/ max contraction)   Hip Flexion 7.1 kg  3.6 kg     Hip Abduction 7.5kg  5.8 kg     Quadriceps 10 kg  4.2 kg     Hamstrings 6.2 kg  4.1 kg       TU seconds (w/ RW)    30 second sit-to-stand test (without U/E support): 6 (w/o UE support)    KOOS Knee Survey:    Symptoms:   Pain:   Functional, Daily Livin/68 (13% disabled)  Function, Sports and Recreational Activities:  10/20  Quality of Life:      PROMIS-29:    Physical Function:    Anxiety:    Depression:     Fatigue:     Sleep Disturbance:     Satisfaction with Social Role:     Pain Interference:     Pain Intensity:  5/10    Gait Analysis: with RW: antalgic, step to pattern with RW, shuffled LLE  with decreased knee flex in swing phase     Impairment/Limitation/Restriction for KOOS, Functional, Daily Living    Therapist reviewed KOOS scores for Drake Barraza on 10/7/2020.   KOOS documents entered into [employer] - see Media section.    Limitation Score: 13%    Limitation for FOTO Knee Survey  Limitation Score: 44%         TREATMENT     Total Treatment time separate from Evaluation: 0 minutes    Drake received therapeutic exercises to develop strength, endurance and ROM for 0 minutes including:  SAQ  Seated HS stretch       Home Exercises and Patient Education Provided    Education provided:   - HEP reviewed    Written Home Exercises Provided: yes.  Exercises were reviewed and Drake was able to demonstrate them prior to the end of the session.  Drake demonstrated good  understanding of the education provided.     See EMR under Patient Instructions for exercises provided 10/7/2020.    Assessment     Drake is a 75 y.o. male referred to outpatient Physical Therapy with a medical diagnosis of L knee OA, s/p TKA. Pt presents to PT with pain, decreased left knee ROM, decreased strength, poor posture, impaired balance and gait, and functional deficits with walking. Pt would benefit from skilled PT consisting of manual therapy including STM/MFR left  knee, patellar mobs, knee flex/ext mobs/stretching; therapeutic exercise including LE strengthening/stretching, postural education, balance and gait training, and modalities prn to address limitations and increase functional mobility.      Pt prognosis is Good.   Pt will benefit from skilled outpatient Physical Therapy to address the deficits stated above and in the chart below, provide pt/family education, and to maximize pt's level of independence.     Plan of care discussed with patient: Yes  Pt's spiritual, cultural and educational needs considered and patient is agreeable to the plan of care and goals as stated below:     Anticipated Barriers for therapy:  none    Medical Necessity is demonstrated by the following  History  Co-morbidities and personal factors that may impact the plan of care Co-morbidities:   HTN, CAD, h/o MI, DM II    Personal Factors:   age     high   Examination  Body Structures and Functions, activity limitations and participation restrictions that may impact the plan of care Body Regions:   lower extremities    Body Systems:    gross symmetry  ROM  strength  gross coordinated movement  balance  gait  transfers  transitions  motor control  motor learning  edema  scar formation  skin integrity    Participation Restrictions:   none    Activity limitations:   Learning and applying knowledge  no deficits    General Tasks and Commands  no deficits    Communication  no deficits    Mobility  walking  moving around using equipment (WC)  using transportation (bus, train, plane, car)  driving (bike, car, motorcycle)    Self care  washing oneself (bathing, drying, washing hands)  caring for body parts (brushing teeth, shaving, grooming)  dressing  looking after one's health    Domestic Life  shopping  cooking  doing house work (cleaning house, washing dishes, laundry)    Interactions/Relationships  family relationships    Life Areas  no deficits    Community and Social Life  recreation and leisure         high   Clinical Presentation evolving clinical presentation with changing clinical characteristics moderate   Decision Making/ Complexity Score: moderate     Goals:  Short Term Goals: in 10 visits:     1. Pt will be independent with HEP supplement PT in improving functional mobility.  2. Pt will improve LLE strength to at least 75% of R LE strength as measured via MicroFet handheld dynamometer in order to improve functional mobility  3. Pt will improve L knee AROM to at least 0/110 deg in order to improve gait    Long Term Goals: In 18 visits:  1. Pt will be independent with updated HEP supplement PT in improving functional mobility.  2. Pt will improve L  "LE strength to at least 90% of R LE strength as measured via MicroFet handheld dynamometer in order to improve functional mobility  3. Pt will improve L knee AROM to at least 0/120 deg in order to improve gait and ability to perform ADLs  4. Pt will improve FOTO knee survey score to </= 39% limited in order to demo improved functional mobility  5. Pt will improve score on Function,Daily Living section of KOOS to at least 7/68   ( 10% limited) in order to demo improved functional mobility  6. Pt will perform TUG in < 10 seconds without AD in order to demo improved gait speed  7. Pt will perform at least 14 sit to stands without UE support on 30 second sit to stand test in order to demo improved ability to perform transfers        TUG Cutoff Scores:13.5        30" sit to stand Cutoff Scores:14          Plan     Plan of care Certification: 10/7/2020 to 1/7/2021    Outpatient Physical Therapy 3 times weekly for 6 weeks to include the following interventions: Gait Training, Manual Therapy, Moist Heat/ Ice, Neuromuscular Re-ed, Orthotic Management and Training, Patient Education, Therapeutic Activites and Therapeutic Exercise, ASTYM, Kinesiotape    Enedelia Byers, PT, DPT  "

## 2020-10-07 NOTE — TELEPHONE ENCOUNTER
----- Message from Jahaira Hernandez sent at 10/7/2020 10:14 AM CDT -----  Contact: Pt's wife-- [phone]  Type:  Needs Medical Advice    Who Called:  Pt's wife    Symptoms (please be specific): blood sugar monitor     Would the patient rather a call back or a response via [facility]? Call    Best Call Back Number:  [phone]    Additional Information:  Pt's wife called to request orders for a blood sugar monitor. She is requesting a call back.

## 2020-10-07 NOTE — DISCHARGE INSTRUCTIONS
Follow-Up Plan:  - Follow-up with primary care doctor within 3 - 5 days  - Additional testing and/or evaluation as directed by your primary doctor  - Follow-up with your orthopedic surgeon as previously directed    Return to the Emergency Department for symptoms including but not limited to: worsening symptoms, shortness of breath or chest pain, vomiting with inability to hold down fluids, fevers greater than 100.4°F, passing out/fainting/unconsciousness, or other concerning symptoms.

## 2020-10-07 NOTE — ED NOTES
Patient identifiers verified and correct for Mr Barraza  C/C: Bleeding from pain support lines above left knee SEE NN  APPEARANCE: awake and alert in NAD.  SKIN: warm, dry and intact. No breakdown or bruising.  MUSCULOSKELETAL: Patient moving all extremities spontaneously, no obvious swelling or deformities noted. Ambulates independently.Arrives with ACE wrap to right knee,  Min swelling or redness,.  RESPIRATORY: Denies shortness of breath.Respirations unlabored.   CARDIAC: Denies CP, 2+ distal pulses; no peripheral edema  ABDOMEN: S/ND/NT, Denies nausea  : voids spontaneously, denies difficulty  Neurologic: AAO x 4; follows commands equal strength in all extremities; denies numbness/tingling. Denies dizziness Denies weakness

## 2020-10-07 NOTE — TELEPHONE ENCOUNTER
Delfino, I have printed scripts for Glucometer/strips/lancets for Mr Barraza  Can you get him in to see me before the end of the year with a recheck HgbA1C a few days prior.  Thanks

## 2020-10-08 NOTE — PROGRESS NOTES
10/8/2020 1108    Called and spoke with patient. Reports patient's On-Q pump removed this morning without difficulty. Stated that blue tip to end of catheter remained intact upon removal. Small area of skin irritation remains where tegaderm bandage was used to secure nerve catheter, stating today that the site is on his left thigh instead of his abdomen. Encouraged patient to closely monitor site for any signs of infection and to notify his surgeon should the irritation worsen. Verbalizes understanding. Denies any other concerns at this time.

## 2020-10-09 ENCOUNTER — CLINICAL SUPPORT (OUTPATIENT)
Dept: REHABILITATION | Facility: HOSPITAL | Age: 75
End: 2020-10-09
Attending: ORTHOPAEDIC SURGERY
Payer: MEDICARE

## 2020-10-09 DIAGNOSIS — M25.662 DECREASED RANGE OF MOTION OF LEFT KNEE: ICD-10-CM

## 2020-10-09 DIAGNOSIS — R26.9 ABNORMALITY OF GAIT AND MOBILITY: ICD-10-CM

## 2020-10-09 DIAGNOSIS — R29.898 WEAKNESS OF LEFT LOWER EXTREMITY: ICD-10-CM

## 2020-10-09 PROCEDURE — 97140 MANUAL THERAPY 1/> REGIONS: CPT | Mod: HCNC

## 2020-10-09 PROCEDURE — 97110 THERAPEUTIC EXERCISES: CPT | Mod: HCNC

## 2020-10-09 NOTE — PROGRESS NOTES
10/9/2020 0808    Received a call from patient's son concerning continuous oozing of serosanguinous drainage from puncture site where PNC was previously inserted on left thigh. On-Q was completed and removed yesterday morning (10/8). According to son, he has saturated through 2 bandaids since removing yesterday. He is also concerned that he can not remember now if the blue tip was intact upon removal of PNC and he is unable to check because the On-Q was taken to a [facility] to be recycled. He does however remember the PNC coming out very easily and appeared to be uniformed in shape once completely removedt. Reassured him that more than likely the entire catheter was removed and that he would have noticed a metal spring at the end of catheter had it been broken off underneath patients skin.  Son also denies seeing any visible pieces of a foreign body sticking out of puncture site. For the oozing, encouraged patient to continue to cover site and that the oozing should eventually cease as wound heals. Also, offered for son to bring his dad to the [facility] before his PT appointment and I can assess and redress site for him. Son verbalizes understanding.

## 2020-10-09 NOTE — PROGRESS NOTES
"  Physical Therapy Daily Treatment Note     Name: Drake Barraza  Clinic Number: [id number]    Therapy Diagnosis: No diagnosis found.  Physician: Chris Israel MD    Visit Date: 10/9/2020    Physician Orders: PT Eval and Treat   Medical Diagnosis from Referral: M17.12 (ICD-10-CM) - Primary osteoarthritis of left knee DOS 10/5/2020  Evaluation Date: 10/7/2020  Authorization Period Expiration: 12/31/2020  Plan of Care Expiration: 1/7/2021  Visit # / Visits authorized: 1/ 1, 1/tbd  FOTO: 2/5        Visit:  89  Total: 177     Time In: 9:55am  Time Out: 10:50pm  Total Billable Time: 45 minutes      Precautions: Standard       Subjective     Pt reports: His pump site was draining over the past 2 days. He went to the hospital to have it removed and re-bandaged.   He was compliant with home exercise program.  Response to previous treatment: last session was initial eval   Functional change: none    Pain: 3/10  Location: left knee      Objective     Drake received the following manual therapy techniques: Soft tissue Mobilization were applied to the: L LE for 10 minutes, including:  Retrograde edema massage    Drake received therapeutic exercises to develop strength, endurance, ROM and flexibility for 35 minutes including:    Nustep 5'   SAQ x10   Seated HS stretch -not performed   Seated gastroc stretch with strap 30" x3   Supine knee ext stretch (towel on ankle) 2'  Quad sets 3" x10   Seated ball squeeze 5" 2x10   Active assist heel slides in supine 2x10    Ice applied to L knee for 10 min following session     Home Exercises Provided and Patient Education Provided     Education provided:   - HEP    Written Home Exercises Provided: Patient instructed to cont prior HEP.  Exercises were reviewed and Drake was able to demonstrate them prior to the end of the session.  Drake demonstrated good  understanding of the education provided.     See EMR under Patient Instructions for exercises provided prior " visit.      Assessment     Pt tolerated session well. He reported pain no worse than 6/10 throughout session. He reported feeling reduced tension in leg following manual treatment.     Drake is progressing well towards his goals.   Pt prognosis is Good.     Pt will continue to benefit from skilled outpatient physical therapy to address the deficits listed in the problem list box on initial evaluation, provide pt/family education and to maximize pt's level of independence in the home and community environment.     Pt's spiritual, cultural and educational needs considered and pt agreeable to plan of care and goals.    Anticipated barriers to physical therapy: none    Goals: Short Term Goals: in 10 visits:      1. Pt will be independent with HEP supplement PT in improving functional mobility.  2. Pt will improve LLE strength to at least 75% of R LE strength as measured via MicroFet handheld dynamometer in order to improve functional mobility  3. Pt will improve L knee AROM to at least 0/110 deg in order to improve gait     Long Term Goals: In 18 visits:  1. Pt will be independent with updated HEP supplement PT in improving functional mobility.  2. Pt will improve L LE strength to at least 90% of R LE strength as measured via MicroFet handheld dynamometer in order to improve functional mobility  3. Pt will improve L knee AROM to at least 0/120 deg in order to improve gait and ability to perform ADLs  4. Pt will improve FOTO knee survey score to </= 39% limited in order to demo improved functional mobility  5. Pt will improve score on Function,Daily Living section of KOOS to at least 7/68   ( 10% limited) in order to demo improved functional mobility  6. Pt will perform TUG in < 10 seconds without AD in order to demo improved gait speed  7. Pt will perform at least 14 sit to stands without UE support on 30 second sit to stand test in order to demo improved ability to perform transfers      Plan     Continue with  established plan of care towards PT goals.       Enedelia Byers, PT

## 2020-10-12 ENCOUNTER — PATIENT MESSAGE (OUTPATIENT)
Dept: ADMINISTRATIVE | Facility: OTHER | Age: 75
End: 2020-10-12

## 2020-10-12 ENCOUNTER — CLINICAL SUPPORT (OUTPATIENT)
Dept: REHABILITATION | Facility: HOSPITAL | Age: 75
End: 2020-10-12
Attending: ORTHOPAEDIC SURGERY
Payer: MEDICARE

## 2020-10-12 DIAGNOSIS — M25.662 DECREASED RANGE OF MOTION OF LEFT KNEE: ICD-10-CM

## 2020-10-12 DIAGNOSIS — R29.898 WEAKNESS OF LEFT LOWER EXTREMITY: ICD-10-CM

## 2020-10-12 DIAGNOSIS — R26.9 ABNORMALITY OF GAIT AND MOBILITY: ICD-10-CM

## 2020-10-12 PROCEDURE — 97140 MANUAL THERAPY 1/> REGIONS: CPT | Mod: HCNC

## 2020-10-12 PROCEDURE — 97110 THERAPEUTIC EXERCISES: CPT | Mod: HCNC

## 2020-10-12 NOTE — PROGRESS NOTES
"  Physical Therapy Daily Treatment Note     Name: Drake Barraza  Clinic Number: [id number]    Therapy Diagnosis:   Encounter Diagnoses   Name Primary?    Abnormality of gait and mobility     Decreased range of motion of left knee     Weakness of left lower extremity      Physician: Chris Israel MD    Visit Date: 10/12/2020    Physician Orders: PT Eval and Treat   Medical Diagnosis from Referral: M17.12 (ICD-10-CM) - Primary osteoarthritis of left knee DOS 10/5/2020  Evaluation Date: 10/7/2020  Authorization Period Expiration: 2020  Plan of Care Expiration: 2021  Visit # / Visits authorized: , 2/tbd  FOTO: 3/5        Visit:  89  Total: 264     Time In: 1:00pm  Time Out: 1:55pm  Total Billable Time: 45inutes      Precautions: Standard       Subjective     Pt reports: His knee feels stiff today, not much pain.   He was compliant with home exercise program.  Response to previous treatment: last session was initial eval   Functional change: none    Pain: 3/10  Location: left knee      Objective     10/12/2020  AROM: *denotes pain  left knee: -5-0-100 deg*       PROM: *denotes pain  left knee: -3-0-100 deg*     L/E Strength w/ MicroFET Muscle Pradip Dynamometer  Left Pain/Dysfunction with Movement   (approx 4 sec hold w/ max contraction)   Hip Flexion  5.7 kg      Hip Abduction  7 kg      Quadriceps  9.1 kg      Hamstrings  10.2kg         TU seconds (w/ RW)     30 second sit-to-stand test (without U/E support): 4 (w/ UE support)       Drake received the following manual therapy techniques: Soft tissue Mobilization were applied to the: L LE for 15 minutes, including:    Retrograde edema massage L gastroc & hamstring   Seated knee flexion joint mabel Juan received assessment & therapeutic exercises to develop strength, endurance, ROM and flexibility for 30 minutes including:      Recumbent bike rocking 5' -supervised     Quad sets 3" 3x10   Seated march 3x10       NOT PERFORMED   Nustep 5' " "-not performed  Seated ball squeeze 5" 2x10  NP  Active assist heel slides in supine 2x10 NP  SAQ x10  NP  Seated HS stretch -not performed  NP  Seated gastroc stretch with strap 30" x3  NP  Supine knee ext stretch (towel on ankle) 2' NP    Ice applied to L knee for 10 min following session     Home Exercises Provided and Patient Education Provided     Education provided:   - HEP    Written Home Exercises Provided: yes.  Exercises were reviewed and Drake was able to demonstrate them prior to the end of the session.  Drake demonstrated good  understanding of the education provided.     See EMR under Patient Instructions for exercises provided 10/12/2020.      Assessment     Pt reported pain with sit<>stand test today, limiting his progress. He did demonstrate significant progress with TUG assessment compared with SOC and knee ROM is improving with minimal pain.     Drake is progressing well towards his goals.   Pt prognosis is Good.     Pt will continue to benefit from skilled outpatient physical therapy to address the deficits listed in the problem list box on initial evaluation, provide pt/family education and to maximize pt's level of independence in the home and community environment.     Pt's spiritual, cultural and educational needs considered and pt agreeable to plan of care and goals.    Anticipated barriers to physical therapy: none    Goals: Short Term Goals: in 10 visits:      1. Pt will be independent with HEP supplement PT in improving functional mobility.  2. Pt will improve LLE strength to at least 75% of R LE strength as measured via MicroFet handheld dynamometer in order to improve functional mobility  3. Pt will improve L knee AROM to at least 0/110 deg in order to improve gait     Long Term Goals: In 18 visits:  1. Pt will be independent with updated HEP supplement PT in improving functional mobility.  2. Pt will improve L LE strength to at least 90% of R LE strength as measured via MicroFet " handheld dynamometer in order to improve functional mobility  3. Pt will improve L knee AROM to at least 0/120 deg in order to improve gait and ability to perform ADLs  4. Pt will improve FOTO knee survey score to </= 39% limited in order to demo improved functional mobility  5. Pt will improve score on Function,Daily Living section of KOOS to at least 7/68   ( 10% limited) in order to demo improved functional mobility  6. Pt will perform TUG in < 10 seconds without AD in order to demo improved gait speed  7. Pt will perform at least 14 sit to stands without UE support on 30 second sit to stand test in order to demo improved ability to perform transfers      Plan     Continue with established plan of care towards PT goals.       Enedelia Byers, PT

## 2020-10-14 ENCOUNTER — CLINICAL SUPPORT (OUTPATIENT)
Dept: REHABILITATION | Facility: HOSPITAL | Age: 75
End: 2020-10-14
Attending: ORTHOPAEDIC SURGERY
Payer: MEDICARE

## 2020-10-14 DIAGNOSIS — M25.662 DECREASED RANGE OF MOTION OF LEFT KNEE: ICD-10-CM

## 2020-10-14 DIAGNOSIS — R29.898 WEAKNESS OF LEFT LOWER EXTREMITY: ICD-10-CM

## 2020-10-14 DIAGNOSIS — R26.9 ABNORMALITY OF GAIT AND MOBILITY: ICD-10-CM

## 2020-10-14 PROCEDURE — 97110 THERAPEUTIC EXERCISES: CPT | Mod: HCNC

## 2020-10-14 PROCEDURE — 97140 MANUAL THERAPY 1/> REGIONS: CPT | Mod: HCNC

## 2020-10-14 PROCEDURE — 97116 GAIT TRAINING THERAPY: CPT | Mod: HCNC

## 2020-10-16 ENCOUNTER — CLINICAL SUPPORT (OUTPATIENT)
Dept: REHABILITATION | Facility: HOSPITAL | Age: 75
End: 2020-10-16
Attending: ORTHOPAEDIC SURGERY
Payer: MEDICARE

## 2020-10-16 DIAGNOSIS — R29.898 WEAKNESS OF LEFT LOWER EXTREMITY: ICD-10-CM

## 2020-10-16 DIAGNOSIS — M25.662 DECREASED RANGE OF MOTION OF LEFT KNEE: ICD-10-CM

## 2020-10-16 DIAGNOSIS — R26.9 ABNORMALITY OF GAIT AND MOBILITY: ICD-10-CM

## 2020-10-16 PROCEDURE — 97110 THERAPEUTIC EXERCISES: CPT | Mod: HCNC

## 2020-10-16 NOTE — PROGRESS NOTES
"  Physical Therapy Daily Treatment Note     Name: Drake Barraza  Clinic Number: [id number]    Therapy Diagnosis:   Encounter Diagnoses   Name Primary?    Abnormality of gait and mobility     Decreased range of motion of left knee     Weakness of left lower extremity      Physician: Chris Israel MD    Visit Date: 10/16/2020    Physician Orders: PT Eval and Treat   Medical Diagnosis from Referral: M17.12 (ICD-10-CM) - Primary osteoarthritis of left knee DOS 10/5/2020  Evaluation Date: 10/7/2020  Authorization Period Expiration: 2020  Plan of Care Expiration: 2021  Visit # / Visits authorized: ,   FOTO:         Visit:  91  Total: 446     Time In: 12:50pm  Time Out: 1:35 pm  Total Billable Time: 45minutes      Precautions: Standard       Subjective     Pt reports: His knee feels stiff today, not much pain.   He was compliant with home exercise program.  Response to previous treatment: muscle soreness   Functional change: none    Pain: 3/10  Location: left knee      Objective     10/12/2020  AROM: *denotes pain  left knee: -5-0-100 deg*       PROM: *denotes pain  left knee: -3-0-100 deg*     L/E Strength w/ MicroFET Muscle Pradip Dynamometer  Left Pain/Dysfunction with Movement   (approx 4 sec hold w/ max contraction)   Hip Flexion  5.7 kg      Hip Abduction  7 kg      Quadriceps  9.1 kg      Hamstrings  10.2kg         TU seconds (w/ RW)  30 second sit-to-stand test (without U/E support): 4 (w/ UE support)       Drake received the following manual therapy techniques: Soft tissue Mobilization were applied to the: L LE for 10  minutes, including:    Retrograde edema massage L gastroc & hamstring   Seated knee flexion joint mobs NP    Drake received  therapeutic exercises to develop strength, endurance, ROM and flexibility for 25 minutes including:      Recumbent bike rocking 5' -supervised seat 13    Quad sets 3" 3x10   Seated march 3x10   LAQ x10 /c focus on eccentric phase   active " "assist heel slides in supine x15      NOT PERFORMED   Nustep 5' -not performed  Seated ball squeeze 5" 2x10  NP  SAQ x10  NP  Seated HS stretch -not performed  NP  Seated gastroc stretch with strap 30" x3  NP  Supine knee ext stretch (towel on ankle) 2' NP      Drake participated in gait training to improve functional mobility and safety for 10  minutes, includin point gait training with SPC     Ice applied to L knee for 10 min following session     Home Exercises Provided and Patient Education Provided     Education provided:   - HEP    Written Home Exercises Provided: yes.  Exercises were reviewed and Drake was able to demonstrate them prior to the end of the session.  Drake demonstrated good  understanding of the education provided.     See EMR under Patient Instructions for exercises provided 10/12/2020.      Assessment     Pt tolerated gait training with SPC good today. He continues to require verbal cues to slow pace. He will require more training before practicing at home. His edema is improving from last week.     Drake is progressing well towards his goals.   Pt prognosis is Good.     Pt will continue to benefit from skilled outpatient physical therapy to address the deficits listed in the problem list box on initial evaluation, provide pt/family education and to maximize pt's level of independence in the home and community environment.     Pt's spiritual, cultural and educational needs considered and pt agreeable to plan of care and goals.    Anticipated barriers to physical therapy: none    Goals: Short Term Goals: in 10 visits:      1. Pt will be independent with HEP supplement PT in improving functional mobility.  2. Pt will improve LLE strength to at least 75% of R LE strength as measured via MicroFet handheld dynamometer in order to improve functional mobility  3. Pt will improve L knee AROM to at least 0/110 deg in order to improve gait     Long Term Goals: In 18 visits:  1. Pt will be " independent with updated HEP supplement PT in improving functional mobility.  2. Pt will improve L LE strength to at least 90% of R LE strength as measured via MicroFet handheld dynamometer in order to improve functional mobility  3. Pt will improve L knee AROM to at least 0/120 deg in order to improve gait and ability to perform ADLs  4. Pt will improve FOTO knee survey score to </= 39% limited in order to demo improved functional mobility  5. Pt will improve score on Function,Daily Living section of KOOS to at least 7/68   ( 10% limited) in order to demo improved functional mobility  6. Pt will perform TUG in < 10 seconds without AD in order to demo improved gait speed  7. Pt will perform at least 14 sit to stands without UE support on 30 second sit to stand test in order to demo improved ability to perform transfers      Plan     Continue with established plan of care towards PT goals.       Chris Henning, PT

## 2020-10-19 ENCOUNTER — OFFICE VISIT (OUTPATIENT)
Dept: ORTHOPEDICS | Facility: CLINIC | Age: 75
End: 2020-10-19
Payer: MEDICARE

## 2020-10-19 ENCOUNTER — CLINICAL SUPPORT (OUTPATIENT)
Dept: REHABILITATION | Facility: HOSPITAL | Age: 75
End: 2020-10-19
Attending: ORTHOPAEDIC SURGERY
Payer: MEDICARE

## 2020-10-19 DIAGNOSIS — Z96.652 STATUS POST TOTAL LEFT KNEE REPLACEMENT: ICD-10-CM

## 2020-10-19 DIAGNOSIS — R26.9 ABNORMALITY OF GAIT AND MOBILITY: ICD-10-CM

## 2020-10-19 DIAGNOSIS — R29.898 WEAKNESS OF LEFT LOWER EXTREMITY: ICD-10-CM

## 2020-10-19 DIAGNOSIS — M25.662 DECREASED RANGE OF MOTION OF LEFT KNEE: ICD-10-CM

## 2020-10-19 PROCEDURE — 99999 PR PBB SHADOW E&M-EST. PATIENT-LVL III: CPT | Mod: PBBFAC,HCNC,, | Performed by: NURSE PRACTITIONER

## 2020-10-19 PROCEDURE — 99024 PR POST-OP FOLLOW-UP VISIT: ICD-10-PCS | Mod: HCNC,S$GLB,, | Performed by: NURSE PRACTITIONER

## 2020-10-19 PROCEDURE — 99999 PR PBB SHADOW E&M-EST. PATIENT-LVL III: ICD-10-PCS | Mod: PBBFAC,HCNC,, | Performed by: NURSE PRACTITIONER

## 2020-10-19 PROCEDURE — 97140 MANUAL THERAPY 1/> REGIONS: CPT | Mod: HCNC

## 2020-10-19 PROCEDURE — 97110 THERAPEUTIC EXERCISES: CPT | Mod: HCNC

## 2020-10-19 PROCEDURE — 99024 POSTOP FOLLOW-UP VISIT: CPT | Mod: HCNC,S$GLB,, | Performed by: NURSE PRACTITIONER

## 2020-10-19 RX ORDER — OXYCODONE HYDROCHLORIDE 5 MG/1
TABLET ORAL
Qty: 50 TABLET | Refills: 0 | Status: SHIPPED | OUTPATIENT
Start: 2020-10-19 | End: 2020-11-17

## 2020-10-19 NOTE — PLAN OF CARE
Pt discharged to home. Pt has DME in place. Pt's 1st outpt PT appt is 10/7.       10/19/20 1121   Final Note   Assessment Type Final Discharge Note   Anticipated Discharge Disposition Home   Hospital Follow Up  Appt(s) scheduled? Yes   Right Care Referral Info   Post Acute Recommendation No Care   Post-Acute Status   Post-Acute Authorization Other   Other Status No Post-Acute Service Needs   Discharge Delays None known at this time

## 2020-10-19 NOTE — PROGRESS NOTES
Drake Barraza presents for initial post-operative visit following a left total knee arthroplasty performed by Dr. Israel on 10/5/2020.        Exam:   Ambulating well with assistive device- cane for distances.  Incision is clean and dry without drainage or erythema.   ROM:0-100    Initial post-operative radiographs reviewed today revealing a well fixed and aligned prosthesis.    A/P:  2 weeks s/p left total knee replacement  - The patient was advised to keep the incision clean and dry for the next 24 hours after which he may wash the area with antibacterial soap in the shower. Will not submerge until the incision is completely healed.   - Outpatient PT: ongoing at [facility] [place]  - Continue aspirin for 1 month from surgery.  - Reviewed antibiotic prophylaxis  - Pain medication refilled  - Follow up in 4 weeks with Dr. Israel. Pt will call clinic with problems/concerns.

## 2020-10-19 NOTE — PROGRESS NOTES
Physical Therapy Daily Treatment Note     Name: Drake Barraza  Clinic Number: [id number]    Therapy Diagnosis:   Encounter Diagnoses   Name Primary?    Abnormality of gait and mobility     Decreased range of motion of left knee     Weakness of left lower extremity      Physician: Chris Israel MD    Visit Date: 10/19/2020    Physician Orders: PT Eval and Treat   Medical Diagnosis from Referral: M17.12 (ICD-10-CM) - Primary osteoarthritis of left knee DOS 10/5/2020  Evaluation Date: 10/7/2020  Authorization Period Expiration: 2020  Plan of Care Expiration: 2021  Visit # / Visits authorized: ,   FOTO:         Visit:  91  Total: 537     Time In: 2:30pm  Time Out: 3:15pm  Total Billable Time: 45minutes      Precautions: Standard       Subjective     Pt reports: his knee still feels stiff. He had his follow up with MD last week and surgical bandage removed. He was given a paper tape bandage to wear until tonight. He is now ambulating with SPC primarily.     He was compliant with home exercise program.  Response to previous treatment: muscle soreness   Functional change: none    Pain: 6/10  Location: left knee      Objective     10/19/2020  AROM: *denotes pain  left knee: -5-0-100 deg*       PROM: *denotes pain  left knee: -3-0-102 deg*     L/E Strength w/ MicroFET Muscle Pradip Dynamometer  Left Pain/Dysfunction with Movement   (approx 4 sec hold w/ max contraction)   Hip Flexion  9.1 kg      Hip Abduction  12.9 kg      Quadriceps  12.9 kg      Hamstrings  8.7kg         TU seconds (w/ SPC)  30 second sit-to-stand test (without U/E support): 9 (w/ UE support)       Drake received the following manual therapy techniques: Soft tissue Mobilization were applied to the: L LE for 15 minutes, including:    Retrograde edema massage LLE  Seated knee flexion joint mobs   Patellar joint mobs Gr3  Passive knee Ext stretch supine    Drake received  assessment & therapeutic exercises to develop  "strength, endurance, ROM and flexibility for 30 minutes including:      Recumbent bike full revolutions 5' -supervised seat 13  Supine knee ext stretch (towel on ankle) 2'   Hamstring strap stretch 4 x 20" holds         NOT PERFORMED   Quad sets 3" 3x10 -not performed   Seated march 3x10 -not performed   LAQ x10 /c focus on eccentric phase -not performed   active assist heel slides in supine x15 -not performed   Nustep 5' -not performed -not performed   Seated ball squeeze 5" 2x10  NP  SAQ x10  NP  Seated HS stretch -not performed  NP  Seated gastroc stretch with strap 30" x3  NP        Drake participated in gait training to improve functional mobility and safety for 10  minutes, includin point gait training with SPC     Ice applied to L knee for 10 min following session     Home Exercises Provided and Patient Education Provided     Education provided:   - HEP    Written Home Exercises Provided: yes.  Exercises were reviewed and Drake was able to demonstrate them prior to the end of the session.  Drake demonstrated good  understanding of the education provided.     See EMR under Patient Instructions for exercises provided 10/19/2020.      Assessment     Pt was nohelia to achieve full revolutions in bike today. He c/o L proximal quad tightness with knee flexion. He demonstrated significant improvement in 30 sec sit<>stand and LLE gross strength today. He is also ambulating with less restrictive AD (SPC) safely.       Drake is progressing well towards his goals.   Pt prognosis is Good.     Pt will continue to benefit from skilled outpatient physical therapy to address the deficits listed in the problem list box on initial evaluation, provide pt/family education and to maximize pt's level of independence in the home and community environment.     Pt's spiritual, cultural and educational needs considered and pt agreeable to plan of care and goals.    Anticipated barriers to physical therapy: none    Goals: Short " Term Goals: in 10 visits:      1. Pt will be independent with HEP supplement PT in improving functional mobility.  2. Pt will improve LLE strength to at least 75% of R LE strength as measured via MicroFet handheld dynamometer in order to improve functional mobility  3. Pt will improve L knee AROM to at least 0/110 deg in order to improve gait     Long Term Goals: In 18 visits:  1. Pt will be independent with updated HEP supplement PT in improving functional mobility.  2. Pt will improve L LE strength to at least 90% of R LE strength as measured via MicroFet handheld dynamometer in order to improve functional mobility  3. Pt will improve L knee AROM to at least 0/120 deg in order to improve gait and ability to perform ADLs  4. Pt will improve FOTO knee survey score to </= 39% limited in order to demo improved functional mobility  5. Pt will improve score on Function,Daily Living section of KOOS to at least 7/68   ( 10% limited) in order to demo improved functional mobility  6. Pt will perform TUG in < 10 seconds without AD in order to demo improved gait speed  7. Pt will perform at least 14 sit to stands without UE support on 30 second sit to stand test in order to demo improved ability to perform transfers      Plan     Continue with established plan of care towards PT goals.       Enedelia Byers, PT

## 2020-10-19 NOTE — PLAN OF CARE
10/05/20 1600   Discharge Assessment   Assessment Type Discharge Planning Assessment   Confirmed/corrected address and phone number on facesheet? Yes   Assessment information obtained from? Patient;Medical Record   Expected Length of Stay (days) 1   Prior to hospitilization cognitive status: Alert/Oriented   Prior to hospitalization functional status: Assistive Equipment   Current cognitive status: Alert/Oriented   Current Functional Status: Assistive Equipment;Needs Assistance   Lives With spouse   Is patient able to care for self after discharge? Unable to determine at this time (comments)   Who are your caregiver(s) and their phone number(s)? spouse - Samara [phone]   Patient's perception of discharge disposition home or selfcare   Readmission Within the Last 30 Days no previous admission in last 30 days   Patient currently receives any other outside agency services? No   Equipment Currently Used at Home walker, rolling;bedside commode;shower chair   Do you have any problems affording any of your prescribed medications? No   Is the patient taking medications as prescribed? yes   Does the patient have transportation home? Yes  (pt's son will pickup pt at d/c)   Transportation Anticipated car, drives self;family or friend will provide   Discharge Plan A Home with family   Discharge Plan B Home with family;Home Health   DME Needed Upon Discharge  other (see comments)  (TBD)   Patient/Family in Agreement with Plan yes

## 2020-10-19 NOTE — PLAN OF CARE
10/05/20 1500   MOSHER Message   Medicare Outpatient and Observation Notification regarding financial responsibility Given to patient/caregiver;Explained to patient/caregiver;Signed/date by patient/caregiver   Date MOSHER was signed 10/05/20   Time MOSHER was signed [id number]

## 2020-10-20 ENCOUNTER — PATIENT MESSAGE (OUTPATIENT)
Dept: INTERNAL MEDICINE | Facility: CLINIC | Age: 75
End: 2020-10-20

## 2020-10-23 ENCOUNTER — CLINICAL SUPPORT (OUTPATIENT)
Dept: REHABILITATION | Facility: HOSPITAL | Age: 75
End: 2020-10-23
Attending: ORTHOPAEDIC SURGERY
Payer: MEDICARE

## 2020-10-23 DIAGNOSIS — R26.9 ABNORMALITY OF GAIT AND MOBILITY: ICD-10-CM

## 2020-10-23 DIAGNOSIS — R29.898 WEAKNESS OF LEFT LOWER EXTREMITY: ICD-10-CM

## 2020-10-23 DIAGNOSIS — M25.662 DECREASED RANGE OF MOTION OF LEFT KNEE: ICD-10-CM

## 2020-10-23 PROCEDURE — 97110 THERAPEUTIC EXERCISES: CPT | Mod: HCNC

## 2020-10-23 NOTE — PROGRESS NOTES
"  Physical Therapy Daily Treatment Note     Name: Drake Barraza  Clinic Number: [id number]    Therapy Diagnosis:   No diagnosis found.  Physician: Chris Israel MD    Visit Date: 10/23/2020    Physician Orders: PT Eval and Treat   Medical Diagnosis from Referral: M17.12 (ICD-10-CM) - Primary osteoarthritis of left knee DOS 10/5/2020  Evaluation Date: 10/7/2020  Authorization Period Expiration: 2020  Plan of Care Expiration: 2021  Visit # / Visits authorized: ,   FOTO:         Visit:  91  Total: 628     Time In: 2:25pm  Time Out: 3:10pm  Total Billable Time: 45 minutes      Precautions: Standard       Subjective     Pt reports: his knee still feels stiff. Pt feels that he may have regressed but was reassured by PT that he did not regress.     He was compliant with home exercise program.  Response to previous treatment: muscle soreness   Functional change: none    Pain: 2/10  Location: left knee      Objective     10/19/2020  AROM: *denotes pain  left knee: -5-0-100 deg*       PROM: *denotes pain  left knee: -3-0-102 deg*     L/E Strength w/ MicroFET Muscle Pradip Dynamometer  Left Pain/Dysfunction with Movement   (approx 4 sec hold w/ max contraction)   Hip Flexion  9.1 kg      Hip Abduction  12.9 kg      Quadriceps  12.9 kg      Hamstrings  8.7kg         TU seconds (w/ SPC)  30 second sit-to-stand test (without U/E support): 9 (w/ UE support)       Drake received the following manual therapy techniques: Soft tissue Mobilization were applied to the: L LE for 15 minutes, including:    Retrograde edema massage LLE  Seated knee flexion joint mobs   Patellar joint mobs Gr3  Passive knee Ext stretch supine    Drake received  assessment & therapeutic exercises to develop strength, endurance, ROM and flexibility for 30 minutes including:      Recumbent bike full revolutions 5' -supervised seat 13  Supine knee ext stretch (towel on ankle) 2'   Hamstring strap stretch 4 x 20" holds " "  Eccentric Step Downs 1x10  Sit to Stands 3x5      NOT PERFORMED   Quad sets 3" 3x10 -not performed   Seated march 3x10 -not performed   LAQ x10 /c focus on eccentric phase -not performed   active assist heel slides in supine x15 -not performed   Nustep 5' -not performed -not performed   Seated ball squeeze 5" 2x10  NP  SAQ x10  NP  Seated HS stretch -not performed  NP  Seated gastroc stretch with strap 30" x3  NP        Drake participated in gait training to improve functional mobility and safety for 10  minutes, includin point gait training with SPC     Ice applied to L knee for 10 min following session     Home Exercises Provided and Patient Education Provided     Education provided:   - HEP    Written Home Exercises Provided: yes.  Exercises were reviewed and Drake was able to demonstrate them prior to the end of the session.  Drake demonstrated good  understanding of the education provided.     See EMR under Patient Instructions for exercises provided 10/19/2020.      Assessment     Pt was nohelia to achieve full revolutions in bike today and tolerated therex progressions well. Pt demonstrated increased active knee ROM in all planes with therex.  Drake is progressing well towards his goals.   Pt prognosis is Good.     Pt will continue to benefit from skilled outpatient physical therapy to address the deficits listed in the problem list box on initial evaluation, provide pt/family education and to maximize pt's level of independence in the home and community environment.     Pt's spiritual, cultural and educational needs considered and pt agreeable to plan of care and goals.    Anticipated barriers to physical therapy: none    Goals: Short Term Goals: in 10 visits:      1. Pt will be independent with HEP supplement PT in improving functional mobility.  2. Pt will improve LLE strength to at least 75% of R LE strength as measured via MicroFet handheld dynamometer in order to improve functional mobility  3. " Pt will improve L knee AROM to at least 0/110 deg in order to improve gait     Long Term Goals: In 18 visits:  1. Pt will be independent with updated HEP supplement PT in improving functional mobility.  2. Pt will improve L LE strength to at least 90% of R LE strength as measured via MicroFet handheld dynamometer in order to improve functional mobility  3. Pt will improve L knee AROM to at least 0/120 deg in order to improve gait and ability to perform ADLs  4. Pt will improve FOTO knee survey score to </= 39% limited in order to demo improved functional mobility  5. Pt will improve score on Function,Daily Living section of KOOS to at least 7/68   ( 10% limited) in order to demo improved functional mobility  6. Pt will perform TUG in < 10 seconds without AD in order to demo improved gait speed  7. Pt will perform at least 14 sit to stands without UE support on 30 second sit to stand test in order to demo improved ability to perform transfers      Plan     Continue with established plan of care towards PT goals.       Chris Henning, PT

## 2020-10-26 ENCOUNTER — CLINICAL SUPPORT (OUTPATIENT)
Dept: REHABILITATION | Facility: HOSPITAL | Age: 75
End: 2020-10-26
Attending: ORTHOPAEDIC SURGERY
Payer: MEDICARE

## 2020-10-26 DIAGNOSIS — R26.9 ABNORMALITY OF GAIT AND MOBILITY: ICD-10-CM

## 2020-10-26 DIAGNOSIS — M25.662 DECREASED RANGE OF MOTION OF LEFT KNEE: ICD-10-CM

## 2020-10-26 DIAGNOSIS — R29.898 WEAKNESS OF LEFT LOWER EXTREMITY: ICD-10-CM

## 2020-10-26 PROCEDURE — 97110 THERAPEUTIC EXERCISES: CPT | Mod: HCNC

## 2020-10-26 NOTE — PROGRESS NOTES
Physical Therapy Daily Treatment Note     Name: Drake Barraza  Clinic Number: [id number]    Therapy Diagnosis:   Encounter Diagnoses   Name Primary?    Abnormality of gait and mobility     Decreased range of motion of left knee     Weakness of left lower extremity      Physician: Chris Israel MD    Visit Date: 10/26/2020    Physician Orders: PT Eval and Treat   Medical Diagnosis from Referral: M17.12 (ICD-10-CM) - Primary osteoarthritis of left knee DOS 10/5/2020  Evaluation Date: 10/7/2020  Authorization Period Expiration: 12/31/2020  Plan of Care Expiration: 1/7/2021  Visit # / Visits authorized: 1/ 1, 6/20  FOTO: 5/5        Visit:  91  Total: 719     Time In: 2:25pm  Time Out: 3:10pm  Total Billable Time: 45 minutes      Precautions: Standard       Subjective     Pt reports: his knee still feels stiff. Pt feels that he may have regressed but was reassured by PT that he did not regress.     He was compliant with home exercise program.  Response to previous treatment: muscle soreness   Functional change: none    Pain: 2/10  Location: left knee      Objective     10/19/2020  AROM: *denotes pain  left knee: 0-115 deg*       PROM: *denotes pain  left knee: 0-120 deg*     L/E Strength w/ MicroFET Muscle Pradip Dynamometer  Left Pain/Dysfunction with Movement   (approx 4 sec hold w/ max contraction)   Hip Flexion  9.2 kg      Hip Abduction  12.9 kg      Quadriceps  15.0 kg      Hamstrings  9.0kg         TUG:  15 seconds (w/ SPC)  30 second sit-to-stand test (without U/E support): 12 (w/ UE support)       Drake received the following manual therapy techniques: Soft tissue Mobilization were applied to the: L LE for 15 minutes, including:    Retrograde edema massage LLE  Seated knee flexion joint mobs   Patellar joint mobs Gr3  Passive knee Ext stretch supine    Drake received  assessment & therapeutic exercises to develop strength, endurance, ROM and flexibility for 30 minutes including:      Recumbent bike  "full revolutions 5' -supervised seat 13  Supine knee ext stretch (towel on ankle) 2'   Hamstring strap stretch 4 x 20" holds   Eccentric Step Downs 1x10  Sit to Stands 3x5  DL Press on Shuttle ( 1 black; 1 red): 2x15  Mini Squats at Bar: 2 x 10  Steamboats: 2 x10 R        NOT PERFORMED   Quad sets 3" 3x10 -not performed   Seated march 3x10 -not performed   LAQ x10 /c focus on eccentric phase -not performed   active assist heel slides in supine x15 -not performed   Nustep 5' -not performed -not performed   Seated ball squeeze 5" 2x10  NP  SAQ x10  NP  Seated HS stretch -not performed  NP  Seated gastroc stretch with strap 30" x3  NP        Drake participated in gait training to improve functional mobility and safety for 10  minutes, includin point gait training with SPC     Ice applied to L knee for 10 min following session     Home Exercises Provided and Patient Education Provided     Education provided:   - HEP    Written Home Exercises Provided: yes.  Exercises were reviewed and Drake was able to demonstrate them prior to the end of the session.  Drake demonstrated good  understanding of the education provided.     See EMR under Patient Instructions for exercises provided 10/19/2020.      Assessment     Pt was nohelia to achieve full revolutions in bike today and tolerated therex progressions well. Pt demonstrated increased active knee strength and ROM in all planes as well as improved TUG and 30 sec sit to stand scores  Drake is progressing well towards his goals.   Pt prognosis is Good.     Pt will continue to benefit from skilled outpatient physical therapy to address the deficits listed in the problem list box on initial evaluation, provide pt/family education and to maximize pt's level of independence in the home and community environment.     Pt's spiritual, cultural and educational needs considered and pt agreeable to plan of care and goals.    Anticipated barriers to physical therapy: none    Goals: " Short Term Goals: in 10 visits:      1. Pt will be independent with HEP supplement PT in improving functional mobility.  2. Pt will improve LLE strength to at least 75% of R LE strength as measured via MicroFet handheld dynamometer in order to improve functional mobility  3. Pt will improve L knee AROM to at least 0/110 deg in order to improve gait     Long Term Goals: In 18 visits:  1. Pt will be independent with updated HEP supplement PT in improving functional mobility.  2. Pt will improve L LE strength to at least 90% of R LE strength as measured via MicroFet handheld dynamometer in order to improve functional mobility  3. Pt will improve L knee AROM to at least 0/120 deg in order to improve gait and ability to perform ADLs  4. Pt will improve FOTO knee survey score to </= 39% limited in order to demo improved functional mobility  5. Pt will improve score on Function,Daily Living section of KOOS to at least 7/68   ( 10% limited) in order to demo improved functional mobility  6. Pt will perform TUG in < 10 seconds without AD in order to demo improved gait speed  7. Pt will perform at least 14 sit to stands without UE support on 30 second sit to stand test in order to demo improved ability to perform transfers      Plan     Continue with established plan of care towards PT goals.       Chris Henning, PT

## 2020-11-02 ENCOUNTER — CLINICAL SUPPORT (OUTPATIENT)
Dept: REHABILITATION | Facility: HOSPITAL | Age: 75
End: 2020-11-02
Attending: ORTHOPAEDIC SURGERY
Payer: MEDICARE

## 2020-11-02 DIAGNOSIS — R29.898 WEAKNESS OF LEFT LOWER EXTREMITY: ICD-10-CM

## 2020-11-02 DIAGNOSIS — M25.662 DECREASED RANGE OF MOTION OF LEFT KNEE: ICD-10-CM

## 2020-11-02 DIAGNOSIS — R26.9 ABNORMALITY OF GAIT AND MOBILITY: ICD-10-CM

## 2020-11-02 PROCEDURE — 97110 THERAPEUTIC EXERCISES: CPT | Mod: HCNC

## 2020-11-02 NOTE — PROGRESS NOTES
Physical Therapy Daily Treatment Note     Name: Drake Barraza  Clinic Number: [id number]    Therapy Diagnosis:   Encounter Diagnoses   Name Primary?    Abnormality of gait and mobility     Decreased range of motion of left knee     Weakness of left lower extremity      Physician: Chris Israel MD    Visit Date: 11/2/2020    Physician Orders: PT Eval and Treat   Medical Diagnosis from Referral: M17.12 (ICD-10-CM) - Primary osteoarthritis of left knee DOS 10/5/2020  Evaluation Date: 10/7/2020  Authorization Period Expiration: 12/31/2020  Plan of Care Expiration: 1/7/2021  Visit # / Visits authorized: 1/ 1, 7/20  FOTO: 5/5        Visit:  91  Total: 810     Time In: 11:19 am  Time Out: 12:00 am  Total Billable Time: 41 minutes      Precautions: Standard       Subjective     Pt reports: his knee still feels stiff. Pt feels that he may have regressed but was reassured by PT that he did not regress.     He was compliant with home exercise program.  Response to previous treatment: muscle soreness   Functional change: none    Pain: 2/10  Location: left knee      Objective     11/2/2020  AROM: *denotes pain  left knee: 0-124 deg*       PROM: *denotes pain  left knee: 0-130 deg*     L/E Strength w/ MicroFET Muscle Pradip Dynamometer  Left Pain/Dysfunction with Movement   (approx 4 sec hold w/ max contraction)   Hip Flexion  10.2 kg      Hip Abduction  12.9 kg      Quadriceps  20.3 kg      Hamstrings  13.7 kg         TUG:  15 seconds (w/o AD)  30 second sit-to-stand test (without U/E support): 14 (w/o UE support)       Drake received the following manual therapy techniques: Soft tissue Mobilization were applied to the: L LE for 0 minutes, including:    Retrograde edema massage LLE  Seated knee flexion joint mobs   Patellar joint mobs Gr3  Passive knee Ext stretch supine    Drake received  assessment & therapeutic exercises to develop strength, endurance, ROM and flexibility for 30 minutes including:      Recumbent  "bike full revolutions 5' -supervised seat 13  Supine knee ext stretch (towel on ankle) 2'   Hamstring strap stretch 4 x 20" holds   Eccentric Step Downs 1x10  Sit to Stands 3x5  DL Press on Shuttle ( 1 black; 1 red): 2x15  Mini Squats at Bar: 2 x 10  Steamboats: 2 x10 R        NOT PERFORMED   Quad sets 3" 3x10 -not performed   Seated march 3x10 -not performed   LAQ x10 /c focus on eccentric phase -not performed   active assist heel slides in supine x15 -not performed   Nustep 5' -not performed -not performed   Seated ball squeeze 5" 2x10  NP  SAQ x10  NP  Seated HS stretch -not performed  NP  Seated gastroc stretch with strap 30" x3  NP        Drake participated in gait training to improve functional mobility and safety for 0 minutes, includin point gait training with SPC     Ice applied to L knee for 0 min following session     Home Exercises Provided and Patient Education Provided     Education provided:   - HEP    Written Home Exercises Provided: yes.  Exercises were reviewed and Drake was able to demonstrate them prior to the end of the session.  Drake demonstrated good  understanding of the education provided.     See EMR under Patient Instructions for exercises provided 10/19/2020.      Assessment     Pt was nohelia to achieve full revolutions in bike today and tolerated therex progressions well. Pt demonstrated increased active knee strength and ROM in all planes as well as improved TUG, 30 sec sit to stand scores, and LE strength and ROM  Drake is progressing well towards his goals.   Pt prognosis is Good.     Pt will continue to benefit from skilled outpatient physical therapy to address the deficits listed in the problem list box on initial evaluation, provide pt/family education and to maximize pt's level of independence in the home and community environment.     Pt's spiritual, cultural and educational needs considered and pt agreeable to plan of care and goals.    Anticipated barriers to physical " therapy: none    Goals: Short Term Goals: in 10 visits:      1. Pt will be independent with HEP supplement PT in improving functional mobility.  2. Pt will improve LLE strength to at least 75% of R LE strength as measured via MicroFet handheld dynamometer in order to improve functional mobility  3. Pt will improve L knee AROM to at least 0/110 deg in order to improve gait     Long Term Goals: In 18 visits:  1. Pt will be independent with updated HEP supplement PT in improving functional mobility.  2. Pt will improve L LE strength to at least 90% of R LE strength as measured via MicroFet handheld dynamometer in order to improve functional mobility  3. Pt will improve L knee AROM to at least 0/120 deg in order to improve gait and ability to perform ADLs  4. Pt will improve FOTO knee survey score to </= 39% limited in order to demo improved functional mobility  5. Pt will improve score on Function,Daily Living section of KOOS to at least 7/68   ( 10% limited) in order to demo improved functional mobility  6. Pt will perform TUG in < 10 seconds without AD in order to demo improved gait speed  7. Pt will perform at least 14 sit to stands without UE support on 30 second sit to stand test in order to demo improved ability to perform transfers      Plan     Continue with established plan of care towards PT goals.       Chris Henning, PT

## 2020-11-04 ENCOUNTER — CLINICAL SUPPORT (OUTPATIENT)
Dept: REHABILITATION | Facility: HOSPITAL | Age: 75
End: 2020-11-04
Attending: ORTHOPAEDIC SURGERY
Payer: MEDICARE

## 2020-11-04 DIAGNOSIS — M25.662 DECREASED RANGE OF MOTION OF LEFT KNEE: ICD-10-CM

## 2020-11-04 DIAGNOSIS — R26.9 ABNORMALITY OF GAIT AND MOBILITY: ICD-10-CM

## 2020-11-04 DIAGNOSIS — R29.898 WEAKNESS OF LEFT LOWER EXTREMITY: ICD-10-CM

## 2020-11-04 PROCEDURE — 97110 THERAPEUTIC EXERCISES: CPT | Mod: HCNC

## 2020-11-04 NOTE — PROGRESS NOTES
Physical Therapy Daily Treatment Note     Name: Drake Barraza  Clinic Number: [id number]    Therapy Diagnosis:   Encounter Diagnoses   Name Primary?    Abnormality of gait and mobility     Decreased range of motion of left knee     Weakness of left lower extremity      Physician: Chris Israel MD    Visit Date: 11/4/2020    Physician Orders: PT Eval and Treat   Medical Diagnosis from Referral: M17.12 (ICD-10-CM) - Primary osteoarthritis of left knee DOS 10/5/2020  Evaluation Date: 10/7/2020  Authorization Period Expiration: 12/31/2020  Plan of Care Expiration: 1/7/2021  Visit # / Visits authorized: 1/ 1, 8/20  FOTO: 5/5        Visit:  91  Total: 901     Time In: 0223 pm  Time Out: 0310  pm  Total Billable Time: 47 minutes      Precautions: Standard       Subjective     Pt reports: his knee still is feeling good today.    He was compliant with home exercise program.  Response to previous treatment: muscle soreness   Functional change: none    Pain: 0/10  Location: left knee      Objective     11/2/2020  AROM: *denotes pain  left knee: 0-124 deg*       PROM: *denotes pain  left knee: 0-130 deg*     L/E Strength w/ MicroFET Muscle Pradip Dynamometer  Left Pain/Dysfunction with Movement   (approx 4 sec hold w/ max contraction)   Hip Flexion  10.2 kg      Hip Abduction  12.9 kg      Quadriceps  20.3 kg      Hamstrings  13.7 kg         TUG:  15 seconds (w/o AD)  30 second sit-to-stand test (without U/E support): 14 (w/o UE support)       Drake received the following manual therapy techniques: Soft tissue Mobilization were applied to the: L LE for 0 minutes, including:    Retrograde edema massage LLE  Seated knee flexion joint mobs   Patellar joint mobs Gr3  Passive knee Ext stretch supine    Drake received  assessment & therapeutic exercises to develop strength, endurance, ROM and flexibility for 40 minutes including:      Recumbent bike full revolutions 5' -supervised seat 13  Supine knee ext stretch (towel  "on ankle) 2'   Hamstring strap stretch 4 x 20" holds   Eccentric Step Downs 1x10  Sit to Stands 3x5  DL Press on Shuttle ( 1 black; 1 red): 2x15  Mini Squats at Bar: 2 x 10  Steamboats: 2 x10 R   Single Leg Balance with RTB 3x 30'  Single Leg Ball Toss on Oval Disk 2 x 10        NOT PERFORMED   Quad sets 3" 3x10 -not performed   Seated march 3x10 -not performed   LAQ x10 /c focus on eccentric phase -not performed   active assist heel slides in supine x15 -not performed   Nustep 5' -not performed -not performed   Seated ball squeeze 5" 2x10  NP  SAQ x10  NP  Seated HS stretch -not performed  NP  Seated gastroc stretch with strap 30" x3  NP        Drake participated in gait training to improve functional mobility and safety for 0 minutes, includin point gait training with SPC     Ice applied to L knee for 0 min following session     Home Exercises Provided and Patient Education Provided     Education provided:   - HEP    Written Home Exercises Provided: yes.  Exercises were reviewed and Drake was able to demonstrate them prior to the end of the session.  Drake demonstrated good  understanding of the education provided.     See EMR under Patient Instructions for exercises provided 10/19/2020.      Assessment     Pt tolerated therex progressions well w/o pain. PT will continue to progress pt with single leg exercises to address balance  Drake is progressing well towards his goals.   Pt prognosis is Good.     Pt will continue to benefit from skilled outpatient physical therapy to address the deficits listed in the problem list box on initial evaluation, provide pt/family education and to maximize pt's level of independence in the home and community environment.     Pt's spiritual, cultural and educational needs considered and pt agreeable to plan of care and goals.    Anticipated barriers to physical therapy: none    Goals: Short Term Goals: in 10 visits:      1. Pt will be independent with HEP supplement PT in " improving functional mobility.  2. Pt will improve LLE strength to at least 75% of R LE strength as measured via MicroFet handheld dynamometer in order to improve functional mobility  3. Pt will improve L knee AROM to at least 0/110 deg in order to improve gait     Long Term Goals: In 18 visits:  1. Pt will be independent with updated HEP supplement PT in improving functional mobility.  2. Pt will improve L LE strength to at least 90% of R LE strength as measured via MicroFet handheld dynamometer in order to improve functional mobility  3. Pt will improve L knee AROM to at least 0/120 deg in order to improve gait and ability to perform ADLs  4. Pt will improve FOTO knee survey score to </= 39% limited in order to demo improved functional mobility  5. Pt will improve score on Function,Daily Living section of KOOS to at least 7/68   ( 10% limited) in order to demo improved functional mobility  6. Pt will perform TUG in < 10 seconds without AD in order to demo improved gait speed  7. Pt will perform at least 14 sit to stands without UE support on 30 second sit to stand test in order to demo improved ability to perform transfers      Plan     Continue with established plan of care towards PT goals.       Chris Henning, PT

## 2020-11-05 ENCOUNTER — PATIENT MESSAGE (OUTPATIENT)
Dept: ADMINISTRATIVE | Facility: OTHER | Age: 75
End: 2020-11-05

## 2020-11-06 ENCOUNTER — CLINICAL SUPPORT (OUTPATIENT)
Dept: REHABILITATION | Facility: HOSPITAL | Age: 75
End: 2020-11-06
Attending: ORTHOPAEDIC SURGERY
Payer: MEDICARE

## 2020-11-06 DIAGNOSIS — R29.898 WEAKNESS OF LEFT LOWER EXTREMITY: ICD-10-CM

## 2020-11-06 DIAGNOSIS — R26.9 ABNORMALITY OF GAIT AND MOBILITY: ICD-10-CM

## 2020-11-06 DIAGNOSIS — M25.662 DECREASED RANGE OF MOTION OF LEFT KNEE: ICD-10-CM

## 2020-11-06 PROCEDURE — 97110 THERAPEUTIC EXERCISES: CPT | Mod: HCNC

## 2020-11-06 NOTE — PROGRESS NOTES
Physical Therapy Daily Treatment Note     Name: Drake Barraza  Clinic Number: [id number]    Therapy Diagnosis:   Encounter Diagnoses   Name Primary?    Abnormality of gait and mobility     Decreased range of motion of left knee     Weakness of left lower extremity      Physician: Chris Israel MD    Visit Date: 11/6/2020    Physician Orders: PT Eval and Treat   Medical Diagnosis from Referral: M17.12 (ICD-10-CM) - Primary osteoarthritis of left knee DOS 10/5/2020  Evaluation Date: 10/7/2020  Authorization Period Expiration: 12/31/2020  Plan of Care Expiration: 1/7/2021  Visit # / Visits authorized: 1/ 1, 9/20  FOTO: 5/5        Visit:  91  Total: 982     Time In:1100 am  Time Out: 1138 am  Total Billable Time: 38 minutes      Precautions: Standard       Subjective     Pt reports: His knee is feeling sore today but states the PT helps relieve this stifness  He was compliant with home exercise program.  Response to previous treatment: muscle soreness   Functional change: none    Pain: 1/10  Location: left knee      Objective     11/2/2020  AROM: *denotes pain  left knee: 0-124 deg*       PROM: *denotes pain  left knee: 0-130 deg*     L/E Strength w/ MicroFET Muscle Pradip Dynamometer  Left Pain/Dysfunction with Movement   (approx 4 sec hold w/ max contraction)   Hip Flexion  10.2 kg      Hip Abduction  12.9 kg      Quadriceps  20.3 kg      Hamstrings  13.7 kg         TUG:  15 seconds (w/o AD)  30 second sit-to-stand test (without U/E support): 14 (w/o UE support)       Drake received the following manual therapy techniques: Soft tissue Mobilization were applied to the: L LE for 0 minutes, including:    Retrograde edema massage LLE NP  Seated knee flexion joint mobs NP  Patellar joint mobs Gr3 NP  Passive knee Ext stretch supine NP    Drake received  assessment & therapeutic exercises to develop strength, endurance, ROM and flexibility for 38 minutes including:      Recumbent bike full revolutions 5'  "-supervised seat 13  Supine knee ext stretch (towel on ankle) 2'   Hamstring strap stretch 4 x 20" holds   Eccentric Step Downs 1x10  Sit to Stands 3x5  DL Press on Shuttle ( 1 black; 1 red): 2x15  Mini Squats at Bar: 2 x 10  Steamboats: 2 x10 R   Single Leg Balance with RTB 3x 30'  Single Leg Ball Toss on Oval Disk 2 x 10        NOT PERFORMED   Quad sets 3" 3x10 -not performed   Seated march 3x10 -not performed   LAQ x10 /c focus on eccentric phase -not performed   active assist heel slides in supine x15 -not performed   Nustep 5' -not performed -not performed   Seated ball squeeze 5" 2x10  NP  SAQ x10  NP  Seated HS stretch -not performed  NP  Seated gastroc stretch with strap 30" x3  NP        Drake participated in gait training to improve functional mobility and safety for 0 minutes, includin point gait training with SPC     Ice applied to L knee for 0 min following session     Home Exercises Provided and Patient Education Provided     Education provided:   - HEP    Written Home Exercises Provided: yes.  Exercises were reviewed and Drake was able to demonstrate them prior to the end of the session.  Drake demonstrated good  understanding of the education provided.     See EMR under Patient Instructions for exercises provided 10/19/2020.      Assessment     Pt tolerated therex progressions well w/o pain. PT will continue to progress pt with single leg exercises to address balance   Drake is progressing well towards his goals.   Pt prognosis is Good.     Pt will continue to benefit from skilled outpatient physical therapy to address the deficits listed in the problem list box on initial evaluation, provide pt/family education and to maximize pt's level of independence in the home and community environment.     Pt's spiritual, cultural and educational needs considered and pt agreeable to plan of care and goals.    Anticipated barriers to physical therapy: none    Goals: Short Term Goals: in 10 visits:    "   1. Pt will be independent with HEP supplement PT in improving functional mobility.  2. Pt will improve LLE strength to at least 75% of R LE strength as measured via MicroFet handheld dynamometer in order to improve functional mobility  3. Pt will improve L knee AROM to at least 0/110 deg in order to improve gait     Long Term Goals: In 18 visits:  1. Pt will be independent with updated HEP supplement PT in improving functional mobility.  2. Pt will improve L LE strength to at least 90% of R LE strength as measured via MicroFet handheld dynamometer in order to improve functional mobility  3. Pt will improve L knee AROM to at least 0/120 deg in order to improve gait and ability to perform ADLs  4. Pt will improve FOTO knee survey score to </= 39% limited in order to demo improved functional mobility  5. Pt will improve score on Function,Daily Living section of KOOS to at least 7/68   ( 10% limited) in order to demo improved functional mobility  6. Pt will perform TUG in < 10 seconds without AD in order to demo improved gait speed  7. Pt will perform at least 14 sit to stands without UE support on 30 second sit to stand test in order to demo improved ability to perform transfers      Plan     Continue with established plan of care towards PT goals.       Chris Henning, PT

## 2020-11-09 ENCOUNTER — CLINICAL SUPPORT (OUTPATIENT)
Dept: REHABILITATION | Facility: HOSPITAL | Age: 75
End: 2020-11-09
Attending: ORTHOPAEDIC SURGERY
Payer: MEDICARE

## 2020-11-09 DIAGNOSIS — M25.662 DECREASED RANGE OF MOTION OF LEFT KNEE: ICD-10-CM

## 2020-11-09 DIAGNOSIS — R26.9 ABNORMALITY OF GAIT AND MOBILITY: ICD-10-CM

## 2020-11-09 DIAGNOSIS — R29.898 WEAKNESS OF LEFT LOWER EXTREMITY: ICD-10-CM

## 2020-11-09 PROCEDURE — 97110 THERAPEUTIC EXERCISES: CPT | Mod: HCNC

## 2020-11-09 NOTE — PROGRESS NOTES
Physical Therapy Daily Treatment Note     Name: Drake Barraza  Clinic Number: [id number]    Therapy Diagnosis:   Encounter Diagnoses   Name Primary?    Abnormality of gait and mobility     Decreased range of motion of left knee     Weakness of left lower extremity      Physician: Chris Israel MD    Visit Date: 2020    Physician Orders: PT Eval and Treat   Medical Diagnosis from Referral: M17.12 (ICD-10-CM) - Primary osteoarthritis of left knee DOS 10/5/2020  Evaluation Date: 10/7/2020  Authorization Period Expiration: 2020  Plan of Care Expiration: 2021  Visit # / Visits authorized: , 10/20  FOTO:         Visit:  91  Total: 1073     Time In:0222 pm  Time Out: 0300 pm  Total Billable Time: 38 minutes      Precautions: Standard       Subjective     Pt reports: His knee is feeling sore today but states the PT helps relieve this stifness  He was compliant with home exercise program.  Response to previous treatment: muscle soreness   Functional change: none    Pain: 1/10  Location: left knee      Objective     2020  AROM: *denotes pain  left knee: 0-130 deg*       PROM: *denotes pain  left knee: 0-133 deg*     L/E Strength w/ MicroFET Muscle Pradip Dynamometer  Left Pain/Dysfunction with Movement   (approx 4 sec hold w/ max contraction)   Hip Flexion  10.2 kg      Hip Abduction  12.9 kg      Quadriceps  20.3 kg      Hamstrings  13.7 kg         TU seconds (w/o AD)  30 second sit-to-stand test (without U/E support): 14 (w/o UE support)       Drake received the following manual therapy techniques: Soft tissue Mobilization were applied to the: L LE for 0 minutes, including:    Retrograde edema massage LLE NP  Seated knee flexion joint mobs NP  Patellar joint mobs Gr3 NP  Passive knee Ext stretch supine NP    Drake received  assessment & therapeutic exercises to develop strength, endurance, ROM and flexibility for 38 minutes including:      Recumbent bike full revolutions 5'  "-supervised seat 13  Supine knee ext stretch (towel on ankle) 2'   Hamstring strap stretch 4 x 20" holds   Eccentric Step Downs 1x10  Sit to Stands 3x5  DL Press on Shuttle ( 1 black; 1 red): 2x15  Mini Squats at Bar: 2 x 10  Steamboats: 2 x10 R   Single Leg Balance with RTB 3x 30'  Single Leg Ball Toss on Oval Disk 2 x 10        NOT PERFORMED   Quad sets 3" 3x10 -not performed   Seated march 3x10 -not performed   LAQ x10 /c focus on eccentric phase -not performed   active assist heel slides in supine x15 -not performed   Nustep 5' -not performed -not performed   Seated ball squeeze 5" 2x10  NP  SAQ x10  NP  Seated HS stretch -not performed  NP  Seated gastroc stretch with strap 30" x3  NP        Drake participated in gait training to improve functional mobility and safety for 0 minutes, includin point gait training with SPC     Ice applied to L knee for 0 min following session     Home Exercises Provided and Patient Education Provided     Education provided:   - HEP    Written Home Exercises Provided: yes.  Exercises were reviewed and Drake was able to demonstrate them prior to the end of the session.  Drake demonstrated good  understanding of the education provided.     See EMR under Patient Instructions for exercises provided 10/19/2020.      Assessment     Pt tolerated therex progressions well w/o pain. PT will continue to progress pt with single leg exercises to address balance   Drake is progressing well towards his goals.   Pt prognosis is Good.     Pt will continue to benefit from skilled outpatient physical therapy to address the deficits listed in the problem list box on initial evaluation, provide pt/family education and to maximize pt's level of independence in the home and community environment.     Pt's spiritual, cultural and educational needs considered and pt agreeable to plan of care and goals.    Anticipated barriers to physical therapy: none    Goals: Short Term Goals: in 10 visits:    "   1. Pt will be independent with HEP supplement PT in improving functional mobility.  2. Pt will improve LLE strength to at least 75% of R LE strength as measured via MicroFet handheld dynamometer in order to improve functional mobility  3. Pt will improve L knee AROM to at least 0/110 deg in order to improve gait     Long Term Goals: In 18 visits:  1. Pt will be independent with updated HEP supplement PT in improving functional mobility.  2. Pt will improve L LE strength to at least 90% of R LE strength as measured via MicroFet handheld dynamometer in order to improve functional mobility  3. Pt will improve L knee AROM to at least 0/120 deg in order to improve gait and ability to perform ADLs  4. Pt will improve FOTO knee survey score to </= 39% limited in order to demo improved functional mobility  5. Pt will improve score on Function,Daily Living section of KOOS to at least 7/68   ( 10% limited) in order to demo improved functional mobility  6. Pt will perform TUG in < 10 seconds without AD in order to demo improved gait speed  7. Pt will perform at least 14 sit to stands without UE support on 30 second sit to stand test in order to demo improved ability to perform transfers      Plan     Continue with established plan of care towards PT goals.       hCris Henning, PT

## 2020-11-11 ENCOUNTER — CLINICAL SUPPORT (OUTPATIENT)
Dept: REHABILITATION | Facility: HOSPITAL | Age: 75
End: 2020-11-11
Attending: ORTHOPAEDIC SURGERY
Payer: MEDICARE

## 2020-11-11 ENCOUNTER — LAB VISIT (OUTPATIENT)
Dept: LAB | Facility: HOSPITAL | Age: 75
End: 2020-11-11
Attending: INTERNAL MEDICINE
Payer: MEDICARE

## 2020-11-11 DIAGNOSIS — R26.9 ABNORMALITY OF GAIT AND MOBILITY: ICD-10-CM

## 2020-11-11 DIAGNOSIS — I25.2 OLD MYOCARDIAL INFARCT: ICD-10-CM

## 2020-11-11 DIAGNOSIS — R29.898 WEAKNESS OF LEFT LOWER EXTREMITY: ICD-10-CM

## 2020-11-11 DIAGNOSIS — I10 ESSENTIAL HYPERTENSION: ICD-10-CM

## 2020-11-11 DIAGNOSIS — M25.662 DECREASED RANGE OF MOTION OF LEFT KNEE: ICD-10-CM

## 2020-11-11 DIAGNOSIS — I25.10 CORONARY ARTERY DISEASE INVOLVING NATIVE CORONARY ARTERY OF NATIVE HEART WITHOUT ANGINA PECTORIS: ICD-10-CM

## 2020-11-11 LAB
ALBUMIN SERPL BCP-MCNC: 4.2 G/DL (ref 3.5–5.2)
ALP SERPL-CCNC: 84 U/L (ref 55–135)
ALT SERPL W/O P-5'-P-CCNC: 35 U/L (ref 10–44)
ANION GAP SERPL CALC-SCNC: 10 MMOL/L (ref 8–16)
AST SERPL-CCNC: 32 U/L (ref 10–40)
BILIRUB SERPL-MCNC: 0.6 MG/DL (ref 0.1–1)
BUN SERPL-MCNC: 20 MG/DL (ref 8–23)
CALCIUM SERPL-MCNC: 9.9 MG/DL (ref 8.7–10.5)
CHLORIDE SERPL-SCNC: 105 MMOL/L (ref 95–110)
CHOLEST SERPL-MCNC: 122 MG/DL (ref 120–199)
CHOLEST/HDLC SERPL: 3.1 {RATIO} (ref 2–5)
CO2 SERPL-SCNC: 27 MMOL/L (ref 23–29)
CREAT SERPL-MCNC: 1.1 MG/DL (ref 0.5–1.4)
EST. GFR  (AFRICAN AMERICAN): >60 ML/MIN/1.73 M^2
EST. GFR  (NON AFRICAN AMERICAN): >60 ML/MIN/1.73 M^2
GLUCOSE SERPL-MCNC: 137 MG/DL (ref 70–110)
HDLC SERPL-MCNC: 40 MG/DL (ref 40–75)
HDLC SERPL: 32.8 % (ref 20–50)
LDLC SERPL CALC-MCNC: 52.6 MG/DL (ref 63–159)
NONHDLC SERPL-MCNC: 82 MG/DL
POTASSIUM SERPL-SCNC: 5.4 MMOL/L (ref 3.5–5.1)
PROT SERPL-MCNC: 7.2 G/DL (ref 6–8.4)
SODIUM SERPL-SCNC: 142 MMOL/L (ref 136–145)
TRIGL SERPL-MCNC: 147 MG/DL (ref 30–150)

## 2020-11-11 PROCEDURE — 80061 LIPID PANEL: CPT | Mod: HCNC

## 2020-11-11 PROCEDURE — 97110 THERAPEUTIC EXERCISES: CPT | Mod: HCNC

## 2020-11-11 PROCEDURE — 80053 COMPREHEN METABOLIC PANEL: CPT | Mod: HCNC

## 2020-11-11 PROCEDURE — 36415 COLL VENOUS BLD VENIPUNCTURE: CPT | Mod: HCNC

## 2020-11-11 NOTE — PROGRESS NOTES
Physical Therapy Daily Treatment Note     Name: Drake Barraza  Clinic Number: [id number]    Therapy Diagnosis:   Encounter Diagnoses   Name Primary?    Abnormality of gait and mobility     Decreased range of motion of left knee     Weakness of left lower extremity      Physician: Chris Israel MD    Visit Date: 2020    Physician Orders: PT Eval and Treat   Medical Diagnosis from Referral: M17.12 (ICD-10-CM) - Primary osteoarthritis of left knee DOS 10/5/2020  Evaluation Date: 10/7/2020  Authorization Period Expiration: 2020  Plan of Care Expiration: 2021  Visit # / Visits authorized: , 10/20  FOTO:         Visit:  91  Total: 1073     Time In:03:30 pm  Time Out: 04:00 pm  Total Billable Time: 30 minutes      Precautions: Standard       Subjective     Pt reports: His knee is feeling sore today but states the PT helps relieve this stifness  He was compliant with home exercise program.  Response to previous treatment: muscle soreness   Functional change: none    Pain: 0/10  Location: left knee      Objective     2020  AROM: *denotes pain  left knee: 0-130 deg*       PROM: *denotes pain  left knee: 0-133 deg*     L/E Strength w/ MicroFET Muscle Pradip Dynamometer  Left Pain/Dysfunction with Movement   (approx 4 sec hold w/ max contraction)   Hip Flexion  10.2 kg      Hip Abduction  12.9 kg      Quadriceps  20.3 kg      Hamstrings  13.7 kg         TU seconds (w/o AD)  30 second sit-to-stand test (without U/E support): 14 (w/o UE support)       Drake received the following manual therapy techniques: Soft tissue Mobilization were applied to the: L LE for 0 minutes, including:    Retrograde edema massage LLE NP  Seated knee flexion joint mobs NP  Patellar joint mobs Gr3 NP  Passive knee Ext stretch supine NP    Drake received  assessment & therapeutic exercises to develop strength, endurance, ROM and flexibility for 30 minutes including:      Recumbent bike full revolutions 5'  "-supervised seat 13  Supine knee ext stretch (towel on ankle) 2'   Hamstring strap stretch 4 x 20" holds   Eccentric Step Downs 1x10  Sit to Stands 3x5  DL Press on Shuttle ( 1 black; 1 red): 2x15  Mini Squats at Bar: 2 x 10  Steamboats: 2 x10 R   Single Leg Balance with RTB 3x 30'  Single Leg Ball Toss on Oval Disk 2 x 10        NOT PERFORMED   Quad sets 3" 3x10 -not performed   Seated march 3x10 -not performed   LAQ x10 /c focus on eccentric phase -not performed   active assist heel slides in supine x15 -not performed   Nustep 5' -not performed -not performed   Seated ball squeeze 5" 2x10  NP  SAQ x10  NP  Seated HS stretch -not performed  NP  Seated gastroc stretch with strap 30" x3  NP        Drake participated in gait training to improve functional mobility and safety for 0 minutes, includin point gait training with SPC     Ice applied to L knee for 0 min following session     Home Exercises Provided and Patient Education Provided     Education provided:   - HEP    Written Home Exercises Provided: yes.  Exercises were reviewed and Drake was able to demonstrate them prior to the end of the session.  Drake demonstrated good  understanding of the education provided.     See EMR under Patient Instructions for exercises provided 10/19/2020.      Assessment     Pt tolerated therex progressions well w/o pain. PT will continue to progress pt with single leg exercises to address balance   Drake is progressing well towards his goals.   Pt prognosis is Good.     Pt will continue to benefit from skilled outpatient physical therapy to address the deficits listed in the problem list box on initial evaluation, provide pt/family education and to maximize pt's level of independence in the home and community environment.     Pt's spiritual, cultural and educational needs considered and pt agreeable to plan of care and goals.    Anticipated barriers to physical therapy: none    Goals: Short Term Goals: in 10 visits:    "   1. Pt will be independent with HEP supplement PT in improving functional mobility.  2. Pt will improve LLE strength to at least 75% of R LE strength as measured via MicroFet handheld dynamometer in order to improve functional mobility  3. Pt will improve L knee AROM to at least 0/110 deg in order to improve gait     Long Term Goals: In 18 visits:  1. Pt will be independent with updated HEP supplement PT in improving functional mobility.  2. Pt will improve L LE strength to at least 90% of R LE strength as measured via MicroFet handheld dynamometer in order to improve functional mobility  3. Pt will improve L knee AROM to at least 0/120 deg in order to improve gait and ability to perform ADLs  4. Pt will improve FOTO knee survey score to </= 39% limited in order to demo improved functional mobility  5. Pt will improve score on Function,Daily Living section of KOOS to at least 7/68   ( 10% limited) in order to demo improved functional mobility  6. Pt will perform TUG in < 10 seconds without AD in order to demo improved gait speed  7. Pt will perform at least 14 sit to stands without UE support on 30 second sit to stand test in order to demo improved ability to perform transfers      Plan     Continue with established plan of care towards PT goals. PT to discuss potential d/c next visit      Chris Henning PT

## 2020-11-13 ENCOUNTER — CLINICAL SUPPORT (OUTPATIENT)
Dept: REHABILITATION | Facility: HOSPITAL | Age: 75
End: 2020-11-13
Attending: ORTHOPAEDIC SURGERY
Payer: MEDICARE

## 2020-11-13 DIAGNOSIS — M25.662 DECREASED RANGE OF MOTION OF LEFT KNEE: ICD-10-CM

## 2020-11-13 DIAGNOSIS — R26.9 ABNORMALITY OF GAIT AND MOBILITY: ICD-10-CM

## 2020-11-13 DIAGNOSIS — R29.898 WEAKNESS OF LEFT LOWER EXTREMITY: ICD-10-CM

## 2020-11-13 PROCEDURE — 97110 THERAPEUTIC EXERCISES: CPT | Mod: HCNC

## 2020-11-13 NOTE — PROGRESS NOTES
Physical Therapy Daily Treatment Note     Name: Drake Barraza  Clinic Number: [id number]    Therapy Diagnosis:   Encounter Diagnoses   Name Primary?    Abnormality of gait and mobility     Decreased range of motion of left knee     Weakness of left lower extremity      Physician: Chris Israel MD    Visit Date: 2020    Physician Orders: PT Eval and Treat   Medical Diagnosis from Referral: M17.12 (ICD-10-CM) - Primary osteoarthritis of left knee DOS 10/5/2020  Evaluation Date: 10/7/2020  Authorization Period Expiration: 2020  Plan of Care Expiration: 2021  Visit # / Visits authorized: ,   FOTO:         Visit:  91  Total: 1073     Time In:10:15 am  Time Out: 11:00 pm  Total Billable Time: 45 minutes      Precautions: Standard       Subjective     Pt reports: His knee is feeling sore today but states the PT helps relieve this stifness  He was compliant with home exercise program.  Response to previous treatment: muscle soreness   Functional change: none    Pain: 0/10  Location: left knee      Objective     2020  AROM: *denotes pain  left knee: 0-130 deg*       PROM: *denotes pain  left knee: 0-133 deg*     L/E Strength w/ MicroFET Muscle Pradip Dynamometer  Left Pain/Dysfunction with Movement   (approx 4 sec hold w/ max contraction)   Hip Flexion  15.2 kg      Hip Abduction  15.9 kg      Quadriceps  22.3 kg      Hamstrings  17.2 kg         TU seconds (w/o AD)  30 second sit-to-stand test (without U/E support): 15 (w/o UE support)       Drake received the following manual therapy techniques: Soft tissue Mobilization were applied to the: L LE for 0 minutes, including:    Retrograde edema massage LLE NP  Seated knee flexion joint mobs NP  Patellar joint mobs Gr3 NP  Passive knee Ext stretch supine NP    Drake received  assessment & therapeutic exercises to develop strength, endurance, ROM and flexibility for 30 minutes including:      Recumbent bike full revolutions 5'  "-supervised seat 13  Supine knee ext stretch (towel on ankle) 2'   Hamstring strap stretch 4 x 20" holds   Eccentric Step Downs 1x10  Sit to Stands 3x5  DL Press on Shuttle ( 1 black; 1 red): 2x15  Mini Squats at Bar: 2 x 10  Steamboats: 2 x10 R   Single Leg Balance with RTB 3x 30'  Single Leg Ball Toss on Oval Disk 2 x 10        NOT PERFORMED   Quad sets 3" 3x10 -not performed   Seated march 3x10 -not performed   LAQ x10 /c focus on eccentric phase -not performed   active assist heel slides in supine x15 -not performed   Nustep 5' -not performed -not performed   Seated ball squeeze 5" 2x10  NP  SAQ x10  NP  Seated HS stretch -not performed  NP  Seated gastroc stretch with strap 30" x3  NP        Drake participated in gait training to improve functional mobility and safety for 0 minutes, includin point gait training with SPC     Ice applied to L knee for 0 min following session     Home Exercises Provided and Patient Education Provided     Education provided:   - HEP    Written Home Exercises Provided: yes.  Exercises were reviewed and Drake was able to demonstrate them prior to the end of the session.  Drake demonstrated good  understanding of the education provided.     See EMR under Patient Instructions for exercises provided 10/19/2020.      Assessment     Pt tolerated therex progressions well w/o pain. PT will discuss discharge next visit  Drake is progressing well towards his goals.   Pt prognosis is Good.     Pt will continue to benefit from skilled outpatient physical therapy to address the deficits listed in the problem list box on initial evaluation, provide pt/family education and to maximize pt's level of independence in the home and community environment.     Pt's spiritual, cultural and educational needs considered and pt agreeable to plan of care and goals.    Anticipated barriers to physical therapy: none    Goals: Short Term Goals: in 10 visits:      1. Pt will be independent with HEP " supplement PT in improving functional mobility.  2. Pt will improve LLE strength to at least 75% of R LE strength as measured via MicroFet handheld dynamometer in order to improve functional mobility  3. Pt will improve L knee AROM to at least 0/110 deg in order to improve gait     Long Term Goals: In 18 visits:  1. Pt will be independent with updated HEP supplement PT in improving functional mobility.  2. Pt will improve L LE strength to at least 90% of R LE strength as measured via MicroFet handheld dynamometer in order to improve functional mobility  3. Pt will improve L knee AROM to at least 0/120 deg in order to improve gait and ability to perform ADLs  4. Pt will improve FOTO knee survey score to </= 39% limited in order to demo improved functional mobility  5. Pt will improve score on Function,Daily Living section of KOOS to at least 7/68   ( 10% limited) in order to demo improved functional mobility  6. Pt will perform TUG in < 10 seconds without AD in order to demo improved gait speed  7. Pt will perform at least 14 sit to stands without UE support on 30 second sit to stand test in order to demo improved ability to perform transfers      Plan     Continue with established plan of care towards PT goals. PT to discuss potential d/c next visit      Chris Henning PT

## 2020-11-16 ENCOUNTER — PATIENT OUTREACH (OUTPATIENT)
Dept: ADMINISTRATIVE | Facility: OTHER | Age: 75
End: 2020-11-16

## 2020-11-16 NOTE — PROGRESS NOTES
Care Everywhere: updated  Immunization: updated,links delay   Health Maintenance: updated  Media Review:   Legacy Review:   Order placed:   Upcoming appts:

## 2020-11-17 ENCOUNTER — OFFICE VISIT (OUTPATIENT)
Dept: CARDIOLOGY | Facility: CLINIC | Age: 75
End: 2020-11-17
Payer: MEDICARE

## 2020-11-17 VITALS
HEIGHT: 67 IN | HEART RATE: 63 BPM | BODY MASS INDEX: 27.51 KG/M2 | SYSTOLIC BLOOD PRESSURE: 112 MMHG | DIASTOLIC BLOOD PRESSURE: 70 MMHG | WEIGHT: 175.25 LBS

## 2020-11-17 DIAGNOSIS — E78.00 HYPERCHOLESTEREMIA: ICD-10-CM

## 2020-11-17 DIAGNOSIS — Z96.652 STATUS POST LEFT KNEE REPLACEMENT: Primary | ICD-10-CM

## 2020-11-17 DIAGNOSIS — I25.2 OLD MYOCARDIAL INFARCT: ICD-10-CM

## 2020-11-17 DIAGNOSIS — N52.9 IMPOTENCE: ICD-10-CM

## 2020-11-17 DIAGNOSIS — I10 ESSENTIAL HYPERTENSION: ICD-10-CM

## 2020-11-17 DIAGNOSIS — G47.30 SLEEP APNEA, UNSPECIFIED TYPE: ICD-10-CM

## 2020-11-17 DIAGNOSIS — E11.9 TYPE 2 DIABETES MELLITUS WITHOUT COMPLICATION, WITHOUT LONG-TERM CURRENT USE OF INSULIN: ICD-10-CM

## 2020-11-17 DIAGNOSIS — Z98.61 POST PTCA: ICD-10-CM

## 2020-11-17 DIAGNOSIS — I25.10 CORONARY ARTERY DISEASE INVOLVING NATIVE CORONARY ARTERY OF NATIVE HEART WITHOUT ANGINA PECTORIS: Primary | ICD-10-CM

## 2020-11-17 PROCEDURE — 3074F SYST BP LT 130 MM HG: CPT | Mod: HCNC,CPTII,S$GLB, | Performed by: INTERNAL MEDICINE

## 2020-11-17 PROCEDURE — 93005 ELECTROCARDIOGRAM TRACING: CPT | Mod: HCNC,S$GLB,, | Performed by: INTERNAL MEDICINE

## 2020-11-17 PROCEDURE — 93010 ELECTROCARDIOGRAM REPORT: CPT | Mod: HCNC,S$GLB,, | Performed by: INTERNAL MEDICINE

## 2020-11-17 PROCEDURE — 3078F PR MOST RECENT DIASTOLIC BLOOD PRESSURE < 80 MM HG: ICD-10-PCS | Mod: HCNC,CPTII,S$GLB, | Performed by: INTERNAL MEDICINE

## 2020-11-17 PROCEDURE — 3051F HG A1C>EQUAL 7.0%<8.0%: CPT | Mod: HCNC,CPTII,S$GLB, | Performed by: INTERNAL MEDICINE

## 2020-11-17 PROCEDURE — 99214 OFFICE O/P EST MOD 30 MIN: CPT | Mod: HCNC,S$GLB,, | Performed by: INTERNAL MEDICINE

## 2020-11-17 PROCEDURE — 99214 PR OFFICE/OUTPT VISIT, EST, LEVL IV, 30-39 MIN: ICD-10-PCS | Mod: HCNC,S$GLB,, | Performed by: INTERNAL MEDICINE

## 2020-11-17 PROCEDURE — 3078F DIAST BP <80 MM HG: CPT | Mod: HCNC,CPTII,S$GLB, | Performed by: INTERNAL MEDICINE

## 2020-11-17 PROCEDURE — 99999 PR PBB SHADOW E&M-EST. PATIENT-LVL V: CPT | Mod: PBBFAC,HCNC,, | Performed by: INTERNAL MEDICINE

## 2020-11-17 PROCEDURE — 1159F MED LIST DOCD IN RCRD: CPT | Mod: HCNC,S$GLB,, | Performed by: INTERNAL MEDICINE

## 2020-11-17 PROCEDURE — 93005 EKG 12-LEAD: ICD-10-PCS | Mod: HCNC,S$GLB,, | Performed by: INTERNAL MEDICINE

## 2020-11-17 PROCEDURE — 3074F PR MOST RECENT SYSTOLIC BLOOD PRESSURE < 130 MM HG: ICD-10-PCS | Mod: HCNC,CPTII,S$GLB, | Performed by: INTERNAL MEDICINE

## 2020-11-17 PROCEDURE — 1159F PR MEDICATION LIST DOCUMENTED IN MEDICAL RECORD: ICD-10-PCS | Mod: HCNC,S$GLB,, | Performed by: INTERNAL MEDICINE

## 2020-11-17 PROCEDURE — 99999 PR PBB SHADOW E&M-EST. PATIENT-LVL V: ICD-10-PCS | Mod: PBBFAC,HCNC,, | Performed by: INTERNAL MEDICINE

## 2020-11-17 PROCEDURE — 1126F PR PAIN SEVERITY QUANTIFIED, NO PAIN PRESENT: ICD-10-PCS | Mod: HCNC,S$GLB,, | Performed by: INTERNAL MEDICINE

## 2020-11-17 PROCEDURE — 3051F PR MOST RECENT HEMOGLOBIN A1C LEVEL 7.0 - < 8.0%: ICD-10-PCS | Mod: HCNC,CPTII,S$GLB, | Performed by: INTERNAL MEDICINE

## 2020-11-17 PROCEDURE — 1126F AMNT PAIN NOTED NONE PRSNT: CPT | Mod: HCNC,S$GLB,, | Performed by: INTERNAL MEDICINE

## 2020-11-17 PROCEDURE — 93010 EKG 12-LEAD: ICD-10-PCS | Mod: HCNC,S$GLB,, | Performed by: INTERNAL MEDICINE

## 2020-11-17 RX ORDER — NITROGLYCERIN 0.4 MG/1
0.4 TABLET SUBLINGUAL EVERY 5 MIN PRN
Qty: 100 TABLET | Refills: 2 | Status: SHIPPED | OUTPATIENT
Start: 2020-11-17 | End: 2023-12-14 | Stop reason: SDUPTHER

## 2020-11-17 RX ORDER — SILDENAFIL 100 MG/1
100 TABLET, FILM COATED ORAL DAILY PRN
Qty: 30 TABLET | Refills: 6 | Status: SHIPPED | OUTPATIENT
Start: 2020-11-17 | End: 2021-12-14 | Stop reason: SDUPTHER

## 2020-11-17 NOTE — PATIENT INSTRUCTIONS
Component      Latest Ref Rng & Units 11/11/2020 4/30/2020 11/7/2019 5/9/2019   Cholesterol      <150 mg/dL 122 134 128 127   Triglycerides      30 - 150 mg/dL 147 175 (H) 97 73   HDL      >45 mg/dL 40 38 (L) 43 36 (L)   LDL Cholesterol External      <70 mg/dL 52.6 (L) 61.0 (L) 65.6 76.4   HDL/Cholesterol      >30 % 32.8 28.4 33.6 28.3

## 2020-11-17 NOTE — PROGRESS NOTES
Subjective:     Problem List:  CAD   Inf MI 2003   RCA and LAD stents 2003   Hypercholesteremia   Hypertension   Sleep apnea   Hypothyroidism   Hyperglycemia/diabetes    HPI:   Drake Barraza is a 75 y.o. male who presents for follow-up of Coronary Artery Disease  He does not report angina or shortness of breath with exertion.  Underwent knee surgery recently.  He has sleep apnea. He uses CPAP. Recvd a new machine recently.  On atorvastatin hepatic transaminases were mildly elevated. Dr. Read and I discussed and switched atorvastatin to rosuvastatin. Most recent hepatic transaminases are within normal limits and LDL(c) is lower than it was earlier in the year.  HbA1C was 7.1%.   S/P total knee replacement 10/5/2020.      Review of Systems   Constitution: Negative for malaise/fatigue, weight gain and weight loss.   Cardiovascular: Negative for chest pain, dyspnea on exertion, leg swelling and palpitations.   Respiratory: Negative for cough and hemoptysis.    Hematologic/Lymphatic: Does not bruise/bleed easily.   Musculoskeletal: Positive for arthritis. Negative for muscle cramps.   Gastrointestinal: Negative for abdominal pain, heartburn and melena.   Genitourinary: Negative for hematuria and nocturia.   Neurological: Negative for headaches, light-headedness and seizures.   Psychiatric/Behavioral: Negative for depression.       Review of patient's allergies indicates:   Allergen Reactions    Adhesive Blisters    Hydrocodone-acetaminophen Rash     Other reaction(s): constipated        Current Outpatient Medications   Medication Sig    acetaminophen (TYLENOL) 650 MG TbSR Take 1 tablet (650 mg total) by mouth every 8 (eight) hours as needed.    aspirin (ECOTRIN) 81 MG EC tablet Take 1 tablet (81 mg total) by mouth 2 (two) times daily. (Patient taking differently: Take 81 mg by mouth once daily. )    blood sugar diagnostic Strp 1 strip by Misc.(Non-Drug; Combo Route) route 2 (two) times daily before meals.  "   blood-glucose meter kit Check glucose 1-2x/day after meals    CINNAMON BARK (CINNAMON ORAL) Take 1 capsule by mouth once daily.    lancets Misc 1 lancet by Misc.(Non-Drug; Combo Route) route 2 (two) times daily before meals.    levothyroxine (SYNTHROID) 125 MCG tablet TAKE 1 TABLET IN THE MORNING ON AN EMPTY STOMACH    metoprolol tartrate (LOPRESSOR) 25 MG tablet Take 1 tablet (25 mg total) by mouth 2 (two) times daily.    multivitamin (ONE DAILY MULTIVITAMIN) per tablet Take 1 tablet by mouth once daily.    nitroGLYCERIN (NITROSTAT) 0.4 MG SL tablet Place 1 tablet (0.4 mg total) under the tongue every 5 (five) minutes as needed for Chest pain.    omega-3 fatty acids-vitamin E (FISH OIL) 1,000 mg Cap Take 1 capsule by mouth Daily.      ramipriL (ALTACE) 10 MG capsule TAKE 1 CAPSULE EVERY DAY    rosuvastatin (CRESTOR) 20 MG tablet Take 1 tablet (20 mg total) by mouth once daily.    sildenafil (VIAGRA) 100 MG tablet Take 1 tablet (100 mg total) by mouth daily as needed for Erectile Dysfunction.    TURMERIC ROOT EXTRACT ORAL Take 1 capsule by mouth.    UBIDECARENONE (COENZYME Q10) 100 mg Tab Take 100 mg by mouth once daily.     vitamin D 185 MG Tab Take 185 mg by mouth once daily.           Social history:  Drake Barraza  reports that he has never smoked. He has never used smokeless tobacco. He reports current alcohol use. He reports that he does not use drugs.      Objective:   /70   Pulse 63   Ht 5' 7" (1.702 m)   Wt 79.5 kg (175 lb 4.3 oz)   BMI 27.45 kg/m²      Physical Exam   Constitutional: He is oriented to person, place, and time. He appears well-developed and well-nourished.   /70   Pulse 63   Ht 5' 7" (1.702 m)   Wt 79.5 kg (175 lb 4.3 oz)   BMI 27.45 kg/m²      HENT:   Head: Normocephalic and atraumatic.   Neck: No JVD present. Carotid bruit is not present.   Cardiovascular: Normal rate, regular rhythm, S1 normal and S2 normal. Exam reveals no gallop.   No murmur " heard.  Pulses:       Radial pulses are 2+ on the right side and 2+ on the left side.        Dorsalis pedis pulses are 2+ on the right side and 2+ on the left side.   Pulmonary/Chest: Effort normal. He has no wheezes. He has no rales. Chest wall is not dull to percussion.   Abdominal: Soft. There is no splenomegaly or hepatomegaly. There is no abdominal tenderness.   Musculoskeletal:      Right lower leg: No edema.      Left lower leg: No edema.   Neurological: He is alert and oriented to person, place, and time. Gait normal.   Skin: Skin is warm and dry. No bruising noted. No cyanosis. Nails show no clubbing.   Psychiatric: He has a normal mood and affect. His speech is normal and behavior is normal. Judgment and thought content normal. Cognition and memory are normal.         Lab Results   Component Value Date    CHOL 122 11/11/2020    HDL 40 11/11/2020    LDLCALC 52.6 (L) 11/11/2020    TRIG 147 11/11/2020    CHOLHDL 32.8 11/11/2020     Lab Results   Component Value Date     (H) 11/11/2020    CREATININE 1.1 11/11/2020    BUN 20 11/11/2020     11/11/2020    K 5.4 (H) 11/11/2020     11/11/2020    CO2 27 11/11/2020     Lab Results   Component Value Date    ALT 35 11/11/2020    AST 32 11/11/2020    ALKPHOS 84 11/11/2020    BILITOT 0.6 11/11/2020           Assessment and Plan:       ICD-10-CM ICD-9-CM   1. Coronary artery disease involving native coronary artery of native heart without angina pectoris  I25.10 414.01   2. S/P RCA and LAD stents 2003  Z98.61 V45.82   3. 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 Essential hypertension  I10 401.9   5. Sleep apnea, unspecified type  G47.30 780.57   6. Hypercholesteremia  E78.00 272.0   7. Type 2 diabetes mellitus without complication, without long-term current use of insulin  E11.9 250.00   8. Erectile dysfunction  N52.9 607.84        CAD stable. Continue same meds.  BP appears well controlled. Continue same meds. Low sodium diet.  LDL(c) at  goal. Hepatic transaminases are within normal limits. Continue rosuvastatin. Cholesterol education. Nutritional counseling.   Continue same medications for hypertension. Low sodium diet.    Counseled about the potential for severe hypotension and possible death from the simultaneous use of a type 5 phoshodiesterase (PDE) inhibitor and nitrate.    Orders placed during this encounter:     Coronary artery disease involving native coronary artery of native heart without angina pectoris  -     IN OFFICE EKG 12-LEAD (to Muse); Future  -     nitroGLYCERIN (NITROSTAT) 0.4 MG SL tablet; Place 1 tablet (0.4 mg total) under the tongue every 5 (five) minutes as needed for Chest pain.  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  Dispense: 30 tablet; Refill: 6         Follow up in about 1 year (around 11/17/2021).

## 2020-11-18 ENCOUNTER — OFFICE VISIT (OUTPATIENT)
Dept: ORTHOPEDICS | Facility: CLINIC | Age: 75
End: 2020-11-18
Payer: MEDICARE

## 2020-11-18 ENCOUNTER — HOSPITAL ENCOUNTER (OUTPATIENT)
Dept: RADIOLOGY | Facility: HOSPITAL | Age: 75
Discharge: HOME | End: 2020-11-18
Attending: ORTHOPAEDIC SURGERY
Payer: MEDICARE

## 2020-11-18 VITALS — WEIGHT: 175.25 LBS | HEIGHT: 67 IN | BODY MASS INDEX: 27.51 KG/M2

## 2020-11-18 DIAGNOSIS — Z96.652 STATUS POST LEFT KNEE REPLACEMENT: ICD-10-CM

## 2020-11-18 DIAGNOSIS — Z96.652 STATUS POST TOTAL LEFT KNEE REPLACEMENT: Primary | ICD-10-CM

## 2020-11-18 PROCEDURE — 3288F PR FALLS RISK ASSESSMENT DOCUMENTED: ICD-10-PCS | Mod: HCNC,CPTII,S$GLB, | Performed by: ORTHOPAEDIC SURGERY

## 2020-11-18 PROCEDURE — 1125F AMNT PAIN NOTED PAIN PRSNT: CPT | Mod: HCNC,S$GLB,, | Performed by: ORTHOPAEDIC SURGERY

## 2020-11-18 PROCEDURE — 1101F PR PT FALLS ASSESS DOC 0-1 FALLS W/OUT INJ PAST YR: ICD-10-PCS | Mod: HCNC,CPTII,S$GLB, | Performed by: ORTHOPAEDIC SURGERY

## 2020-11-18 PROCEDURE — 3288F FALL RISK ASSESSMENT DOCD: CPT | Mod: HCNC,CPTII,S$GLB, | Performed by: ORTHOPAEDIC SURGERY

## 2020-11-18 PROCEDURE — 99999 PR PBB SHADOW E&M-EST. PATIENT-LVL III: CPT | Mod: PBBFAC,HCNC,, | Performed by: ORTHOPAEDIC SURGERY

## 2020-11-18 PROCEDURE — 99024 POSTOP FOLLOW-UP VISIT: CPT | Mod: HCNC,S$GLB,, | Performed by: ORTHOPAEDIC SURGERY

## 2020-11-18 PROCEDURE — 73562 X-RAY EXAM OF KNEE 3: CPT | Mod: 26,HCNC,LT, | Performed by: RADIOLOGY

## 2020-11-18 PROCEDURE — 1101F PT FALLS ASSESS-DOCD LE1/YR: CPT | Mod: HCNC,CPTII,S$GLB, | Performed by: ORTHOPAEDIC SURGERY

## 2020-11-18 PROCEDURE — 99024 PR POST-OP FOLLOW-UP VISIT: ICD-10-PCS | Mod: HCNC,S$GLB,, | Performed by: ORTHOPAEDIC SURGERY

## 2020-11-18 PROCEDURE — 99999 PR PBB SHADOW E&M-EST. PATIENT-LVL III: ICD-10-PCS | Mod: PBBFAC,HCNC,, | Performed by: ORTHOPAEDIC SURGERY

## 2020-11-18 PROCEDURE — 73562 XR KNEE 3 VIEW LEFT: ICD-10-PCS | Mod: 26,HCNC,LT, | Performed by: RADIOLOGY

## 2020-11-18 PROCEDURE — 73562 X-RAY EXAM OF KNEE 3: CPT | Mod: TC,HCNC,LT

## 2020-11-18 PROCEDURE — 1125F PR PAIN SEVERITY QUANTIFIED, PAIN PRESENT: ICD-10-PCS | Mod: HCNC,S$GLB,, | Performed by: ORTHOPAEDIC SURGERY

## 2020-11-19 NOTE — PROGRESS NOTES
Subjective:      Patient ID: Drake Barraza is a 75 y.o. male.    Chief Complaint:   Pain of the Left Knee    HPI  He returns about 6 weeks out from left TKA.  He has only mild occasional soreness.  He has essentially on limited activity level at this point, and he is very satisfied.      Objective:        Ortho/SPM Exam    The left knee is well healed without any redness or tenderness.  No knee effusion.  Active range of motion 0-130 degrees.  No instability at any position of flexion.  Calf soft and nontender.  Distal neurovascular intact.      IMAGING:  Weightbearing x-rays done today show a well-fixed and positioned cemented left total knee arthroplasty without signs of loosening for other complications.  Assessment:   Doing well status post left TKA      Plan:   He may do his three-month surveys through the portal, and see me for anniversary x-ray follow-up.    The patient's pathophysiology was explained in detail with reference to x-rays, models, other visual aids as appropriate.  Treatment options were discussed in detail.  Questions were invited and answered to the patient's satisfaction.    Greater than 50% of this 15 minute visit was devoted to counseling and coordination of care      Chris Israel MD  Orthopedic Surgery

## 2020-12-04 ENCOUNTER — PATIENT MESSAGE (OUTPATIENT)
Dept: ADMINISTRATIVE | Facility: OTHER | Age: 75
End: 2020-12-04

## 2020-12-09 ENCOUNTER — LAB VISIT (OUTPATIENT)
Dept: LAB | Facility: HOSPITAL | Age: 75
End: 2020-12-09
Attending: INTERNAL MEDICINE
Payer: MEDICARE

## 2020-12-09 DIAGNOSIS — E11.9 DIABETES MELLITUS WITHOUT COMPLICATION: ICD-10-CM

## 2020-12-09 LAB
ESTIMATED AVG GLUCOSE: 128 MG/DL (ref 68–131)
HBA1C MFR BLD HPLC: 6.1 % (ref 4–5.6)

## 2020-12-09 PROCEDURE — 83036 HEMOGLOBIN GLYCOSYLATED A1C: CPT | Mod: HCNC

## 2020-12-09 PROCEDURE — 36415 COLL VENOUS BLD VENIPUNCTURE: CPT | Mod: HCNC

## 2020-12-15 ENCOUNTER — OFFICE VISIT (OUTPATIENT)
Dept: INTERNAL MEDICINE | Facility: CLINIC | Age: 75
End: 2020-12-15
Payer: MEDICARE

## 2020-12-15 DIAGNOSIS — I25.10 CORONARY ARTERY DISEASE, ANGINA PRESENCE UNSPECIFIED, UNSPECIFIED VESSEL OR LESION TYPE, UNSPECIFIED WHETHER NATIVE OR TRANSPLANTED HEART: ICD-10-CM

## 2020-12-15 DIAGNOSIS — R73.03 PRE-DIABETES: Primary | ICD-10-CM

## 2020-12-15 DIAGNOSIS — Z12.5 PROSTATE CANCER SCREENING: ICD-10-CM

## 2020-12-15 DIAGNOSIS — Z29.9 PREVENTIVE MEASURE: ICD-10-CM

## 2020-12-15 DIAGNOSIS — I10 ESSENTIAL HYPERTENSION: ICD-10-CM

## 2020-12-15 PROCEDURE — 99999 PR PBB SHADOW E&M-EST. PATIENT-LVL V: ICD-10-PCS | Mod: PBBFAC,HCNC,, | Performed by: INTERNAL MEDICINE

## 2020-12-15 PROCEDURE — 3078F PR MOST RECENT DIASTOLIC BLOOD PRESSURE < 80 MM HG: ICD-10-PCS | Mod: HCNC,CPTII,S$GLB, | Performed by: INTERNAL MEDICINE

## 2020-12-15 PROCEDURE — 1126F AMNT PAIN NOTED NONE PRSNT: CPT | Mod: HCNC,S$GLB,, | Performed by: INTERNAL MEDICINE

## 2020-12-15 PROCEDURE — 3077F SYST BP >= 140 MM HG: CPT | Mod: HCNC,CPTII,S$GLB, | Performed by: INTERNAL MEDICINE

## 2020-12-15 PROCEDURE — 3288F PR FALLS RISK ASSESSMENT DOCUMENTED: ICD-10-PCS | Mod: HCNC,CPTII,S$GLB, | Performed by: INTERNAL MEDICINE

## 2020-12-15 PROCEDURE — 3078F DIAST BP <80 MM HG: CPT | Mod: HCNC,CPTII,S$GLB, | Performed by: INTERNAL MEDICINE

## 2020-12-15 PROCEDURE — 3077F PR MOST RECENT SYSTOLIC BLOOD PRESSURE >= 140 MM HG: ICD-10-PCS | Mod: HCNC,CPTII,S$GLB, | Performed by: INTERNAL MEDICINE

## 2020-12-15 PROCEDURE — 1101F PT FALLS ASSESS-DOCD LE1/YR: CPT | Mod: HCNC,CPTII,S$GLB, | Performed by: INTERNAL MEDICINE

## 2020-12-15 PROCEDURE — 99214 PR OFFICE/OUTPT VISIT, EST, LEVL IV, 30-39 MIN: ICD-10-PCS | Mod: HCNC,S$GLB,, | Performed by: INTERNAL MEDICINE

## 2020-12-15 PROCEDURE — 99214 OFFICE O/P EST MOD 30 MIN: CPT | Mod: HCNC,S$GLB,, | Performed by: INTERNAL MEDICINE

## 2020-12-15 PROCEDURE — 3288F FALL RISK ASSESSMENT DOCD: CPT | Mod: HCNC,CPTII,S$GLB, | Performed by: INTERNAL MEDICINE

## 2020-12-15 PROCEDURE — 1159F MED LIST DOCD IN RCRD: CPT | Mod: HCNC,S$GLB,, | Performed by: INTERNAL MEDICINE

## 2020-12-15 PROCEDURE — 1159F PR MEDICATION LIST DOCUMENTED IN MEDICAL RECORD: ICD-10-PCS | Mod: HCNC,S$GLB,, | Performed by: INTERNAL MEDICINE

## 2020-12-15 PROCEDURE — 1101F PR PT FALLS ASSESS DOC 0-1 FALLS W/OUT INJ PAST YR: ICD-10-PCS | Mod: HCNC,CPTII,S$GLB, | Performed by: INTERNAL MEDICINE

## 2020-12-15 PROCEDURE — 99499 RISK ADDL DX/OHS AUDIT: ICD-10-PCS | Mod: S$GLB,,, | Performed by: INTERNAL MEDICINE

## 2020-12-15 PROCEDURE — 99999 PR PBB SHADOW E&M-EST. PATIENT-LVL V: CPT | Mod: PBBFAC,HCNC,, | Performed by: INTERNAL MEDICINE

## 2020-12-15 PROCEDURE — 99499 UNLISTED E&M SERVICE: CPT | Mod: S$GLB,,, | Performed by: INTERNAL MEDICINE

## 2020-12-15 PROCEDURE — 1126F PR PAIN SEVERITY QUANTIFIED, NO PAIN PRESENT: ICD-10-PCS | Mod: HCNC,S$GLB,, | Performed by: INTERNAL MEDICINE

## 2020-12-15 NOTE — PROGRESS NOTES
Subjective:       Patient ID: Drake Barraza is a 75 y.o. male.    Chief Complaint:   Follow-up, Diabetes, and Hyperlipidemia    HPI: Mr Barraza today for f/u DM/hyperglycemia, which was transiently worse while in the hospital s/p Left TKR  However, he has done well s/p the TKR and completed PT  DM: Accuchecks: AM: 140          Diet:  He has been tryng to decrease his carbohydrate intake  HLD: He is doing fine on his Crestor 20mg tab-no problems with such    Past Medical, Surgical, Social History: Please see as stated in Epic chart which has been reviewed.    Current Outpatient Medications   Medication Sig Dispense Refill    aspirin (ECOTRIN) 81 MG EC tablet Take 1 tablet (81 mg total) by mouth 2 (two) times daily. (Patient taking differently: Take 81 mg by mouth once daily. ) 60 tablet 0    CINNAMON BARK (CINNAMON ORAL) Take 1 capsule by mouth once daily.      levothyroxine (SYNTHROID) 125 MCG tablet TAKE 1 TABLET IN THE MORNING ON AN EMPTY STOMACH 90 tablet 1    metoprolol tartrate (LOPRESSOR) 25 MG tablet Take 1 tablet (25 mg total) by mouth 2 (two) times daily. 180 tablet 3    multivitamin (ONE DAILY MULTIVITAMIN) per tablet Take 1 tablet by mouth once daily.      nitroGLYCERIN (NITROSTAT) 0.4 MG SL tablet Place 1 tablet (0.4 mg total) under the tongue every 5 (five) minutes as needed for Chest pain. 100 tablet 2    omega-3 fatty acids-vitamin E (FISH OIL) 1,000 mg Cap Take 1 capsule by mouth Daily.        ramipriL (ALTACE) 10 MG capsule TAKE 1 CAPSULE EVERY DAY 90 capsule 3    rosuvastatin (CRESTOR) 20 MG tablet Take 1 tablet (20 mg total) by mouth once daily. 90 tablet 1    sildenafiL (VIAGRA) 100 MG tablet Take 1 tablet (100 mg total) by mouth daily as needed for Erectile Dysfunction. 30 tablet 6    TURMERIC ROOT EXTRACT ORAL Take 1 capsule by mouth.      UBIDECARENONE (COENZYME Q10) 100 mg Tab Take 100 mg by mouth once daily.       vitamin D 185 MG Tab Take 185 mg by mouth once daily.         acetaminophen (TYLENOL) 650 MG TbSR Take 1 tablet (650 mg total) by mouth every 8 (eight) hours as needed. 120 tablet 0    blood sugar diagnostic Strp 1 strip by Misc.(Non-Drug; Combo Route) route 2 (two) times daily before meals. (Patient not taking: Reported on 12/15/2020) 100 strip 3    blood-glucose meter kit Check glucose 1-2x/day after meals (Patient not taking: Reported on 12/15/2020) 1 each 0    lancets Misc 1 lancet by Misc.(Non-Drug; Combo Route) route 2 (two) times daily before meals. (Patient not taking: Reported on 12/15/2020) 100 each 3     No current facility-administered medications for this visit.        Review of Systems   Respiratory: Negative for chest tightness, shortness of breath and wheezing.    Cardiovascular: Negative for chest pain, palpitations and leg swelling.   Gastrointestinal: Negative.    Musculoskeletal: Positive for arthralgias.        +Mild left knee pain s/p TKR-but doing well   Neurological: Negative for dizziness, tremors, weakness, numbness and headaches.   Psychiatric/Behavioral: Negative.        Objective:      Lab Results   Component Value Date    WBC 7.02 09/22/2020    HGB 15.7 09/22/2020    HCT 47.3 09/22/2020     09/22/2020    CHOL 122 11/11/2020    TRIG 147 11/11/2020    HDL 40 11/11/2020    ALT 35 11/11/2020    AST 32 11/11/2020     11/11/2020    K 5.4 (H) 11/11/2020     11/11/2020    CREATININE 1.1 11/11/2020    BUN 20 11/11/2020    CO2 27 11/11/2020    TSH 3.586 09/22/2020    PSA 0.71 04/30/2020    INR 0.9 09/22/2020    GLUF 104 04/03/2004    HGBA1C 6.1 (H) 12/09/2020     Physical Exam  Vitals signs reviewed.   Constitutional:       Appearance: Normal appearance. He is well-developed.   HENT:      Head: Normocephalic and atraumatic.      Mouth/Throat:      Pharynx: No oropharyngeal exudate.   Eyes:      Conjunctiva/sclera: Conjunctivae normal.   Neck:      Thyroid: No thyromegaly.      Vascular: No JVD.      Comments: No  "masses    Cardiovascular:      Rate and Rhythm: Normal rate and regular rhythm.      Heart sounds: No murmur. No gallop.    Pulmonary:      Effort: Pulmonary effort is normal. No respiratory distress.      Breath sounds: Normal breath sounds. No wheezing.   Chest:      Chest wall: No tenderness.   Abdominal:      General: There is no distension.      Palpations: Abdomen is soft. There is no mass.      Tenderness: There is no abdominal tenderness.      Comments: No Organomegaly   Musculoskeletal:      Right lower leg: No edema.      Left lower leg: No edema.   Lymphadenopathy:      Cervical: No cervical adenopathy.   Skin:     General: Skin is warm and dry.   Neurological:      Mental Status: He is alert and oriented to person, place, and time.      Cranial Nerves: No cranial nerve deficit.   Psychiatric:         Mood and Affect: Mood normal.         Judgment: Judgment normal.       Protective Sensation (w/ 10 gram monofilament):  Right: Decreased  Left: Decreased    Visual Inspection:  Normal -  Bilateral    Pedal Pulses:   Right: Present  Left: Present    Posterior tibialis:   Right:Diminshed  Left: Diminshed      Vital Signs  Pulse: (!) 58  SpO2: 97 %  BP: (!) 140/80  Pain Score: 0-No pain  Height and Weight  Height: 5' 7" (170.2 cm)  Weight: 79.2 kg (174 lb 9.7 oz)  BSA (Calculated - sq m): 1.93 sq meters  BMI (Calculated): 27.3  Weight in (lb) to have BMI = 25: 159.3]    Assessment:       1. Pre-diabetes    2. Essential hypertension    3. Coronary artery disease, angina presence unspecified, unspecified vessel or lesion type, unspecified whether native or transplanted heart    4. Preventive measure    5. Prostate cancer screening        Plan:     Health Maintenance   Topic Date Due    Hemoglobin A1c  06/09/2021    Eye Exam  07/27/2021    Lipid Panel  11/11/2021    High Dose Statin  12/15/2021    Aspirin/Antiplatelet Therapy  12/15/2021    Foot Exam  12/15/2021    TETANUS VACCINE  05/16/2024    Hepatitis " C Screening  Completed    Pneumococcal Vaccine (65+ Low/Medium Risk)  Completed        Drake was seen today for follow-up, diabetes and hyperlipidemia.    Diagnoses and all orders for this visit:    Pre-diabetes/HGbA1C much improved to 6.1 from 7.1%        -     Pt to continue with ADA diet and Accucheck following  -     Comprehensive Metabolic Panel; Future  -     Hemoglobin A1C; Future    Essential hypertension/controlled        -     Continue Altace 10mg QD and Lopressor 25mg BID  -     Comprehensive Metabolic Panel; Future  -     CBC Auto Differential; Future  -     TSH; Future    Coronary artery disease, angina presence unspecified, unspecified vessel or lesion type, unspecified whether native or transplanted heart        -     Pt to continue daily ASA and Statin Tx  -     Lipid Panel; Future    Prostate cancer screening  -     PSA, Screening; Future    Preventive measure        -     RTC x 4 months with 1 week prior fasting lab

## 2020-12-16 VITALS
OXYGEN SATURATION: 97 % | HEART RATE: 58 BPM | BODY MASS INDEX: 27.41 KG/M2 | WEIGHT: 174.63 LBS | HEIGHT: 67 IN | SYSTOLIC BLOOD PRESSURE: 110 MMHG | DIASTOLIC BLOOD PRESSURE: 70 MMHG

## 2021-01-03 ENCOUNTER — PATIENT MESSAGE (OUTPATIENT)
Dept: ADMINISTRATIVE | Facility: OTHER | Age: 76
End: 2021-01-03

## 2021-01-06 ENCOUNTER — PATIENT MESSAGE (OUTPATIENT)
Dept: ORTHOPEDICS | Facility: CLINIC | Age: 76
End: 2021-01-06

## 2021-01-22 ENCOUNTER — PATIENT MESSAGE (OUTPATIENT)
Dept: ADMINISTRATIVE | Facility: OTHER | Age: 76
End: 2021-01-22

## 2021-02-13 ENCOUNTER — IMMUNIZATION (OUTPATIENT)
Dept: PHARMACY | Facility: CLINIC | Age: 76
End: 2021-02-13
Payer: MEDICARE

## 2021-02-13 DIAGNOSIS — Z23 NEED FOR VACCINATION: Primary | ICD-10-CM

## 2021-02-17 ENCOUNTER — TELEPHONE (OUTPATIENT)
Dept: INTERNAL MEDICINE | Facility: CLINIC | Age: 76
End: 2021-02-17

## 2021-02-17 DIAGNOSIS — Z01.818 PRE-OP EVALUATION: Primary | ICD-10-CM

## 2021-02-19 ENCOUNTER — LAB VISIT (OUTPATIENT)
Dept: LAB | Facility: HOSPITAL | Age: 76
End: 2021-02-19
Attending: INTERNAL MEDICINE
Payer: MEDICARE

## 2021-02-19 DIAGNOSIS — Z01.818 PRE-OP EVALUATION: ICD-10-CM

## 2021-02-19 LAB
ANION GAP SERPL CALC-SCNC: 8 MMOL/L (ref 8–16)
BASOPHILS # BLD AUTO: 0.06 K/UL (ref 0–0.2)
BASOPHILS NFR BLD: 0.9 % (ref 0–1.9)
BUN SERPL-MCNC: 20 MG/DL (ref 8–23)
CALCIUM SERPL-MCNC: 9.7 MG/DL (ref 8.7–10.5)
CHLORIDE SERPL-SCNC: 103 MMOL/L (ref 95–110)
CO2 SERPL-SCNC: 29 MMOL/L (ref 23–29)
CREAT SERPL-MCNC: 1.2 MG/DL (ref 0.5–1.4)
DIFFERENTIAL METHOD: NORMAL
EOSINOPHIL # BLD AUTO: 0.2 K/UL (ref 0–0.5)
EOSINOPHIL NFR BLD: 2.4 % (ref 0–8)
ERYTHROCYTE [DISTWIDTH] IN BLOOD BY AUTOMATED COUNT: 13.4 % (ref 11.5–14.5)
EST. GFR  (AFRICAN AMERICAN): >60 ML/MIN/1.73 M^2
EST. GFR  (NON AFRICAN AMERICAN): 58.8 ML/MIN/1.73 M^2
GLUCOSE SERPL-MCNC: 216 MG/DL (ref 70–110)
HCT VFR BLD AUTO: 47.2 % (ref 40–54)
HGB BLD-MCNC: 15.3 G/DL (ref 14–18)
IMM GRANULOCYTES # BLD AUTO: 0.02 K/UL (ref 0–0.04)
IMM GRANULOCYTES NFR BLD AUTO: 0.3 % (ref 0–0.5)
LYMPHOCYTES # BLD AUTO: 2.5 K/UL (ref 1–4.8)
LYMPHOCYTES NFR BLD: 40.1 % (ref 18–48)
MCH RBC QN AUTO: 30.2 PG (ref 27–31)
MCHC RBC AUTO-ENTMCNC: 32.4 G/DL (ref 32–36)
MCV RBC AUTO: 93 FL (ref 82–98)
MONOCYTES # BLD AUTO: 0.7 K/UL (ref 0.3–1)
MONOCYTES NFR BLD: 10.3 % (ref 4–15)
NEUTROPHILS # BLD AUTO: 2.9 K/UL (ref 1.8–7.7)
NEUTROPHILS NFR BLD: 46 % (ref 38–73)
NRBC BLD-RTO: 0 /100 WBC
PLATELET # BLD AUTO: 214 K/UL (ref 150–350)
PMV BLD AUTO: 10.2 FL (ref 9.2–12.9)
POTASSIUM SERPL-SCNC: 4.8 MMOL/L (ref 3.5–5.1)
RBC # BLD AUTO: 5.07 M/UL (ref 4.6–6.2)
SODIUM SERPL-SCNC: 140 MMOL/L (ref 136–145)
WBC # BLD AUTO: 6.34 K/UL (ref 3.9–12.7)

## 2021-02-19 PROCEDURE — 80048 BASIC METABOLIC PNL TOTAL CA: CPT

## 2021-02-19 PROCEDURE — 36415 COLL VENOUS BLD VENIPUNCTURE: CPT

## 2021-02-19 PROCEDURE — 85025 COMPLETE CBC W/AUTO DIFF WBC: CPT

## 2021-02-23 ENCOUNTER — OFFICE VISIT (OUTPATIENT)
Dept: INTERNAL MEDICINE | Facility: CLINIC | Age: 76
End: 2021-02-23
Payer: MEDICARE

## 2021-02-23 VITALS
OXYGEN SATURATION: 97 % | DIASTOLIC BLOOD PRESSURE: 72 MMHG | SYSTOLIC BLOOD PRESSURE: 138 MMHG | BODY MASS INDEX: 27.61 KG/M2 | WEIGHT: 175.94 LBS | HEART RATE: 70 BPM | HEIGHT: 67 IN

## 2021-02-23 DIAGNOSIS — Z01.818 PRE-OP EVALUATION: Primary | ICD-10-CM

## 2021-02-23 PROCEDURE — 99213 PR OFFICE/OUTPT VISIT, EST, LEVL III, 20-29 MIN: ICD-10-PCS | Mod: GC,S$GLB,, | Performed by: STUDENT IN AN ORGANIZED HEALTH CARE EDUCATION/TRAINING PROGRAM

## 2021-02-23 PROCEDURE — 99999 PR PBB SHADOW E&M-EST. PATIENT-LVL IV: CPT | Mod: PBBFAC,GC,, | Performed by: STUDENT IN AN ORGANIZED HEALTH CARE EDUCATION/TRAINING PROGRAM

## 2021-02-23 PROCEDURE — 99213 OFFICE O/P EST LOW 20 MIN: CPT | Mod: GC,S$GLB,, | Performed by: STUDENT IN AN ORGANIZED HEALTH CARE EDUCATION/TRAINING PROGRAM

## 2021-02-23 PROCEDURE — 1159F PR MEDICATION LIST DOCUMENTED IN MEDICAL RECORD: ICD-10-PCS | Mod: GC,S$GLB,, | Performed by: STUDENT IN AN ORGANIZED HEALTH CARE EDUCATION/TRAINING PROGRAM

## 2021-02-23 PROCEDURE — 3078F PR MOST RECENT DIASTOLIC BLOOD PRESSURE < 80 MM HG: ICD-10-PCS | Mod: CPTII,GC,S$GLB, | Performed by: STUDENT IN AN ORGANIZED HEALTH CARE EDUCATION/TRAINING PROGRAM

## 2021-02-23 PROCEDURE — 1159F MED LIST DOCD IN RCRD: CPT | Mod: GC,S$GLB,, | Performed by: STUDENT IN AN ORGANIZED HEALTH CARE EDUCATION/TRAINING PROGRAM

## 2021-02-23 PROCEDURE — 3075F SYST BP GE 130 - 139MM HG: CPT | Mod: CPTII,GC,S$GLB, | Performed by: STUDENT IN AN ORGANIZED HEALTH CARE EDUCATION/TRAINING PROGRAM

## 2021-02-23 PROCEDURE — 3075F PR MOST RECENT SYSTOLIC BLOOD PRESS GE 130-139MM HG: ICD-10-PCS | Mod: CPTII,GC,S$GLB, | Performed by: STUDENT IN AN ORGANIZED HEALTH CARE EDUCATION/TRAINING PROGRAM

## 2021-02-23 PROCEDURE — 99999 PR PBB SHADOW E&M-EST. PATIENT-LVL IV: ICD-10-PCS | Mod: PBBFAC,GC,, | Performed by: STUDENT IN AN ORGANIZED HEALTH CARE EDUCATION/TRAINING PROGRAM

## 2021-02-23 PROCEDURE — 3078F DIAST BP <80 MM HG: CPT | Mod: CPTII,GC,S$GLB, | Performed by: STUDENT IN AN ORGANIZED HEALTH CARE EDUCATION/TRAINING PROGRAM

## 2021-02-23 RX ORDER — NEPAFENAC 3 MG/ML
SUSPENSION/ DROPS OPHTHALMIC
COMMUNITY
Start: 2021-01-28 | End: 2021-08-17

## 2021-02-23 RX ORDER — OFLOXACIN 3 MG/ML
1 SOLUTION/ DROPS OPHTHALMIC 4 TIMES DAILY
COMMUNITY
Start: 2021-01-27 | End: 2021-08-17

## 2021-02-23 RX ORDER — DUREZOL 0.5 MG/ML
1 EMULSION OPHTHALMIC 4 TIMES DAILY
COMMUNITY
Start: 2021-01-27 | End: 2021-04-21

## 2021-03-04 ENCOUNTER — PES CALL (OUTPATIENT)
Dept: ADMINISTRATIVE | Facility: CLINIC | Age: 76
End: 2021-03-04

## 2021-03-13 ENCOUNTER — IMMUNIZATION (OUTPATIENT)
Dept: PHARMACY | Facility: CLINIC | Age: 76
End: 2021-03-13
Payer: MEDICARE

## 2021-03-13 DIAGNOSIS — Z23 NEED FOR VACCINATION: Primary | ICD-10-CM

## 2021-04-06 ENCOUNTER — PATIENT OUTREACH (OUTPATIENT)
Dept: ADMINISTRATIVE | Facility: HOSPITAL | Age: 76
End: 2021-04-06

## 2021-04-14 ENCOUNTER — LAB VISIT (OUTPATIENT)
Dept: LAB | Facility: HOSPITAL | Age: 76
End: 2021-04-14
Attending: INTERNAL MEDICINE
Payer: MEDICARE

## 2021-04-14 DIAGNOSIS — I10 ESSENTIAL HYPERTENSION: ICD-10-CM

## 2021-04-14 DIAGNOSIS — Z12.5 PROSTATE CANCER SCREENING: ICD-10-CM

## 2021-04-14 DIAGNOSIS — I25.10 CORONARY ARTERY DISEASE, ANGINA PRESENCE UNSPECIFIED, UNSPECIFIED VESSEL OR LESION TYPE, UNSPECIFIED WHETHER NATIVE OR TRANSPLANTED HEART: ICD-10-CM

## 2021-04-14 DIAGNOSIS — R73.03 PRE-DIABETES: ICD-10-CM

## 2021-04-14 LAB
ALBUMIN SERPL BCP-MCNC: 4.1 G/DL (ref 3.5–5.2)
ALP SERPL-CCNC: 78 U/L (ref 55–135)
ALT SERPL W/O P-5'-P-CCNC: 36 U/L (ref 10–44)
ANION GAP SERPL CALC-SCNC: 9 MMOL/L (ref 8–16)
AST SERPL-CCNC: 34 U/L (ref 10–40)
BASOPHILS # BLD AUTO: 0.05 K/UL (ref 0–0.2)
BASOPHILS NFR BLD: 0.8 % (ref 0–1.9)
BILIRUB SERPL-MCNC: 0.8 MG/DL (ref 0.1–1)
BUN SERPL-MCNC: 18 MG/DL (ref 8–23)
CALCIUM SERPL-MCNC: 9.3 MG/DL (ref 8.7–10.5)
CHLORIDE SERPL-SCNC: 105 MMOL/L (ref 95–110)
CHOLEST SERPL-MCNC: 130 MG/DL (ref 120–199)
CHOLEST/HDLC SERPL: 3.3 {RATIO} (ref 2–5)
CO2 SERPL-SCNC: 26 MMOL/L (ref 23–29)
COMPLEXED PSA SERPL-MCNC: 0.78 NG/ML (ref 0–4)
CREAT SERPL-MCNC: 1.1 MG/DL (ref 0.5–1.4)
DIFFERENTIAL METHOD: NORMAL
EOSINOPHIL # BLD AUTO: 0.2 K/UL (ref 0–0.5)
EOSINOPHIL NFR BLD: 3.8 % (ref 0–8)
ERYTHROCYTE [DISTWIDTH] IN BLOOD BY AUTOMATED COUNT: 13.5 % (ref 11.5–14.5)
EST. GFR  (AFRICAN AMERICAN): >60 ML/MIN/1.73 M^2
EST. GFR  (NON AFRICAN AMERICAN): >60 ML/MIN/1.73 M^2
ESTIMATED AVG GLUCOSE: 140 MG/DL (ref 68–131)
GLUCOSE SERPL-MCNC: 151 MG/DL (ref 70–110)
HBA1C MFR BLD: 6.5 % (ref 4–5.6)
HCT VFR BLD AUTO: 45.2 % (ref 40–54)
HDLC SERPL-MCNC: 39 MG/DL (ref 40–75)
HDLC SERPL: 30 % (ref 20–50)
HGB BLD-MCNC: 14.8 G/DL (ref 14–18)
IMM GRANULOCYTES # BLD AUTO: 0.01 K/UL (ref 0–0.04)
IMM GRANULOCYTES NFR BLD AUTO: 0.2 % (ref 0–0.5)
LDLC SERPL CALC-MCNC: 65.6 MG/DL (ref 63–159)
LYMPHOCYTES # BLD AUTO: 2.4 K/UL (ref 1–4.8)
LYMPHOCYTES NFR BLD: 39.5 % (ref 18–48)
MCH RBC QN AUTO: 30.8 PG (ref 27–31)
MCHC RBC AUTO-ENTMCNC: 32.7 G/DL (ref 32–36)
MCV RBC AUTO: 94 FL (ref 82–98)
MONOCYTES # BLD AUTO: 0.7 K/UL (ref 0.3–1)
MONOCYTES NFR BLD: 12.2 % (ref 4–15)
NEUTROPHILS # BLD AUTO: 2.6 K/UL (ref 1.8–7.7)
NEUTROPHILS NFR BLD: 43.5 % (ref 38–73)
NONHDLC SERPL-MCNC: 91 MG/DL
NRBC BLD-RTO: 0 /100 WBC
PLATELET # BLD AUTO: 163 K/UL (ref 150–450)
PMV BLD AUTO: 10.1 FL (ref 9.2–12.9)
POTASSIUM SERPL-SCNC: 5.1 MMOL/L (ref 3.5–5.1)
PROT SERPL-MCNC: 7.3 G/DL (ref 6–8.4)
RBC # BLD AUTO: 4.8 M/UL (ref 4.6–6.2)
SODIUM SERPL-SCNC: 140 MMOL/L (ref 136–145)
TRIGL SERPL-MCNC: 127 MG/DL (ref 30–150)
TSH SERPL DL<=0.005 MIU/L-ACNC: 1.5 UIU/ML (ref 0.4–4)
WBC # BLD AUTO: 5.98 K/UL (ref 3.9–12.7)

## 2021-04-14 PROCEDURE — 85025 COMPLETE CBC W/AUTO DIFF WBC: CPT | Performed by: INTERNAL MEDICINE

## 2021-04-14 PROCEDURE — 80053 COMPREHEN METABOLIC PANEL: CPT | Performed by: INTERNAL MEDICINE

## 2021-04-14 PROCEDURE — 36415 COLL VENOUS BLD VENIPUNCTURE: CPT | Performed by: INTERNAL MEDICINE

## 2021-04-14 PROCEDURE — 84443 ASSAY THYROID STIM HORMONE: CPT | Performed by: INTERNAL MEDICINE

## 2021-04-14 PROCEDURE — 83036 HEMOGLOBIN GLYCOSYLATED A1C: CPT | Performed by: INTERNAL MEDICINE

## 2021-04-14 PROCEDURE — 80061 LIPID PANEL: CPT | Performed by: INTERNAL MEDICINE

## 2021-04-14 PROCEDURE — 84153 ASSAY OF PSA TOTAL: CPT | Performed by: INTERNAL MEDICINE

## 2021-04-21 ENCOUNTER — OFFICE VISIT (OUTPATIENT)
Dept: INTERNAL MEDICINE | Facility: CLINIC | Age: 76
End: 2021-04-21
Payer: MEDICARE

## 2021-04-21 VITALS
BODY MASS INDEX: 27.34 KG/M2 | HEART RATE: 60 BPM | OXYGEN SATURATION: 98 % | WEIGHT: 174.19 LBS | SYSTOLIC BLOOD PRESSURE: 130 MMHG | DIASTOLIC BLOOD PRESSURE: 78 MMHG | HEIGHT: 67 IN

## 2021-04-21 DIAGNOSIS — I25.10 CORONARY ARTERY DISEASE, ANGINA PRESENCE UNSPECIFIED, UNSPECIFIED VESSEL OR LESION TYPE, UNSPECIFIED WHETHER NATIVE OR TRANSPLANTED HEART: ICD-10-CM

## 2021-04-21 DIAGNOSIS — I10 HYPERTENSION, UNSPECIFIED TYPE: Primary | ICD-10-CM

## 2021-04-21 DIAGNOSIS — G47.33 OSA (OBSTRUCTIVE SLEEP APNEA): ICD-10-CM

## 2021-04-21 DIAGNOSIS — Z12.11 COLON CANCER SCREENING: ICD-10-CM

## 2021-04-21 DIAGNOSIS — Z01.818 PRE-OP EVALUATION: ICD-10-CM

## 2021-04-21 DIAGNOSIS — E11.9 DIABETES MELLITUS WITHOUT COMPLICATION: ICD-10-CM

## 2021-04-21 PROCEDURE — 99999 PR PBB SHADOW E&M-EST. PATIENT-LVL IV: ICD-10-PCS | Mod: PBBFAC,,, | Performed by: INTERNAL MEDICINE

## 2021-04-21 PROCEDURE — 3075F PR MOST RECENT SYSTOLIC BLOOD PRESS GE 130-139MM HG: ICD-10-PCS | Mod: CPTII,S$GLB,, | Performed by: INTERNAL MEDICINE

## 2021-04-21 PROCEDURE — 99999 PR PBB SHADOW E&M-EST. PATIENT-LVL IV: CPT | Mod: PBBFAC,,, | Performed by: INTERNAL MEDICINE

## 2021-04-21 PROCEDURE — 1126F AMNT PAIN NOTED NONE PRSNT: CPT | Mod: S$GLB,,, | Performed by: INTERNAL MEDICINE

## 2021-04-21 PROCEDURE — 99214 OFFICE O/P EST MOD 30 MIN: CPT | Mod: S$GLB,,, | Performed by: INTERNAL MEDICINE

## 2021-04-21 PROCEDURE — 99214 PR OFFICE/OUTPT VISIT, EST, LEVL IV, 30-39 MIN: ICD-10-PCS | Mod: S$GLB,,, | Performed by: INTERNAL MEDICINE

## 2021-04-21 PROCEDURE — 3078F PR MOST RECENT DIASTOLIC BLOOD PRESSURE < 80 MM HG: ICD-10-PCS | Mod: CPTII,S$GLB,, | Performed by: INTERNAL MEDICINE

## 2021-04-21 PROCEDURE — 1101F PR PT FALLS ASSESS DOC 0-1 FALLS W/OUT INJ PAST YR: ICD-10-PCS | Mod: CPTII,S$GLB,, | Performed by: INTERNAL MEDICINE

## 2021-04-21 PROCEDURE — 99499 RISK ADDL DX/OHS AUDIT: ICD-10-PCS | Mod: S$GLB,,, | Performed by: INTERNAL MEDICINE

## 2021-04-21 PROCEDURE — 1101F PT FALLS ASSESS-DOCD LE1/YR: CPT | Mod: CPTII,S$GLB,, | Performed by: INTERNAL MEDICINE

## 2021-04-21 PROCEDURE — 1159F MED LIST DOCD IN RCRD: CPT | Mod: S$GLB,,, | Performed by: INTERNAL MEDICINE

## 2021-04-21 PROCEDURE — 3288F PR FALLS RISK ASSESSMENT DOCUMENTED: ICD-10-PCS | Mod: CPTII,S$GLB,, | Performed by: INTERNAL MEDICINE

## 2021-04-21 PROCEDURE — 1126F PR PAIN SEVERITY QUANTIFIED, NO PAIN PRESENT: ICD-10-PCS | Mod: S$GLB,,, | Performed by: INTERNAL MEDICINE

## 2021-04-21 PROCEDURE — 1159F PR MEDICATION LIST DOCUMENTED IN MEDICAL RECORD: ICD-10-PCS | Mod: S$GLB,,, | Performed by: INTERNAL MEDICINE

## 2021-04-21 PROCEDURE — 3078F DIAST BP <80 MM HG: CPT | Mod: CPTII,S$GLB,, | Performed by: INTERNAL MEDICINE

## 2021-04-21 PROCEDURE — 99499 UNLISTED E&M SERVICE: CPT | Mod: S$GLB,,, | Performed by: INTERNAL MEDICINE

## 2021-04-21 PROCEDURE — 3075F SYST BP GE 130 - 139MM HG: CPT | Mod: CPTII,S$GLB,, | Performed by: INTERNAL MEDICINE

## 2021-04-21 PROCEDURE — 3288F FALL RISK ASSESSMENT DOCD: CPT | Mod: CPTII,S$GLB,, | Performed by: INTERNAL MEDICINE

## 2021-04-21 RX ORDER — GLUCOSAM/CHON-MSM1/C/MANG/BOSW 500-416.6
TABLET ORAL
COMMUNITY
Start: 2021-03-16 | End: 2021-08-11 | Stop reason: SDUPTHER

## 2021-08-11 DIAGNOSIS — E03.4 HYPOTHYROIDISM DUE TO ACQUIRED ATROPHY OF THYROID: ICD-10-CM

## 2021-08-11 DIAGNOSIS — E11.9 DIABETES MELLITUS WITHOUT COMPLICATION: ICD-10-CM

## 2021-08-11 DIAGNOSIS — I25.10 CORONARY ARTERY DISEASE, ANGINA PRESENCE UNSPECIFIED, UNSPECIFIED VESSEL OR LESION TYPE, UNSPECIFIED WHETHER NATIVE OR TRANSPLANTED HEART: ICD-10-CM

## 2021-08-11 RX ORDER — GLUCOSAM/CHON-MSM1/C/MANG/BOSW 500-416.6
TABLET ORAL
Qty: 100 EACH | Refills: 3 | Status: SHIPPED | OUTPATIENT
Start: 2021-08-11 | End: 2022-10-19

## 2021-08-11 RX ORDER — ROSUVASTATIN CALCIUM 20 MG/1
TABLET, COATED ORAL
Qty: 90 TABLET | Refills: 1 | Status: SHIPPED | OUTPATIENT
Start: 2021-08-11 | End: 2022-02-09 | Stop reason: SDUPTHER

## 2021-08-11 RX ORDER — LEVOTHYROXINE SODIUM 125 UG/1
TABLET ORAL
Qty: 90 TABLET | Refills: 1 | Status: SHIPPED | OUTPATIENT
Start: 2021-08-11 | End: 2022-02-09 | Stop reason: SDUPTHER

## 2021-08-12 ENCOUNTER — TELEPHONE (OUTPATIENT)
Dept: INTERNAL MEDICINE | Facility: CLINIC | Age: 76
End: 2021-08-12

## 2021-08-12 DIAGNOSIS — Z01.818 PRE-OPERATIVE CLEARANCE: Primary | ICD-10-CM

## 2021-08-16 ENCOUNTER — HOSPITAL ENCOUNTER (OUTPATIENT)
Dept: CARDIOLOGY | Facility: CLINIC | Age: 76
Discharge: HOME | End: 2021-08-16
Payer: MEDICARE

## 2021-08-16 DIAGNOSIS — Z01.818 PRE-OPERATIVE CLEARANCE: ICD-10-CM

## 2021-08-16 PROCEDURE — 93010 EKG 12-LEAD: ICD-10-PCS | Mod: S$GLB,,, | Performed by: INTERNAL MEDICINE

## 2021-08-16 PROCEDURE — 93010 ELECTROCARDIOGRAM REPORT: CPT | Mod: S$GLB,,, | Performed by: INTERNAL MEDICINE

## 2021-08-16 PROCEDURE — 93005 ELECTROCARDIOGRAM TRACING: CPT | Mod: S$GLB,,, | Performed by: INTERNAL MEDICINE

## 2021-08-16 PROCEDURE — 93005 EKG 12-LEAD: ICD-10-PCS | Mod: S$GLB,,, | Performed by: INTERNAL MEDICINE

## 2021-08-17 ENCOUNTER — OFFICE VISIT (OUTPATIENT)
Dept: INTERNAL MEDICINE | Facility: CLINIC | Age: 76
End: 2021-08-17
Payer: MEDICARE

## 2021-08-17 VITALS
WEIGHT: 172.81 LBS | HEART RATE: 70 BPM | SYSTOLIC BLOOD PRESSURE: 120 MMHG | OXYGEN SATURATION: 97 % | BODY MASS INDEX: 27.12 KG/M2 | DIASTOLIC BLOOD PRESSURE: 70 MMHG | TEMPERATURE: 99 F | HEIGHT: 67 IN

## 2021-08-17 DIAGNOSIS — I25.10 CORONARY ARTERY DISEASE INVOLVING NATIVE CORONARY ARTERY OF NATIVE HEART WITHOUT ANGINA PECTORIS: ICD-10-CM

## 2021-08-17 DIAGNOSIS — I10 ESSENTIAL HYPERTENSION: ICD-10-CM

## 2021-08-17 DIAGNOSIS — R73.03 PRE-DIABETES: ICD-10-CM

## 2021-08-17 DIAGNOSIS — H26.9 CATARACT, UNSPECIFIED CATARACT TYPE, UNSPECIFIED LATERALITY: ICD-10-CM

## 2021-08-17 DIAGNOSIS — Z01.818 PRE-OP EVALUATION: Primary | ICD-10-CM

## 2021-08-17 PROCEDURE — 3078F PR MOST RECENT DIASTOLIC BLOOD PRESSURE < 80 MM HG: ICD-10-PCS | Mod: CPTII,S$GLB,, | Performed by: INTERNAL MEDICINE

## 2021-08-17 PROCEDURE — 99999 PR PBB SHADOW E&M-EST. PATIENT-LVL IV: ICD-10-PCS | Mod: PBBFAC,,, | Performed by: INTERNAL MEDICINE

## 2021-08-17 PROCEDURE — 3074F PR MOST RECENT SYSTOLIC BLOOD PRESSURE < 130 MM HG: ICD-10-PCS | Mod: CPTII,S$GLB,, | Performed by: INTERNAL MEDICINE

## 2021-08-17 PROCEDURE — 99213 OFFICE O/P EST LOW 20 MIN: CPT | Mod: S$GLB,,, | Performed by: INTERNAL MEDICINE

## 2021-08-17 PROCEDURE — 1159F PR MEDICATION LIST DOCUMENTED IN MEDICAL RECORD: ICD-10-PCS | Mod: CPTII,S$GLB,, | Performed by: INTERNAL MEDICINE

## 2021-08-17 PROCEDURE — 1159F MED LIST DOCD IN RCRD: CPT | Mod: CPTII,S$GLB,, | Performed by: INTERNAL MEDICINE

## 2021-08-17 PROCEDURE — 3288F FALL RISK ASSESSMENT DOCD: CPT | Mod: CPTII,S$GLB,, | Performed by: INTERNAL MEDICINE

## 2021-08-17 PROCEDURE — 1126F AMNT PAIN NOTED NONE PRSNT: CPT | Mod: CPTII,S$GLB,, | Performed by: INTERNAL MEDICINE

## 2021-08-17 PROCEDURE — 3078F DIAST BP <80 MM HG: CPT | Mod: CPTII,S$GLB,, | Performed by: INTERNAL MEDICINE

## 2021-08-17 PROCEDURE — 1101F PT FALLS ASSESS-DOCD LE1/YR: CPT | Mod: CPTII,S$GLB,, | Performed by: INTERNAL MEDICINE

## 2021-08-17 PROCEDURE — 1126F PR PAIN SEVERITY QUANTIFIED, NO PAIN PRESENT: ICD-10-PCS | Mod: CPTII,S$GLB,, | Performed by: INTERNAL MEDICINE

## 2021-08-17 PROCEDURE — 3288F PR FALLS RISK ASSESSMENT DOCUMENTED: ICD-10-PCS | Mod: CPTII,S$GLB,, | Performed by: INTERNAL MEDICINE

## 2021-08-17 PROCEDURE — 3074F SYST BP LT 130 MM HG: CPT | Mod: CPTII,S$GLB,, | Performed by: INTERNAL MEDICINE

## 2021-08-17 PROCEDURE — 1101F PR PT FALLS ASSESS DOC 0-1 FALLS W/OUT INJ PAST YR: ICD-10-PCS | Mod: CPTII,S$GLB,, | Performed by: INTERNAL MEDICINE

## 2021-08-17 PROCEDURE — 99999 PR PBB SHADOW E&M-EST. PATIENT-LVL IV: CPT | Mod: PBBFAC,,, | Performed by: INTERNAL MEDICINE

## 2021-08-17 PROCEDURE — 99213 PR OFFICE/OUTPT VISIT, EST, LEVL III, 20-29 MIN: ICD-10-PCS | Mod: S$GLB,,, | Performed by: INTERNAL MEDICINE

## 2021-08-21 RX ORDER — METOPROLOL TARTRATE 25 MG/1
25 TABLET, FILM COATED ORAL 2 TIMES DAILY
Qty: 180 TABLET | Refills: 3 | Status: SHIPPED | OUTPATIENT
Start: 2021-08-21 | End: 2022-08-08 | Stop reason: SDUPTHER

## 2021-10-05 ENCOUNTER — HOSPITAL ENCOUNTER (OUTPATIENT)
Dept: RADIOLOGY | Facility: HOSPITAL | Age: 76
Discharge: HOME | End: 2021-10-05
Attending: ORTHOPAEDIC SURGERY
Payer: MEDICARE

## 2021-10-05 ENCOUNTER — OFFICE VISIT (OUTPATIENT)
Dept: ORTHOPEDICS | Facility: CLINIC | Age: 76
End: 2021-10-05
Payer: MEDICARE

## 2021-10-05 VITALS — HEIGHT: 67 IN | WEIGHT: 172 LBS | BODY MASS INDEX: 27 KG/M2

## 2021-10-05 DIAGNOSIS — Z96.651 STATUS POST TOTAL RIGHT KNEE REPLACEMENT: Primary | ICD-10-CM

## 2021-10-05 DIAGNOSIS — Z96.651 STATUS POST TOTAL RIGHT KNEE REPLACEMENT: ICD-10-CM

## 2021-10-05 DIAGNOSIS — M17.12 PRIMARY OSTEOARTHRITIS OF LEFT KNEE: Primary | ICD-10-CM

## 2021-10-05 DIAGNOSIS — M17.12 PRIMARY OSTEOARTHRITIS OF LEFT KNEE: ICD-10-CM

## 2021-10-05 DIAGNOSIS — Z96.652 STATUS POST TOTAL LEFT KNEE REPLACEMENT: Primary | ICD-10-CM

## 2021-10-05 PROCEDURE — 1126F PR PAIN SEVERITY QUANTIFIED, NO PAIN PRESENT: ICD-10-PCS | Mod: HCNC,CPTII,S$GLB, | Performed by: ORTHOPAEDIC SURGERY

## 2021-10-05 PROCEDURE — 1159F PR MEDICATION LIST DOCUMENTED IN MEDICAL RECORD: ICD-10-PCS | Mod: HCNC,CPTII,S$GLB, | Performed by: ORTHOPAEDIC SURGERY

## 2021-10-05 PROCEDURE — 1101F PT FALLS ASSESS-DOCD LE1/YR: CPT | Mod: HCNC,CPTII,S$GLB, | Performed by: ORTHOPAEDIC SURGERY

## 2021-10-05 PROCEDURE — 99999 PR PBB SHADOW E&M-EST. PATIENT-LVL III: ICD-10-PCS | Mod: PBBFAC,HCNC,, | Performed by: ORTHOPAEDIC SURGERY

## 2021-10-05 PROCEDURE — 1126F AMNT PAIN NOTED NONE PRSNT: CPT | Mod: HCNC,CPTII,S$GLB, | Performed by: ORTHOPAEDIC SURGERY

## 2021-10-05 PROCEDURE — 99213 OFFICE O/P EST LOW 20 MIN: CPT | Mod: HCNC,S$GLB,, | Performed by: ORTHOPAEDIC SURGERY

## 2021-10-05 PROCEDURE — 73562 XR KNEE ORTHO BILAT: ICD-10-PCS | Mod: 26,50,HCNC, | Performed by: RADIOLOGY

## 2021-10-05 PROCEDURE — 73562 X-RAY EXAM OF KNEE 3: CPT | Mod: 26,50,HCNC, | Performed by: RADIOLOGY

## 2021-10-05 PROCEDURE — 73562 X-RAY EXAM OF KNEE 3: CPT | Mod: TC,50,HCNC

## 2021-10-05 PROCEDURE — 1159F MED LIST DOCD IN RCRD: CPT | Mod: HCNC,CPTII,S$GLB, | Performed by: ORTHOPAEDIC SURGERY

## 2021-10-05 PROCEDURE — 1160F PR REVIEW ALL MEDS BY PRESCRIBER/CLIN PHARMACIST DOCUMENTED: ICD-10-PCS | Mod: HCNC,CPTII,S$GLB, | Performed by: ORTHOPAEDIC SURGERY

## 2021-10-05 PROCEDURE — 99999 PR PBB SHADOW E&M-EST. PATIENT-LVL III: CPT | Mod: PBBFAC,HCNC,, | Performed by: ORTHOPAEDIC SURGERY

## 2021-10-05 PROCEDURE — 3288F PR FALLS RISK ASSESSMENT DOCUMENTED: ICD-10-PCS | Mod: HCNC,CPTII,S$GLB, | Performed by: ORTHOPAEDIC SURGERY

## 2021-10-05 PROCEDURE — 3288F FALL RISK ASSESSMENT DOCD: CPT | Mod: HCNC,CPTII,S$GLB, | Performed by: ORTHOPAEDIC SURGERY

## 2021-10-05 PROCEDURE — 1160F RVW MEDS BY RX/DR IN RCRD: CPT | Mod: HCNC,CPTII,S$GLB, | Performed by: ORTHOPAEDIC SURGERY

## 2021-10-05 PROCEDURE — 1101F PR PT FALLS ASSESS DOC 0-1 FALLS W/OUT INJ PAST YR: ICD-10-PCS | Mod: HCNC,CPTII,S$GLB, | Performed by: ORTHOPAEDIC SURGERY

## 2021-10-05 PROCEDURE — 99213 PR OFFICE/OUTPT VISIT, EST, LEVL III, 20-29 MIN: ICD-10-PCS | Mod: HCNC,S$GLB,, | Performed by: ORTHOPAEDIC SURGERY

## 2021-12-07 ENCOUNTER — IMMUNIZATION (OUTPATIENT)
Dept: PHARMACY | Facility: CLINIC | Age: 76
End: 2021-12-07
Payer: MEDICARE

## 2021-12-07 ENCOUNTER — LAB VISIT (OUTPATIENT)
Dept: LAB | Facility: HOSPITAL | Age: 76
End: 2021-12-07
Payer: MEDICARE

## 2021-12-07 DIAGNOSIS — I10 ESSENTIAL HYPERTENSION: ICD-10-CM

## 2021-12-07 DIAGNOSIS — Z23 NEED FOR VACCINATION: Primary | ICD-10-CM

## 2021-12-07 DIAGNOSIS — E78.00 HYPERCHOLESTEREMIA: ICD-10-CM

## 2021-12-07 LAB
ALBUMIN SERPL BCP-MCNC: 4 G/DL (ref 3.5–5.2)
ALP SERPL-CCNC: 71 U/L (ref 55–135)
ALT SERPL W/O P-5'-P-CCNC: 44 U/L (ref 10–44)
ANION GAP SERPL CALC-SCNC: 9 MMOL/L (ref 8–16)
AST SERPL-CCNC: 35 U/L (ref 10–40)
BILIRUB SERPL-MCNC: 0.9 MG/DL (ref 0.1–1)
BUN SERPL-MCNC: 22 MG/DL (ref 8–23)
CALCIUM SERPL-MCNC: 9.8 MG/DL (ref 8.7–10.5)
CHLORIDE SERPL-SCNC: 103 MMOL/L (ref 95–110)
CHOLEST SERPL-MCNC: 136 MG/DL (ref 120–199)
CHOLEST/HDLC SERPL: 3.6 {RATIO} (ref 2–5)
CO2 SERPL-SCNC: 26 MMOL/L (ref 23–29)
CREAT SERPL-MCNC: 1.2 MG/DL (ref 0.5–1.4)
EST. GFR  (AFRICAN AMERICAN): >60 ML/MIN/1.73 M^2
EST. GFR  (NON AFRICAN AMERICAN): 58.4 ML/MIN/1.73 M^2
GLUCOSE SERPL-MCNC: 159 MG/DL (ref 70–110)
HDLC SERPL-MCNC: 38 MG/DL (ref 40–75)
HDLC SERPL: 27.9 % (ref 20–50)
LDLC SERPL CALC-MCNC: 64.6 MG/DL (ref 63–159)
NONHDLC SERPL-MCNC: 98 MG/DL
POTASSIUM SERPL-SCNC: 4.7 MMOL/L (ref 3.5–5.1)
PROT SERPL-MCNC: 7.1 G/DL (ref 6–8.4)
SODIUM SERPL-SCNC: 138 MMOL/L (ref 136–145)
TRIGL SERPL-MCNC: 167 MG/DL (ref 30–150)

## 2021-12-07 PROCEDURE — 80053 COMPREHEN METABOLIC PANEL: CPT | Mod: HCNC | Performed by: INTERNAL MEDICINE

## 2021-12-07 PROCEDURE — 80061 LIPID PANEL: CPT | Mod: HCNC | Performed by: INTERNAL MEDICINE

## 2021-12-07 PROCEDURE — 36415 COLL VENOUS BLD VENIPUNCTURE: CPT | Mod: HCNC | Performed by: INTERNAL MEDICINE

## 2021-12-13 ENCOUNTER — PATIENT OUTREACH (OUTPATIENT)
Dept: ADMINISTRATIVE | Facility: OTHER | Age: 76
End: 2021-12-13
Payer: MEDICARE

## 2021-12-13 DIAGNOSIS — E11.9 TYPE 2 DIABETES MELLITUS WITHOUT COMPLICATION, UNSPECIFIED WHETHER LONG TERM INSULIN USE: Primary | ICD-10-CM

## 2021-12-14 ENCOUNTER — OFFICE VISIT (OUTPATIENT)
Dept: CARDIOLOGY | Facility: CLINIC | Age: 76
End: 2021-12-14
Payer: MEDICARE

## 2021-12-14 VITALS
DIASTOLIC BLOOD PRESSURE: 72 MMHG | HEIGHT: 67 IN | SYSTOLIC BLOOD PRESSURE: 126 MMHG | BODY MASS INDEX: 27.51 KG/M2 | WEIGHT: 175.25 LBS | HEART RATE: 68 BPM

## 2021-12-14 DIAGNOSIS — I25.10 CORONARY ARTERY DISEASE INVOLVING NATIVE CORONARY ARTERY OF NATIVE HEART WITHOUT ANGINA PECTORIS: Primary | ICD-10-CM

## 2021-12-14 DIAGNOSIS — G47.30 SLEEP APNEA, UNSPECIFIED TYPE: Chronic | ICD-10-CM

## 2021-12-14 DIAGNOSIS — E11.9 TYPE 2 DIABETES MELLITUS WITHOUT COMPLICATION, WITHOUT LONG-TERM CURRENT USE OF INSULIN: ICD-10-CM

## 2021-12-14 DIAGNOSIS — E78.00 HYPERCHOLESTEREMIA: Chronic | ICD-10-CM

## 2021-12-14 DIAGNOSIS — E66.3 OVERWEIGHT (BMI 25.0-29.9): ICD-10-CM

## 2021-12-14 DIAGNOSIS — I25.2 OLD MYOCARDIAL INFARCT: ICD-10-CM

## 2021-12-14 DIAGNOSIS — E03.4 HYPOTHYROIDISM DUE TO ACQUIRED ATROPHY OF THYROID: ICD-10-CM

## 2021-12-14 DIAGNOSIS — Z98.61 POST PTCA: ICD-10-CM

## 2021-12-14 DIAGNOSIS — I10 ESSENTIAL HYPERTENSION: ICD-10-CM

## 2021-12-14 DIAGNOSIS — N52.9 IMPOTENCE: ICD-10-CM

## 2021-12-14 PROCEDURE — 99214 PR OFFICE/OUTPT VISIT, EST, LEVL IV, 30-39 MIN: ICD-10-PCS | Mod: HCNC,S$GLB,, | Performed by: NURSE PRACTITIONER

## 2021-12-14 PROCEDURE — 99999 PR PBB SHADOW E&M-EST. PATIENT-LVL IV: CPT | Mod: PBBFAC,HCNC,, | Performed by: NURSE PRACTITIONER

## 2021-12-14 PROCEDURE — 99214 OFFICE O/P EST MOD 30 MIN: CPT | Mod: HCNC,S$GLB,, | Performed by: NURSE PRACTITIONER

## 2021-12-14 PROCEDURE — 99999 PR PBB SHADOW E&M-EST. PATIENT-LVL IV: ICD-10-PCS | Mod: PBBFAC,HCNC,, | Performed by: NURSE PRACTITIONER

## 2021-12-14 RX ORDER — SILDENAFIL 100 MG/1
100 TABLET, FILM COATED ORAL DAILY PRN
Qty: 30 TABLET | Refills: 6 | Status: SHIPPED | OUTPATIENT
Start: 2021-12-14 | End: 2022-11-10 | Stop reason: SDUPTHER

## 2022-01-12 ENCOUNTER — PATIENT MESSAGE (OUTPATIENT)
Dept: ADMINISTRATIVE | Facility: HOSPITAL | Age: 77
End: 2022-01-12
Payer: MEDICARE

## 2022-01-31 DIAGNOSIS — E03.4 HYPOTHYROIDISM DUE TO ACQUIRED ATROPHY OF THYROID: ICD-10-CM

## 2022-01-31 DIAGNOSIS — I25.10 CORONARY ARTERY DISEASE: ICD-10-CM

## 2022-01-31 NOTE — TELEPHONE ENCOUNTER
No new care gaps identified.  Powered by [employer]. Reference number: [id number].   1/31/2022 10:42:32 AM CST

## 2022-02-02 ENCOUNTER — TELEPHONE (OUTPATIENT)
Dept: INTERNAL MEDICINE | Facility: CLINIC | Age: 77
End: 2022-02-02
Payer: MEDICARE

## 2022-02-02 DIAGNOSIS — I10 ESSENTIAL HYPERTENSION: Primary | ICD-10-CM

## 2022-02-02 DIAGNOSIS — R73.03 PRE-DIABETES: ICD-10-CM

## 2022-02-02 DIAGNOSIS — I25.10 CORONARY ARTERY DISEASE INVOLVING NATIVE CORONARY ARTERY OF NATIVE HEART WITHOUT ANGINA PECTORIS: ICD-10-CM

## 2022-02-02 DIAGNOSIS — Z12.5 PROSTATE CANCER SCREENING: ICD-10-CM

## 2022-02-02 NOTE — TELEPHONE ENCOUNTER
"Subjective:       Patient ID: Sandra Brunson is a 63 y.o. female.    Chief Complaint: Follow-up (f/u head lac)    HPI  Patient see for f/u of head lac.   At LOV, we removed the staples, and noted continued drainage from inferior portion of wound. She had a small hematoma in the area as well. We reviewed skin care and started keflex. She felt better after several days on abx, and has started to resume normal activities. Last night, she noticed a recurrence of drainage from the (same) inferior portion of the wound. Reported as light yellow drainage.  Otherwise afebrile and feeling well.    Review of Systems:  Review of Systems   Constitutional: Negative for chills and fever.   Gastrointestinal: Negative for diarrhea and nausea.        Tolerated abx well.   Skin: Positive for wound. Negative for rash.       Objective:     Vitals:    05/04/20 1313   BP: 120/64   BP Location: Right arm   Temp: 98.9 °F (37.2 °C)   TempSrc: Oral   Weight: 81 kg (178 lb 9.2 oz)   Height: 5' 5" (1.651 m)          Physical Exam   Constitutional: She is oriented to person, place, and time. She appears well-developed and well-nourished. No distress.   HENT:   Head: Normocephalic and atraumatic.   Eyes: Conjunctivae and EOM are normal. Right eye exhibits no discharge. Left eye exhibits no discharge. No scleral icterus.   Neck: Normal range of motion. Neck supple.   Cardiovascular: Normal rate.   Pulmonary/Chest: Effort normal. No respiratory distress.   Abdominal: Soft. She exhibits no distension.   Musculoskeletal: Normal range of motion. She exhibits no edema.   Neurological: She is alert and oriented to person, place, and time.   Skin: Skin is warm and dry. No rash noted.        Psychiatric: She has a normal mood and affect. Her behavior is normal. Judgment and thought content normal.   Nursing note and vitals reviewed.        Assessment & Plan:  Laceration of scalp, subsequent encounter    Other orders  -     cephALEXin (KEFLEX) 500 MG " Please place lab orders   capsule; Take 1 capsule (500 mg total) by mouth every 8 (eight) hours.  Dispense: 21 capsule; Refill: 0    improving. Continue keflex for another 5-7 days to ensure complete healing. Wound has otherwise healed well and without issue. Reviewed skin care and monitoring. Let me know of any concerning sx change, increase in tenderness, swelling, drainage.

## 2022-02-09 ENCOUNTER — TELEPHONE (OUTPATIENT)
Dept: INTERNAL MEDICINE | Facility: CLINIC | Age: 77
End: 2022-02-09
Payer: MEDICARE

## 2022-02-09 DIAGNOSIS — E03.4 HYPOTHYROIDISM DUE TO ACQUIRED ATROPHY OF THYROID: ICD-10-CM

## 2022-02-09 DIAGNOSIS — I25.10 CORONARY ARTERY DISEASE, UNSPECIFIED VESSEL OR LESION TYPE, UNSPECIFIED WHETHER ANGINA PRESENT, UNSPECIFIED WHETHER NATIVE OR TRANSPLANTED HEART: ICD-10-CM

## 2022-02-09 RX ORDER — ROSUVASTATIN CALCIUM 20 MG/1
20 TABLET, COATED ORAL DAILY
Qty: 90 TABLET | Refills: 1 | Status: SHIPPED | OUTPATIENT
Start: 2022-02-09 | End: 2022-08-05

## 2022-02-09 RX ORDER — LEVOTHYROXINE SODIUM 125 UG/1
TABLET ORAL
Qty: 90 TABLET | Refills: 1 | Status: SHIPPED | OUTPATIENT
Start: 2022-02-09 | End: 2022-08-05

## 2022-02-09 NOTE — TELEPHONE ENCOUNTER
----- Message from Jahaira Hernandez sent at 2/9/2022  3:50 PM CST -----  Contact: pt- [phone]  Requesting an RX refill or new RX.    Is this a refill or new RX: refill    RX name and strength (copy/paste from chart):  levothyroxine (SYNTHROID) 125 MCG tablet  rosuvastatin (CRESTOR) 20 MG tablet    Is this a 30 day or 90 day RX: 90 day    Pharmacy name and phone # (copy/paste from chart):    [facility] - [place] [id number] [facility]  [address] [facility] [address]  Phone: [phone] Fax: [phone]    The doctors have asked that we provide their patients with the following 2 reminders -- prescription refills can take up to 72 hours, and a friendly reminder that in the future you can use your [facility] account to request refills:  yes

## 2022-02-09 NOTE — TELEPHONE ENCOUNTER
----- Message from Jahaira Hernandez sent at 2/9/2022  3:50 PM CST -----  Contact: pt- [phone]  Requesting an RX refill or new RX.    Is this a refill or new RX: refill    RX name and strength (copy/paste from chart):  levothyroxine (SYNTHROID) 125 MCG tablet  rosuvastatin (CRESTOR) 20 MG tablet    Is this a 30 day or 90 day RX: 90 day    Pharmacy name and phone # (copy/paste from chart):    [facility] - [place] [id number] [facility]  [address] [facility] [address]  Phone: [phone] Fax: [phone]    The doctors have asked that we provide their patients with the following 2 reminders -- prescription refills can take up to 72 hours, and a friendly reminder that in the future you can use your [facility] account to request refills:  yes

## 2022-02-11 RX ORDER — ROSUVASTATIN CALCIUM 20 MG/1
TABLET, COATED ORAL
Qty: 90 TABLET | Refills: 1 | OUTPATIENT
Start: 2022-02-11

## 2022-02-11 RX ORDER — LEVOTHYROXINE SODIUM 125 UG/1
TABLET ORAL
Qty: 90 TABLET | Refills: 1 | OUTPATIENT
Start: 2022-02-11

## 2022-04-08 NOTE — TELEPHONE ENCOUNTER
Labs linked to pending appt.    Patient tolerated padcev infusion without issue  IV magnesium also given   Patient discharged in stable condition with AVS

## 2022-06-17 ENCOUNTER — LAB VISIT (OUTPATIENT)
Dept: LAB | Facility: HOSPITAL | Age: 77
End: 2022-06-17
Attending: INTERNAL MEDICINE
Payer: MEDICARE

## 2022-06-17 DIAGNOSIS — I10 ESSENTIAL HYPERTENSION: ICD-10-CM

## 2022-06-17 DIAGNOSIS — I25.10 CORONARY ARTERY DISEASE INVOLVING NATIVE CORONARY ARTERY OF NATIVE HEART WITHOUT ANGINA PECTORIS: ICD-10-CM

## 2022-06-17 DIAGNOSIS — R73.03 PRE-DIABETES: ICD-10-CM

## 2022-06-17 DIAGNOSIS — Z12.5 PROSTATE CANCER SCREENING: ICD-10-CM

## 2022-06-17 LAB
ALBUMIN SERPL BCP-MCNC: 4.3 G/DL (ref 3.5–5.2)
ALP SERPL-CCNC: 70 U/L (ref 55–135)
ALT SERPL W/O P-5'-P-CCNC: 45 U/L (ref 10–44)
ANION GAP SERPL CALC-SCNC: 11 MMOL/L (ref 8–16)
AST SERPL-CCNC: 42 U/L (ref 10–40)
BASOPHILS # BLD AUTO: 0.06 K/UL (ref 0–0.2)
BASOPHILS NFR BLD: 1 % (ref 0–1.9)
BILIRUB SERPL-MCNC: 0.8 MG/DL (ref 0.1–1)
BUN SERPL-MCNC: 18 MG/DL (ref 8–23)
CALCIUM SERPL-MCNC: 10.3 MG/DL (ref 8.7–10.5)
CHLORIDE SERPL-SCNC: 103 MMOL/L (ref 95–110)
CHOLEST SERPL-MCNC: 134 MG/DL (ref 120–199)
CHOLEST/HDLC SERPL: 3.2 {RATIO} (ref 2–5)
CO2 SERPL-SCNC: 25 MMOL/L (ref 23–29)
COMPLEXED PSA SERPL-MCNC: 0.85 NG/ML (ref 0–4)
CREAT SERPL-MCNC: 1.1 MG/DL (ref 0.5–1.4)
DIFFERENTIAL METHOD: ABNORMAL
EOSINOPHIL # BLD AUTO: 0.2 K/UL (ref 0–0.5)
EOSINOPHIL NFR BLD: 3.4 % (ref 0–8)
ERYTHROCYTE [DISTWIDTH] IN BLOOD BY AUTOMATED COUNT: 13 % (ref 11.5–14.5)
EST. GFR  (AFRICAN AMERICAN): >60 ML/MIN/1.73 M^2
EST. GFR  (NON AFRICAN AMERICAN): >60 ML/MIN/1.73 M^2
ESTIMATED AVG GLUCOSE: 160 MG/DL (ref 68–131)
GLUCOSE SERPL-MCNC: 163 MG/DL (ref 70–110)
HBA1C MFR BLD: 7.2 % (ref 4–5.6)
HCT VFR BLD AUTO: 49.2 % (ref 40–54)
HDLC SERPL-MCNC: 42 MG/DL (ref 40–75)
HDLC SERPL: 31.3 % (ref 20–50)
HGB BLD-MCNC: 15.9 G/DL (ref 14–18)
IMM GRANULOCYTES # BLD AUTO: 0.01 K/UL (ref 0–0.04)
IMM GRANULOCYTES NFR BLD AUTO: 0.2 % (ref 0–0.5)
LDLC SERPL CALC-MCNC: 65.6 MG/DL (ref 63–159)
LYMPHOCYTES # BLD AUTO: 2 K/UL (ref 1–4.8)
LYMPHOCYTES NFR BLD: 34.6 % (ref 18–48)
MCH RBC QN AUTO: 31.2 PG (ref 27–31)
MCHC RBC AUTO-ENTMCNC: 32.3 G/DL (ref 32–36)
MCV RBC AUTO: 97 FL (ref 82–98)
MONOCYTES # BLD AUTO: 0.8 K/UL (ref 0.3–1)
MONOCYTES NFR BLD: 12.9 % (ref 4–15)
NEUTROPHILS # BLD AUTO: 2.8 K/UL (ref 1.8–7.7)
NEUTROPHILS NFR BLD: 47.9 % (ref 38–73)
NONHDLC SERPL-MCNC: 92 MG/DL
NRBC BLD-RTO: 0 /100 WBC
PLATELET # BLD AUTO: 188 K/UL (ref 150–450)
PMV BLD AUTO: 10 FL (ref 9.2–12.9)
POTASSIUM SERPL-SCNC: 4.9 MMOL/L (ref 3.5–5.1)
PROT SERPL-MCNC: 7.3 G/DL (ref 6–8.4)
RBC # BLD AUTO: 5.09 M/UL (ref 4.6–6.2)
SODIUM SERPL-SCNC: 139 MMOL/L (ref 136–145)
T4 FREE SERPL-MCNC: 1.06 NG/DL (ref 0.71–1.51)
TRIGL SERPL-MCNC: 132 MG/DL (ref 30–150)
TSH SERPL DL<=0.005 MIU/L-ACNC: 4.69 UIU/ML (ref 0.4–4)
WBC # BLD AUTO: 5.9 K/UL (ref 3.9–12.7)

## 2022-06-17 PROCEDURE — 80053 COMPREHEN METABOLIC PANEL: CPT | Performed by: INTERNAL MEDICINE

## 2022-06-17 PROCEDURE — 80061 LIPID PANEL: CPT | Performed by: INTERNAL MEDICINE

## 2022-06-17 PROCEDURE — 85025 COMPLETE CBC W/AUTO DIFF WBC: CPT | Performed by: INTERNAL MEDICINE

## 2022-06-17 PROCEDURE — 84153 ASSAY OF PSA TOTAL: CPT | Performed by: INTERNAL MEDICINE

## 2022-06-17 PROCEDURE — 83036 HEMOGLOBIN GLYCOSYLATED A1C: CPT | Performed by: INTERNAL MEDICINE

## 2022-06-17 PROCEDURE — 36415 COLL VENOUS BLD VENIPUNCTURE: CPT | Performed by: INTERNAL MEDICINE

## 2022-06-17 PROCEDURE — 84439 ASSAY OF FREE THYROXINE: CPT | Performed by: INTERNAL MEDICINE

## 2022-06-17 PROCEDURE — 84443 ASSAY THYROID STIM HORMONE: CPT | Performed by: INTERNAL MEDICINE

## 2022-06-24 ENCOUNTER — OFFICE VISIT (OUTPATIENT)
Dept: INTERNAL MEDICINE | Facility: CLINIC | Age: 77
End: 2022-06-24
Payer: MEDICARE

## 2022-06-24 VITALS
WEIGHT: 174.81 LBS | DIASTOLIC BLOOD PRESSURE: 72 MMHG | HEIGHT: 67 IN | HEART RATE: 64 BPM | OXYGEN SATURATION: 98 % | BODY MASS INDEX: 27.44 KG/M2 | SYSTOLIC BLOOD PRESSURE: 140 MMHG

## 2022-06-24 DIAGNOSIS — I10 ESSENTIAL HYPERTENSION: ICD-10-CM

## 2022-06-24 DIAGNOSIS — I25.10 CORONARY ARTERY DISEASE, UNSPECIFIED VESSEL OR LESION TYPE, UNSPECIFIED WHETHER ANGINA PRESENT, UNSPECIFIED WHETHER NATIVE OR TRANSPLANTED HEART: ICD-10-CM

## 2022-06-24 DIAGNOSIS — Z12.11 COLON CANCER SCREENING: ICD-10-CM

## 2022-06-24 DIAGNOSIS — G56.02 CARPAL TUNNEL SYNDROME OF LEFT WRIST: ICD-10-CM

## 2022-06-24 DIAGNOSIS — E03.4 HYPOTHYROIDISM DUE TO ACQUIRED ATROPHY OF THYROID: ICD-10-CM

## 2022-06-24 DIAGNOSIS — I70.0 ATHEROSCLEROSIS OF AORTA: ICD-10-CM

## 2022-06-24 DIAGNOSIS — E11.9 TYPE 2 DIABETES MELLITUS WITHOUT COMPLICATION, WITHOUT LONG-TERM CURRENT USE OF INSULIN: Primary | ICD-10-CM

## 2022-06-24 LAB
ALBUMIN/CREAT UR: 10.6 UG/MG (ref 0–30)
CREAT UR-MCNC: 94 MG/DL (ref 23–375)
MICROALBUMIN UR DL<=1MG/L-MCNC: 10 UG/ML

## 2022-06-24 PROCEDURE — 3078F PR MOST RECENT DIASTOLIC BLOOD PRESSURE < 80 MM HG: ICD-10-PCS | Mod: CPTII,S$GLB,, | Performed by: INTERNAL MEDICINE

## 2022-06-24 PROCEDURE — 3077F SYST BP >= 140 MM HG: CPT | Mod: CPTII,S$GLB,, | Performed by: INTERNAL MEDICINE

## 2022-06-24 PROCEDURE — 99999 PR PBB SHADOW E&M-EST. PATIENT-LVL IV: ICD-10-PCS | Mod: PBBFAC,,, | Performed by: INTERNAL MEDICINE

## 2022-06-24 PROCEDURE — 1101F PT FALLS ASSESS-DOCD LE1/YR: CPT | Mod: CPTII,S$GLB,, | Performed by: INTERNAL MEDICINE

## 2022-06-24 PROCEDURE — 1126F PR PAIN SEVERITY QUANTIFIED, NO PAIN PRESENT: ICD-10-PCS | Mod: CPTII,S$GLB,, | Performed by: INTERNAL MEDICINE

## 2022-06-24 PROCEDURE — 99215 PR OFFICE/OUTPT VISIT, EST, LEVL V, 40-54 MIN: ICD-10-PCS | Mod: S$GLB,,, | Performed by: INTERNAL MEDICINE

## 2022-06-24 PROCEDURE — 99999 PR PBB SHADOW E&M-EST. PATIENT-LVL IV: CPT | Mod: PBBFAC,,, | Performed by: INTERNAL MEDICINE

## 2022-06-24 PROCEDURE — 3288F PR FALLS RISK ASSESSMENT DOCUMENTED: ICD-10-PCS | Mod: CPTII,S$GLB,, | Performed by: INTERNAL MEDICINE

## 2022-06-24 PROCEDURE — 3077F PR MOST RECENT SYSTOLIC BLOOD PRESSURE >= 140 MM HG: ICD-10-PCS | Mod: CPTII,S$GLB,, | Performed by: INTERNAL MEDICINE

## 2022-06-24 PROCEDURE — 99499 RISK ADDL DX/OHS AUDIT: ICD-10-PCS | Mod: S$GLB,,, | Performed by: INTERNAL MEDICINE

## 2022-06-24 PROCEDURE — 99215 OFFICE O/P EST HI 40 MIN: CPT | Mod: S$GLB,,, | Performed by: INTERNAL MEDICINE

## 2022-06-24 PROCEDURE — 82043 UR ALBUMIN QUANTITATIVE: CPT | Performed by: INTERNAL MEDICINE

## 2022-06-24 PROCEDURE — 99499 UNLISTED E&M SERVICE: CPT | Mod: S$GLB,,, | Performed by: INTERNAL MEDICINE

## 2022-06-24 PROCEDURE — 3078F DIAST BP <80 MM HG: CPT | Mod: CPTII,S$GLB,, | Performed by: INTERNAL MEDICINE

## 2022-06-24 PROCEDURE — 3288F FALL RISK ASSESSMENT DOCD: CPT | Mod: CPTII,S$GLB,, | Performed by: INTERNAL MEDICINE

## 2022-06-24 PROCEDURE — 1159F MED LIST DOCD IN RCRD: CPT | Mod: CPTII,S$GLB,, | Performed by: INTERNAL MEDICINE

## 2022-06-24 PROCEDURE — 82570 ASSAY OF URINE CREATININE: CPT | Performed by: INTERNAL MEDICINE

## 2022-06-24 PROCEDURE — 1101F PR PT FALLS ASSESS DOC 0-1 FALLS W/OUT INJ PAST YR: ICD-10-PCS | Mod: CPTII,S$GLB,, | Performed by: INTERNAL MEDICINE

## 2022-06-24 PROCEDURE — 1126F AMNT PAIN NOTED NONE PRSNT: CPT | Mod: CPTII,S$GLB,, | Performed by: INTERNAL MEDICINE

## 2022-06-24 PROCEDURE — 3051F HG A1C>EQUAL 7.0%<8.0%: CPT | Mod: CPTII,S$GLB,, | Performed by: INTERNAL MEDICINE

## 2022-06-24 PROCEDURE — 1159F PR MEDICATION LIST DOCUMENTED IN MEDICAL RECORD: ICD-10-PCS | Mod: CPTII,S$GLB,, | Performed by: INTERNAL MEDICINE

## 2022-06-24 PROCEDURE — 3051F PR MOST RECENT HEMOGLOBIN A1C LEVEL 7.0 - < 8.0%: ICD-10-PCS | Mod: CPTII,S$GLB,, | Performed by: INTERNAL MEDICINE

## 2022-06-24 NOTE — PROGRESS NOTES
Subjective:       Patient ID: Drake Barraza is a 77 y.o. male.    Chief Complaint:   Annual Exam (Numbness in left hand )    HPI: Mr Barraza presents for annual wellness exam  DM: Med Tx 1-Cinnamon tabs only-no meds         Accuchecks: AM: 140-150/higher than had been, PM: None         He has gotten away from exercise/gym         Diet Diary: Breakfast: Tracey/Eggs/2 pieces toast/coffee:Lunch:        Chicken/pasta,diet tea; Dinner: Veggies, pork,diet tea  CAD/HLD: Med Tx 1- Crestor 20mg QD, 2-Baby ASA 81mg QD          He has no CP/SOB or palpitations  HTN: Med Tx 1-Altace 10mg QD, 2-Lopressor 25mg BID  Left Hand numbness(thumb and 1st/2nd fingers):  Symptoms started x last 2-3          weeks; He has hx Right CTS and is s/p Right CTSurgery >20 years ago    Past Medical, Surgical, Social History: Please see as stated in Epic chart which has been reviewed.    Current Outpatient Medications   Medication Sig Dispense Refill    blood sugar diagnostic Strp 1 strip by Misc.(Non-Drug; Combo Route) route 2 (two) times daily before meals. 100 strip 3    CINNAMON BARK (CINNAMON ORAL) Take 1 capsule by mouth once daily.      lancets Misc 1 lancet by Misc.(Non-Drug; Combo Route) route 2 (two) times daily before meals. 100 each 3    levothyroxine (SYNTHROID) 125 MCG tablet TAKE 1 TABLET EVERY MORNING ON AN EMPTY STOMACH 90 tablet 1    metoprolol tartrate (LOPRESSOR) 25 MG tablet Take 1 tablet (25 mg total) by mouth 2 (two) times daily. 180 tablet 3    multivitamin (THERAGRAN) per tablet Take 1 tablet by mouth once daily.      nitroGLYCERIN (NITROSTAT) 0.4 MG SL tablet Place 1 tablet (0.4 mg total) under the tongue every 5 (five) minutes as needed for Chest pain. 100 tablet 2    omega-3 fatty acids-vitamin E 1,000 mg Cap Take 1 capsule by mouth Daily.        ramipriL (ALTACE) 10 MG capsule TAKE 1 CAPSULE EVERY DAY 90 capsule 3    rosuvastatin (CRESTOR) 20 MG tablet Take 1 tablet (20 mg total) by mouth once daily. 90  tablet 1    sildenafiL (VIAGRA) 100 MG tablet Take 1 tablet (100 mg total) by mouth daily as needed for Erectile Dysfunction. 30 tablet 6    TRUEPLUS LANCETS 30 gauge Misc USE TWICE DAILY BEFORE MEALS : 2-3 DAYS/WEEK 100 each 3    TURMERIC ROOT EXTRACT ORAL Take 1 capsule by mouth.      UBIDECARENONE (COENZYME Q10) 100 mg Tab Take 100 mg by mouth once daily.      vitamin D 185 MG Tab Take 185 mg by mouth once daily.      aspirin (ECOTRIN) 81 MG EC tablet Take 1 tablet (81 mg total) by mouth 2 (two) times daily. (Patient taking differently: Take 81 mg by mouth once daily.) 60 tablet 0    blood-glucose meter kit Check glucose 1-2x/day after meals 1 each 0     No current facility-administered medications for this visit.       Review of Systems   Constitutional: Negative for appetite change, fatigue and fever.   HENT: Negative for congestion, postnasal drip, sinus pressure and trouble swallowing.    Eyes: Negative for pain and visual disturbance.   Respiratory: Negative for cough, chest tightness, shortness of breath and wheezing.    Cardiovascular: Negative for chest pain, palpitations and leg swelling.   Gastrointestinal: Negative for abdominal pain, blood in stool, constipation, diarrhea, nausea and vomiting.   Endocrine: Negative for cold intolerance and heat intolerance.   Genitourinary: Negative for difficulty urinating, dysuria and hematuria.        No BPH symptoms   Musculoskeletal: Negative for arthralgias, back pain and joint swelling.   Skin: Negative for color change and rash.   Neurological: Positive for numbness. Negative for dizziness, syncope, weakness and headaches.        No Focal Neurological abnormalities   Hematological: Negative for adenopathy.   Psychiatric/Behavioral: Negative for sleep disturbance.        No Anxiety/Depression symptoms       Objective:      Lab Results   Component Value Date    WBC 5.90 06/17/2022    HGB 15.9 06/17/2022    HCT 49.2 06/17/2022     06/17/2022    CHOL  "134 06/17/2022    TRIG 132 06/17/2022    HDL 42 06/17/2022    ALT 45 (H) 06/17/2022    AST 42 (H) 06/17/2022     06/17/2022    K 4.9 06/17/2022     06/17/2022    CREATININE 1.1 06/17/2022    BUN 18 06/17/2022    CO2 25 06/17/2022    TSH 4.688 (H) 06/17/2022    PSA 0.85 06/17/2022    INR 0.9 09/22/2020    GLUF 104 04/03/2004    HGBA1C 7.2 (H) 06/17/2022     Physical Exam  Vitals reviewed.   Constitutional:       Appearance: He is well-developed.   HENT:      Head: Normocephalic and atraumatic.      Mouth/Throat:      Pharynx: No oropharyngeal exudate.   Eyes:      Conjunctiva/sclera: Conjunctivae normal.   Neck:      Thyroid: No thyromegaly.      Vascular: No JVD.      Comments: No masses    Cardiovascular:      Rate and Rhythm: Normal rate and regular rhythm.      Heart sounds: No murmur heard.    No gallop.   Pulmonary:      Effort: Pulmonary effort is normal. No respiratory distress.      Breath sounds: Normal breath sounds. No wheezing.   Chest:      Chest wall: No tenderness.   Abdominal:      General: Bowel sounds are normal. There is no distension.      Palpations: Abdomen is soft. There is no mass.      Tenderness: There is no abdominal tenderness.      Comments: No Organomegaly   Musculoskeletal:         General: Tenderness present. Normal range of motion.      Cervical back: Normal range of motion and neck supple.      Comments: +Tinel's sign/Left hand-no muscular weakness of the hypothenar musculature   Lymphadenopathy:      Cervical: No cervical adenopathy.   Skin:     General: Skin is warm and dry.   Neurological:      Mental Status: He is alert and oriented to person, place, and time.      Cranial Nerves: No cranial nerve deficit.   Psychiatric:         Judgment: Judgment normal.           Vital Signs  Pulse: 64  SpO2: 98 %  BP: (!) 140/72  BP Location: Right arm  Patient Position: Sitting  Pain Score: 0-No pain  Height and Weight  Height: 5' 7" (170.2 cm)  Weight: 79.3 kg (174 lb 13.2 " oz)  BSA (Calculated - sq m): 1.94 sq meters  BMI (Calculated): 27.4  Weight in (lb) to have BMI = 25: 159.3]    Protective Sensation (w/ 10 gram monofilament):  Right: Decreased  Left: Decreased    Visual Inspection:  Normal -  Bilateral    Pedal Pulses:   Right: Present  Left: Present    Posterior tibialis:   Right:Present  Left: Present        Assessment:       1. Type 2 diabetes mellitus without complication, without long-term current use of insulin    2. Essential hypertension    3. Hypothyroidism due to acquired atrophy of thyroid    4. Coronary artery disease, unspecified vessel or lesion type, unspecified whether angina present, unspecified whether native or transplanted heart    5. Carpal tunnel syndrome of left wrist    6. Atherosclerosis of aorta    7. Colon cancer screening        Plan:     Health Maintenance   Topic Date Due    Hemoglobin A1c  12/17/2022    Eye Exam  01/12/2023    Lipid Panel  06/17/2023    Aspirin/Antiplatelet Therapy  06/24/2023    Foot Exam  06/24/2023    TETANUS VACCINE  07/06/2025    Hepatitis C Screening  Completed        Drake was seen today for annual exam.    Diagnoses and all orders for this visit:    Type 2 diabetes mellitus without complication, without long-term current use of insulin/hemoglobin A1c increased to 7.2%        -    'have discussed the importance of Mr. Barraza getting back to low-carbohydrate healthy ADA diet and regular exercise  -     Microalbumin/Creatinine Ratio, Urine  -     Comprehensive Metabolic Panel; Future  -     Hemoglobin A1C; Future    Essential hypertension/acceptable control        -     Continue : Med Tx 1-Altace 10mg QD, 2-Lopressor 25mg BID    Hypothyroidism due to acquired atrophy of thyroid/acceptable control        -     Continue:  Synthroid 125 mcg q.day    Coronary artery disease, unspecified vessel or lesion type, unspecified whether angina present, unspecified whether native or transplanted heart        -     continue with  daily statin/Crestor 20 mg q.day and aspirin 81 mg q.day  -     Comprehensive Metabolic Panel; Future  -     Lipid Panel; Future    Carpal tunnel syndrome of left wrist        -     recommend trial of left wrist splint q.h.s. and if no improvement will either order EMG nerve study and or refer [facility]    Atherosclerosis of aorta        -     continue treatment with recommended tight control of diabetes mellitus, hypertension, and hyperlipidemia    Colon cancer screening/status post normal colonoscopy July 2016  -     Fecal Immunochemical Test (iFOBT); Future    Health maintenance        -     return to clinic x6 months with 1 week prior fasting lab or see patient sooner if needed

## 2022-07-14 ENCOUNTER — OFFICE VISIT (OUTPATIENT)
Dept: DERMATOLOGY | Facility: CLINIC | Age: 77
End: 2022-07-14
Payer: MEDICARE

## 2022-07-14 DIAGNOSIS — L82.1 SK (SEBORRHEIC KERATOSIS): ICD-10-CM

## 2022-07-14 DIAGNOSIS — D22.9 NEVUS: ICD-10-CM

## 2022-07-14 DIAGNOSIS — D18.01 CHERRY ANGIOMA: ICD-10-CM

## 2022-07-14 DIAGNOSIS — L91.8 SKIN TAG: ICD-10-CM

## 2022-07-14 DIAGNOSIS — R20.9 SKIN SENSATION DISTURBANCE: ICD-10-CM

## 2022-07-14 DIAGNOSIS — L82.0 INFLAMED SEBORRHEIC KERATOSIS: ICD-10-CM

## 2022-07-14 DIAGNOSIS — L57.0 AK (ACTINIC KERATOSIS): Primary | ICD-10-CM

## 2022-07-14 PROCEDURE — 99213 PR OFFICE/OUTPT VISIT, EST, LEVL III, 20-29 MIN: ICD-10-PCS | Mod: 25,S$GLB,, | Performed by: DERMATOLOGY

## 2022-07-14 PROCEDURE — 17110 DESTRUCTION B9 LES UP TO 14: CPT | Mod: 59,S$GLB,, | Performed by: DERMATOLOGY

## 2022-07-14 PROCEDURE — 1160F RVW MEDS BY RX/DR IN RCRD: CPT | Mod: CPTII,S$GLB,, | Performed by: DERMATOLOGY

## 2022-07-14 PROCEDURE — 1159F MED LIST DOCD IN RCRD: CPT | Mod: CPTII,S$GLB,, | Performed by: DERMATOLOGY

## 2022-07-14 PROCEDURE — 17000 PR DESTRUCTION(LASER SURGERY,CRYOSURGERY,CHEMOSURGERY),PREMALIGNANT LESIONS,FIRST LESION: ICD-10-PCS | Mod: 59,S$GLB,, | Performed by: DERMATOLOGY

## 2022-07-14 PROCEDURE — 11200 RMVL SKIN TAGS UP TO&INC 15: CPT | Mod: S$GLB,,, | Performed by: DERMATOLOGY

## 2022-07-14 PROCEDURE — 99999 PR PBB SHADOW E&M-EST. PATIENT-LVL III: ICD-10-PCS | Mod: PBBFAC,,, | Performed by: DERMATOLOGY

## 2022-07-14 PROCEDURE — 1159F PR MEDICATION LIST DOCUMENTED IN MEDICAL RECORD: ICD-10-PCS | Mod: CPTII,S$GLB,, | Performed by: DERMATOLOGY

## 2022-07-14 PROCEDURE — 17003 DESTRUCTION, PREMALIGNANT LESIONS; SECOND THROUGH 14 LESIONS: ICD-10-PCS | Mod: S$GLB,,, | Performed by: DERMATOLOGY

## 2022-07-14 PROCEDURE — 17110 PR DESTRUCTION BENIGN LESIONS UP TO 14: ICD-10-PCS | Mod: 59,S$GLB,, | Performed by: DERMATOLOGY

## 2022-07-14 PROCEDURE — 11200 PR REMOVAL OF SKIN TAGS, UP TO 15: ICD-10-PCS | Mod: S$GLB,,, | Performed by: DERMATOLOGY

## 2022-07-14 PROCEDURE — 1160F PR REVIEW ALL MEDS BY PRESCRIBER/CLIN PHARMACIST DOCUMENTED: ICD-10-PCS | Mod: CPTII,S$GLB,, | Performed by: DERMATOLOGY

## 2022-07-14 PROCEDURE — 17003 DESTRUCT PREMALG LES 2-14: CPT | Mod: S$GLB,,, | Performed by: DERMATOLOGY

## 2022-07-14 PROCEDURE — 99999 PR PBB SHADOW E&M-EST. PATIENT-LVL III: CPT | Mod: PBBFAC,,, | Performed by: DERMATOLOGY

## 2022-07-14 PROCEDURE — 17000 DESTRUCT PREMALG LESION: CPT | Mod: 59,S$GLB,, | Performed by: DERMATOLOGY

## 2022-07-14 PROCEDURE — 99213 OFFICE O/P EST LOW 20 MIN: CPT | Mod: 25,S$GLB,, | Performed by: DERMATOLOGY

## 2022-07-14 NOTE — PROGRESS NOTES
"  Subjective:       Patient ID:  Drake Barraza is a 77 y.o. male who presents for   Chief Complaint   Patient presents with    Skin Check    Lesion     Patient is here today for a "mole" check.   Pt has a history of  extensive sun exposure in the past.   Pt recalls several blistering sunburns in the past- no  Pt has history of tanning bed use- no  Pt has  had moles removed in the past- no  Pt has history of melanoma in first degree relatives-  no    Pt c/o scaly lesion on left cheek x 3-4 years. Shaves ove r it and it bleeds. No prev tx.   C/o lesion behind left ear and right ear. Irritated with combing hair. Lesion on left post ear was tx with cryo in the past and has recurred  C/o lesions under arms.  Increasing in number.  Irritated with friction and clothing. No tx.       Review of Systems   Skin: Positive for activity-related sunscreen use. Negative for daily sunscreen use, tendency to form keloidal scars and recent sunburn.   Hematologic/Lymphatic: Does not bruise/bleed easily.        Objective:    Physical Exam   Constitutional: He appears well-developed and well-nourished. No distress.   Neurological: He is alert and oriented to person, place, and time. He is not disoriented.   Psychiatric: He has a normal mood and affect.   Skin:   Areas Examined (abnormalities noted in diagram):   Scalp / Hair Palpated and Inspected  Head / Face Inspection Performed  Neck Inspection Performed  Chest / Axilla Inspection Performed  Abdomen Inspection Performed  Back Inspection Performed  RUE Inspected  LUE Inspection Performed                       Diagram Legend     Erythematous scaling macule/papule c/w actinic keratosis       Vascular papule c/w angioma      Pigmented verrucoid papule/plaque c/w seborrheic keratosis      Yellow umbilicated papule c/w sebaceous hyperplasia      Irregularly shaped tan macule c/w lentigo     1-2 mm smooth white papules consistent with Milia      Movable subcutaneous cyst with punctum " c/w epidermal inclusion cyst      Subcutaneous movable cyst c/w pilar cyst      Firm pink to brown papule c/w dermatofibroma      Pedunculated fleshy papule(s) c/w skin tag(s)      Evenly pigmented macule c/w junctional nevus     Mildly variegated pigmented, slightly irregular-bordered macule c/w mildly atypical nevus      Flesh colored to evenly pigmented papule c/w intradermal nevus       Pink pearly papule/plaque c/w basal cell carcinoma      Erythematous hyperkeratotic cursted plaque c/w SCC      Surgical scar with no sign of skin cancer recurrence      Open and closed comedones      Inflammatory papules and pustules      Verrucoid papule consistent consistent with wart     Erythematous eczematous patches and plaques     Dystrophic onycholytic nail with subungual debris c/w onychomycosis     Umbilicated papule    Erythematous-base heme-crusted tan verrucoid plaque consistent with inflamed seborrheic keratosis     Erythematous Silvery Scaling Plaque c/w Psoriasis     See annotation      Assessment / Plan:        AK (actinic keratosis)  Cryosurgery Procedure Note    Verbal consent from the patient is obtained including, but not limited to, risk of hypopigmentation/hyperpigmentation, scar, recurrence of lesion. The patient is aware of the precancerous quality and need for treatment of these lesions. Liquid nitrogen cryosurgery is applied to the 5 actinic keratoses, as detailed in the physical exam, to produce a freeze injury. The patient is aware that blisters may form and is instructed on wound care with gentle cleansing and use of vaseline ointment to keep moist until healed. The patient is supplied a handout on cryosurgery and is instructed to call if lesions do not completely resolve.    Inflamed seborrheic keratosis  Cryosurgery procedure note:    Verbal consent from the patient is obtained including, but not limited to, risk of hypopigmentation/hyperpigmentation, scar, recurrence of lesion. Liquid nitrogen  cryosurgery is applied to 4 lesions to produce a freeze injury. The patient is aware that blisters may form and is instructed on wound care with gentle cleansing and use of vaseline ointment to keep moist until healed. The patient is supplied a handout on cryosurgery and is instructed to call if lesions do not completely resolve.    Skin tag  Skin sensation disturbance  Verbal consent obtained. 14 lesions removed with scissor snip removal after anesthesia with 1% lidocaine with epinephrine. Hemostasis achieved with aluminum chloride and hyfrecation. No complications.  Discussed with patient that this procedure may not be covered by insurance as it can be considered cosmetic and pt would like to pursue treatment.  He/she understands that he/she  may be responsible for the bill and agrees to pay.     Cherry angioma  These are benign vascular lesions that are inherited.  Treatment is not necessary.    Nevus  Discussed ABCDE's of nevi.  Monitor for new mole or moles that are becoming bigger, darker, irritated, or developing irregular borders. Brochure provided. Instructed patient to observe lesion(s) for changes and follow up in clinic if changes are noted. Patient to monitor skin at home for new or changing lesions.     SK (seborrheic keratosis)  These are benign inherited growths without a malignant potential. Reassurance given to patient. No treatment is necessary.     Upper body skin examination performed today including at least 6 points as noted in physical examination. No lesions suspicious for malignancy noted.    Recommend daily sun protection/avoidance and use of at least SPF 30, broad spectrum sunscreen (OTC drug).              Follow up in about 1 year (around 7/14/2023).

## 2022-08-05 DIAGNOSIS — E03.4 HYPOTHYROIDISM DUE TO ACQUIRED ATROPHY OF THYROID: ICD-10-CM

## 2022-08-05 DIAGNOSIS — I25.10 CORONARY ARTERY DISEASE, UNSPECIFIED VESSEL OR LESION TYPE, UNSPECIFIED WHETHER ANGINA PRESENT, UNSPECIFIED WHETHER NATIVE OR TRANSPLANTED HEART: ICD-10-CM

## 2022-08-05 RX ORDER — LEVOTHYROXINE SODIUM 125 UG/1
TABLET ORAL
Qty: 90 TABLET | Refills: 3 | Status: SHIPPED | OUTPATIENT
Start: 2022-08-05 | End: 2023-11-19

## 2022-08-05 RX ORDER — ROSUVASTATIN CALCIUM 20 MG/1
TABLET, COATED ORAL
Qty: 90 TABLET | Refills: 3 | Status: SHIPPED | OUTPATIENT
Start: 2022-08-05 | End: 2023-11-19

## 2022-08-05 NOTE — TELEPHONE ENCOUNTER
Refill Decision Note   Drake Barraza  is requesting a refill authorization.  Brief Assessment and Rationale for Refill:  Approve     Medication Therapy Plan:  T4 within normal range    Medication Reconciliation Completed: No   Comments:     No Care Gaps recommended.     Note composed:12:54 PM 08/05/2022

## 2022-08-05 NOTE — TELEPHONE ENCOUNTER
No new care gaps identified.  [facility] Embedded Care Gaps. Reference number: [id number]. 8/05/2022   11:30:10 AM CDT

## 2022-08-29 ENCOUNTER — PATIENT MESSAGE (OUTPATIENT)
Dept: ORTHOPEDICS | Facility: CLINIC | Age: 77
End: 2022-08-29
Payer: MEDICARE

## 2022-08-29 DIAGNOSIS — M79.642 LEFT HAND PAIN: Primary | ICD-10-CM

## 2022-08-30 ENCOUNTER — HOSPITAL ENCOUNTER (OUTPATIENT)
Dept: RADIOLOGY | Facility: HOSPITAL | Age: 77
Discharge: HOME | End: 2022-08-30
Attending: ORTHOPAEDIC SURGERY
Payer: MEDICARE

## 2022-08-30 ENCOUNTER — OFFICE VISIT (OUTPATIENT)
Dept: ORTHOPEDICS | Facility: CLINIC | Age: 77
End: 2022-08-30
Payer: MEDICARE

## 2022-08-30 VITALS — WEIGHT: 174 LBS | BODY MASS INDEX: 27.31 KG/M2 | HEIGHT: 67 IN

## 2022-08-30 DIAGNOSIS — M79.642 LEFT HAND PAIN: ICD-10-CM

## 2022-08-30 DIAGNOSIS — G56.02 CARPAL TUNNEL SYNDROME OF LEFT WRIST: Primary | ICD-10-CM

## 2022-08-30 PROCEDURE — 1159F MED LIST DOCD IN RCRD: CPT | Mod: CPTII,S$GLB,, | Performed by: ORTHOPAEDIC SURGERY

## 2022-08-30 PROCEDURE — 3288F FALL RISK ASSESSMENT DOCD: CPT | Mod: CPTII,S$GLB,, | Performed by: ORTHOPAEDIC SURGERY

## 2022-08-30 PROCEDURE — 1125F AMNT PAIN NOTED PAIN PRSNT: CPT | Mod: CPTII,S$GLB,, | Performed by: ORTHOPAEDIC SURGERY

## 2022-08-30 PROCEDURE — 1125F PR PAIN SEVERITY QUANTIFIED, PAIN PRESENT: ICD-10-PCS | Mod: CPTII,S$GLB,, | Performed by: ORTHOPAEDIC SURGERY

## 2022-08-30 PROCEDURE — 73130 XR HAND COMPLETE 3 VIEW LEFT: ICD-10-PCS | Mod: 26,LT,, | Performed by: INTERNAL MEDICINE

## 2022-08-30 PROCEDURE — 1159F PR MEDICATION LIST DOCUMENTED IN MEDICAL RECORD: ICD-10-PCS | Mod: CPTII,S$GLB,, | Performed by: ORTHOPAEDIC SURGERY

## 2022-08-30 PROCEDURE — 99204 PR OFFICE/OUTPT VISIT, NEW, LEVL IV, 45-59 MIN: ICD-10-PCS | Mod: S$GLB,,, | Performed by: ORTHOPAEDIC SURGERY

## 2022-08-30 PROCEDURE — 73130 X-RAY EXAM OF HAND: CPT | Mod: TC,LT

## 2022-08-30 PROCEDURE — 73130 X-RAY EXAM OF HAND: CPT | Mod: 26,LT,, | Performed by: INTERNAL MEDICINE

## 2022-08-30 PROCEDURE — 99999 PR PBB SHADOW E&M-EST. PATIENT-LVL III: CPT | Mod: PBBFAC,,, | Performed by: ORTHOPAEDIC SURGERY

## 2022-08-30 PROCEDURE — 1101F PT FALLS ASSESS-DOCD LE1/YR: CPT | Mod: CPTII,S$GLB,, | Performed by: ORTHOPAEDIC SURGERY

## 2022-08-30 PROCEDURE — 3288F PR FALLS RISK ASSESSMENT DOCUMENTED: ICD-10-PCS | Mod: CPTII,S$GLB,, | Performed by: ORTHOPAEDIC SURGERY

## 2022-08-30 PROCEDURE — 99999 PR PBB SHADOW E&M-EST. PATIENT-LVL III: ICD-10-PCS | Mod: PBBFAC,,, | Performed by: ORTHOPAEDIC SURGERY

## 2022-08-30 PROCEDURE — 1101F PR PT FALLS ASSESS DOC 0-1 FALLS W/OUT INJ PAST YR: ICD-10-PCS | Mod: CPTII,S$GLB,, | Performed by: ORTHOPAEDIC SURGERY

## 2022-08-30 PROCEDURE — 99204 OFFICE O/P NEW MOD 45 MIN: CPT | Mod: S$GLB,,, | Performed by: ORTHOPAEDIC SURGERY

## 2022-08-31 NOTE — PROGRESS NOTES
[facility]  History & Physical  Orthopedics    SUBJECTIVE:      COVID-19 attestation:  This patient was treated during the COVID-19 pandemic.  This was discussed with the patient, they are aware of our current policies and procedures, were given the option of delaying their visit and or switching to a virtual visit, delaying their surgery when applicable, and they elect to proceed.    Chief Complaint:  Numbness and tingling left hand    Referring Provider: Catherine Middleton MD     History of Present Illness:  Patient is a 77 y.o. left hand dominant male who presents today with complaints of numbness and tingling to the left hand.  He notes that this is essentially constant the left thumb index finger and long finger.  This never really improves.  Of note he is status post a right carpal tunnel release done by Dr. Choi in 1994 which has done well.  He is also status post a left trigger thumb procedure in the past which has also done well.  He has been attempting a activity modifications rest and nocturnal carpal tunnel bracing with no relief.  He also reports some left shoulder pain today and has no other complaints and presents for initial evaluation.     The patient is a/an retired.    Symptoms are aggravated by activity, movement, and at night.    Symptoms are alleviated by rest.    Symptoms consist of pain and numbness/tingling.    The patient rates their pain as a 2/10.    Attempted treatment(s) and/or interventions include activity modifications, rest, immobilization.     The patient denies any fevers, chills, N/V, D/C and presents for evaluation.       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 Ochsner Jr., MD;  Location: [facility] OR 2ND FLR;  Service: Orthopedics;  Laterality: Right;    TOTAL KNEE ARTHROPLASTY Left 10/5/2020    Procedure: ARTHROPLASTY, KNEE, TOTAL Jayy NexGen Cemented Tibia;  Surgeon: Chris Israel MD;  Location: [facility];  Service: Orthopedics;  Laterality: Left;     Review of patient's allergies indicates:   Allergen Reactions    Adhesive Blisters    Hydrocodone-acetaminophen Rash     Other reaction(s): constipated     Social History     Social History Narrative    Mr Barraza is  to [facility]; he is retired from [email or web address]; he has 1 grown son, Paul who is  and living in [place]-no kids     Family History   Problem Relation Age of Onset    Hypertension Father     Stroke Father     No Known Problems Sister     No Known Problems Son     Thrombosis Brother     Melanoma Neg Hx          Current Outpatient Medications:     aspirin (ECOTRIN) 81 MG EC tablet, Take 1 tablet (81 mg total) by mouth 2 (two) times daily. (Patient taking differently: Take 81 mg by mouth once daily.), Disp: 60 tablet, Rfl: 0    blood-glucose meter kit, Check glucose 1-2x/day after meals, Disp: 1 each, Rfl: 0    CINNAMON BARK (CINNAMON ORAL), Take 1 capsule by mouth once daily., Disp: , Rfl:     lancets Misc, 1 lancet by Misc.(Non-Drug; Combo Route) route 2 (two) times daily before meals., Disp: 100 each, Rfl: 3    levothyroxine (SYNTHROID) 125 MCG tablet, TAKE 1 TABLET EVERY MORNING ON AN EMPTY STOMACH, Disp: 90 tablet, Rfl: 3    metoprolol  "tartrate (LOPRESSOR) 25 MG tablet, TAKE 1 TABLET TWICE DAILY, Disp: 180 tablet, Rfl: 3    multivitamin (THERAGRAN) per tablet, Take 1 tablet by mouth once daily., Disp: , Rfl:     nitroGLYCERIN (NITROSTAT) 0.4 MG SL tablet, Place 1 tablet (0.4 mg total) under the tongue every 5 (five) minutes as needed for Chest pain., Disp: 100 tablet, Rfl: 2    omega-3 fatty acids-vitamin E 1,000 mg Cap, Take 1 capsule by mouth Daily.  , Disp: , Rfl:     ramipriL (ALTACE) 10 MG capsule, TAKE 1 CAPSULE EVERY DAY, Disp: 90 capsule, Rfl: 3    rosuvastatin (CRESTOR) 20 MG tablet, TAKE 1 TABLET EVERY DAY, Disp: 90 tablet, Rfl: 3    sildenafiL (VIAGRA) 100 MG tablet, Take 1 tablet (100 mg total) by mouth daily as needed for Erectile Dysfunction., Disp: 30 tablet, Rfl: 6    TRUE METRIX GLUCOSE TEST STRIP Strp, TEST SUGAR TWICE DAILY BEFORE MEALS, Disp: 200 strip, Rfl: 3    TRUEPLUS LANCETS 30 gauge Misc, USE TWICE DAILY BEFORE MEALS : 2-3 DAYS/WEEK, Disp: 100 each, Rfl: 3    TURMERIC ROOT EXTRACT ORAL, Take 1 capsule by mouth., Disp: , Rfl:     UBIDECARENONE (COENZYME Q10) 100 mg Tab, Take 100 mg by mouth once daily., Disp: , Rfl:     vitamin D 185 MG Tab, Take 185 mg by mouth once daily., Disp: , Rfl:       Review of Systems:  As per HPI otherwise noncontributory    OBJECTIVE:      Vital Signs (Most Recent):  Vitals:    08/30/22 1007   Weight: 78.9 kg (174 lb)   Height: 5' 7" (1.702 m)     Body mass index is 27.25 kg/m².      Physical Exam:  Constitutional: The patient appears well-developed and well-nourished. No distress.   Skin: No lesions appreciated  Head: Normocephalic and atraumatic.   Nose: Nose normal.   Ears: No deformities seen  Eyes: Conjunctivae and EOM are normal.   Neck: No tracheal deviation present.   Cardiovascular: Normal rate and intact distal pulses.    Pulmonary/Chest: Effort normal. No respiratory distress.   Abdominal: There is no guarding.   Neurological: The patient is alert.   Psychiatric: The patient has a " normal mood and affect.     Bilateral Hand/Wrist Examination:    Observation/Inspection:  Swelling  none    Deformity  none  Discoloration  none     Scars   right carpal tunnel, left trigger thumb well-healed    Atrophy  none    HAND/WRIST EXAMINATION:  Finkelstein's Test   Neg  WHAT Test    Neg  Snuff box tenderness   Neg  Mustafa's Test    Neg  Hook of Hamate Tenderness  Neg  CMC grind    Neg  Circumduction test   Neg    Neurovascular Exam:  Digits WWP, brisk CR < 3s throughout  NVI motor/LTS to M/R/U nerves, radial pulse 2+  Tinel's Test - Carpal Tunnel  positive on the left  Tinel's Test - Cubital Tunnel  Neg  Phalen's Test    positive on the left  Median Nerve Compression Test positive on the left    ROM hand full, painless    ROM wrist full, painless    ROM elbow full, painless    Abdomen not guarded  Respirations nonlabored  Perfusion intact    Diagnostic Results:     Imaging - I independently viewed the patient's imaging as well as the radiology report.  Xrays of the patient's left hand  demonstrate no evidence of any acute fractures or dislocations with some degenerative changes    EMG - none    ASSESSMENT/PLAN:      77 y.o. yo male with left carpal tunnel syndrome  Plan: The patient and I had a thorough discussion today.  We discussed the working diagnosis as well as several other potential alternative diagnoses.  Treatment options were discussed, both conservative and surgical.  Conservative treatment options would include things such as activity modifications, workplace modifications, a period of rest, oral vs topical OTC and prescription anti-inflammatory medications, occupational therapy, splinting/bracing, immobilization, corticosteroid injections, and others.  Surgical options were discussed as well.     At this time, the patient would like to proceed with a trial of an EMG for evaluation of left cope with 0 surgery was well as continued activity modifications, anti-inflammatory medications as needed  and as tolerated, rest, and continuation of nocturnal carpal tunnel bracing.  Follow-up after EMG..    Should the patient's symptoms worsen, persist, or fail to improve they should return for reevaluation and I would be happy to see them back anytime.        Mark Beach M.D.    Please be aware that this note has been generated with the assistance of [employer] voice-to-text.  Please excuse any spelling or grammatical errors.    Thank you for choosing Dr. Mark Beach for your orthopedic hand and upper extremity care. It is our goal to provide you with exceptional care that will help keep you healthy, active, and get you back in the game.     If you felt that you received exemplary care today, please consider leaving feedback for Dr. Beach on [employer] at [email or web address].    Please do not hesitate to reach out to us via email, phone, or MyChart with any questions, concerns, or feedback.

## 2022-09-26 ENCOUNTER — PROCEDURE VISIT (OUTPATIENT)
Dept: NEUROLOGY | Facility: CLINIC | Age: 77
End: 2022-09-26
Payer: MEDICARE

## 2022-09-26 DIAGNOSIS — G56.02 CARPAL TUNNEL SYNDROME OF LEFT WRIST: ICD-10-CM

## 2022-09-26 PROCEDURE — 95886 PR EMG COMPLETE, W/ NERVE CONDUCTION STUDIES, 5+ MUSCLES: ICD-10-PCS | Mod: S$GLB,,, | Performed by: PHYSICAL MEDICINE & REHABILITATION

## 2022-09-26 PROCEDURE — 95911 PR NERVE CONDUCTION STUDY; 9-10 STUDIES: ICD-10-PCS | Mod: S$GLB,,, | Performed by: PHYSICAL MEDICINE & REHABILITATION

## 2022-09-26 PROCEDURE — 95886 MUSC TEST DONE W/N TEST COMP: CPT | Mod: S$GLB,,, | Performed by: PHYSICAL MEDICINE & REHABILITATION

## 2022-09-26 PROCEDURE — 95911 NRV CNDJ TEST 9-10 STUDIES: CPT | Mod: S$GLB,,, | Performed by: PHYSICAL MEDICINE & REHABILITATION

## 2022-09-26 NOTE — PROCEDURES
Test Date:  2022    Patient: Drake Barraza : 1945 Physician: Dusty Ambrocio D.O.   ID#:  SEX: Male Ref. Phys: Mark Beach MD     HPI: Drake Barraza is a 77 y.o.male who presents for NCS/EMG to evaluate left CTS.  Hx of right CTR several decades ago.      NCV & EMG Findings:  Evaluation of the left median motor nerve showed prolonged distal onset latency and reduced amplitude.  The left median sensory nerve showed no response.  The right median sensory nerve showed prolonged distal peak latency, reduced amplitude, and decreased conduction velocity.  The left radial sensory and the right ulnar sensory nerves showed prolonged distal peak latency.  The left ulnar sensory nerve showed prolonged distal peak latency and decreased conduction velocity.  All remaining nerves (as indicated in the following tables) were within normal limits.  Needle evaluation of the left Abductor Pollicis Brevis muscle showed increased insertional activity, moderately increased spontaneous activity, and diminished recruitment.  All remaining muscles (as indicated in the following table) showed no evidence of electrical instability.    Impression:  There is electrophysiologic evidence of a bilateral (sensory on the right, sensorimotor on the left) median mononeuropathy across the wrist (I.e. Carpal tunnel syndrome).  There is motor axonal loss on the left.  There is active denervation on the left.  This is graded as Mild in severity on the right, and Severe on the left.      All of the sensory responses that were present were of prolonged latency.  This can happen to some degree with advancing age (0.5-4m/s slowing per decade after age 60), but in his case, the slowing is a bit beyond what would be expected for age.  A mild sensory peripheral polyneuropathy cannot be excluded.  Of note, he has no diffuse symptoms to suggest a polyneuropathy.        ___________________________  Dusty Ambrocio  D.O.        NCS+  Motor Nerve Results      Latency Amplitude F-Lat Segment Distance CV Comment   Site (ms) Norm (mV) Norm (ms)  (cm) (m/s) Norm    Left Median (APB)   Palm 3.3 - 0.71 -         Wrist *9.5  < 4.7 *0.67  > 3.8  Wrist-Palm 6 10 -    Elbow 10.2 - 0.28 -  Elbow-Wrist 25 357  > 47    Right Median (APB)   Wrist 4.6  < 4.7 5.1  > 3.8  Wrist-Palm - - -    Elbow 9.4 - 3.6 -  Elbow-Wrist 23 48  > 47    Left Ulnar (ADM)   Wrist 3.6  < 3.7 5.7  > 3.0         Bel Elbow 8.6 - 5.3 -  Bel Elbow-Wrist 26 52  > 52    Abv Elbow 11.0 - 5.2 -  Abv Elbow-Bel Elbow 12 50  > 43    Right Ulnar (ADM)   Wrist 3.1  < 3.7 8.0  > 3.0         Bel Elbow 8.1 - 6.2 -  Bel Elbow-Wrist 26 52  > 52    Abv Elbow 10.2 - 5.8 -  Abv Elbow-Bel Elbow 13 62  > 43      Sensory Nerve Results      Latency (Peak) Amplitude (P-P) Segment Distance CV Comment   Site (ms) Norm (µV) Norm  (cm) (m/s) Norm    Left Median   Wrist-Dig II *NR  < 4.0 *NR  > 8 Wrist-Dig II 14 *NR  > 39    Right Median   Wrist-Dig II *5.4  < 4.0 *6  > 8 Wrist-Dig II 14 *26  > 39    Left Ulnar   Wrist-Dig V *4.4  < 4.0 9  > 4 Wrist-Dig V 13 *30  > 38    Right Ulnar   Wrist-Dig V *4.2  < 4.0 8  > 4 Wrist-Dig V 16 38  > 38    Left Radial   Forearm-Wrist *3.3  < 2.8 12  > 11 Forearm-Wrist 10 30 -      EMG+     Side Muscle Nerve Root Ins Act Fibs Psw Amp Dur Poly Recrt Int Pat Comment   Left Deltoid Axillary C5-C6 Nml Nml Nml Nml Nml 0 Nml Nml    Left Triceps Radial C6-C8 Nml Nml Nml Nml Nml 0 Nml Nml    Left Pronator Teres Median C6-C7 Nml Nml Nml Nml Nml 0 Nml Nml    Left FCR Median C6-C7 Nml Nml Nml Nml Nml 0 Nml Nml    Left APB Median C8-T1 *Incr *2+ *2+ Nml Nml 0 *Reduced Nml 1 unit at 20-25 Hz           Waveforms:    Motor              Sensory

## 2022-10-04 ENCOUNTER — OFFICE VISIT (OUTPATIENT)
Dept: ORTHOPEDICS | Facility: CLINIC | Age: 77
End: 2022-10-04
Payer: MEDICARE

## 2022-10-04 ENCOUNTER — TELEPHONE (OUTPATIENT)
Dept: CARDIOLOGY | Facility: CLINIC | Age: 77
End: 2022-10-04
Payer: MEDICARE

## 2022-10-04 VITALS — WEIGHT: 174 LBS | BODY MASS INDEX: 27.31 KG/M2 | HEIGHT: 67 IN

## 2022-10-04 DIAGNOSIS — G56.02 CARPAL TUNNEL SYNDROME OF LEFT WRIST: Primary | ICD-10-CM

## 2022-10-04 DIAGNOSIS — G56.00 CTS (CARPAL TUNNEL SYNDROME): ICD-10-CM

## 2022-10-04 PROCEDURE — 99499 RISK ADDL DX/OHS AUDIT: ICD-10-PCS | Mod: HCNC,S$GLB,, | Performed by: ORTHOPAEDIC SURGERY

## 2022-10-04 PROCEDURE — 99214 PR OFFICE/OUTPT VISIT, EST, LEVL IV, 30-39 MIN: ICD-10-PCS | Mod: S$GLB,,, | Performed by: ORTHOPAEDIC SURGERY

## 2022-10-04 PROCEDURE — 99999 PR PBB SHADOW E&M-EST. PATIENT-LVL IV: ICD-10-PCS | Mod: PBBFAC,,, | Performed by: ORTHOPAEDIC SURGERY

## 2022-10-04 PROCEDURE — 1126F PR PAIN SEVERITY QUANTIFIED, NO PAIN PRESENT: ICD-10-PCS | Mod: CPTII,S$GLB,, | Performed by: ORTHOPAEDIC SURGERY

## 2022-10-04 PROCEDURE — 1159F MED LIST DOCD IN RCRD: CPT | Mod: CPTII,S$GLB,, | Performed by: ORTHOPAEDIC SURGERY

## 2022-10-04 PROCEDURE — 1126F AMNT PAIN NOTED NONE PRSNT: CPT | Mod: CPTII,S$GLB,, | Performed by: ORTHOPAEDIC SURGERY

## 2022-10-04 PROCEDURE — 99499 UNLISTED E&M SERVICE: CPT | Mod: HCNC,S$GLB,, | Performed by: ORTHOPAEDIC SURGERY

## 2022-10-04 PROCEDURE — 1159F PR MEDICATION LIST DOCUMENTED IN MEDICAL RECORD: ICD-10-PCS | Mod: CPTII,S$GLB,, | Performed by: ORTHOPAEDIC SURGERY

## 2022-10-04 PROCEDURE — 99214 OFFICE O/P EST MOD 30 MIN: CPT | Mod: S$GLB,,, | Performed by: ORTHOPAEDIC SURGERY

## 2022-10-04 PROCEDURE — 99999 PR PBB SHADOW E&M-EST. PATIENT-LVL IV: CPT | Mod: PBBFAC,,, | Performed by: ORTHOPAEDIC SURGERY

## 2022-10-04 RX ORDER — MUPIROCIN 20 MG/G
OINTMENT TOPICAL
Status: CANCELLED | OUTPATIENT
Start: 2022-10-04

## 2022-10-04 RX ORDER — CEFAZOLIN SODIUM 2 G/50ML
2 SOLUTION INTRAVENOUS
Status: CANCELLED | OUTPATIENT
Start: 2022-10-04

## 2022-10-04 NOTE — H&P
[facility]  History & Physical  Orthopedics     SUBJECTIVE:       COVID-19 attestation:  This patient was treated during the COVID-19 pandemic.  This was discussed with the patient, they are aware of our current policies and procedures, were given the option of delaying their visit and or switching to a virtual visit, delaying their surgery when applicable, and they elect to proceed.     Chief Complaint:  Numbness and tingling left hand     Referring Provider: No ref. provider found      History of Present Illness:  Patient is a 77 y.o. left hand dominant male who presents today with complaints of numbness and tingling to the left hand.  He notes that this is essentially constant the left thumb index finger and long finger.  This never really improves.  Of note he is status post a right carpal tunnel release done by Dr. Choi in 1994 which has done well.  He is also status post a left trigger thumb procedure in the past which has also done well.  He has been attempting a activity modifications rest and nocturnal carpal tunnel bracing with no relief.  He also reports some left shoulder pain today and has no other complaints and presents for initial evaluation.      Interval history October 4, 2022:  The patient returns today for re-evaluation.  He notes persistent constant numbness and tingling in the median nerve distribution of the left hand.  He had a recent EMG returns for these results and re-evaluation.  No new complaints.     The patient is a/an retired.     Symptoms are aggravated by activity, movement, and at night.     Symptoms are alleviated by rest.     Symptoms consist of pain and numbness/tingling.     The patient rates their pain as a 2/10.     Attempted treatment(s) and/or interventions include activity modifications, rest, immobilization.     The patient denies any fevers, chills, N/V, D/C and presents for evaluation.             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 Ochsner Jr., MD;  Location: [facility] OR 2ND FLR;  Service: Orthopedics;  Laterality: Right;    TOTAL KNEE ARTHROPLASTY Left 10/5/2020     Procedure: ARTHROPLASTY, KNEE, TOTAL Jayy NexGen Cemented Tibia;  Surgeon: Chris Israel MD;  Location: [facility];  Service: Orthopedics;  Laterality: Left;            Review of patient's allergies indicates:   Allergen Reactions    Adhesive Blisters    Hydrocodone-acetaminophen Rash       Other reaction(s): constipated      Social History          Social History Narrative     Mr Barraza is  to [facility]; he is retired from [employer]; he has 1 grown son, Paul who is  and living in [place]-no kids            Family History   Problem Relation Age of Onset    Hypertension Father      Stroke Father      No Known Problems Sister      No Known Problems Son      Thrombosis Brother      Melanoma Neg Hx              Current Outpatient Medications:     aspirin (ECOTRIN) 81 MG EC tablet, Take 1 tablet (81 mg total) by mouth 2 (two) times daily. (Patient taking differently: Take 81 mg by mouth once daily.), Disp: 60 tablet, Rfl: 0    blood-glucose meter kit, Check glucose 1-2x/day after meals, Disp:  "1 each, Rfl: 0    CINNAMON BARK (CINNAMON ORAL), Take 1 capsule by mouth once daily., Disp: , Rfl:     lancets Misc, 1 lancet by Misc.(Non-Drug; Combo Route) route 2 (two) times daily before meals., Disp: 100 each, Rfl: 3    levothyroxine (SYNTHROID) 125 MCG tablet, TAKE 1 TABLET EVERY MORNING ON AN EMPTY STOMACH, Disp: 90 tablet, Rfl: 3    metoprolol tartrate (LOPRESSOR) 25 MG tablet, TAKE 1 TABLET TWICE DAILY, Disp: 180 tablet, Rfl: 3    multivitamin (THERAGRAN) per tablet, Take 1 tablet by mouth once daily., Disp: , Rfl:     nitroGLYCERIN (NITROSTAT) 0.4 MG SL tablet, Place 1 tablet (0.4 mg total) under the tongue every 5 (five) minutes as needed for Chest pain., Disp: 100 tablet, Rfl: 2    omega-3 fatty acids-vitamin E 1,000 mg Cap, Take 1 capsule by mouth Daily.  , Disp: , Rfl:     ramipriL (ALTACE) 10 MG capsule, TAKE 1 CAPSULE EVERY DAY, Disp: 90 capsule, Rfl: 3    rosuvastatin (CRESTOR) 20 MG tablet, TAKE 1 TABLET EVERY DAY, Disp: 90 tablet, Rfl: 3    sildenafiL (VIAGRA) 100 MG tablet, Take 1 tablet (100 mg total) by mouth daily as needed for Erectile Dysfunction., Disp: 30 tablet, Rfl: 6    TRUE METRIX GLUCOSE TEST STRIP Strp, TEST SUGAR TWICE DAILY BEFORE MEALS, Disp: 200 strip, Rfl: 3    TRUEPLUS LANCETS 30 gauge Misc, USE TWICE DAILY BEFORE MEALS : 2-3 DAYS/WEEK, Disp: 100 each, Rfl: 3    TURMERIC ROOT EXTRACT ORAL, Take 1 capsule by mouth., Disp: , Rfl:     UBIDECARENONE (COENZYME Q10) 100 mg Tab, Take 100 mg by mouth once daily., Disp: , Rfl:     vitamin D 185 MG Tab, Take 185 mg by mouth once daily., Disp: , Rfl:         Review of Systems:  As per HPI otherwise noncontributory     OBJECTIVE:       Vital Signs (Most Recent):  Vitals       Vitals:     10/04/22 0841   Weight: 78.9 kg (174 lb)   Height: 5' 7.01" (1.702 m)         Body mass index is 27.25 kg/m².        Physical Exam:  Constitutional: The patient appears well-developed and well-nourished. No distress.   Skin: No lesions appreciated  Head: " Normocephalic and atraumatic.   Nose: Nose normal.   Ears: No deformities seen  Eyes: Conjunctivae and EOM are normal.   Neck: No tracheal deviation present.   Cardiovascular: Normal rate and intact distal pulses.    Pulmonary/Chest: Effort normal. No respiratory distress.   Abdominal: There is no guarding.   Neurological: The patient is alert.   Psychiatric: The patient has a normal mood and affect.      Bilateral Hand/Wrist Examination:     Observation/Inspection:  Swelling                       none                  Deformity                     none  Discoloration               none                  Scars                           right carpal tunnel, left trigger thumb well-healed                    Atrophy                        moderate thenar on the left     HAND/WRIST EXAMINATION:  Finkelstein's Test                                Neg  WHAT Test                                         Neg  Snuff box tenderness                          Neg  Mustafa's Test                                     Neg  Hook of Hamate Tenderness              Neg  CMC grind                                           Neg  Circumduction test                              Neg     Neurovascular Exam:  Digits WWP, brisk CR < 3s throughout  NVI motor/LTS to M/R/U nerves, radial pulse 2+ except for decreased light touch sensation left median nerve distribution at baseline  Tinel's Test - Carpal Tunnel                positive on the left  Tinel's Test - Cubital Tunnel               Neg  Phalen's Test                                      positive on the left  Median Nerve Compression Test       positive on the left     ROM hand full, painless     ROM wrist full, painless    ROM elbow full, painless     Abdomen not guarded  Respirations nonlabored  Perfusion intact     Diagnostic Results:     Imaging - I independently viewed the patient's imaging as well as the radiology report.  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 Carpal tunnel syndrome).  There is motor axonal loss on the left.  There is active denervation on the left.  This is graded as Mild in severity on the right, and Severe on the left.      All of the sensory responses that were present were of prolonged latency.  This can happen to some degree with advancing age (0.5-4m/s slowing per decade after age 60), but in his case, the slowing is a bit beyond what would be expected for age.  A mild sensory peripheral polyneuropathy cannot be excluded.  Of note, he has no diffuse symptoms to suggest a polyneuropathy.          ASSESSMENT/PLAN:       77 y.o. yo male with left carpal tunnel syndrome  Plan: The patient and I had a thorough discussion today.  We discussed the working diagnosis as well as several other potential alternative diagnoses.  Treatment options were discussed, both conservative and surgical.  Conservative treatment options would include things such as activity modifications, workplace modifications, a period of rest, oral vs topical OTC and prescription anti-inflammatory medications, occupational therapy, splinting/bracing, immobilization, corticosteroid injections, and others.  Surgical options were discussed as well.      At this time, the patient would like to proceed with an endoscopic versus open left carpal tunnel release on November 30, 2022.  I will get this set up.  Of note he has cardiac stents and he will obtain perioperative cardiac risk stratification prior to proceeding with surgery.      The patient has not responded to adequate non operative treatment at this time and/or non operative treatment is not indicated. Thus, the risks, benefits and alternatives to surgery were discussed with the patient in detail.   Specific risks include but are not limited to bleeding, infection, vessel and/or nerve damage, pain, numbness, tingling, compartment syndrome, need for additional surgery, failure to return to pre-injury and/or preoperative functional status, inability to return to work, scar sensitivity, delayed healing, complex regional pain syndrome, weakness, pulley injury, tendon injury, bowstringing, partial and/or incomplete relief of symptoms, persistence of and/or worsening of symptoms, hardware and/or surgical failure, prominent and/or symptomatic hardware possibly necessitating future removal, osteomyelitis, amputation, loss of function, stiffness, rotational malalignment, functional debility, dysfunction, decreased  strength, need for prolonged postoperative rehabilitation, malunion, nonunion, deep venous thrombosis, pulmonary embolism, arthritis and death.  The patient states an understanding and wishes to proceed with surgery.   All questions were answered.  No guarantees were implied or stated.  Written informed consent was obtained.        Should the patient's symptoms worsen, persist, or fail to improve they should return for reevaluation and I would be happy to see them back anytime.        Mark Beach M.D.     Please be aware that this note has been generated with the assistance of [employer] voice-to-text.  Please excuse any spelling or grammatical errors.     Thank you for choosing Dr. Mark Beach for your orthopedic hand and upper extremity care. It is our goal to provide you with exceptional care that will help keep you healthy, active, and get you back in the game.     If you felt that you received exemplary care today, please consider leaving feedback for Dr. Beach on [employer] at [email or web address].     Please do not hesitate to reach out to us via email, phone, or MyChart with any questions, concerns, or feedback.

## 2022-10-04 NOTE — PROGRESS NOTES
[facility]  History & Physical  Orthopedics    SUBJECTIVE:      COVID-19 attestation:  This patient was treated during the COVID-19 pandemic.  This was discussed with the patient, they are aware of our current policies and procedures, were given the option of delaying their visit and or switching to a virtual visit, delaying their surgery when applicable, and they elect to proceed.    Chief Complaint:  Numbness and tingling left hand    Referring Provider: No ref. provider found     History of Present Illness:  Patient is a 77 y.o. left hand dominant male who presents today with complaints of numbness and tingling to the left hand.  He notes that this is essentially constant the left thumb index finger and long finger.  This never really improves.  Of note he is status post a right carpal tunnel release done by Dr. Choi in 1994 which has done well.  He is also status post a left trigger thumb procedure in the past which has also done well.  He has been attempting a activity modifications rest and nocturnal carpal tunnel bracing with no relief.  He also reports some left shoulder pain today and has no other complaints and presents for initial evaluation.     Interval history October 4, 2022:  The patient returns today for re-evaluation.  He notes persistent constant numbness and tingling in the median nerve distribution of the left hand.  He had a recent EMG returns for these results and re-evaluation.  No new complaints.    The patient is a/an retired.    Symptoms are aggravated by activity, movement, and at night.    Symptoms are alleviated by rest.    Symptoms consist of pain and numbness/tingling.    The patient rates their pain as a 2/10.    Attempted treatment(s) and/or interventions include activity modifications, rest, immobilization.     The patient denies any fevers, chills, N/V, D/C and presents for evaluation.       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 [facility] Jr., MD;  Location: [facility] OR 2ND FLR;  Service: Orthopedics;  Laterality: Right;    TOTAL KNEE ARTHROPLASTY Left 10/5/2020    Procedure: ARTHROPLASTY, KNEE, TOTAL Jayy NexGen Cemented Tibia;  Surgeon: Chris Israel MD;  Location: [facility];  Service: Orthopedics;  Laterality: Left;     Review of patient's allergies indicates:   Allergen Reactions    Adhesive Blisters    Hydrocodone-acetaminophen Rash     Other reaction(s): constipated     Social History     Social History Narrative    Mr Barraza is  to [facility]; he is retired from [employer]; he has 1 grown son, Paul who is  and living in [place]-no kids     Family History   Problem Relation Age of Onset    Hypertension Father     Stroke Father     No Known Problems Sister     No Known Problems Son     Thrombosis Brother     Melanoma Neg Hx          Current Outpatient Medications:     aspirin (ECOTRIN) 81 MG EC tablet, Take 1 tablet (81 mg total) by mouth 2 (two) times daily. (Patient taking differently: Take 81 mg by mouth once daily.), Disp: 60 tablet, Rfl: 0    blood-glucose meter kit, Check glucose 1-2x/day after meals, Disp: 1 each, Rfl: 0    CINNAMON BARK (CINNAMON ORAL), Take 1 capsule by  "mouth once daily., Disp: , Rfl:     lancets Misc, 1 lancet by Misc.(Non-Drug; Combo Route) route 2 (two) times daily before meals., Disp: 100 each, Rfl: 3    levothyroxine (SYNTHROID) 125 MCG tablet, TAKE 1 TABLET EVERY MORNING ON AN EMPTY STOMACH, Disp: 90 tablet, Rfl: 3    metoprolol tartrate (LOPRESSOR) 25 MG tablet, TAKE 1 TABLET TWICE DAILY, Disp: 180 tablet, Rfl: 3    multivitamin (THERAGRAN) per tablet, Take 1 tablet by mouth once daily., Disp: , Rfl:     nitroGLYCERIN (NITROSTAT) 0.4 MG SL tablet, Place 1 tablet (0.4 mg total) under the tongue every 5 (five) minutes as needed for Chest pain., Disp: 100 tablet, Rfl: 2    omega-3 fatty acids-vitamin E 1,000 mg Cap, Take 1 capsule by mouth Daily.  , Disp: , Rfl:     ramipriL (ALTACE) 10 MG capsule, TAKE 1 CAPSULE EVERY DAY, Disp: 90 capsule, Rfl: 3    rosuvastatin (CRESTOR) 20 MG tablet, TAKE 1 TABLET EVERY DAY, Disp: 90 tablet, Rfl: 3    sildenafiL (VIAGRA) 100 MG tablet, Take 1 tablet (100 mg total) by mouth daily as needed for Erectile Dysfunction., Disp: 30 tablet, Rfl: 6    TRUE METRIX GLUCOSE TEST STRIP Strp, TEST SUGAR TWICE DAILY BEFORE MEALS, Disp: 200 strip, Rfl: 3    TRUEPLUS LANCETS 30 gauge Misc, USE TWICE DAILY BEFORE MEALS : 2-3 DAYS/WEEK, Disp: 100 each, Rfl: 3    TURMERIC ROOT EXTRACT ORAL, Take 1 capsule by mouth., Disp: , Rfl:     UBIDECARENONE (COENZYME Q10) 100 mg Tab, Take 100 mg by mouth once daily., Disp: , Rfl:     vitamin D 185 MG Tab, Take 185 mg by mouth once daily., Disp: , Rfl:       Review of Systems:  As per HPI otherwise noncontributory    OBJECTIVE:      Vital Signs (Most Recent):  Vitals:    10/04/22 0841   Weight: 78.9 kg (174 lb)   Height: 5' 7.01" (1.702 m)     Body mass index is 27.25 kg/m².      Physical Exam:  Constitutional: The patient appears well-developed and well-nourished. No distress.   Skin: No lesions appreciated  Head: Normocephalic and atraumatic.   Nose: Nose normal.   Ears: No deformities seen  Eyes: " Conjunctivae and EOM are normal.   Neck: No tracheal deviation present.   Cardiovascular: Normal rate and intact distal pulses.    Pulmonary/Chest: Effort normal. No respiratory distress.   Abdominal: There is no guarding.   Neurological: The patient is alert.   Psychiatric: The patient has a normal mood and affect.     Bilateral Hand/Wrist Examination:    Observation/Inspection:  Swelling  none    Deformity  none  Discoloration  none     Scars   right carpal tunnel, left trigger thumb well-healed    Atrophy  moderate thenar on the left    HAND/WRIST EXAMINATION:  Finkelstein's Test   Neg  WHAT Test    Neg  Snuff box tenderness   Neg  Mustafa's Test    Neg  Hook of Hamate Tenderness  Neg  CMC grind    Neg  Circumduction test   Neg    Neurovascular Exam:  Digits WWP, brisk CR < 3s throughout  NVI motor/LTS to M/R/U nerves, radial pulse 2+ except for decreased light touch sensation left median nerve distribution at baseline  Tinel's Test - Carpal Tunnel  positive on the left  Tinel's Test - Cubital Tunnel  Neg  Phalen's Test    positive on the left  Median Nerve Compression Test positive on the left    ROM hand full, painless    ROM wrist full, painless    ROM elbow full, painless    Abdomen not guarded  Respirations nonlabored  Perfusion intact    Diagnostic Results:     Imaging - I independently viewed the patient's imaging as well as the radiology report.  Xrays of the patient's left hand  demonstrate no evidence of any acute fractures or dislocations with some degenerative changes    EMG - Impression:  There is electrophysiologic evidence of a bilateral (sensory on the right, sensorimotor on the left) median mononeuropathy across the wrist (I.e. Carpal tunnel syndrome).  There is motor axonal loss on the left.  There is active denervation on the left.  This is graded as Mild in severity on the right, and Severe on the left.      All of the sensory responses that were present were of prolonged latency.  This can  happen to some degree with advancing age (0.5-4m/s slowing per decade after age 60), but in his case, the slowing is a bit beyond what would be expected for age.  A mild sensory peripheral polyneuropathy cannot be excluded.  Of note, he has no diffuse symptoms to suggest a polyneuropathy.         ASSESSMENT/PLAN:      77 y.o. yo male with left carpal tunnel syndrome  Plan: The patient and I had a thorough discussion today.  We discussed the working diagnosis as well as several other potential alternative diagnoses.  Treatment options were discussed, both conservative and surgical.  Conservative treatment options would include things such as activity modifications, workplace modifications, a period of rest, oral vs topical OTC and prescription anti-inflammatory medications, occupational therapy, splinting/bracing, immobilization, corticosteroid injections, and others.  Surgical options were discussed as well.     At this time, the patient would like to proceed with an endoscopic versus open left carpal tunnel release on November 30, 2022.  I will get this set up.  Of note he has cardiac stents and he will obtain perioperative cardiac risk stratification prior to proceeding with surgery.      The patient has not responded to adequate non operative treatment at this time and/or non operative treatment is not indicated. Thus, the risks, benefits and alternatives to surgery were discussed with the patient in detail.  Specific risks include but are not limited to bleeding, infection, vessel and/or nerve damage, pain, numbness, tingling, compartment syndrome, need for additional surgery, failure to return to pre-injury and/or preoperative functional status, inability to return to work, scar sensitivity, delayed healing, complex regional pain syndrome, weakness, pulley injury, tendon injury, bowstringing, partial and/or incomplete relief of symptoms, persistence of and/or worsening of symptoms, hardware and/or surgical  failure, prominent and/or symptomatic hardware possibly necessitating future removal, osteomyelitis, amputation, loss of function, stiffness, rotational malalignment, functional debility, dysfunction, decreased  strength, need for prolonged postoperative rehabilitation, malunion, nonunion, deep venous thrombosis, pulmonary embolism, arthritis and death.  The patient states an understanding and wishes to proceed with surgery.   All questions were answered.  No guarantees were implied or stated.  Written informed consent was obtained.      Should the patient's symptoms worsen, persist, or fail to improve they should return for reevaluation and I would be happy to see them back anytime.        Mark Beach M.D.    Please be aware that this note has been generated with the assistance of [employer] voice-to-text.  Please excuse any spelling or grammatical errors.    Thank you for choosing Dr. Mark Beach for your orthopedic hand and upper extremity care. It is our goal to provide you with exceptional care that will help keep you healthy, active, and get you back in the game.     If you felt that you received exemplary care today, please consider leaving feedback for Dr. Beach on [employer] at [email or web address].    Please do not hesitate to reach out to us via email, phone, or MyChart with any questions, concerns, or feedback.

## 2022-10-04 NOTE — TELEPHONE ENCOUNTER
----- Message from Coleen Donaldson sent at 10/4/2022  9:12 AM CDT -----  Regarding: cardiology clearance  Good morning!    The attached patient is shared by you and Dr. Beach.     The patient is scheduled for carpal tunnel release surgery on 11/30/2022.     Cardiology clearance is indicated prior to this. Would you be able to help the patient in that regard? The anesthesia team will require documentation in the chart regarding clearance status and perioperative medical recommendations.    Thank you for your help & let me know if I can assist in any way!    Coleen Donaldson MS, OTC  Sports Medicine Assistant to Dr. Mark Beach

## 2022-10-17 DIAGNOSIS — I25.10 CORONARY ARTERY DISEASE INVOLVING NATIVE CORONARY ARTERY OF NATIVE HEART WITHOUT ANGINA PECTORIS: Primary | ICD-10-CM

## 2022-10-17 NOTE — TELEPHONE ENCOUNTER
Pt denies chest pains or shortness of breath   Pt requesting to have EKG done 11/10 prior to appointment with PERRI Cornell.

## 2022-10-19 ENCOUNTER — PATIENT MESSAGE (OUTPATIENT)
Dept: INTERNAL MEDICINE | Facility: CLINIC | Age: 77
End: 2022-10-19
Payer: MEDICARE

## 2022-10-19 NOTE — TELEPHONE ENCOUNTER
"Lancets say "discontinued" under his MAR, patient needs a refill on lancets. Also, he called about if there was any extra blood work you wanted him to get done besides what all he currently is getting done in the next couple days. Please advise.   "

## 2022-10-19 NOTE — TELEPHONE ENCOUNTER
"Carey, I sent in the :"TruePlus" lancets today-is that the type he wants? Also his labs ordered are perfect-no other labs needed.  Thanks  "

## 2022-11-03 ENCOUNTER — LAB VISIT (OUTPATIENT)
Dept: LAB | Facility: HOSPITAL | Age: 77
End: 2022-11-03
Attending: INTERNAL MEDICINE
Payer: MEDICARE

## 2022-11-03 DIAGNOSIS — I25.10 CORONARY ARTERY DISEASE INVOLVING NATIVE CORONARY ARTERY OF NATIVE HEART WITHOUT ANGINA PECTORIS: ICD-10-CM

## 2022-11-03 DIAGNOSIS — E11.9 TYPE 2 DIABETES MELLITUS WITHOUT COMPLICATION, WITHOUT LONG-TERM CURRENT USE OF INSULIN: ICD-10-CM

## 2022-11-03 LAB
ALBUMIN SERPL BCP-MCNC: 4 G/DL (ref 3.5–5.2)
ALP SERPL-CCNC: 81 U/L (ref 55–135)
ALT SERPL W/O P-5'-P-CCNC: 41 U/L (ref 10–44)
ANION GAP SERPL CALC-SCNC: 8 MMOL/L (ref 8–16)
AST SERPL-CCNC: 32 U/L (ref 10–40)
BILIRUB SERPL-MCNC: 0.3 MG/DL (ref 0.1–1)
BUN SERPL-MCNC: 21 MG/DL (ref 8–23)
CALCIUM SERPL-MCNC: 9.7 MG/DL (ref 8.7–10.5)
CHLORIDE SERPL-SCNC: 109 MMOL/L (ref 95–110)
CHOLEST SERPL-MCNC: 125 MG/DL (ref 120–199)
CHOLEST/HDLC SERPL: 3.4 {RATIO} (ref 2–5)
CO2 SERPL-SCNC: 26 MMOL/L (ref 23–29)
CREAT SERPL-MCNC: 1.3 MG/DL (ref 0.5–1.4)
ERYTHROCYTE [DISTWIDTH] IN BLOOD BY AUTOMATED COUNT: 12.8 % (ref 11.5–14.5)
EST. GFR  (NO RACE VARIABLE): 56.6 ML/MIN/1.73 M^2
ESTIMATED AVG GLUCOSE: 157 MG/DL (ref 68–131)
GLUCOSE SERPL-MCNC: 161 MG/DL (ref 70–110)
HBA1C MFR BLD: 7.1 % (ref 4–5.6)
HCT VFR BLD AUTO: 46.3 % (ref 40–54)
HDLC SERPL-MCNC: 37 MG/DL (ref 40–75)
HDLC SERPL: 29.6 % (ref 20–50)
HGB BLD-MCNC: 14.6 G/DL (ref 14–18)
LDLC SERPL CALC-MCNC: 65.6 MG/DL (ref 63–159)
MAGNESIUM SERPL-MCNC: 1.9 MG/DL (ref 1.6–2.6)
MCH RBC QN AUTO: 30.7 PG (ref 27–31)
MCHC RBC AUTO-ENTMCNC: 31.5 G/DL (ref 32–36)
MCV RBC AUTO: 98 FL (ref 82–98)
NONHDLC SERPL-MCNC: 88 MG/DL
PLATELET # BLD AUTO: 182 K/UL (ref 150–450)
PMV BLD AUTO: 9.8 FL (ref 9.2–12.9)
POTASSIUM SERPL-SCNC: 4.9 MMOL/L (ref 3.5–5.1)
PROT SERPL-MCNC: 7 G/DL (ref 6–8.4)
RBC # BLD AUTO: 4.75 M/UL (ref 4.6–6.2)
SODIUM SERPL-SCNC: 143 MMOL/L (ref 136–145)
TRIGL SERPL-MCNC: 112 MG/DL (ref 30–150)
WBC # BLD AUTO: 6.07 K/UL (ref 3.9–12.7)

## 2022-11-03 PROCEDURE — 83036 HEMOGLOBIN GLYCOSYLATED A1C: CPT | Performed by: INTERNAL MEDICINE

## 2022-11-03 PROCEDURE — 80061 LIPID PANEL: CPT | Performed by: NURSE PRACTITIONER

## 2022-11-03 PROCEDURE — 80053 COMPREHEN METABOLIC PANEL: CPT | Performed by: NURSE PRACTITIONER

## 2022-11-03 PROCEDURE — 85027 COMPLETE CBC AUTOMATED: CPT | Performed by: NURSE PRACTITIONER

## 2022-11-03 PROCEDURE — 36415 COLL VENOUS BLD VENIPUNCTURE: CPT | Performed by: NURSE PRACTITIONER

## 2022-11-03 PROCEDURE — 83735 ASSAY OF MAGNESIUM: CPT | Performed by: NURSE PRACTITIONER

## 2022-11-10 ENCOUNTER — HOSPITAL ENCOUNTER (OUTPATIENT)
Dept: CARDIOLOGY | Facility: CLINIC | Age: 77
Discharge: HOME | End: 2022-11-10
Payer: MEDICARE

## 2022-11-10 ENCOUNTER — OFFICE VISIT (OUTPATIENT)
Dept: CARDIOLOGY | Facility: CLINIC | Age: 77
End: 2022-11-10
Payer: MEDICARE

## 2022-11-10 VITALS
HEIGHT: 67 IN | HEART RATE: 61 BPM | WEIGHT: 172.81 LBS | BODY MASS INDEX: 27.12 KG/M2 | DIASTOLIC BLOOD PRESSURE: 78 MMHG | SYSTOLIC BLOOD PRESSURE: 142 MMHG

## 2022-11-10 DIAGNOSIS — I10 ESSENTIAL HYPERTENSION: ICD-10-CM

## 2022-11-10 DIAGNOSIS — I25.10 CORONARY ARTERY DISEASE INVOLVING NATIVE CORONARY ARTERY OF NATIVE HEART WITHOUT ANGINA PECTORIS: ICD-10-CM

## 2022-11-10 DIAGNOSIS — Z98.61 POST PTCA: ICD-10-CM

## 2022-11-10 DIAGNOSIS — I70.0 ATHEROSCLEROSIS OF AORTA: ICD-10-CM

## 2022-11-10 DIAGNOSIS — E78.00 HYPERCHOLESTEREMIA: Chronic | ICD-10-CM

## 2022-11-10 DIAGNOSIS — Z01.810 PRE-OPERATIVE CARDIOVASCULAR EXAMINATION: Primary | ICD-10-CM

## 2022-11-10 DIAGNOSIS — N52.9 IMPOTENCE: ICD-10-CM

## 2022-11-10 DIAGNOSIS — G47.30 SLEEP APNEA, UNSPECIFIED TYPE: Chronic | ICD-10-CM

## 2022-11-10 PROCEDURE — 1159F MED LIST DOCD IN RCRD: CPT | Mod: CPTII,S$GLB,, | Performed by: PHYSICIAN ASSISTANT

## 2022-11-10 PROCEDURE — 3288F FALL RISK ASSESSMENT DOCD: CPT | Mod: CPTII,S$GLB,, | Performed by: PHYSICIAN ASSISTANT

## 2022-11-10 PROCEDURE — 3288F PR FALLS RISK ASSESSMENT DOCUMENTED: ICD-10-PCS | Mod: CPTII,S$GLB,, | Performed by: PHYSICIAN ASSISTANT

## 2022-11-10 PROCEDURE — 3077F SYST BP >= 140 MM HG: CPT | Mod: CPTII,S$GLB,, | Performed by: PHYSICIAN ASSISTANT

## 2022-11-10 PROCEDURE — 1126F AMNT PAIN NOTED NONE PRSNT: CPT | Mod: CPTII,S$GLB,, | Performed by: PHYSICIAN ASSISTANT

## 2022-11-10 PROCEDURE — 93010 EKG 12-LEAD: ICD-10-PCS | Mod: S$GLB,,, | Performed by: INTERNAL MEDICINE

## 2022-11-10 PROCEDURE — 3078F DIAST BP <80 MM HG: CPT | Mod: CPTII,S$GLB,, | Performed by: PHYSICIAN ASSISTANT

## 2022-11-10 PROCEDURE — 1101F PT FALLS ASSESS-DOCD LE1/YR: CPT | Mod: CPTII,S$GLB,, | Performed by: PHYSICIAN ASSISTANT

## 2022-11-10 PROCEDURE — 99999 PR PBB SHADOW E&M-EST. PATIENT-LVL V: CPT | Mod: PBBFAC,,, | Performed by: PHYSICIAN ASSISTANT

## 2022-11-10 PROCEDURE — 99214 PR OFFICE/OUTPT VISIT, EST, LEVL IV, 30-39 MIN: ICD-10-PCS | Mod: 25,S$GLB,, | Performed by: PHYSICIAN ASSISTANT

## 2022-11-10 PROCEDURE — 3078F PR MOST RECENT DIASTOLIC BLOOD PRESSURE < 80 MM HG: ICD-10-PCS | Mod: CPTII,S$GLB,, | Performed by: PHYSICIAN ASSISTANT

## 2022-11-10 PROCEDURE — 1160F PR REVIEW ALL MEDS BY PRESCRIBER/CLIN PHARMACIST DOCUMENTED: ICD-10-PCS | Mod: CPTII,S$GLB,, | Performed by: PHYSICIAN ASSISTANT

## 2022-11-10 PROCEDURE — 1159F PR MEDICATION LIST DOCUMENTED IN MEDICAL RECORD: ICD-10-PCS | Mod: CPTII,S$GLB,, | Performed by: PHYSICIAN ASSISTANT

## 2022-11-10 PROCEDURE — 99999 PR PBB SHADOW E&M-EST. PATIENT-LVL V: ICD-10-PCS | Mod: PBBFAC,,, | Performed by: PHYSICIAN ASSISTANT

## 2022-11-10 PROCEDURE — 3077F PR MOST RECENT SYSTOLIC BLOOD PRESSURE >= 140 MM HG: ICD-10-PCS | Mod: CPTII,S$GLB,, | Performed by: PHYSICIAN ASSISTANT

## 2022-11-10 PROCEDURE — 93005 ELECTROCARDIOGRAM TRACING: CPT | Mod: S$GLB,,, | Performed by: INTERNAL MEDICINE

## 2022-11-10 PROCEDURE — 1160F RVW MEDS BY RX/DR IN RCRD: CPT | Mod: CPTII,S$GLB,, | Performed by: PHYSICIAN ASSISTANT

## 2022-11-10 PROCEDURE — 1101F PR PT FALLS ASSESS DOC 0-1 FALLS W/OUT INJ PAST YR: ICD-10-PCS | Mod: CPTII,S$GLB,, | Performed by: PHYSICIAN ASSISTANT

## 2022-11-10 PROCEDURE — 93010 ELECTROCARDIOGRAM REPORT: CPT | Mod: S$GLB,,, | Performed by: INTERNAL MEDICINE

## 2022-11-10 PROCEDURE — 93005 EKG 12-LEAD: ICD-10-PCS | Mod: S$GLB,,, | Performed by: INTERNAL MEDICINE

## 2022-11-10 PROCEDURE — 99214 OFFICE O/P EST MOD 30 MIN: CPT | Mod: 25,S$GLB,, | Performed by: PHYSICIAN ASSISTANT

## 2022-11-10 PROCEDURE — 1126F PR PAIN SEVERITY QUANTIFIED, NO PAIN PRESENT: ICD-10-PCS | Mod: CPTII,S$GLB,, | Performed by: PHYSICIAN ASSISTANT

## 2022-11-10 RX ORDER — SILDENAFIL 100 MG/1
100 TABLET, FILM COATED ORAL DAILY PRN
Qty: 30 TABLET | Refills: 6 | Status: SHIPPED | OUTPATIENT
Start: 2022-11-10 | End: 2023-05-19 | Stop reason: SDUPTHER

## 2022-11-10 NOTE — PROGRESS NOTES
General Cardiology Clinic Note  Reason for Visit: Annual follow up   Last Clinic Visit: 12/14/2021 with Patience Galeas  General Cardiologist: Dr. Mccoy    HPI:   Drake Barraza is a 77 y.o. male who presents for annual follow up.     Problem List:  CAD   Inf MI 2003   RCA and LAD stents 2003   Hypercholesteremia   Hypertension   Sleep apnea on CPAP   Hypothyroidism   Diabetes type II-diet controlled     HPI:   Patient presents for annual follow up. Doing well. Denies symptoms of CAD, CHF, arrhythmia, hypotension, TIA. He stays active doing a lot of yard work and cutting the grass. No issues with exertion. BP at home is 120s/70s. He is compliant with his CPAP.     He is undergoing carpal tunnel surgery soon.     12/14/2021 HPI (Patience Galeas)  Drake Barraza is in clinic today for routine follow up.  He has not been exercising d/t the pandemic.  He can mow his grass with a push mower.  Patient denies chest pain with exertion or at rest, palpitations, SOB, HIGHTOWER, dizziness, syncope, edema, orthopnea, PND, or claudication.  His BP is running 120s-130s.  Patient has history of obstructive sleep apnea.  Reports nightly use of CPAP machine and denies any associated sx of MICHELLE.  His machine was replaced about a year ago.      ROS:      Review of Systems   Constitutional: Negative for diaphoresis, malaise/fatigue, weight gain and weight loss.   HENT:  Negative for nosebleeds.    Eyes:  Negative for vision loss in left eye, vision loss in right eye and visual disturbance.   Cardiovascular:  Negative for chest pain, claudication, dyspnea on exertion, irregular heartbeat, leg swelling, near-syncope, orthopnea, palpitations, paroxysmal nocturnal dyspnea and syncope.   Respiratory:  Negative for cough, shortness of breath, sleep disturbances due to breathing, snoring and wheezing.    Hematologic/Lymphatic: Negative for bleeding problem. Does not bruise/bleed easily.   Skin:  Negative for poor wound healing and rash.    Musculoskeletal:  Negative for muscle cramps and myalgias.   Gastrointestinal:  Negative for bloating, abdominal pain, diarrhea, heartburn, melena, nausea and vomiting.   Genitourinary:  Negative for hematuria and nocturia.   Neurological:  Negative for brief paralysis, dizziness, headaches, light-headedness, numbness and weakness.   Psychiatric/Behavioral:  Negative for depression.    Allergic/Immunologic: Negative for hives.     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 [facility] Jr., MD;  Location: [facility] 2ND FLR;  Service: Orthopedics;  Laterality: Right;    TOTAL KNEE ARTHROPLASTY Left 10/5/2020    Procedure: ARTHROPLASTY, KNEE, TOTAL Jayy NexGen Cemented Tibia;  Surgeon: Chris Israel MD;  Location: [facility];  Service: Orthopedics;  Laterality: Left;     Allergies:     Review of patient's allergies indicates:   Allergen Reactions    Adhesive Blisters    Hydrocodone-acetaminophen Rash     Other reaction(s): constipated     Medications:     Current Outpatient Medications on File Prior to Visit   Medication Sig Dispense Refill    aspirin (ECOTRIN) 81 MG EC tablet Take 1 tablet (81 mg total) by mouth 2 (two) times daily. (Patient  taking differently: Take 81 mg by mouth once daily.) 60 tablet 0    blood-glucose meter kit Check glucose 1-2x/day after meals 1 each 0    CINNAMON BARK (CINNAMON ORAL) Take 1 capsule by mouth once daily.      levothyroxine (SYNTHROID) 125 MCG tablet TAKE 1 TABLET EVERY MORNING ON AN EMPTY STOMACH 90 tablet 3    metoprolol tartrate (LOPRESSOR) 25 MG tablet TAKE 1 TABLET TWICE DAILY 180 tablet 3    multivitamin (THERAGRAN) per tablet Take 1 tablet by mouth once daily.      nitroGLYCERIN (NITROSTAT) 0.4 MG SL tablet Place 1 tablet (0.4 mg total) under the tongue every 5 (five) minutes as needed for Chest pain. 100 tablet 2    omega-3 fatty acids-vitamin E 1,000 mg Cap Take 1 capsule by mouth Daily.        ramipriL (ALTACE) 10 MG capsule TAKE 1 CAPSULE EVERY DAY 90 capsule 3    rosuvastatin (CRESTOR) 20 MG tablet TAKE 1 TABLET EVERY DAY 90 tablet 3    sildenafiL (VIAGRA) 100 MG tablet Take 1 tablet (100 mg total) by mouth daily as needed for Erectile Dysfunction. 30 tablet 6    TRUE METRIX GLUCOSE TEST STRIP Strp TEST SUGAR TWICE DAILY BEFORE MEALS 200 strip 3    TRUEPLUS LANCETS 30 gauge Misc USE TO TEST BLOOD SUGAR TWICE DAILY BEFORE MEALS 2-3 DAYS A WEEK 100 each 3    TURMERIC ROOT EXTRACT ORAL Take 1 capsule by mouth.      UBIDECARENONE (COENZYME Q10) 100 mg Tab Take 100 mg by mouth once daily.      vitamin D 185 MG Tab Take 185 mg by mouth once daily.       No current facility-administered medications on file prior to visit.     Social History:     Social History     Tobacco Use    Smoking status: Never    Smokeless tobacco: Never   Substance Use Topics    Alcohol use: Yes     Comment: a few glasses of wine a week     Family History:     Family History   Problem Relation Age of Onset    Hypertension Father     Stroke Father     No Known Problems Sister     No Known Problems Son     Thrombosis Brother     Melanoma Neg Hx      Physical Exam:   BP (!) 142/78 (BP Location: Left arm, Patient Position: Sitting, BP  "Method: Medium (Automatic))   Pulse 61   Ht 5' 7" (1.702 m)   Wt 78.4 kg (172 lb 13.5 oz)   BMI 27.07 kg/m²      Physical Exam  Vitals and nursing note reviewed.   Constitutional:       General: He is not in acute distress.     Appearance: Normal appearance.   HENT:      Head: Normocephalic and atraumatic.      Nose: Nose normal.   Eyes:      General: No scleral icterus.     Extraocular Movements: Extraocular movements intact.      Conjunctiva/sclera: Conjunctivae normal.   Neck:      Thyroid: No thyromegaly.      Vascular: No carotid bruit or JVD.   Cardiovascular:      Rate and Rhythm: Normal rate and regular rhythm.      Pulses: Normal pulses.      Heart sounds: Normal heart sounds. No murmur heard.    No friction rub. No gallop.   Pulmonary:      Effort: Pulmonary effort is normal.      Breath sounds: Normal breath sounds. No wheezing, rhonchi or rales.   Chest:      Chest wall: No tenderness.   Abdominal:      General: Bowel sounds are normal. There is no distension.      Palpations: Abdomen is soft.      Tenderness: There is no abdominal tenderness.   Musculoskeletal:      Cervical back: Neck supple.      Right lower leg: No edema.      Left lower leg: No edema.   Skin:     General: Skin is warm and dry.      Coloration: Skin is not pale.      Findings: No erythema or rash.      Nails: There is no clubbing.   Neurological:      Mental Status: He is alert and oriented to person, place, and time.   Psychiatric:         Mood and Affect: Mood and affect normal.         Behavior: Behavior normal.        Labs:     Lab Results   Component Value Date     11/03/2022    K 4.9 11/03/2022     11/03/2022    CO2 26 11/03/2022    BUN 21 11/03/2022    CREATININE 1.3 11/03/2022    ANIONGAP 8 11/03/2022     Lab Results   Component Value Date    HGBA1C 7.1 (H) 11/03/2022     Lab Results   Component Value Date    BNP 91 08/11/2014    Lab Results   Component Value Date    WBC 6.07 11/03/2022    HGB 14.6 11/03/2022 "    HCT 46.3 11/03/2022     11/03/2022    GRAN 2.8 06/17/2022    GRAN 47.9 06/17/2022     Lab Results   Component Value Date    CHOL 125 11/03/2022    HDL 37 (L) 11/03/2022    LDLCALC 65.6 11/03/2022    TRIG 112 11/03/2022          Imaging:   Echocardiograms:   See stress echo    Stress Tests:   Stress echo 7/10/2019  The patient reached the end of the protocol.  The stress echo portion of this study is negative for myocardial ischemia.  The EKG portion of this study is negative for myocardial ischemia.  Normal left ventricular systolic function. The estimated ejection fraction is 60%  Normal LV diastolic function.  Normal right ventricular systolic function.  Normal central venous pressure (3 mm Hg).  The estimated PA systolic pressure is 24 mm Hg    Exercise stress echo 2/26/2013  No evidence of stress induced myocardial ischemia. No improvement in function seen involving basal inferior wall suggesting myocardial scar.     Caths:   None    Other:  None      EKG 11/10/2022: Sinus smith with first degree AV block, otherwise normal     Assessment:     1. Pre-operative cardiovascular examination    2. Coronary artery disease involving native coronary artery of native heart without angina pectoris    3. S/P RCA and LAD stents 2003    4. Essential hypertension    5. Hypercholesteremia    6. Atherosclerosis of aorta    7. Sleep apnea, unspecified type    8. Erectile dysfunction      Plan:     Pre-op carpal tunnel surgery  Pt has no active cardiac condition (ACS/USA, decompenstated CHF, significant arrhythmias or severe valvular disease) and can easily achieve 4 METS.  As such, pt does not require further cardiac evaluation prior to undergoing surgery.  Pt should remain on beta-blockers throughout the entire smitha-procedure time period.  Given his history of coronary stents, it is preferable that Aspirin therapy be continued smitha-operatively, but it can be held for 5-7 days if at prohibitive bleeding risk.  The  remaining cardiac meds can be held as needed but should also be restarted after the procedure.     His estimated risk with the proposed surgery/procedure for an adverse cardiac outcome is 6.0% (95% CI 4.9%-7.4%) (Revised Cardiac Risk Index [RCRI] Score = 1) based on the following risk factors: History of ischemic heart disease.    CAD s/p PCI to RCA and LAD in 2003  Asymptomatic   Continue ASA and high intensity statin     Hypertension  Well controlled at home  Continue Metoprolol 25 mg bid  Continue Ramipril 10 mg daily     Hyperlipidemia  LDL at goal on Rosuvastatin 20 mg     Atherosclerosis of aorta   Stable. Continue ASA and statin    MICHELLE on CPAP   Continue CPAP every night.     Follow up in one year.    Signed:  Yary Silva PA-C  Cardiology   [phone] - General  11/10/2022 9:58 AM

## 2022-11-21 ENCOUNTER — ANESTHESIA EVENT (OUTPATIENT)
Dept: SURGERY | Facility: HOSPITAL | Age: 77
End: 2022-11-21
Payer: MEDICARE

## 2022-11-21 NOTE — ANESTHESIA PAT ROS NOTE
Chart review complete. Patient's medical history reviewed.  OK to proceed at [facility].    Chris Fenton MD   11/21/2022

## 2022-11-24 ENCOUNTER — PATIENT MESSAGE (OUTPATIENT)
Dept: ADMINISTRATIVE | Facility: OTHER | Age: 77
End: 2022-11-24
Payer: MEDICARE

## 2022-11-29 ENCOUNTER — TELEPHONE (OUTPATIENT)
Dept: ORTHOPEDICS | Facility: CLINIC | Age: 77
End: 2022-11-29
Payer: MEDICARE

## 2022-11-29 PROBLEM — G56.02 CARPAL TUNNEL SYNDROME OF LEFT WRIST: Status: ACTIVE | Noted: 2022-11-29

## 2022-11-29 RX ORDER — TRAMADOL HYDROCHLORIDE 50 MG/1
50 TABLET ORAL EVERY 6 HOURS PRN
Qty: 10 TABLET | Refills: 0 | Status: SHIPPED | OUTPATIENT
Start: 2022-11-29 | End: 2023-05-20

## 2022-11-29 NOTE — TELEPHONE ENCOUNTER
Spoke with the patient. Notified of 6 AM arrival time to the [facility].  Informed of current visitor policy.  Reminded of NPO and need for transportation. Patient verbalized understanding to all.    Coleen Donaldson MS, OTC  Clinical Assistant to Dr. Ross Dunbar [facility]

## 2022-11-30 ENCOUNTER — ANESTHESIA (OUTPATIENT)
Dept: SURGERY | Facility: HOSPITAL | Age: 77
End: 2022-11-30
Payer: MEDICARE

## 2022-11-30 ENCOUNTER — HOSPITAL ENCOUNTER (OUTPATIENT)
Facility: HOSPITAL | Age: 77
Discharge: HOME | End: 2022-11-30
Attending: ORTHOPAEDIC SURGERY | Admitting: ORTHOPAEDIC SURGERY
Payer: MEDICARE

## 2022-11-30 VITALS
HEART RATE: 46 BPM | OXYGEN SATURATION: 98 % | SYSTOLIC BLOOD PRESSURE: 114 MMHG | RESPIRATION RATE: 20 BRPM | BODY MASS INDEX: 27 KG/M2 | WEIGHT: 172 LBS | DIASTOLIC BLOOD PRESSURE: 58 MMHG | TEMPERATURE: 98 F | HEIGHT: 67 IN

## 2022-11-30 DIAGNOSIS — G56.02 CARPAL TUNNEL SYNDROME OF LEFT WRIST: Primary | ICD-10-CM

## 2022-11-30 DIAGNOSIS — G56.00 CTS (CARPAL TUNNEL SYNDROME): ICD-10-CM

## 2022-11-30 LAB — POCT GLUCOSE: 142 MG/DL (ref 70–110)

## 2022-11-30 PROCEDURE — 29848 WRIST ENDOSCOPY/SURGERY: CPT | Mod: LT,,, | Performed by: ORTHOPAEDIC SURGERY

## 2022-11-30 PROCEDURE — 94761 N-INVAS EAR/PLS OXIMETRY MLT: CPT

## 2022-11-30 PROCEDURE — D9220A PRA ANESTHESIA: ICD-10-PCS | Mod: ANES,,, | Performed by: ANESTHESIOLOGY

## 2022-11-30 PROCEDURE — 63600175 PHARM REV CODE 636 W HCPCS: Performed by: ANESTHESIOLOGY

## 2022-11-30 PROCEDURE — D9220A PRA ANESTHESIA: Mod: ANES,,, | Performed by: ANESTHESIOLOGY

## 2022-11-30 PROCEDURE — 25000003 PHARM REV CODE 250: Performed by: ORTHOPAEDIC SURGERY

## 2022-11-30 PROCEDURE — 25000003 PHARM REV CODE 250: Performed by: NURSE ANESTHETIST, CERTIFIED REGISTERED

## 2022-11-30 PROCEDURE — 29848 PR WRIST ARTHROSCOP,RELEASE XVERS LIG: ICD-10-PCS | Mod: LT,,, | Performed by: ORTHOPAEDIC SURGERY

## 2022-11-30 PROCEDURE — 76942 ECHO GUIDE FOR BIOPSY: CPT | Performed by: ANESTHESIOLOGY

## 2022-11-30 PROCEDURE — 82962 GLUCOSE BLOOD TEST: CPT | Performed by: ORTHOPAEDIC SURGERY

## 2022-11-30 PROCEDURE — 64415 PR NERVE BLOCK INJ, ANES/STEROID, BRACHIAL PLEXUS, INCL IMAG GUIDANCE: ICD-10-PCS | Mod: 59,LT,, | Performed by: ANESTHESIOLOGY

## 2022-11-30 PROCEDURE — 37000008 HC ANESTHESIA 1ST 15 MINUTES: Performed by: ORTHOPAEDIC SURGERY

## 2022-11-30 PROCEDURE — 76942 ECHO GUIDE FOR BIOPSY: CPT | Mod: 26,,, | Performed by: ANESTHESIOLOGY

## 2022-11-30 PROCEDURE — 76942 PR U/S GUIDANCE FOR NEEDLE GUIDANCE: ICD-10-PCS | Mod: 26,,, | Performed by: ANESTHESIOLOGY

## 2022-11-30 PROCEDURE — 64415 NJX AA&/STRD BRCH PLXS IMG: CPT | Mod: 59,LT,, | Performed by: ANESTHESIOLOGY

## 2022-11-30 PROCEDURE — 63600175 PHARM REV CODE 636 W HCPCS: Performed by: STUDENT IN AN ORGANIZED HEALTH CARE EDUCATION/TRAINING PROGRAM

## 2022-11-30 PROCEDURE — D9220A PRA ANESTHESIA: ICD-10-PCS | Mod: CRNA,,, | Performed by: NURSE ANESTHETIST, CERTIFIED REGISTERED

## 2022-11-30 PROCEDURE — 71000033 HC RECOVERY, INTIAL HOUR: Performed by: ORTHOPAEDIC SURGERY

## 2022-11-30 PROCEDURE — D9220A PRA ANESTHESIA: Mod: CRNA,,, | Performed by: NURSE ANESTHETIST, CERTIFIED REGISTERED

## 2022-11-30 PROCEDURE — 71000015 HC POSTOP RECOV 1ST HR: Performed by: ORTHOPAEDIC SURGERY

## 2022-11-30 PROCEDURE — 25000003 PHARM REV CODE 250: Performed by: STUDENT IN AN ORGANIZED HEALTH CARE EDUCATION/TRAINING PROGRAM

## 2022-11-30 PROCEDURE — 36000706: Performed by: ORTHOPAEDIC SURGERY

## 2022-11-30 PROCEDURE — 99900035 HC TECH TIME PER 15 MIN (STAT)

## 2022-11-30 PROCEDURE — 63600175 PHARM REV CODE 636 W HCPCS: Performed by: NURSE ANESTHETIST, CERTIFIED REGISTERED

## 2022-11-30 PROCEDURE — 27201423 OPTIME MED/SURG SUP & DEVICES STERILE SUPPLY: Performed by: ORTHOPAEDIC SURGERY

## 2022-11-30 PROCEDURE — 37000009 HC ANESTHESIA EA ADD 15 MINS: Performed by: ORTHOPAEDIC SURGERY

## 2022-11-30 PROCEDURE — 36000707: Performed by: ORTHOPAEDIC SURGERY

## 2022-11-30 RX ORDER — CEFAZOLIN SODIUM 1 G/3ML
2 INJECTION, POWDER, FOR SOLUTION INTRAMUSCULAR; INTRAVENOUS
Status: DISCONTINUED | OUTPATIENT
Start: 2022-11-30 | End: 2022-11-30 | Stop reason: HOSPADM

## 2022-11-30 RX ORDER — MIDAZOLAM HYDROCHLORIDE 1 MG/ML
.5-4 INJECTION INTRAMUSCULAR; INTRAVENOUS
Status: DISCONTINUED | OUTPATIENT
Start: 2022-11-30 | End: 2022-11-30 | Stop reason: HOSPADM

## 2022-11-30 RX ORDER — LIDOCAINE HCL/PF 100 MG/5ML
SYRINGE (ML) INTRAVENOUS
Status: DISCONTINUED | OUTPATIENT
Start: 2022-11-30 | End: 2022-11-30

## 2022-11-30 RX ORDER — ONDANSETRON 2 MG/ML
INJECTION INTRAMUSCULAR; INTRAVENOUS
Status: DISCONTINUED | OUTPATIENT
Start: 2022-11-30 | End: 2022-11-30

## 2022-11-30 RX ORDER — MUPIROCIN 20 MG/G
OINTMENT TOPICAL
Status: DISCONTINUED | OUTPATIENT
Start: 2022-11-30 | End: 2022-11-30 | Stop reason: HOSPADM

## 2022-11-30 RX ORDER — DEXAMETHASONE SODIUM PHOSPHATE 4 MG/ML
INJECTION, SOLUTION INTRA-ARTICULAR; INTRALESIONAL; INTRAMUSCULAR; INTRAVENOUS; SOFT TISSUE
Status: DISCONTINUED | OUTPATIENT
Start: 2022-11-30 | End: 2022-11-30

## 2022-11-30 RX ORDER — PHENYLEPHRINE HYDROCHLORIDE 10 MG/ML
INJECTION INTRAVENOUS
Status: DISCONTINUED | OUTPATIENT
Start: 2022-11-30 | End: 2022-11-30

## 2022-11-30 RX ORDER — OXYCODONE HYDROCHLORIDE 5 MG/1
5 TABLET ORAL
Status: DISCONTINUED | OUTPATIENT
Start: 2022-11-30 | End: 2022-11-30 | Stop reason: HOSPADM

## 2022-11-30 RX ORDER — HALOPERIDOL 5 MG/ML
0.5 INJECTION INTRAMUSCULAR EVERY 10 MIN PRN
Status: DISCONTINUED | OUTPATIENT
Start: 2022-11-30 | End: 2022-11-30 | Stop reason: HOSPADM

## 2022-11-30 RX ORDER — SODIUM CHLORIDE 9 MG/ML
INJECTION, SOLUTION INTRAVENOUS CONTINUOUS PRN
Status: DISCONTINUED | OUTPATIENT
Start: 2022-11-30 | End: 2022-11-30

## 2022-11-30 RX ORDER — PROPOFOL 10 MG/ML
VIAL (ML) INTRAVENOUS CONTINUOUS PRN
Status: DISCONTINUED | OUTPATIENT
Start: 2022-11-30 | End: 2022-11-30

## 2022-11-30 RX ORDER — ONDANSETRON 4 MG/1
8 TABLET, ORALLY DISINTEGRATING ORAL EVERY 8 HOURS PRN
Status: DISCONTINUED | OUTPATIENT
Start: 2022-11-30 | End: 2022-11-30 | Stop reason: HOSPADM

## 2022-11-30 RX ORDER — CELECOXIB 200 MG/1
400 CAPSULE ORAL ONCE
Status: COMPLETED | OUTPATIENT
Start: 2022-11-30 | End: 2022-11-30

## 2022-11-30 RX ORDER — FENTANYL CITRATE 50 UG/ML
25-200 INJECTION, SOLUTION INTRAMUSCULAR; INTRAVENOUS
Status: DISCONTINUED | OUTPATIENT
Start: 2022-11-30 | End: 2022-11-30 | Stop reason: HOSPADM

## 2022-11-30 RX ORDER — CEFAZOLIN SODIUM 1 G/3ML
INJECTION, POWDER, FOR SOLUTION INTRAMUSCULAR; INTRAVENOUS
Status: DISCONTINUED | OUTPATIENT
Start: 2022-11-30 | End: 2022-11-30

## 2022-11-30 RX ORDER — IBUPROFEN 200 MG
600 TABLET ORAL EVERY 6 HOURS PRN
Status: DISCONTINUED | OUTPATIENT
Start: 2022-11-30 | End: 2022-11-30 | Stop reason: HOSPADM

## 2022-11-30 RX ORDER — FENTANYL CITRATE 50 UG/ML
25 INJECTION, SOLUTION INTRAMUSCULAR; INTRAVENOUS EVERY 5 MIN PRN
Status: DISCONTINUED | OUTPATIENT
Start: 2022-11-30 | End: 2022-11-30 | Stop reason: HOSPADM

## 2022-11-30 RX ORDER — ACETAMINOPHEN 500 MG
1000 TABLET ORAL
Status: COMPLETED | OUTPATIENT
Start: 2022-11-30 | End: 2022-11-30

## 2022-11-30 RX ORDER — FAMOTIDINE 10 MG/ML
INJECTION INTRAVENOUS
Status: DISCONTINUED | OUTPATIENT
Start: 2022-11-30 | End: 2022-11-30

## 2022-11-30 RX ORDER — PROPOFOL 10 MG/ML
VIAL (ML) INTRAVENOUS
Status: DISCONTINUED | OUTPATIENT
Start: 2022-11-30 | End: 2022-11-30

## 2022-11-30 RX ADMIN — CEFAZOLIN 2 G: 330 INJECTION, POWDER, FOR SOLUTION INTRAMUSCULAR; INTRAVENOUS at 08:11

## 2022-11-30 RX ADMIN — PHENYLEPHRINE HYDROCHLORIDE 100 MCG: 10 INJECTION INTRAVENOUS at 08:11

## 2022-11-30 RX ADMIN — MEPIVACAINE HYDROCHLORIDE 30 ML: 15 INJECTION, SOLUTION EPIDURAL; INFILTRATION at 07:11

## 2022-11-30 RX ADMIN — PROPOFOL 50 MG: 10 INJECTION, EMULSION INTRAVENOUS at 08:11

## 2022-11-30 RX ADMIN — CELECOXIB 400 MG: 200 CAPSULE ORAL at 06:11

## 2022-11-30 RX ADMIN — MUPIROCIN: 20 OINTMENT TOPICAL at 06:11

## 2022-11-30 RX ADMIN — MIDAZOLAM HYDROCHLORIDE 2 MG: 1 INJECTION, SOLUTION INTRAMUSCULAR; INTRAVENOUS at 07:11

## 2022-11-30 RX ADMIN — DEXAMETHASONE SODIUM PHOSPHATE 4 MG: 4 INJECTION, SOLUTION INTRAMUSCULAR; INTRAVENOUS at 08:11

## 2022-11-30 RX ADMIN — SODIUM CHLORIDE: 0.9 INJECTION, SOLUTION INTRAVENOUS at 07:11

## 2022-11-30 RX ADMIN — FENTANYL CITRATE 50 MCG: 50 INJECTION INTRAMUSCULAR; INTRAVENOUS at 07:11

## 2022-11-30 RX ADMIN — FAMOTIDINE 20 MG: 10 INJECTION, SOLUTION INTRAVENOUS at 07:11

## 2022-11-30 RX ADMIN — LIDOCAINE HYDROCHLORIDE 50 MG: 20 INJECTION, SOLUTION INTRAVENOUS at 08:11

## 2022-11-30 RX ADMIN — ONDANSETRON 4 MG: 2 INJECTION INTRAMUSCULAR; INTRAVENOUS at 07:11

## 2022-11-30 RX ADMIN — ACETAMINOPHEN 1000 MG: 500 TABLET ORAL at 06:11

## 2022-11-30 RX ADMIN — PROPOFOL 125 MCG/KG/MIN: 10 INJECTION, EMULSION INTRAVENOUS at 08:11

## 2022-11-30 NOTE — OP NOTE
ORTHOPEDIC SURGERY OPERATIVE NOTE    SERVICE: ORTHOPEDICS     DATE OF PROCEDURE: 11/30/2022     SURGEON: Mark Beach M.D.    ASSISTANT: MD Kailey    PREOPERATIVE DIAGNOSIS: Left sided carpal tunnel syndrome    POSTOPERATIVE DIAGNOSIS: Left sided carpal tunnel syndrome    PROCEDURE PERFORMED: Endoscopic Left sided carpal tunnel release, CPT code 69087    ANESTHESIA: Regional/MAC    ESTIMATED BLOOD LOSS: Minimal           SPECIMENS: None    COMPLICATIONS: None              CONDITION: Stable    IV FLUIDS: Crystalloid per anesthesia    IMPLANTS: None    TOURNIQUET TIME: 12 minutes at 250 mmHg    INDICATIONS FOR PROCEDURE: Drake Barraza is a 77 y.o. male who presented to clinic with findings and workup consistent with carpal tunnel syndrome of the left hand.  The patient had not responded to nonoperative management and wished to proceed with surgical intervention.  The risks, benefits and alternatives to surgery were discussed with the patient at great length and in great detail.  Specific risks discussed include but are not limited to bleeding, infection, vessel damage, nerve damage, pain, numbness, tingling, weakness, stiffness, complex regional pain syndrome, hardware/surgical failure, need for further surgery, DVT, PE, arthritis, scar sensitivity, delayed healing, need for prolonged postoperative rehabilitation, and death amongst others.  The patient stated an understanding of the risks and wished to proceed with surgery.   All questions were answered.  No guarantees were implied or stated.  Informed consent was obtained.    PROCEDURE IN DETAIL: With the patient's participation in the preoperative holding area, the left upper extremity was marked as the surgical site. Preoperative checks were completed and consents were verified.  Regional anesthesia was administered.  The patient was brought to the Operating Room and placed in supine position.  A well-padded nonsterile tourniquet was placed on the upper arm.   The left arm was then prepped and draped in the usual sterile fashion.  Timeout was called for to verify the correct site, procedure and patient.  All were in agreement and we proceeded.  MAC anesthesia was introduced.  Esmarch was utilized to exsanguinate the arm and tourniquet was insufflated to 250mmHg where it remained for the duration of the procedure.    Next, a small 1 cm transverse incision was made in line with a proximal wrist flexion crease beginning radially at the palmaris longus and extending ulnarly for 1 cm.  Dissection was continued to level of antebrachial fascia.  This was transversely incised in line with the skin incision.  A narrow double skin hook was then utilized to elevate the distal most aspect of the incision to gain access to the carpal tunnel.  A series of small and large dilators were utilized to dilate our planned path with the endoscope.  A synovial elevator was then utilized to free synovium from the undersurface of the transverse carpal ligament.  Washboard effect was confirmed.  At this time, the Arthrex endoscopic carpal tunnel system was introduced into the incision.  Confirmation of placement within the carpal tunnel was confirmed.  The endoscope was advanced the distal aspect of the transverse carpal ligament and the distal fat was visualized.  The median nerve was identified radial to the endoscope and was protected throughout the duration of the procedure.  Excellent visualization of the transverse carpal ligament along its entire length was confirmed with no interposed soft tissue.  I then began my division of the transverse carpal ligament from distally and extending in a proximal direction.  The knife was deployed and complete full-thickness transection of the transverse carpal ligament was performed beginning distally and working my way more proximally.  Complete division of the ligament was performed. Care was taken distally to ensure not to cut past [employer] Cardinal  Line or injure the superficial palmar arch. The distal fat was visualized at the distalmost aspect of the ligament division. Tension and pressure within the carpal tunnel was dramatically improved and decreased status post ligament division.  The endoscope was then once again advanced more distally and completion of the division was confirmed.  Fat protruded through the divided ligament throughout.  The median nerve was identified along the length of our ligament division and remained intact and uninjured.  The endoscope was then removed from the wound and the antebrachial forearm fascia was divided in line with the ligament division by hand with tenotomy scissors.  The wound was then irrigated with copious amounts of sterile saline.  Skin was closed with 4 0 nylon suture.        Sterile dressings were applied consisting of Xeroform 4 x 4 gauze cast padding and 2 in Ace wrap to the hand.  Tourniquet was deflated and brisk cap refill in Circle throughout the operative upper extremity.  There was no significant bleeding.      DISPOSITION: The patient will be discharged home with followup in approximately 2 weeks for suture removal.  Early active range of motion in the acute postoperative period was discussed and encouraged.  They are to keep the dressing clean, dry, and intact until that time.

## 2022-11-30 NOTE — ANESTHESIA PROCEDURE NOTES
Left infraclavicular single injection    Patient location during procedure: pre-op   Block not for primary anesthetic.  Reason for block: at surgeon's request and post-op pain management   Post-op Pain Location: Left wrist pain   Start time: 11/30/2022 7:09 AM  Timeout: 11/30/2022 7:09 AM   End time: 11/30/2022 7:13 AM    Staffing  Authorizing Provider: Fredy Aviles MD  Performing Provider: Fredy Aviles MD    Preanesthetic Checklist  Completed: patient identified, IV checked, site marked, risks and benefits discussed, surgical consent, monitors and equipment checked, pre-op evaluation and timeout performed  Peripheral Block  Patient position: sitting  Prep: ChloraPrep  Patient monitoring: heart rate, cardiac monitor, continuous pulse ox, continuous capnometry and frequent blood pressure checks  Block type: infraclavicular  Laterality: left  Injection technique: single shot  Needle  Needle type: Stimuplex   Needle gauge: 21 G  Needle length: 4 in  Needle localization: ultrasound guidance and anatomical landmarks   -ultrasound image captured on disc.  Assessment  Injection assessment: negative aspiration and negative parasthesia  Paresthesia pain: none  Heart rate change: no  Slow fractionated injection: yes  Pain Tolerance: comfortable throughout block and no complaints  Medications:    Medications: mepivacaine (CARBOCAINE) injection 15 mg/mL (1.5%) - Perineural   30 mL - 11/30/2022 7:13:00 AM    Additional Notes  VSS.  [facility] RN monitoring vitals throughout procedure.  Patient tolerated procedure well.    30 mL mepivacaine 1.5% w/ epi 1:200k

## 2022-11-30 NOTE — TRANSFER OF CARE
"Anesthesia Transfer of Care Note    Patient: Drake Barraza    Procedure(s) Performed: Procedure(s) (LRB):  RELEASE, CARPAL TUNNEL, ENDOSCOPIC - left (Left)    Patient location: PACU    Anesthesia Type: general    Transport from OR: Transported from OR on 100% O2 by closed face mask with adequate spontaneous ventilation    Post pain: adequate analgesia    Post assessment: no apparent anesthetic complications and tolerated procedure well    Post vital signs: stable    Level of consciousness: sedated and responds to stimulation    Nausea/Vomiting: no nausea/vomiting    Complications: none    Transfer of care protocol was followed      Last vitals:   Visit Vitals  BP (!) 101/55 (BP Location: Right arm, Patient Position: Lying)   Pulse (!) 51   Temp 36.6 °C (97.8 °F) (Oral)   Resp 18   Ht 5' 7" (1.702 m)   Wt 78 kg (172 lb)   SpO2 100%   BMI 26.94 kg/m²     "

## 2022-11-30 NOTE — BRIEF OP NOTE
[facility] (Hospital)  Brief Operative Note    Surgery Date: 11/30/2022     Surgeon(s) and Role:     * Mark Beach MD - Primary    Assisting Surgeon: None    Pre-op Diagnosis:  Carpal tunnel syndrome of left wrist [G56.02]    Post-op Diagnosis:  Post-Op Diagnosis Codes:     * Carpal tunnel syndrome of left wrist [G56.02]    Procedure(s) (LRB):  RELEASE, CARPAL TUNNEL, ENDOSCOPIC - left (Left)    Anesthesia: Regional    Operative Findings: See op note    Estimated Blood Loss: * No values recorded between 11/30/2022  8:16 AM and 11/30/2022  8:30 AM *         Specimens:   Specimen (24h ago, onward)      None              Discharge Note    OUTCOME: Patient tolerated treatment/procedure well without complication and is now ready for discharge.    DISPOSITION: Home or Self Care    FINAL DIAGNOSIS:  Carpal tunnel syndrome of left wrist    FOLLOWUP: In clinic    DISCHARGE INSTRUCTIONS:    Discharge Procedure Orders   Diet general     Leave dressing on - Keep it clean, dry, and intact until clinic visit     Call MD for:  persistent nausea and vomiting     Call MD for:  severe uncontrolled pain     Call MD for:  difficulty breathing, headache or visual disturbances     Call MD for:  redness, tenderness, or signs of infection (pain, swelling, redness, odor or green/yellow discharge around incision site)     Non weight bearing   Order Comments: Surgical Extremity

## 2022-11-30 NOTE — H&P
[facility]  History & Physical  Orthopedics     SUBJECTIVE:       COVID-19 attestation:  This patient was treated during the COVID-19 pandemic.  This was discussed with the patient, they are aware of our current policies and procedures, were given the option of delaying their visit and or switching to a virtual visit, delaying their surgery when applicable, and they elect to proceed.     Chief Complaint:  Numbness and tingling left hand     Referring Provider: No ref. provider found      History of Present Illness:  Patient is a 77 y.o. left hand dominant male who presents today with complaints of numbness and tingling to the left hand.  He notes that this is essentially constant the left thumb index finger and long finger.  This never really improves.  Of note he is status post a right carpal tunnel release done by Dr. Choi in 1994 which has done well.  He is also status post a left trigger thumb procedure in the past which has also done well.  He has been attempting a activity modifications rest and nocturnal carpal tunnel bracing with no relief.  He also reports some left shoulder pain today and has no other complaints and presents for initial evaluation.      Interval history October 4, 2022:  The patient returns today for re-evaluation.  He notes persistent constant numbness and tingling in the median nerve distribution of the left hand.  He had a recent EMG returns for these results and re-evaluation.  No new complaints.     The patient is a/an retired.     Symptoms are aggravated by activity, movement, and at night.     Symptoms are alleviated by rest.     Symptoms consist of pain and numbness/tingling.     The patient rates their pain as a 2/10.     Attempted treatment(s) and/or interventions include activity modifications, rest, immobilization.     The patient denies any fevers, chills, N/V, D/C and presents for evaluation.             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 Ochsner Jr., MD;  Location: [facility] OR 2ND FLR;  Service: Orthopedics;  Laterality: Right;    TOTAL KNEE ARTHROPLASTY Left 10/5/2020     Procedure: ARTHROPLASTY, KNEE, TOTAL Jayy NexGen Cemented Tibia;  Surgeon: Chris Israel MD;  Location: [facility];  Service: Orthopedics;  Laterality: Left;            Review of patient's allergies indicates:   Allergen Reactions    Adhesive Blisters    Hydrocodone-acetaminophen Rash       Other reaction(s): constipated      Social History          Social History Narrative     Mr Barraza is  to [facility]; he is retired from [employer]; he has 1 grown son, Paul who is  and living in [place]-no kids            Family History   Problem Relation Age of Onset    Hypertension Father      Stroke Father      No Known Problems Sister      No Known Problems Son      Thrombosis Brother      Melanoma Neg Hx              Current Outpatient Medications:     aspirin (ECOTRIN) 81 MG EC tablet, Take 1 tablet (81 mg total) by mouth 2 (two) times daily. (Patient taking differently: Take 81 mg by mouth once daily.), Disp: 60 tablet, Rfl: 0    blood-glucose meter kit, Check glucose 1-2x/day after meals, Disp:  "1 each, Rfl: 0    CINNAMON BARK (CINNAMON ORAL), Take 1 capsule by mouth once daily., Disp: , Rfl:     lancets Misc, 1 lancet by Misc.(Non-Drug; Combo Route) route 2 (two) times daily before meals., Disp: 100 each, Rfl: 3    levothyroxine (SYNTHROID) 125 MCG tablet, TAKE 1 TABLET EVERY MORNING ON AN EMPTY STOMACH, Disp: 90 tablet, Rfl: 3    metoprolol tartrate (LOPRESSOR) 25 MG tablet, TAKE 1 TABLET TWICE DAILY, Disp: 180 tablet, Rfl: 3    multivitamin (THERAGRAN) per tablet, Take 1 tablet by mouth once daily., Disp: , Rfl:     nitroGLYCERIN (NITROSTAT) 0.4 MG SL tablet, Place 1 tablet (0.4 mg total) under the tongue every 5 (five) minutes as needed for Chest pain., Disp: 100 tablet, Rfl: 2    omega-3 fatty acids-vitamin E 1,000 mg Cap, Take 1 capsule by mouth Daily.  , Disp: , Rfl:     ramipriL (ALTACE) 10 MG capsule, TAKE 1 CAPSULE EVERY DAY, Disp: 90 capsule, Rfl: 3    rosuvastatin (CRESTOR) 20 MG tablet, TAKE 1 TABLET EVERY DAY, Disp: 90 tablet, Rfl: 3    sildenafiL (VIAGRA) 100 MG tablet, Take 1 tablet (100 mg total) by mouth daily as needed for Erectile Dysfunction., Disp: 30 tablet, Rfl: 6    TRUE METRIX GLUCOSE TEST STRIP Strp, TEST SUGAR TWICE DAILY BEFORE MEALS, Disp: 200 strip, Rfl: 3    TRUEPLUS LANCETS 30 gauge Misc, USE TWICE DAILY BEFORE MEALS : 2-3 DAYS/WEEK, Disp: 100 each, Rfl: 3    TURMERIC ROOT EXTRACT ORAL, Take 1 capsule by mouth., Disp: , Rfl:     UBIDECARENONE (COENZYME Q10) 100 mg Tab, Take 100 mg by mouth once daily., Disp: , Rfl:     vitamin D 185 MG Tab, Take 185 mg by mouth once daily., Disp: , Rfl:         Review of Systems:  As per HPI otherwise noncontributory     OBJECTIVE:       Vital Signs (Most Recent):  Vitals       Vitals:     10/04/22 0841   Weight: 78.9 kg (174 lb)   Height: 5' 7.01" (1.702 m)         Body mass index is 27.25 kg/m².        Physical Exam:  Constitutional: The patient appears well-developed and well-nourished. No distress.   Skin: No lesions appreciated  Head: " Normocephalic and atraumatic.   Nose: Nose normal.   Ears: No deformities seen  Eyes: Conjunctivae and EOM are normal.   Neck: No tracheal deviation present.   Cardiovascular: Normal rate and intact distal pulses.    Pulmonary/Chest: Effort normal. No respiratory distress.   Abdominal: There is no guarding.   Neurological: The patient is alert.   Psychiatric: The patient has a normal mood and affect.      Bilateral Hand/Wrist Examination:     Observation/Inspection:  Swelling                       none                  Deformity                     none  Discoloration               none                  Scars                           right carpal tunnel, left trigger thumb well-healed                    Atrophy                        moderate thenar on the left     HAND/WRIST EXAMINATION:  Finkelstein's Test                                Neg  WHAT Test                                         Neg  Snuff box tenderness                          Neg  Mustafa's Test                                     Neg  Hook of Hamate Tenderness              Neg  CMC grind                                           Neg  Circumduction test                              Neg     Neurovascular Exam:  Digits WWP, brisk CR < 3s throughout  NVI motor/LTS to M/R/U nerves, radial pulse 2+ except for decreased light touch sensation left median nerve distribution at baseline  Tinel's Test - Carpal Tunnel                positive on the left  Tinel's Test - Cubital Tunnel               Neg  Phalen's Test                                      positive on the left  Median Nerve Compression Test       positive on the left     ROM hand full, painless     ROM wrist full, painless    ROM elbow full, painless     Abdomen not guarded  Respirations nonlabored  Perfusion intact     Diagnostic Results:     Imaging - I independently viewed the patient's imaging as well as the radiology report.  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 Carpal tunnel syndrome).  There is motor axonal loss on the left.  There is active denervation on the left.  This is graded as Mild in severity on the right, and Severe on the left.      All of the sensory responses that were present were of prolonged latency.  This can happen to some degree with advancing age (0.5-4m/s slowing per decade after age 60), but in his case, the slowing is a bit beyond what would be expected for age.  A mild sensory peripheral polyneuropathy cannot be excluded.  Of note, he has no diffuse symptoms to suggest a polyneuropathy.          ASSESSMENT/PLAN:       77 y.o. yo male with left carpal tunnel syndrome  Plan: The patient and I had a thorough discussion today.  We discussed the working diagnosis as well as several other potential alternative diagnoses.  Treatment options were discussed, both conservative and surgical.  Conservative treatment options would include things such as activity modifications, workplace modifications, a period of rest, oral vs topical OTC and prescription anti-inflammatory medications, occupational therapy, splinting/bracing, immobilization, corticosteroid injections, and others.  Surgical options were discussed as well.      At this time, the patient would like to proceed with an endoscopic versus open left carpal tunnel release on November 30, 2022.  I will get this set up.  Of note he has cardiac stents and he will obtain perioperative cardiac risk stratification prior to proceeding with surgery.      The patient has not responded to adequate non operative treatment at this time and/or non operative treatment is not indicated. Thus, the risks, benefits and alternatives to surgery were discussed with the patient in detail.   Specific risks include but are not limited to bleeding, infection, vessel and/or nerve damage, pain, numbness, tingling, compartment syndrome, need for additional surgery, failure to return to pre-injury and/or preoperative functional status, inability to return to work, scar sensitivity, delayed healing, complex regional pain syndrome, weakness, pulley injury, tendon injury, bowstringing, partial and/or incomplete relief of symptoms, persistence of and/or worsening of symptoms, hardware and/or surgical failure, prominent and/or symptomatic hardware possibly necessitating future removal, osteomyelitis, amputation, loss of function, stiffness, rotational malalignment, functional debility, dysfunction, decreased  strength, need for prolonged postoperative rehabilitation, malunion, nonunion, deep venous thrombosis, pulmonary embolism, arthritis and death.  The patient states an understanding and wishes to proceed with surgery.   All questions were answered.  No guarantees were implied or stated.  Written informed consent was obtained.        Should the patient's symptoms worsen, persist, or fail to improve they should return for reevaluation and I would be happy to see them back anytime.        Mark Beach M.D.     Please be aware that this note has been generated with the assistance of [employer] voice-to-text.  Please excuse any spelling or grammatical errors.     Thank you for choosing Dr. Mark Beach for your orthopedic hand and upper extremity care. It is our goal to provide you with exceptional care that will help keep you healthy, active, and get you back in the game.     If you felt that you received exemplary care today, please consider leaving feedback for Dr. Beach on [email or web address] at [email or web address].     Please do not hesitate to reach out to us via email, phone, or MyChart with any questions, concerns, or feedback.

## 2022-11-30 NOTE — ANESTHESIA POSTPROCEDURE EVALUATION
Anesthesia Post Evaluation    Patient: Drake Barraza    Procedure(s) Performed: Procedure(s) (LRB):  RELEASE, CARPAL TUNNEL, ENDOSCOPIC - left (Left)    Final Anesthesia Type: general      Patient location during evaluation: PACU  Patient participation: Yes- Able to Participate  Level of consciousness: awake and alert  Post-procedure vital signs: reviewed and stable  Pain management: adequate  Airway patency: patent    PONV status at discharge: No PONV  Anesthetic complications: no      Cardiovascular status: blood pressure returned to baseline  Respiratory status: unassisted  Hydration status: euvolemic  Follow-up not needed.          Vitals Value Taken Time   /58 11/30/22 0915   Temp 36.4C 11/30/22 0958   Pulse 49 11/30/22 0916   Resp 21 11/30/22 0916   SpO2 97 % 11/30/22 0916   Vitals shown include unvalidated device data.      Event Time   Out of Recovery 09:12:00         Pain/Francine Score: Pain Rating Prior to Med Admin: 0 (11/30/2022  6:35 AM)  Francine Score: 9 (11/30/2022  9:06 AM)

## 2022-11-30 NOTE — INTERVAL H&P NOTE
The patient has been examined and the H&P has been reviewed:    I concur with the findings and no changes have occurred since H&P was written.    Surgery risks, benefits and alternative options discussed and understood by patient/family.          Active Hospital Problems    Diagnosis  POA    *Carpal tunnel syndrome of left wrist [G56.02]  Yes      Resolved Hospital Problems   No resolved problems to display.

## 2022-11-30 NOTE — ANESTHESIA PREPROCEDURE EVALUATION
11/30/2022  Drake Barraza is a 77 y.o., male.      Pre-op Assessment    I have reviewed the Patient Summary Reports.     I have reviewed the Nursing Notes. I have reviewed the NPO Status.   I have reviewed the Medications.     Review of Systems  Anesthesia Hx:  No problems with previous Anesthesia  History of prior surgery of interest to airway management or planning: Previous anesthesia: General Denies Family Hx of Anesthesia complications.   Denies Personal Hx of Anesthesia complications.   Social:  Non-Smoker    Hematology/Oncology:  Hematology Normal   Oncology Normal   Hematology Comments: Denies recent use of anticoagulant, other than aspirin.    EENT/Dental:EENT/Dental Normal   Cardiovascular:   Exercise tolerance: good Hypertension Past MI CAD   hyperlipidemia    Pulmonary:   Sleep Apnea, CPAP    Renal/:  Renal/ Normal     Hepatic/GI:   Liver Disease,    Musculoskeletal:   Arthritis     Neurological:  Neurology Normal    Endocrine:   Diabetes, type 2 Hypothyroidism    Dermatological:  Skin Normal    Psych:  Psychiatric Normal           Physical Exam  General: Well nourished and Cooperative    Airway:  Mallampati: II   Neck ROM: Normal ROM    Dental:  Intact        Anesthesia Plan  Type of Anesthesia, risks & benefits discussed:    Anesthesia Type: Gen Natural Airway, MAC, Regional  Intra-op Monitoring Plan: Standard ASA Monitors  Post Op Pain Control Plan: multimodal analgesia, IV/PO Opioids PRN and peripheral nerve block  Induction:  IV  Informed Consent: Informed consent signed with the Patient and all parties understand the risks and agree with anesthesia plan.  All questions answered.   ASA Score: 3    Ready For Surgery From Anesthesia Perspective.     .

## 2022-12-13 ENCOUNTER — OFFICE VISIT (OUTPATIENT)
Dept: ORTHOPEDICS | Facility: CLINIC | Age: 77
End: 2022-12-13
Payer: MEDICARE

## 2022-12-13 DIAGNOSIS — Z98.890 POSTOPERATIVE STATE: ICD-10-CM

## 2022-12-13 DIAGNOSIS — G56.02 CARPAL TUNNEL SYNDROME OF LEFT WRIST: Primary | ICD-10-CM

## 2022-12-13 PROCEDURE — 99024 POSTOP FOLLOW-UP VISIT: CPT | Mod: S$GLB,,, | Performed by: PHYSICIAN ASSISTANT

## 2022-12-13 PROCEDURE — 3288F PR FALLS RISK ASSESSMENT DOCUMENTED: ICD-10-PCS | Mod: CPTII,S$GLB,, | Performed by: PHYSICIAN ASSISTANT

## 2022-12-13 PROCEDURE — 1159F PR MEDICATION LIST DOCUMENTED IN MEDICAL RECORD: ICD-10-PCS | Mod: CPTII,S$GLB,, | Performed by: PHYSICIAN ASSISTANT

## 2022-12-13 PROCEDURE — 3288F FALL RISK ASSESSMENT DOCD: CPT | Mod: CPTII,S$GLB,, | Performed by: PHYSICIAN ASSISTANT

## 2022-12-13 PROCEDURE — 1101F PT FALLS ASSESS-DOCD LE1/YR: CPT | Mod: CPTII,S$GLB,, | Performed by: PHYSICIAN ASSISTANT

## 2022-12-13 PROCEDURE — 1126F AMNT PAIN NOTED NONE PRSNT: CPT | Mod: CPTII,S$GLB,, | Performed by: PHYSICIAN ASSISTANT

## 2022-12-13 PROCEDURE — 99999 PR PBB SHADOW E&M-EST. PATIENT-LVL III: ICD-10-PCS | Mod: PBBFAC,,, | Performed by: PHYSICIAN ASSISTANT

## 2022-12-13 PROCEDURE — 99999 PR PBB SHADOW E&M-EST. PATIENT-LVL III: CPT | Mod: PBBFAC,,, | Performed by: PHYSICIAN ASSISTANT

## 2022-12-13 PROCEDURE — 99024 PR POST-OP FOLLOW-UP VISIT: ICD-10-PCS | Mod: S$GLB,,, | Performed by: PHYSICIAN ASSISTANT

## 2022-12-13 PROCEDURE — 1126F PR PAIN SEVERITY QUANTIFIED, NO PAIN PRESENT: ICD-10-PCS | Mod: CPTII,S$GLB,, | Performed by: PHYSICIAN ASSISTANT

## 2022-12-13 PROCEDURE — 1101F PR PT FALLS ASSESS DOC 0-1 FALLS W/OUT INJ PAST YR: ICD-10-PCS | Mod: CPTII,S$GLB,, | Performed by: PHYSICIAN ASSISTANT

## 2022-12-13 PROCEDURE — 1159F MED LIST DOCD IN RCRD: CPT | Mod: CPTII,S$GLB,, | Performed by: PHYSICIAN ASSISTANT

## 2022-12-13 NOTE — PROGRESS NOTES
Dr. Beach is the supervising physician for this encounter/patient    Drake Barraza presents for post-operative evaluation.  The patient is now 2 weeks s/p Left endoscopic carpal tunnel release with Dr. Beach on 11/30/22.  Overall the patient reports doing well.  He reports 0/10 pain, denies any f/c/s. He reports that his pain has resolved and  strength has improved, still has some mild numbness.    PE:    AA&O x 4.  NAD  HEENT:  NCAT, sclera nonicteric  Lungs:  Respirations are equal and unlabored.  CV:  2+ bilateral upper and lower extremity pulses.  MSK: The wound is healing well with no signs of erythema or warmth.  There is no drainage.  No clinical signs or symptoms of infection are present.  Full hand/wrist motion. SILT. DNVI.    A/P: Status post above, doing well  1) Continue with activity as tolerated.  2) All sutures removed today. Wound care and signs of infection discussed.  3) F/U as needed.  4) Call with any questions/concerns in the interim      Jamie Russo-DiGeorge PA-C

## 2023-01-10 NOTE — PROGRESS NOTES
"  Physical Therapy Daily Treatment Note     Name: Drake Barraza  Clinic Number: [id number]  Therapy Diagnosis:        Encounter Diagnoses   Name Primary?    Chronic pain of right knee      Localized edema      Joint stiffness of knee, right        Physician: [facility] Jr, MD     Physician Orders: PT Eval and Treat   Medical Diagnosis from Referral: Z96.651 (ICD-10-CM) - Status post total right knee replacement  Evaluation Date: 8/13/2019  Authorization Period Expiration: 12/31/2019  Plan of Care Expiration: 9/10/2019  Visit # / Visits authorized: 6/ 20     Time In: 1255  Time Out: 1355  Total Billable Time: 30 minutes  Tx time 60     Precautions: Standard, blood thinners and Fall,     Subjective     Pt reports: no pain in R knee but "it still aches me in the morning till I get moving around".   He was compliant with home exercise program.  Response to previous treatment: min soreness  Functional change: ambulating without assistive device into clinic w/o pain     Pain: 0/10  Location: right knee      Objective   R knee PROM 0-125'     Drake received therapeutic exercises to develop strength, endurance, ROM, flexibility and posture for 40 minutes including:  Stationary bike for 10' for increased ROM R knee, mm endurance and increased blood circulation   R QS under heel 3x10/3"  R SLR under heel 3x10/3"  Seated extension 4# above and below knee, leg elevated on 2nd chair 5'  LAQ 4# 3 x 10 B  Leg Press 100# 30x  SL Leg Press 70# 30x  Lateral walks RTB 2 laps    Not today:  TKE with BTB 30x on R  SLR 2# 2 x 10 bilaterally  Prone knee extension 3# 5'  Prone knee flexion 3x10  Prone TKE w/bolster 3x10  SAQ 3x10  Gastroc stretch with band 30 sec 5x  Long sitting knee extensionn 3# above knee, straps at ankles to avoid ER 4'  Cybex TKE 4 pl 20x  SLB 10 sec 10x on blue pad     Drake received the following manual therapy techniques: Joint mobilizations and Soft tissue Mobilization were applied to the: R knee  for 10 " well developed, well nourished , in no acute distress , ambulating without difficulty , normal communication ability minutes, including: patella mobs, HSS, GSS and quad stretching    Drake received cold pack for 10 minutes to to decrease circulation, pain, and swelling.    Home Exercises Provided and Patient Education Provided     Education provided:   Posture awareness, HEP and RICE     Written Home Exercises Provided: yes.  Exercises were reviewed and Drake was able to demonstrate them prior to the end of the session.  Drake demonstrated good  understanding of the education provided.       Assessment     Pt requires SBA with lateral walks with RTB due to balance impairments. Leg press strength improved today and seated LAQ strength. Increased tolerance to long sitting extensions for 5' with increased weight around the knee.    Pt prognosis is Good.     Pt will continue to benefit from skilled outpatient physical therapy to address the deficits listed in the problem list box on initial evaluation, provide pt/family education and to maximize pt's level of independence in the home and community environment.     Pt's spiritual, cultural and educational needs considered and pt agreeable to plan of care and goals.    Anticipated barriers to physical therapy: none     Progressing, has not met his LTG    Goals:GOALS: Short Term Goals:  4 weeks  1.Report decreased Knee pain  < / =  3/10  to increase tolerance for ambulating in community (met)  2. Increase knee ROM to 120 deg flexion in order to be able to perform ADLs without difficulty.(progressing, not met)  3. Increase strength by 1/3 MMT grade in quads  to increase tolerance for ADL and work activities.(met)  4. Pt to tolerate HEP to improve ROM and independence with ADL's (met)     Long Term Goals: 8 weeks  1.Report decreased knee pain < / = 1/10  to increase tolerance for returning to [facility]  2.Patient goal: Return to climbing stairs without pain  3.Increase strength to >/= 4+/5 in quads  to increase tolerance for ADL and work activities.  4. Pt will report at 36%  on FOTO knee to demonstrate increase in LE function with every day tasks.      Plan     Continue to progress functional activities in closed chain    Enrrique Cueto, PTA, STS

## 2023-01-18 ENCOUNTER — OFFICE VISIT (OUTPATIENT)
Dept: URGENT CARE | Facility: CLINIC | Age: 78
End: 2023-01-18
Payer: MEDICARE

## 2023-01-18 VITALS
TEMPERATURE: 99 F | OXYGEN SATURATION: 97 % | BODY MASS INDEX: 26.94 KG/M2 | WEIGHT: 172 LBS | HEART RATE: 65 BPM | RESPIRATION RATE: 16 BRPM | DIASTOLIC BLOOD PRESSURE: 72 MMHG | SYSTOLIC BLOOD PRESSURE: 122 MMHG

## 2023-01-18 DIAGNOSIS — R05.9 COUGH, UNSPECIFIED TYPE: ICD-10-CM

## 2023-01-18 DIAGNOSIS — J06.9 UPPER RESPIRATORY TRACT INFECTION, UNSPECIFIED TYPE: Primary | ICD-10-CM

## 2023-01-18 LAB
CTP QC/QA: YES
SARS-COV-2 AG RESP QL IA.RAPID: NEGATIVE

## 2023-01-18 PROCEDURE — 99213 OFFICE O/P EST LOW 20 MIN: CPT | Mod: S$GLB,,, | Performed by: NURSE PRACTITIONER

## 2023-01-18 PROCEDURE — 87811 SARS-COV-2 COVID19 W/OPTIC: CPT | Mod: QW,S$GLB,, | Performed by: NURSE PRACTITIONER

## 2023-01-18 PROCEDURE — 1160F RVW MEDS BY RX/DR IN RCRD: CPT | Mod: CPTII,S$GLB,, | Performed by: NURSE PRACTITIONER

## 2023-01-18 PROCEDURE — 3078F PR MOST RECENT DIASTOLIC BLOOD PRESSURE < 80 MM HG: ICD-10-PCS | Mod: CPTII,S$GLB,, | Performed by: NURSE PRACTITIONER

## 2023-01-18 PROCEDURE — 87811 SARS CORONAVIRUS 2 ANTIGEN POCT, MANUAL READ: ICD-10-PCS | Mod: QW,S$GLB,, | Performed by: NURSE PRACTITIONER

## 2023-01-18 PROCEDURE — 1126F PR PAIN SEVERITY QUANTIFIED, NO PAIN PRESENT: ICD-10-PCS | Mod: CPTII,S$GLB,, | Performed by: NURSE PRACTITIONER

## 2023-01-18 PROCEDURE — 1159F PR MEDICATION LIST DOCUMENTED IN MEDICAL RECORD: ICD-10-PCS | Mod: CPTII,S$GLB,, | Performed by: NURSE PRACTITIONER

## 2023-01-18 PROCEDURE — 99213 PR OFFICE/OUTPT VISIT, EST, LEVL III, 20-29 MIN: ICD-10-PCS | Mod: S$GLB,,, | Performed by: NURSE PRACTITIONER

## 2023-01-18 PROCEDURE — 3074F PR MOST RECENT SYSTOLIC BLOOD PRESSURE < 130 MM HG: ICD-10-PCS | Mod: CPTII,S$GLB,, | Performed by: NURSE PRACTITIONER

## 2023-01-18 PROCEDURE — 3074F SYST BP LT 130 MM HG: CPT | Mod: CPTII,S$GLB,, | Performed by: NURSE PRACTITIONER

## 2023-01-18 PROCEDURE — 1126F AMNT PAIN NOTED NONE PRSNT: CPT | Mod: CPTII,S$GLB,, | Performed by: NURSE PRACTITIONER

## 2023-01-18 PROCEDURE — 3078F DIAST BP <80 MM HG: CPT | Mod: CPTII,S$GLB,, | Performed by: NURSE PRACTITIONER

## 2023-01-18 PROCEDURE — 1160F PR REVIEW ALL MEDS BY PRESCRIBER/CLIN PHARMACIST DOCUMENTED: ICD-10-PCS | Mod: CPTII,S$GLB,, | Performed by: NURSE PRACTITIONER

## 2023-01-18 PROCEDURE — 1159F MED LIST DOCD IN RCRD: CPT | Mod: CPTII,S$GLB,, | Performed by: NURSE PRACTITIONER

## 2023-01-18 RX ORDER — FLUTICASONE PROPIONATE 50 MCG
2 SPRAY, SUSPENSION (ML) NASAL DAILY
Qty: 18.2 ML | Refills: 0 | Status: SHIPPED | OUTPATIENT
Start: 2023-01-18 | End: 2023-01-18

## 2023-01-18 RX ORDER — CETIRIZINE HYDROCHLORIDE 10 MG/1
10 TABLET ORAL DAILY
Qty: 30 TABLET | Refills: 0 | Status: SHIPPED | OUTPATIENT
Start: 2023-01-18 | End: 2023-05-20

## 2023-01-18 RX ORDER — BENZONATATE 100 MG/1
100 CAPSULE ORAL 3 TIMES DAILY PRN
Qty: 30 CAPSULE | Refills: 0 | Status: SHIPPED | OUTPATIENT
Start: 2023-01-18 | End: 2023-01-28

## 2023-01-18 RX ORDER — PROMETHAZINE HYDROCHLORIDE AND DEXTROMETHORPHAN HYDROBROMIDE 6.25; 15 MG/5ML; MG/5ML
5 SYRUP ORAL EVERY 4 HOURS PRN
Qty: 240 ML | Refills: 0 | Status: SHIPPED | OUTPATIENT
Start: 2023-01-18 | End: 2023-01-28

## 2023-01-18 NOTE — PROGRESS NOTES
Subjective:       Patient ID: Drake Barraza is a 77 y.o. male.    Vitals:  weight is 78 kg (172 lb). His oral temperature is 99.1 °F (37.3 °C). His blood pressure is 122/72 and his pulse is 65. His respiration is 16 and oxygen saturation is 97%.     Chief Complaint: Cough    78yo male pt presents with wife, who is also being seen as patient.  Reports productive cough that started with clear sputum 7 days ago, but now has green-tingued sputum.  Reports that cough is worse at night and nasal congestion occurs at night, reports clear runny nose.  Reports fever (103.0F at home) and chills on first day of symptoms, denies current fever/chills.  Denies n/v/d, denies chest pain, wheezing, or SOB.  Reports mild relief with ibuprofen and OTC allergy relief cough syrup.  Denies any other known sick contacts.  Reports COVID vaccination, denies flu vaccination.    Cough  The problem has been gradually improving. The cough is Productive of sputum. Associated symptoms include chills (resolved), a fever (resolved), headaches, nasal congestion, postnasal drip and a sore throat. Pertinent negatives include no chest pain, ear congestion, ear pain, shortness of breath or wheezing. His past medical history is significant for environmental allergies. There is no history of asthma, bronchitis or pneumonia.     Constitution: Positive for chills (resolved) and fever (resolved).   HENT:  Positive for congestion, postnasal drip and sore throat. Negative for ear pain, sinus pain, sinus pressure and trouble swallowing.    Cardiovascular:  Negative for chest pain.   Respiratory:  Positive for cough and sputum production. Negative for chest tightness, shortness of breath and wheezing.    Gastrointestinal:  Negative for nausea, vomiting and diarrhea.   Allergic/Immunologic: Positive for environmental allergies.   Neurological:  Positive for headaches.     Objective:      Physical Exam   Constitutional: He is oriented to person, place, and  time. He appears well-developed. He is cooperative.  Non-toxic appearance. He does not appear ill. No distress.   HENT:   Head: Normocephalic and atraumatic.   Ears:   Right Ear: Hearing, external ear and ear canal normal. Tympanic membrane is bulging. Tympanic membrane is not erythematous and not retracted. A middle ear effusion (clear fluid) is present.   Left Ear: Hearing, external ear and ear canal normal. Tympanic membrane is bulging. Tympanic membrane is not erythematous and not retracted. A middle ear effusion (clear fluid) is present.   Nose: Mucosal edema (erythema to BL turbinates) and rhinorrhea (clear to BL nares) present. No purulent discharge or nasal deformity. No epistaxis. Right sinus exhibits no maxillary sinus tenderness and no frontal sinus tenderness. Left sinus exhibits no maxillary sinus tenderness and no frontal sinus tenderness.   Mouth/Throat: Uvula is midline and mucous membranes are normal. No trismus in the jaw. Normal dentition. No uvula swelling. Oropharyngeal exudate (clear postnasal drip) present. No posterior oropharyngeal edema or posterior oropharyngeal erythema. Tonsils are 1+ on the right. Tonsils are 1+ on the left. No tonsillar exudate.   Eyes: Conjunctivae and lids are normal. No scleral icterus.   Neck: Trachea normal and phonation normal. Neck supple. No edema present. No erythema present. No neck rigidity present.   Cardiovascular: Normal rate, regular rhythm, normal heart sounds and normal pulses.   Pulmonary/Chest: Effort normal and breath sounds normal. No accessory muscle usage or stridor. No tachypnea. No respiratory distress. He has no decreased breath sounds. He has no wheezes. He has no rhonchi. He has no rales.   Abdominal: Normal appearance.   Musculoskeletal: Normal range of motion.         General: No deformity. Normal range of motion.   Lymphadenopathy:        Head (right side): No submandibular adenopathy present.        Head (left side): No submandibular  adenopathy present.     He has no cervical adenopathy.   Neurological: He is alert and oriented to person, place, and time. He exhibits normal muscle tone. Coordination normal.   Skin: Skin is warm, dry, intact, not diaphoretic and not pale.   Psychiatric: His speech is normal and behavior is normal. Judgment and thought content normal.   Nursing note and vitals reviewed.    Results for orders placed or performed in visit on 01/18/23   SARS Coronavirus 2 Antigen, POCT Manual Read   Result Value Ref Range    SARS Coronavirus 2 Antigen Negative Negative     Acceptable Yes            Assessment:       1. Upper respiratory tract infection, unspecified type    2. Cough, unspecified type          Plan:       Provided education on prescribed medications.  Provided education on return/ER precautions.  Pt and wife both verbalized understanding and agreed to plan.      Upper respiratory tract infection, unspecified type  -     cetirizine (ZYRTEC) 10 MG tablet; Take 1 tablet (10 mg total) by mouth once daily.  Dispense: 30 tablet; Refill: 0  -     fluticasone propionate (FLONASE) 50 mcg/actuation nasal spray; 2 sprays (100 mcg total) by Each Nostril route once daily.  Dispense: 18.2 mL; Refill: 0    Cough, unspecified type  -     SARS Coronavirus 2 Antigen, POCT Manual Read  -     benzonatate (TESSALON) 100 MG capsule; Take 1 capsule (100 mg total) by mouth 3 (three) times daily as needed for Cough.  Dispense: 30 capsule; Refill: 0  -     promethazine-dextromethorphan (PROMETHAZINE-DM) 6.25-15 mg/5 mL Syrp; Take 5 mLs by mouth every 4 (four) hours as needed (cough).  Dispense: 240 mL; Refill: 0      Patient Instructions   If your condition worsens or fails to improve, we recommend that you receive another evaluation at the ER immediately contact your PCP to discuss your concerns, or return here.  You must understand that you've received an urgent care treatment only, and that you may be released before all your  "medical problems are known or treated.  You, the patient, will arrange for follow-up care as instructed.     If we discussed that I think your illness is viral, it will not respond to antibiotics and will last 10-14 days.  If we discussed "wait and see" antibiotics, and if over the next few days the symptoms worsen, start the antibiotics I have given you.     If you are female and on birth control pills and do take the antibiotics, use additional methods to prevent pregnancy while on the antibiotics and for one cycle after.     Flonase (fluticasone) is a nasal spray which is available over the counter and may help with your symptoms.  Zyrtec D, Claritin D, or Allegra D can also help with symptoms of congestion and drainage.  If you have hypertension, avoid using the "D" which is the decongestant formula.    If you just have drainage, you can take plain Zyrtec, Claritin, or Allegra.  If you just have a congested feeling, you can take pseudoephedrine (unless you have high blood pressure), which you have to sign for behind the counter.  Do not buy phenylephrine OTC, as it is not effective.    Rest and fluids are also important.  Tylenol or ibuprofen can also be used as directed for pain, unless you have an allergy to them or medical condition (such as stomach ulcers, kidney or liver disease, or use blood thinners, etc.) for which you should not be taking these type of medications.     If you are flying in the next few days, Afrin nose drops for the airplane flight upon take off and landing may help.  Other than at those times, refrain from using Afrin.     If you were prescribed a narcotic or sedating cough medicine, do not drive or operate heavy machinery while taking these medications.                        "

## 2023-02-07 DIAGNOSIS — Z00.00 ENCOUNTER FOR MEDICARE ANNUAL WELLNESS EXAM: ICD-10-CM

## 2023-02-09 DIAGNOSIS — Z00.00 ENCOUNTER FOR MEDICARE ANNUAL WELLNESS EXAM: ICD-10-CM

## 2023-03-13 ENCOUNTER — PATIENT MESSAGE (OUTPATIENT)
Dept: INTERNAL MEDICINE | Facility: CLINIC | Age: 78
End: 2023-03-13
Payer: MEDICARE

## 2023-03-13 DIAGNOSIS — I25.10 CORONARY ARTERY DISEASE INVOLVING NATIVE CORONARY ARTERY OF NATIVE HEART WITHOUT ANGINA PECTORIS: ICD-10-CM

## 2023-03-13 DIAGNOSIS — E11.9 TYPE 2 DIABETES MELLITUS WITHOUT COMPLICATION, WITHOUT LONG-TERM CURRENT USE OF INSULIN: Primary | ICD-10-CM

## 2023-03-13 DIAGNOSIS — I10 ESSENTIAL HYPERTENSION: ICD-10-CM

## 2023-03-13 DIAGNOSIS — E03.4 HYPOTHYROIDISM DUE TO ACQUIRED ATROPHY OF THYROID: ICD-10-CM

## 2023-03-13 DIAGNOSIS — Z12.5 PROSTATE CANCER SCREENING: ICD-10-CM

## 2023-04-20 ENCOUNTER — OFFICE VISIT (OUTPATIENT)
Dept: DERMATOLOGY | Facility: CLINIC | Age: 78
End: 2023-04-20
Payer: MEDICARE

## 2023-04-20 DIAGNOSIS — D22.9 NEVUS: ICD-10-CM

## 2023-04-20 DIAGNOSIS — L82.1 SK (SEBORRHEIC KERATOSIS): ICD-10-CM

## 2023-04-20 DIAGNOSIS — D18.01 CHERRY ANGIOMA: ICD-10-CM

## 2023-04-20 DIAGNOSIS — L57.0 AK (ACTINIC KERATOSIS): Primary | ICD-10-CM

## 2023-04-20 PROCEDURE — 1126F AMNT PAIN NOTED NONE PRSNT: CPT | Mod: HCNC,CPTII,S$GLB, | Performed by: DERMATOLOGY

## 2023-04-20 PROCEDURE — 99999 PR PBB SHADOW E&M-EST. PATIENT-LVL III: CPT | Mod: PBBFAC,HCNC,, | Performed by: DERMATOLOGY

## 2023-04-20 PROCEDURE — 1160F RVW MEDS BY RX/DR IN RCRD: CPT | Mod: HCNC,CPTII,S$GLB, | Performed by: DERMATOLOGY

## 2023-04-20 PROCEDURE — 1159F PR MEDICATION LIST DOCUMENTED IN MEDICAL RECORD: ICD-10-PCS | Mod: HCNC,CPTII,S$GLB, | Performed by: DERMATOLOGY

## 2023-04-20 PROCEDURE — 1160F PR REVIEW ALL MEDS BY PRESCRIBER/CLIN PHARMACIST DOCUMENTED: ICD-10-PCS | Mod: HCNC,CPTII,S$GLB, | Performed by: DERMATOLOGY

## 2023-04-20 PROCEDURE — 17000 PR DESTRUCTION(LASER SURGERY,CRYOSURGERY,CHEMOSURGERY),PREMALIGNANT LESIONS,FIRST LESION: ICD-10-PCS | Mod: HCNC,S$GLB,, | Performed by: DERMATOLOGY

## 2023-04-20 PROCEDURE — 17003 DESTRUCT PREMALG LES 2-14: CPT | Mod: HCNC,S$GLB,, | Performed by: DERMATOLOGY

## 2023-04-20 PROCEDURE — 99999 PR PBB SHADOW E&M-EST. PATIENT-LVL III: ICD-10-PCS | Mod: PBBFAC,HCNC,, | Performed by: DERMATOLOGY

## 2023-04-20 PROCEDURE — 1159F MED LIST DOCD IN RCRD: CPT | Mod: HCNC,CPTII,S$GLB, | Performed by: DERMATOLOGY

## 2023-04-20 PROCEDURE — 1101F PR PT FALLS ASSESS DOC 0-1 FALLS W/OUT INJ PAST YR: ICD-10-PCS | Mod: HCNC,CPTII,S$GLB, | Performed by: DERMATOLOGY

## 2023-04-20 PROCEDURE — 99213 PR OFFICE/OUTPT VISIT, EST, LEVL III, 20-29 MIN: ICD-10-PCS | Mod: 25,HCNC,S$GLB, | Performed by: DERMATOLOGY

## 2023-04-20 PROCEDURE — 3288F PR FALLS RISK ASSESSMENT DOCUMENTED: ICD-10-PCS | Mod: HCNC,CPTII,S$GLB, | Performed by: DERMATOLOGY

## 2023-04-20 PROCEDURE — 17000 DESTRUCT PREMALG LESION: CPT | Mod: HCNC,S$GLB,, | Performed by: DERMATOLOGY

## 2023-04-20 PROCEDURE — 99213 OFFICE O/P EST LOW 20 MIN: CPT | Mod: 25,HCNC,S$GLB, | Performed by: DERMATOLOGY

## 2023-04-20 PROCEDURE — 17003 DESTRUCTION, PREMALIGNANT LESIONS; SECOND THROUGH 14 LESIONS: ICD-10-PCS | Mod: HCNC,S$GLB,, | Performed by: DERMATOLOGY

## 2023-04-20 PROCEDURE — 1101F PT FALLS ASSESS-DOCD LE1/YR: CPT | Mod: HCNC,CPTII,S$GLB, | Performed by: DERMATOLOGY

## 2023-04-20 PROCEDURE — 3288F FALL RISK ASSESSMENT DOCD: CPT | Mod: HCNC,CPTII,S$GLB, | Performed by: DERMATOLOGY

## 2023-04-20 PROCEDURE — 1126F PR PAIN SEVERITY QUANTIFIED, NO PAIN PRESENT: ICD-10-PCS | Mod: HCNC,CPTII,S$GLB, | Performed by: DERMATOLOGY

## 2023-04-20 NOTE — PROGRESS NOTES
"  Subjective:      Patient ID:  Drake Barraza is a 78 y.o. male who presents for   Chief Complaint   Patient presents with    Skin Check     UBSE     Patient is here today for a "mole" check.   Pt has a history of  extensive sun exposure in the past.   Pt recalls several blistering sunburns in the past- no  Pt has history of tanning bed use- no  Pt has  had moles removed in the past- no  Pt has history of melanoma in first degree relatives-  no    Patient with new complaint of lesion(s)  Location: Left temple  Duration: 1 year ago  Symptoms: dry and flaky   Relieving factors/Previous treatments: none    And pt is concerned about skin tags - under both axillas         Review of Systems   Skin:  Negative for daily sunscreen use, activity-related sunscreen use and recent sunburn.   Hematologic/Lymphatic: Does not bruise/bleed easily.     Objective:   Physical Exam   Constitutional: He appears well-developed and well-nourished. No distress.   Neurological: He is alert and oriented to person, place, and time. He is not disoriented.   Psychiatric: He has a normal mood and affect.   Skin:   Areas Examined (abnormalities noted in diagram):   Scalp / Hair Palpated and Inspected  Head / Face Inspection Performed  Neck Inspection Performed  Chest / Axilla Inspection Performed  Abdomen Inspection Performed  Back Inspection Performed  RUE Inspected  LUE Inspection Performed                   Diagram Legend     Erythematous scaling macule/papule c/w actinic keratosis       Vascular papule c/w angioma      Pigmented verrucoid papule/plaque c/w seborrheic keratosis      Yellow umbilicated papule c/w sebaceous hyperplasia      Irregularly shaped tan macule c/w lentigo     1-2 mm smooth white papules consistent with Milia      Movable subcutaneous cyst with punctum c/w epidermal inclusion cyst      Subcutaneous movable cyst c/w pilar cyst      Firm pink to brown papule c/w dermatofibroma      Pedunculated fleshy papule(s) c/w skin " tag(s)      Evenly pigmented macule c/w junctional nevus     Mildly variegated pigmented, slightly irregular-bordered macule c/w mildly atypical nevus      Flesh colored to evenly pigmented papule c/w intradermal nevus       Pink pearly papule/plaque c/w basal cell carcinoma      Erythematous hyperkeratotic cursted plaque c/w SCC      Surgical scar with no sign of skin cancer recurrence      Open and closed comedones      Inflammatory papules and pustules      Verrucoid papule consistent consistent with wart     Erythematous eczematous patches and plaques     Dystrophic onycholytic nail with subungual debris c/w onychomycosis     Umbilicated papule    Erythematous-base heme-crusted tan verrucoid plaque consistent with inflamed seborrheic keratosis     Erythematous Silvery Scaling Plaque c/w Psoriasis     See annotation      Assessment / Plan:        AK (actinic keratosis)  Cryosurgery Procedure Note    Verbal consent from the patient is obtained including, but not limited to, risk of hypopigmentation/hyperpigmentation, scar, recurrence of lesion. The patient is aware of the precancerous quality and need for treatment of these lesions. Liquid nitrogen cryosurgery is applied to the 3 actinic keratoses, as detailed in the physical exam, to produce a freeze injury. The patient is aware that blisters may form and is instructed on wound care with gentle cleansing and use of vaseline ointment to keep moist until healed. The patient is supplied a handout on cryosurgery and is instructed to call if lesions do not completely resolve.    Cherry angioma  These are benign vascular lesions that are inherited.  Treatment is not necessary.    SK (seborrheic keratosis)  These are benign inherited growths without a malignant potential. Reassurance given to patient. No treatment is necessary.     Schedule cosmetic removal - Discussed with patient that this procedure is not  covered by insurance as it can be considered cosmetic and pt  would like to pursue treatment.  He/she understands that he/she  may be responsible for the bill and agrees to pay.     Nevus  Discussed ABCDE's of nevi.  Monitor for new mole or moles that are becoming bigger, darker, irritated, or developing irregular borders. Brochure provided. Instructed patient to observe lesion(s) for changes and follow up in clinic if changes are noted. Patient to monitor skin at home for new or changing lesions.     SKIN TAGS  Reassurance given to patient. No treatment is necessary.   Treatment of benign, asymptomatic lesions may be considered cosmetic.      Upper body skin examination performed today including at least 6 points as noted in physical examination. No lesions suspicious for malignancy noted.    Recommend daily sun protection/avoidance and use of at least SPF 30, broad spectrum sunscreen (OTC drug).              Follow up in about 1 year (around 4/20/2024) for UBSE.

## 2023-04-20 NOTE — PATIENT INSTRUCTIONS
We would like to see you back in the clinic in 12 months.  You will be able to schedule this appointment by calling or by using your [facility] portal 3 months before this time. Because our schedule fills so quickly, please set a reminder in your phone or on your calendar to schedule 3 months before you are due to come in so that we can see you in a timely fashion.  You should also receive a reminder from us in the mail. This will help us ensure we can continue to provide excellent healthcare for you. Thank you.

## 2023-05-02 ENCOUNTER — TELEPHONE (OUTPATIENT)
Dept: INTERNAL MEDICINE | Facility: CLINIC | Age: 78
End: 2023-05-02
Payer: MEDICARE

## 2023-05-02 NOTE — TELEPHONE ENCOUNTER
----- Message from Rika Rausch sent at 5/2/2023 10:45 AM CDT -----  Contact: [phone] @ patient  Good Morning patient would like a call back about getting his his C PAP

## 2023-05-10 ENCOUNTER — LAB VISIT (OUTPATIENT)
Dept: LAB | Facility: HOSPITAL | Age: 78
End: 2023-05-10
Attending: INTERNAL MEDICINE
Payer: MEDICARE

## 2023-05-10 DIAGNOSIS — Z12.5 PROSTATE CANCER SCREENING: ICD-10-CM

## 2023-05-10 DIAGNOSIS — I10 ESSENTIAL HYPERTENSION: ICD-10-CM

## 2023-05-10 DIAGNOSIS — E11.9 TYPE 2 DIABETES MELLITUS WITHOUT COMPLICATION, WITHOUT LONG-TERM CURRENT USE OF INSULIN: ICD-10-CM

## 2023-05-10 DIAGNOSIS — I25.10 CORONARY ARTERY DISEASE INVOLVING NATIVE CORONARY ARTERY OF NATIVE HEART WITHOUT ANGINA PECTORIS: ICD-10-CM

## 2023-05-10 DIAGNOSIS — E03.4 HYPOTHYROIDISM DUE TO ACQUIRED ATROPHY OF THYROID: ICD-10-CM

## 2023-05-10 LAB
ALBUMIN SERPL BCP-MCNC: 4.3 G/DL (ref 3.5–5.2)
ALP SERPL-CCNC: 71 U/L (ref 55–135)
ALT SERPL W/O P-5'-P-CCNC: 47 U/L (ref 10–44)
ANION GAP SERPL CALC-SCNC: 13 MMOL/L (ref 8–16)
AST SERPL-CCNC: 39 U/L (ref 10–40)
BASOPHILS # BLD AUTO: 0.05 K/UL (ref 0–0.2)
BASOPHILS NFR BLD: 0.9 % (ref 0–1.9)
BILIRUB SERPL-MCNC: 0.6 MG/DL (ref 0.1–1)
BUN SERPL-MCNC: 23 MG/DL (ref 8–23)
CALCIUM SERPL-MCNC: 10.2 MG/DL (ref 8.7–10.5)
CHLORIDE SERPL-SCNC: 105 MMOL/L (ref 95–110)
CHOLEST SERPL-MCNC: 141 MG/DL (ref 120–199)
CHOLEST/HDLC SERPL: 3.2 {RATIO} (ref 2–5)
CO2 SERPL-SCNC: 24 MMOL/L (ref 23–29)
COMPLEXED PSA SERPL-MCNC: 0.8 NG/ML (ref 0–4)
CREAT SERPL-MCNC: 1.2 MG/DL (ref 0.5–1.4)
DIFFERENTIAL METHOD: ABNORMAL
EOSINOPHIL # BLD AUTO: 0.2 K/UL (ref 0–0.5)
EOSINOPHIL NFR BLD: 2.8 % (ref 0–8)
ERYTHROCYTE [DISTWIDTH] IN BLOOD BY AUTOMATED COUNT: 13.1 % (ref 11.5–14.5)
EST. GFR  (NO RACE VARIABLE): >60 ML/MIN/1.73 M^2
ESTIMATED AVG GLUCOSE: 151 MG/DL (ref 68–131)
GLUCOSE SERPL-MCNC: 147 MG/DL (ref 70–110)
HBA1C MFR BLD: 6.9 % (ref 4–5.6)
HCT VFR BLD AUTO: 47.3 % (ref 40–54)
HDLC SERPL-MCNC: 44 MG/DL (ref 40–75)
HDLC SERPL: 31.2 % (ref 20–50)
HGB BLD-MCNC: 15.5 G/DL (ref 14–18)
IMM GRANULOCYTES # BLD AUTO: 0.01 K/UL (ref 0–0.04)
IMM GRANULOCYTES NFR BLD AUTO: 0.2 % (ref 0–0.5)
LDLC SERPL CALC-MCNC: 76.8 MG/DL (ref 63–159)
LYMPHOCYTES # BLD AUTO: 2.5 K/UL (ref 1–4.8)
LYMPHOCYTES NFR BLD: 45.8 % (ref 18–48)
MCH RBC QN AUTO: 31.6 PG (ref 27–31)
MCHC RBC AUTO-ENTMCNC: 32.8 G/DL (ref 32–36)
MCV RBC AUTO: 96 FL (ref 82–98)
MONOCYTES # BLD AUTO: 0.6 K/UL (ref 0.3–1)
MONOCYTES NFR BLD: 11.4 % (ref 4–15)
NEUTROPHILS # BLD AUTO: 2.1 K/UL (ref 1.8–7.7)
NEUTROPHILS NFR BLD: 38.9 % (ref 38–73)
NONHDLC SERPL-MCNC: 97 MG/DL
NRBC BLD-RTO: 0 /100 WBC
PLATELET # BLD AUTO: 169 K/UL (ref 150–450)
PMV BLD AUTO: 9.8 FL (ref 9.2–12.9)
POTASSIUM SERPL-SCNC: 4.9 MMOL/L (ref 3.5–5.1)
PROT SERPL-MCNC: 7.3 G/DL (ref 6–8.4)
RBC # BLD AUTO: 4.91 M/UL (ref 4.6–6.2)
SODIUM SERPL-SCNC: 142 MMOL/L (ref 136–145)
TRIGL SERPL-MCNC: 101 MG/DL (ref 30–150)
TSH SERPL DL<=0.005 MIU/L-ACNC: 3.31 UIU/ML (ref 0.4–4)
WBC # BLD AUTO: 5.35 K/UL (ref 3.9–12.7)

## 2023-05-10 PROCEDURE — 84153 ASSAY OF PSA TOTAL: CPT | Mod: HCNC | Performed by: INTERNAL MEDICINE

## 2023-05-10 PROCEDURE — 85025 COMPLETE CBC W/AUTO DIFF WBC: CPT | Mod: HCNC | Performed by: INTERNAL MEDICINE

## 2023-05-10 PROCEDURE — 84443 ASSAY THYROID STIM HORMONE: CPT | Mod: HCNC | Performed by: INTERNAL MEDICINE

## 2023-05-10 PROCEDURE — 36415 COLL VENOUS BLD VENIPUNCTURE: CPT | Mod: HCNC | Performed by: INTERNAL MEDICINE

## 2023-05-10 PROCEDURE — 83036 HEMOGLOBIN GLYCOSYLATED A1C: CPT | Mod: HCNC | Performed by: INTERNAL MEDICINE

## 2023-05-10 PROCEDURE — 80061 LIPID PANEL: CPT | Mod: HCNC | Performed by: INTERNAL MEDICINE

## 2023-05-10 PROCEDURE — 80053 COMPREHEN METABOLIC PANEL: CPT | Mod: HCNC | Performed by: INTERNAL MEDICINE

## 2023-05-19 ENCOUNTER — OFFICE VISIT (OUTPATIENT)
Dept: INTERNAL MEDICINE | Facility: CLINIC | Age: 78
End: 2023-05-19
Payer: MEDICARE

## 2023-05-19 VITALS
HEART RATE: 62 BPM | HEIGHT: 67 IN | BODY MASS INDEX: 27.3 KG/M2 | WEIGHT: 173.94 LBS | DIASTOLIC BLOOD PRESSURE: 72 MMHG | SYSTOLIC BLOOD PRESSURE: 132 MMHG | OXYGEN SATURATION: 98 %

## 2023-05-19 DIAGNOSIS — N52.9 IMPOTENCE: ICD-10-CM

## 2023-05-19 DIAGNOSIS — I25.10 CORONARY ARTERY DISEASE INVOLVING NATIVE CORONARY ARTERY OF NATIVE HEART WITHOUT ANGINA PECTORIS: ICD-10-CM

## 2023-05-19 DIAGNOSIS — I10 ESSENTIAL HYPERTENSION: Primary | ICD-10-CM

## 2023-05-19 DIAGNOSIS — E11.9 TYPE 2 DIABETES MELLITUS WITHOUT COMPLICATION, WITHOUT LONG-TERM CURRENT USE OF INSULIN: ICD-10-CM

## 2023-05-19 DIAGNOSIS — E03.4 HYPOTHYROIDISM DUE TO ACQUIRED ATROPHY OF THYROID: ICD-10-CM

## 2023-05-19 DIAGNOSIS — G47.33 OSA (OBSTRUCTIVE SLEEP APNEA): ICD-10-CM

## 2023-05-19 DIAGNOSIS — G56.02 CARPAL TUNNEL SYNDROME OF LEFT WRIST: ICD-10-CM

## 2023-05-19 DIAGNOSIS — I70.0 ATHEROSCLEROSIS OF AORTA: ICD-10-CM

## 2023-05-19 PROCEDURE — 1101F PR PT FALLS ASSESS DOC 0-1 FALLS W/OUT INJ PAST YR: ICD-10-PCS | Mod: HCNC,CPTII,, | Performed by: INTERNAL MEDICINE

## 2023-05-19 PROCEDURE — 99999 PR PBB SHADOW E&M-EST. PATIENT-LVL IV: ICD-10-PCS | Mod: PBBFAC,HCNC,, | Performed by: INTERNAL MEDICINE

## 2023-05-19 PROCEDURE — 99215 OFFICE O/P EST HI 40 MIN: CPT | Mod: HCNC,,, | Performed by: INTERNAL MEDICINE

## 2023-05-19 PROCEDURE — 3288F PR FALLS RISK ASSESSMENT DOCUMENTED: ICD-10-PCS | Mod: HCNC,CPTII,, | Performed by: INTERNAL MEDICINE

## 2023-05-19 PROCEDURE — 1159F MED LIST DOCD IN RCRD: CPT | Mod: HCNC,CPTII,, | Performed by: INTERNAL MEDICINE

## 2023-05-19 PROCEDURE — 3078F DIAST BP <80 MM HG: CPT | Mod: HCNC,CPTII,, | Performed by: INTERNAL MEDICINE

## 2023-05-19 PROCEDURE — 99215 PR OFFICE/OUTPT VISIT, EST, LEVL V, 40-54 MIN: ICD-10-PCS | Mod: HCNC,,, | Performed by: INTERNAL MEDICINE

## 2023-05-19 PROCEDURE — 99999 PR PBB SHADOW E&M-EST. PATIENT-LVL IV: CPT | Mod: PBBFAC,HCNC,, | Performed by: INTERNAL MEDICINE

## 2023-05-19 PROCEDURE — 99214 OFFICE O/P EST MOD 30 MIN: CPT | Mod: HCNC | Performed by: INTERNAL MEDICINE

## 2023-05-19 PROCEDURE — 1159F PR MEDICATION LIST DOCUMENTED IN MEDICAL RECORD: ICD-10-PCS | Mod: HCNC,CPTII,, | Performed by: INTERNAL MEDICINE

## 2023-05-19 PROCEDURE — 3078F PR MOST RECENT DIASTOLIC BLOOD PRESSURE < 80 MM HG: ICD-10-PCS | Mod: HCNC,CPTII,, | Performed by: INTERNAL MEDICINE

## 2023-05-19 PROCEDURE — 3288F FALL RISK ASSESSMENT DOCD: CPT | Mod: HCNC,CPTII,, | Performed by: INTERNAL MEDICINE

## 2023-05-19 PROCEDURE — 1101F PT FALLS ASSESS-DOCD LE1/YR: CPT | Mod: HCNC,CPTII,, | Performed by: INTERNAL MEDICINE

## 2023-05-19 PROCEDURE — 3075F SYST BP GE 130 - 139MM HG: CPT | Mod: HCNC,CPTII,, | Performed by: INTERNAL MEDICINE

## 2023-05-19 PROCEDURE — 3075F PR MOST RECENT SYSTOLIC BLOOD PRESS GE 130-139MM HG: ICD-10-PCS | Mod: HCNC,CPTII,, | Performed by: INTERNAL MEDICINE

## 2023-05-19 RX ORDER — SILDENAFIL 100 MG/1
100 TABLET, FILM COATED ORAL DAILY PRN
Qty: 30 TABLET | Refills: 0 | Status: SHIPPED | OUTPATIENT
Start: 2023-05-19 | End: 2024-05-18

## 2023-05-19 NOTE — PROGRESS NOTES
Subjective:       Patient ID: Drake Barraza is a 78 y.o. male.    Chief Complaint:   Annual Exam    HPI: Mr Barraza presents for annual follow up multiple issues  He has been doing well and has no acute issues or complaints of concern  DM II: Med Tx 1-Cinnamon tabs only-no meds         Accuchecks:AM- 140-150, 118  HTN: Med Tx 1-Altace 10mg QD, 2-Lopressor 25mg BID         Home BPs: running good  CAD/HLD: Med Tx 1- Crestor 20mg QD, 2-Baby ASA 81mg QD  MICHELLE: +Home CPAP at 9 cm pressure/FFM/ He wears such 8+ hours nightly    Past Medical, Surgical, Social History: Please see as stated in Epic chart which has been reviewed.    Current Outpatient Medications   Medication Sig Dispense Refill    CINNAMON BARK (CINNAMON ORAL) Take 1 capsule by mouth once daily.      levothyroxine (SYNTHROID) 125 MCG tablet TAKE 1 TABLET EVERY MORNING ON AN EMPTY STOMACH 90 tablet 3    metoprolol tartrate (LOPRESSOR) 25 MG tablet TAKE 1 TABLET TWICE DAILY 180 tablet 3    multivitamin (THERAGRAN) per tablet Take 1 tablet by mouth once daily.      nitroGLYCERIN (NITROSTAT) 0.4 MG SL tablet Place 1 tablet (0.4 mg total) under the tongue every 5 (five) minutes as needed for Chest pain. 100 tablet 2    omega-3 fatty acids-vitamin E 1,000 mg Cap Take 1 capsule by mouth Daily.        ramipriL (ALTACE) 10 MG capsule TAKE 1 CAPSULE EVERY DAY 90 capsule 3    rosuvastatin (CRESTOR) 20 MG tablet TAKE 1 TABLET EVERY DAY 90 tablet 3    TRUE METRIX GLUCOSE TEST STRIP Strp TEST SUGAR TWICE DAILY BEFORE MEALS 200 strip 3    TRUEPLUS LANCETS 30 gauge Misc USE TO TEST BLOOD SUGAR TWICE DAILY BEFORE MEALS 2-3 DAYS A WEEK 100 each 3    TURMERIC ROOT EXTRACT ORAL Take 1 capsule by mouth.      UBIDECARENONE (COENZYME Q10) 100 mg Tab Take 100 mg by mouth once daily.      vitamin D 185 MG Tab Take 185 mg by mouth once daily.      aspirin (ECOTRIN) 81 MG EC tablet Take 1 tablet (81 mg total) by mouth 2 (two) times daily. (Patient taking differently: Take 81 mg by  mouth once daily.) 60 tablet 0    blood-glucose meter kit Check glucose 1-2x/day after meals 1 each 0    cetirizine (ZYRTEC) 10 MG tablet Take 1 tablet (10 mg total) by mouth once daily. 30 tablet 0    fluticasone propionate (FLONASE) 50 mcg/actuation nasal spray SHAKE LIQUID AND USE 2 SPRAYS(100 MCG) IN EACH NOSTRIL EVERY DAY 48 g 0    sildenafiL (VIAGRA) 100 MG tablet Take 1 tablet (100 mg total) by mouth daily as needed for Erectile Dysfunction. 30 tablet 0    traMADoL (ULTRAM) 50 mg tablet Take 1 tablet (50 mg total) by mouth every 6 (six) hours as needed for Pain. (Patient not taking: Reported on 1/18/2023) 10 tablet 0     No current facility-administered medications for this visit.       Review of Systems   Respiratory:  Positive for apnea.    Cardiovascular: Negative.    Gastrointestinal: Negative.    Genitourinary: Negative.         +Chronic ED   Musculoskeletal: Negative.    Neurological:  Positive for numbness.        Left hand numbness s/p CTS surgery-sx continued even s/p surgery   All other systems reviewed and are negative.    Objective:      Lab Results   Component Value Date    WBC 5.35 05/10/2023    HGB 15.5 05/10/2023    HCT 47.3 05/10/2023     05/10/2023    CHOL 141 05/10/2023    TRIG 101 05/10/2023    HDL 44 05/10/2023    ALT 47 (H) 05/10/2023    AST 39 05/10/2023     05/10/2023    K 4.9 05/10/2023     05/10/2023    CREATININE 1.2 05/10/2023    BUN 23 05/10/2023    CO2 24 05/10/2023    TSH 3.307 05/10/2023    PSA 0.80 05/10/2023    INR 0.9 09/22/2020    GLUF 104 04/03/2004    HGBA1C 6.9 (H) 05/10/2023     Physical Exam  Vitals reviewed.   Constitutional:       General: He is not in acute distress.     Appearance: He is well-developed.   HENT:      Head: Normocephalic and atraumatic.      Mouth/Throat:      Pharynx: No oropharyngeal exudate.   Eyes:      Conjunctiva/sclera: Conjunctivae normal.   Neck:      Thyroid: No thyromegaly.      Vascular: No JVD.      Comments: No  "masses    Cardiovascular:      Rate and Rhythm: Normal rate and regular rhythm.      Heart sounds: No murmur heard.    No gallop.   Pulmonary:      Effort: Pulmonary effort is normal. No respiratory distress.      Breath sounds: Normal breath sounds. No wheezing.   Chest:      Chest wall: No tenderness.   Abdominal:      General: Bowel sounds are normal. There is no distension.      Palpations: Abdomen is soft. There is no mass.      Tenderness: There is no abdominal tenderness.      Comments: No Organomegaly   Musculoskeletal:         General: Deformity present. No tenderness. Normal range of motion.      Cervical back: Normal range of motion and neck supple.      Comments: Left hand + thenar muscle thinning/ strength decraesed   Lymphadenopathy:      Cervical: No cervical adenopathy.   Skin:     General: Skin is warm and dry.   Neurological:      Mental Status: He is alert and oriented to person, place, and time.      Cranial Nerves: No cranial nerve deficit.   Psychiatric:         Judgment: Judgment normal.         Vital Signs  Pulse: 62  SpO2: 98 %  BP: 132/72  Height and Weight  Height: 5' 7" (170.2 cm)  Weight: 78.9 kg (173 lb 15.1 oz)  BSA (Calculated - sq m): 1.93 sq meters  BMI (Calculated): 27.2  Weight in (lb) to have BMI = 25: 159.3  Protective Sensation (w/ 10 gram monofilament):  Right: Decreased  Left: Decreased    Visual Inspection:  Normal -  Bilateral    Pedal Pulses:   Right: Present  Left: Present    Posterior Tibialis Pulses:   Right:Diminished  Left: Diminished     Assessment:       1. Essential hypertension    2. Type 2 diabetes mellitus without complication, without long-term current use of insulin    3. Coronary artery disease involving native coronary artery of native heart without angina pectoris    4. Hypothyroidism due to acquired atrophy of thyroid    5. MICHELLE (obstructive sleep apnea)    6. Carpal tunnel syndrome of left wrist    7. Erectile dysfunction    8. Atherosclerosis of aorta "        Plan:     Health Maintenance   Topic Date Due    Eye Exam  01/12/2023    Aspirin/Antiplatelet Therapy  06/24/2023    Hemoglobin A1c  11/10/2023    Lipid Panel  05/10/2024    TETANUS VACCINE  07/06/2025    Hepatitis C Screening  Completed        Drake was seen today for annual exam.    Diagnoses and all orders for this visit:    Essential hypertension/controlled      -    Continue:  Med Tx 1-Altace 10mg QD, 2-Lopressor 25mg BID    Type 2 diabetes mellitus without complication, without long-term current use of insulin/controlled with diet and cinnamon supplements    Coronary artery disease involving native coronary artery of native heart without angina pectoris       -    Continue: Med Tx 1- Crestor 20mg QD, 2-Baby ASA 81mg QD    Hypothyroidism due to acquired atrophy of thyroid/controlled on Synthroid 125 mcg q.day    MICHELLE (obstructive sleep apnea)/controlled on home CPAP at 9 cm pressure/fullface mask nightly    Carpal tunnel syndrome of left wrist        -     patient to schedule a follow-up appointment in [facility] with Dr. Beach    Erectile dysfunction  -     sildenafiL (VIAGRA) 100 MG tablet; Take 1 tablet (100 mg total) by mouth daily as needed for Erectile Dysfunction.    Atherosclerosis of aorta        -     continue to follow guidelines for control of cardiovascular risk factors including: Hypertension, hyperlipidemia, diabetes, etc.

## 2023-05-22 ENCOUNTER — TELEPHONE (OUTPATIENT)
Dept: INTERNAL MEDICINE | Facility: CLINIC | Age: 78
End: 2023-05-22
Payer: MEDICARE

## 2023-05-22 NOTE — TELEPHONE ENCOUNTER
----- Message from Jade Sarah sent at 5/22/2023  2:15 PM CDT -----  Contact: Viviana/Garth [phone]  Pt was given a hard copy of script for sildenafiL (VIAGRA) 100 MG tablet. Can you please call pharmacy with a verbal okay to fill or E-prescribe new script in?

## 2023-07-14 DIAGNOSIS — E11.9 DIABETES MELLITUS WITHOUT COMPLICATION: ICD-10-CM

## 2023-07-14 RX ORDER — LANCETS 33 GAUGE
EACH MISCELLANEOUS
Qty: 300 EACH | Refills: 0 | OUTPATIENT
Start: 2023-07-14

## 2023-07-14 NOTE — TELEPHONE ENCOUNTER
No care due was identified.  [facility] Embedded Care Due Messages. Reference number: [id number].   7/14/2023 2:17:19 PM CDT

## 2023-07-14 NOTE — TELEPHONE ENCOUNTER
Refill Decision Note   Drake Barraza  is requesting a refill authorization.  Brief Assessment and Rationale for Refill:  Quick Discontinue     Medication Therapy Plan:    Pharmacy is requesting new scripts for the following medications without required information, (sig/ frequency/qty/etc)          Comments: Pharmacies have been requesting medications for patients without required information, (sig, frequency, qty, etc.). In addition, requests are sent for medication(s) pt. are currently not taking, and medications patients have never taken.    We have spoken to the pharmacies about these request types and advised their teams previously that we are unable to assess these New Script requests and require all details for these requests. This is a known issue and has been reported.     Note composed:6:07 PM 07/14/2023

## 2023-07-18 RX ORDER — RAMIPRIL 10 MG/1
CAPSULE ORAL
Qty: 90 CAPSULE | Refills: 3 | Status: SHIPPED | OUTPATIENT
Start: 2023-07-18

## 2023-09-12 DIAGNOSIS — I25.10 CORONARY ARTERY DISEASE INVOLVING NATIVE CORONARY ARTERY OF NATIVE HEART WITHOUT ANGINA PECTORIS: Primary | ICD-10-CM

## 2023-11-18 DIAGNOSIS — I25.10 CORONARY ARTERY DISEASE, UNSPECIFIED VESSEL OR LESION TYPE, UNSPECIFIED WHETHER ANGINA PRESENT, UNSPECIFIED WHETHER NATIVE OR TRANSPLANTED HEART: ICD-10-CM

## 2023-11-18 DIAGNOSIS — E03.4 HYPOTHYROIDISM DUE TO ACQUIRED ATROPHY OF THYROID: ICD-10-CM

## 2023-11-18 NOTE — TELEPHONE ENCOUNTER
No care due was identified.  Health [facility] Embedded Care Due Messages. Reference number: [id number].   11/18/2023 10:58:27 AM CST   First Trimester Sonogram

## 2023-11-19 RX ORDER — ROSUVASTATIN CALCIUM 20 MG/1
TABLET, COATED ORAL
Qty: 90 TABLET | Refills: 1 | Status: SHIPPED | OUTPATIENT
Start: 2023-11-19

## 2023-11-19 RX ORDER — LEVOTHYROXINE SODIUM 125 UG/1
TABLET ORAL
Qty: 90 TABLET | Refills: 1 | Status: SHIPPED | OUTPATIENT
Start: 2023-11-19

## 2023-11-19 NOTE — TELEPHONE ENCOUNTER
Refill Decision Note   Drake Christi  is requesting a refill authorization.  Brief Assessment and Rationale for Refill:  Approve     Medication Therapy Plan:         Comments:     Note composed:12:54 AM 11/19/2023

## 2023-11-21 RX ORDER — METOPROLOL TARTRATE 25 MG/1
TABLET, FILM COATED ORAL
Qty: 180 TABLET | Refills: 3 | Status: SHIPPED | OUTPATIENT
Start: 2023-11-21

## 2023-12-07 ENCOUNTER — LAB VISIT (OUTPATIENT)
Dept: LAB | Facility: HOSPITAL | Age: 78
End: 2023-12-07
Payer: MEDICARE

## 2023-12-07 DIAGNOSIS — I25.10 CORONARY ARTERY DISEASE INVOLVING NATIVE CORONARY ARTERY OF NATIVE HEART WITHOUT ANGINA PECTORIS: ICD-10-CM

## 2023-12-07 LAB
ALBUMIN SERPL BCP-MCNC: 4 G/DL (ref 3.5–5.2)
ALP SERPL-CCNC: 78 U/L (ref 55–135)
ALT SERPL W/O P-5'-P-CCNC: 38 U/L (ref 10–44)
ANION GAP SERPL CALC-SCNC: 7 MMOL/L (ref 8–16)
AST SERPL-CCNC: 35 U/L (ref 10–40)
BILIRUB SERPL-MCNC: 0.5 MG/DL (ref 0.1–1)
BUN SERPL-MCNC: 22 MG/DL (ref 8–23)
CALCIUM SERPL-MCNC: 10.1 MG/DL (ref 8.7–10.5)
CHLORIDE SERPL-SCNC: 107 MMOL/L (ref 95–110)
CHOLEST SERPL-MCNC: 131 MG/DL (ref 120–199)
CHOLEST/HDLC SERPL: 3.1 {RATIO} (ref 2–5)
CO2 SERPL-SCNC: 27 MMOL/L (ref 23–29)
CREAT SERPL-MCNC: 1 MG/DL (ref 0.5–1.4)
EST. GFR  (NO RACE VARIABLE): >60 ML/MIN/1.73 M^2
GLUCOSE SERPL-MCNC: 197 MG/DL (ref 70–110)
HDLC SERPL-MCNC: 42 MG/DL (ref 40–75)
HDLC SERPL: 32.1 % (ref 20–50)
LDLC SERPL CALC-MCNC: 71.2 MG/DL (ref 63–159)
NONHDLC SERPL-MCNC: 89 MG/DL
POTASSIUM SERPL-SCNC: 5 MMOL/L (ref 3.5–5.1)
PROT SERPL-MCNC: 6.9 G/DL (ref 6–8.4)
SODIUM SERPL-SCNC: 141 MMOL/L (ref 136–145)
TRIGL SERPL-MCNC: 89 MG/DL (ref 30–150)

## 2023-12-07 PROCEDURE — 36415 COLL VENOUS BLD VENIPUNCTURE: CPT | Mod: HCNC | Performed by: INTERNAL MEDICINE

## 2023-12-07 PROCEDURE — 80053 COMPREHEN METABOLIC PANEL: CPT | Mod: HCNC | Performed by: INTERNAL MEDICINE

## 2023-12-07 PROCEDURE — 80061 LIPID PANEL: CPT | Mod: HCNC | Performed by: INTERNAL MEDICINE

## 2023-12-14 ENCOUNTER — OFFICE VISIT (OUTPATIENT)
Dept: CARDIOLOGY | Facility: CLINIC | Age: 78
End: 2023-12-14
Payer: MEDICARE

## 2023-12-14 ENCOUNTER — OFFICE VISIT (OUTPATIENT)
Dept: DERMATOLOGY | Facility: CLINIC | Age: 78
End: 2023-12-14
Payer: MEDICARE

## 2023-12-14 VITALS
SYSTOLIC BLOOD PRESSURE: 130 MMHG | WEIGHT: 171.06 LBS | BODY MASS INDEX: 26.85 KG/M2 | DIASTOLIC BLOOD PRESSURE: 60 MMHG | HEIGHT: 67 IN | HEART RATE: 65 BPM

## 2023-12-14 VITALS — WEIGHT: 173 LBS | BODY MASS INDEX: 27.1 KG/M2

## 2023-12-14 DIAGNOSIS — Z98.61 POST PTCA: ICD-10-CM

## 2023-12-14 DIAGNOSIS — D48.5 NEOPLASM OF UNCERTAIN BEHAVIOR OF SKIN: Primary | ICD-10-CM

## 2023-12-14 DIAGNOSIS — E78.00 HYPERCHOLESTEREMIA: Chronic | ICD-10-CM

## 2023-12-14 DIAGNOSIS — I25.2 OLD MYOCARDIAL INFARCT: ICD-10-CM

## 2023-12-14 DIAGNOSIS — L82.1 SEBORRHEIC KERATOSES: ICD-10-CM

## 2023-12-14 DIAGNOSIS — I10 PRIMARY HYPERTENSION: ICD-10-CM

## 2023-12-14 DIAGNOSIS — I25.10 CORONARY ARTERY DISEASE INVOLVING NATIVE CORONARY ARTERY OF NATIVE HEART WITHOUT ANGINA PECTORIS: Primary | ICD-10-CM

## 2023-12-14 PROCEDURE — 1160F PR REVIEW ALL MEDS BY PRESCRIBER/CLIN PHARMACIST DOCUMENTED: ICD-10-PCS | Mod: HCNC,CPTII,S$GLB, | Performed by: INTERNAL MEDICINE

## 2023-12-14 PROCEDURE — 88305 TISSUE EXAM BY PATHOLOGIST: CPT | Mod: 26,HCNC,, | Performed by: PATHOLOGY

## 2023-12-14 PROCEDURE — 1101F PR PT FALLS ASSESS DOC 0-1 FALLS W/OUT INJ PAST YR: ICD-10-PCS | Mod: HCNC,CPTII,S$GLB, | Performed by: INTERNAL MEDICINE

## 2023-12-14 PROCEDURE — 1126F PR PAIN SEVERITY QUANTIFIED, NO PAIN PRESENT: ICD-10-PCS | Mod: HCNC,CPTII,S$GLB, | Performed by: DERMATOLOGY

## 2023-12-14 PROCEDURE — 3075F SYST BP GE 130 - 139MM HG: CPT | Mod: HCNC,CPTII,S$GLB, | Performed by: INTERNAL MEDICINE

## 2023-12-14 PROCEDURE — 3288F PR FALLS RISK ASSESSMENT DOCUMENTED: ICD-10-PCS | Mod: HCNC,CPTII,S$GLB, | Performed by: DERMATOLOGY

## 2023-12-14 PROCEDURE — 99214 OFFICE O/P EST MOD 30 MIN: CPT | Mod: HCNC,S$GLB,, | Performed by: INTERNAL MEDICINE

## 2023-12-14 PROCEDURE — 3078F DIAST BP <80 MM HG: CPT | Mod: HCNC,CPTII,S$GLB, | Performed by: INTERNAL MEDICINE

## 2023-12-14 PROCEDURE — 3078F PR MOST RECENT DIASTOLIC BLOOD PRESSURE < 80 MM HG: ICD-10-PCS | Mod: HCNC,CPTII,S$GLB, | Performed by: INTERNAL MEDICINE

## 2023-12-14 PROCEDURE — 17110 DESTRUCTION B9 LES UP TO 14: CPT | Mod: HCNC,S$GLB,, | Performed by: DERMATOLOGY

## 2023-12-14 PROCEDURE — 1159F MED LIST DOCD IN RCRD: CPT | Mod: HCNC,CPTII,S$GLB, | Performed by: INTERNAL MEDICINE

## 2023-12-14 PROCEDURE — 1160F RVW MEDS BY RX/DR IN RCRD: CPT | Mod: HCNC,CPTII,S$GLB, | Performed by: DERMATOLOGY

## 2023-12-14 PROCEDURE — 93005 EKG 12-LEAD: ICD-10-PCS | Mod: HCNC,S$GLB,, | Performed by: INTERNAL MEDICINE

## 2023-12-14 PROCEDURE — 93010 EKG 12-LEAD: ICD-10-PCS | Mod: HCNC,S$GLB,, | Performed by: INTERNAL MEDICINE

## 2023-12-14 PROCEDURE — 99999 PR PBB SHADOW E&M-EST. PATIENT-LVL III: ICD-10-PCS | Mod: PBBFAC,HCNC,, | Performed by: DERMATOLOGY

## 2023-12-14 PROCEDURE — 1126F PR PAIN SEVERITY QUANTIFIED, NO PAIN PRESENT: ICD-10-PCS | Mod: HCNC,CPTII,S$GLB, | Performed by: INTERNAL MEDICINE

## 2023-12-14 PROCEDURE — 1101F PR PT FALLS ASSESS DOC 0-1 FALLS W/OUT INJ PAST YR: ICD-10-PCS | Mod: HCNC,CPTII,S$GLB, | Performed by: DERMATOLOGY

## 2023-12-14 PROCEDURE — 3075F PR MOST RECENT SYSTOLIC BLOOD PRESS GE 130-139MM HG: ICD-10-PCS | Mod: HCNC,CPTII,S$GLB, | Performed by: INTERNAL MEDICINE

## 2023-12-14 PROCEDURE — 3288F FALL RISK ASSESSMENT DOCD: CPT | Mod: HCNC,CPTII,S$GLB, | Performed by: INTERNAL MEDICINE

## 2023-12-14 PROCEDURE — 17110 PR DESTRUCTION BENIGN LESIONS UP TO 14: ICD-10-PCS | Mod: HCNC,S$GLB,, | Performed by: DERMATOLOGY

## 2023-12-14 PROCEDURE — 1160F PR REVIEW ALL MEDS BY PRESCRIBER/CLIN PHARMACIST DOCUMENTED: ICD-10-PCS | Mod: HCNC,CPTII,S$GLB, | Performed by: DERMATOLOGY

## 2023-12-14 PROCEDURE — 99214 OFFICE O/P EST MOD 30 MIN: CPT | Mod: 25,HCNC,S$GLB, | Performed by: DERMATOLOGY

## 2023-12-14 PROCEDURE — 1126F AMNT PAIN NOTED NONE PRSNT: CPT | Mod: HCNC,CPTII,S$GLB, | Performed by: INTERNAL MEDICINE

## 2023-12-14 PROCEDURE — 88305 TISSUE EXAM BY PATHOLOGIST: CPT | Mod: HCNC | Performed by: PATHOLOGY

## 2023-12-14 PROCEDURE — 11102 PR TANGENTIAL BIOPSY, SKIN, SINGLE LESION: ICD-10-PCS | Mod: HCNC,XS,S$GLB, | Performed by: DERMATOLOGY

## 2023-12-14 PROCEDURE — 1101F PT FALLS ASSESS-DOCD LE1/YR: CPT | Mod: HCNC,CPTII,S$GLB, | Performed by: DERMATOLOGY

## 2023-12-14 PROCEDURE — 99999 PR PBB SHADOW E&M-EST. PATIENT-LVL III: CPT | Mod: PBBFAC,HCNC,, | Performed by: DERMATOLOGY

## 2023-12-14 PROCEDURE — 93010 ELECTROCARDIOGRAM REPORT: CPT | Mod: HCNC,S$GLB,, | Performed by: INTERNAL MEDICINE

## 2023-12-14 PROCEDURE — 1126F AMNT PAIN NOTED NONE PRSNT: CPT | Mod: HCNC,CPTII,S$GLB, | Performed by: DERMATOLOGY

## 2023-12-14 PROCEDURE — 99214 PR OFFICE/OUTPT VISIT, EST, LEVL IV, 30-39 MIN: ICD-10-PCS | Mod: HCNC,S$GLB,, | Performed by: INTERNAL MEDICINE

## 2023-12-14 PROCEDURE — 1101F PT FALLS ASSESS-DOCD LE1/YR: CPT | Mod: HCNC,CPTII,S$GLB, | Performed by: INTERNAL MEDICINE

## 2023-12-14 PROCEDURE — 1160F RVW MEDS BY RX/DR IN RCRD: CPT | Mod: HCNC,CPTII,S$GLB, | Performed by: INTERNAL MEDICINE

## 2023-12-14 PROCEDURE — 11102 TANGNTL BX SKIN SINGLE LES: CPT | Mod: HCNC,XS,S$GLB, | Performed by: DERMATOLOGY

## 2023-12-14 PROCEDURE — 1159F PR MEDICATION LIST DOCUMENTED IN MEDICAL RECORD: ICD-10-PCS | Mod: HCNC,CPTII,S$GLB, | Performed by: DERMATOLOGY

## 2023-12-14 PROCEDURE — 3288F PR FALLS RISK ASSESSMENT DOCUMENTED: ICD-10-PCS | Mod: HCNC,CPTII,S$GLB, | Performed by: INTERNAL MEDICINE

## 2023-12-14 PROCEDURE — 99214 PR OFFICE/OUTPT VISIT, EST, LEVL IV, 30-39 MIN: ICD-10-PCS | Mod: 25,HCNC,S$GLB, | Performed by: DERMATOLOGY

## 2023-12-14 PROCEDURE — 88305 TISSUE EXAM BY PATHOLOGIST: ICD-10-PCS | Mod: 26,HCNC,, | Performed by: PATHOLOGY

## 2023-12-14 PROCEDURE — 1159F PR MEDICATION LIST DOCUMENTED IN MEDICAL RECORD: ICD-10-PCS | Mod: HCNC,CPTII,S$GLB, | Performed by: INTERNAL MEDICINE

## 2023-12-14 PROCEDURE — 3288F FALL RISK ASSESSMENT DOCD: CPT | Mod: HCNC,CPTII,S$GLB, | Performed by: DERMATOLOGY

## 2023-12-14 PROCEDURE — 1159F MED LIST DOCD IN RCRD: CPT | Mod: HCNC,CPTII,S$GLB, | Performed by: DERMATOLOGY

## 2023-12-14 PROCEDURE — 93005 ELECTROCARDIOGRAM TRACING: CPT | Mod: HCNC,S$GLB,, | Performed by: INTERNAL MEDICINE

## 2023-12-14 PROCEDURE — 99999 PR PBB SHADOW E&M-EST. PATIENT-LVL III: CPT | Mod: PBBFAC,HCNC,, | Performed by: INTERNAL MEDICINE

## 2023-12-14 PROCEDURE — 99999 PR PBB SHADOW E&M-EST. PATIENT-LVL III: ICD-10-PCS | Mod: PBBFAC,HCNC,, | Performed by: INTERNAL MEDICINE

## 2023-12-14 RX ORDER — EZETIMIBE 10 MG/1
10 TABLET ORAL DAILY
Qty: 90 TABLET | Refills: 3 | Status: SHIPPED | OUTPATIENT
Start: 2023-12-14 | End: 2024-01-13

## 2023-12-14 RX ORDER — NITROGLYCERIN 0.4 MG/1
0.4 TABLET SUBLINGUAL EVERY 5 MIN PRN
Qty: 100 TABLET | Refills: 2 | Status: SHIPPED | OUTPATIENT
Start: 2023-12-14

## 2023-12-14 NOTE — PROGRESS NOTES
Subjective:   Chief Complaint:  Drake Bararza is a 78 y.o. male who presents for follow-up of Coronary Artery Disease      Problem List and HPI:   CAD   Inf MI 2003   RCA and LAD stents 2003   Hypercholesteremia   Hypertension   Sleep apnea on CPAP   Hypothyroidism   Diabetes type II-diet controlled    He does not report angina or shortness of breath with exertion.   He has not reqd. S/L NTG  LDL has been in the 70s on 20 mg of rosuvastatin.   Checks blood pressure at home.      Review of patient's allergies indicates:   Allergen Reactions    Adhesive Blisters    Hydrocodone-acetaminophen Rash     Other reaction(s): constipated        Current Outpatient Medications   Medication Sig    aspirin (ECOTRIN) 81 MG EC tablet Take 1 tablet (81 mg total) by mouth 2 (two) times daily. (Patient taking differently: Take 81 mg by mouth once daily.)    blood-glucose meter kit Check glucose 1-2x/day after meals    CINNAMON BARK (CINNAMON ORAL) Take 1 capsule by mouth once daily.    levothyroxine (SYNTHROID) 125 MCG tablet TAKE 1 TABLET EVERY MORNING ON AN EMPTY STOMACH    metoprolol tartrate (LOPRESSOR) 25 MG tablet TAKE 1 TABLET TWICE DAILY    multivitamin (THERAGRAN) per tablet Take 1 tablet by mouth once daily.    nitroGLYCERIN (NITROSTAT) 0.4 MG SL tablet Place 1 tablet (0.4 mg total) under the tongue every 5 (five) minutes as needed for Chest pain.    omega-3 fatty acids-vitamin E 1,000 mg Cap Take 1 capsule by mouth Daily.      ramipriL (ALTACE) 10 MG capsule TAKE 1 CAPSULE EVERY DAY    rosuvastatin (CRESTOR) 20 MG tablet TAKE 1 TABLET EVERY DAY    sildenafiL (VIAGRA) 100 MG tablet Take 1 tablet (100 mg total) by mouth daily as needed for Erectile Dysfunction.    TRUE METRIX GLUCOSE TEST STRIP Strp TEST SUGAR TWICE DAILY BEFORE MEALS    TRUEPLUS LANCETS 30 gauge Misc USE TO TEST BLOOD SUGAR TWICE DAILY BEFORE MEALS 2-3 DAYS A WEEK    TURMERIC ROOT EXTRACT ORAL Take 1 capsule by mouth.    UBIDECARENONE (COENZYME Q10) 100  "mg Tab Take 100 mg by mouth once daily.    vitamin D 185 MG Tab Take 185 mg by mouth once daily.     No current facility-administered medications for this visit.       Social history:  Drake Barraza  reports that he has never smoked. He has never been exposed to tobacco smoke. He has never used smokeless tobacco. He reports current alcohol use. He reports that he does not use drugs.      Objective:   /60   Pulse 65   Ht 5' 7" (1.702 m)   Wt 77.6 kg (171 lb 1.2 oz)   BMI 26.79 kg/m²    Physical Exam  Constitutional:       Appearance: He is well-developed.      Comments:      HENT:      Head: Normocephalic and atraumatic.   Neck:      Vascular: No carotid bruit or JVD.   Cardiovascular:      Rate and Rhythm: Normal rate and regular rhythm.      Pulses:           Radial pulses are 2+ on the right side and 2+ on the left side.        Posterior tibial pulses are 2+ on the right side and 2+ on the left side.      Heart sounds: S1 normal and S2 normal. No murmur heard.     No gallop.   Pulmonary:      Effort: Pulmonary effort is normal.      Breath sounds: No wheezing or rales.   Chest:      Chest wall: There is no dullness to percussion.   Abdominal:      Palpations: Abdomen is soft. There is no hepatomegaly or splenomegaly.      Tenderness: There is no abdominal tenderness.   Musculoskeletal:      Right lower leg: No edema.      Left lower leg: No edema.   Skin:     General: Skin is warm and dry.      Findings: No bruising.      Nails: There is no clubbing.   Neurological:      Mental Status: He is alert and oriented to person, place, and time.      Gait: Gait normal.   Psychiatric:         Speech: Speech normal.         Behavior: Behavior normal.         Thought Content: Thought content normal.         Judgment: Judgment normal.         Lipids:  Recent Labs   Lab 12/07/23  0816   LDL Cholesterol 71.2   HDL 42   Cholesterol 131      Renal:  Recent Labs   Lab 12/07/23  0816   Creatinine 1.0   Potassium 5.0 "   CO2 27   BUN 22     Liver:  Recent Labs   Lab 12/07/23  0816   AST 35   ALT 38     CBC:  Lab Results   Component Value Date    WBC 5.35 05/10/2023    HGB 15.5 05/10/2023    HCT 47.3 05/10/2023    MCV 96 05/10/2023     05/10/2023           Assessment and Plan:       ICD-10-CM ICD-9-CM   1. Coronary artery disease involving native coronary artery of native heart without angina pectoris  I25.10 414.01   2. S/P RCA and LAD stents 2003  Z98.61 V45.82   3. 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 Hypercholesteremia  E78.00 272.0        CAD stable.  No angina.  Counseled regarding the use of nitroglycerin while using sildenafil    LDL goal ~ 55.  Cholesterol Education.  Nutritional counseling.  Discussed addition of ezetimibe versus increasing the dose of rosuvastatin to 40 mg.      Orders placed during this encounter:     Coronary artery disease involving native coronary artery of native heart without angina pectoris  -     nitroGLYCERIN (NITROSTAT) 0.4 MG SL tablet; Place 1 tablet (0.4 mg total) under the tongue every 5 (five) minutes as needed for Chest pain.  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

## 2023-12-14 NOTE — PROGRESS NOTES
Subjective:      Patient ID:  Drake Barraza is a 78 y.o. male who presents for   Chief Complaint   Patient presents with    Lesion           Spot on left temple which bleeds    Lesion - Initial  Affected locations: face and left ear  Signs / symptoms: asymptomatic      Review of Systems   Constitutional:  Negative for fever, chills, weight loss, weight gain, fatigue, night sweats and malaise.   Skin:  Negative for daily sunscreen use and activity-related sunscreen use.   Hematologic/Lymphatic: Does not bruise/bleed easily.       Objective:   Physical Exam   Constitutional: He appears well-developed and well-nourished.   Neurological: He is alert and oriented to person, place, and time.   Psychiatric: He has a normal mood and affect.   Skin:   Areas Examined (abnormalities noted in diagram):   Head / Face Inspection Performed  Neck Inspection Performed            Diagram Legend     Erythematous scaling macule/papule c/w actinic keratosis       Vascular papule c/w angioma      Pigmented verrucoid papule/plaque c/w seborrheic keratosis      Yellow umbilicated papule c/w sebaceous hyperplasia      Irregularly shaped tan macule c/w lentigo     1-2 mm smooth white papules consistent with Milia      Movable subcutaneous cyst with punctum c/w epidermal inclusion cyst      Subcutaneous movable cyst c/w pilar cyst      Firm pink to brown papule c/w dermatofibroma      Pedunculated fleshy papule(s) c/w skin tag(s)      Evenly pigmented macule c/w junctional nevus     Mildly variegated pigmented, slightly irregular-bordered macule c/w mildly atypical nevus      Flesh colored to evenly pigmented papule c/w intradermal nevus       Pink pearly papule/plaque c/w basal cell carcinoma      Erythematous hyperkeratotic cursted plaque c/w SCC      Surgical scar with no sign of skin cancer recurrence      Open and closed comedones      Inflammatory papules and pustules      Verrucoid papule consistent consistent with wart      Erythematous eczematous patches and plaques     Dystrophic onycholytic nail with subungual debris c/w onychomycosis     Umbilicated papule    Erythematous-base heme-crusted tan verrucoid plaque consistent with inflamed seborrheic keratosis     Erythematous Silvery Scaling Plaque c/w Psoriasis     See annotation      Assessment / Plan:      Pathology Orders:       Normal Orders This Visit    Specimen to Pathology, Dermatology     Questions:    Procedure Type: Dermatology and skin neoplasms    Number of Specimens: 1    ------------------------: -------------------------    Spec 1 Procedure: Biopsy    Spec 1 Clinical Impression: bcc    Spec 1 Source: left temple    Release to patient:           Neoplasm of uncertain behavior of skin  -     Specimen to Pathology, Dermatology  Shave biopsy performed after verbal consent including risk of infection, scar, recurrence, need for additional treatment of site. Area prepped with alcohol, anesthetized with 1% lidocaine with epinephrine. . Hemostasis achieved with monsels. No complications. Dressing applied. Wound care explained. Will need further rx if + ca      Seborrheic keratoses  reassurance  Cryosurgery procedure note:    Verbal consent from the patient is obtained including, but not limited to, risk of hypopigmentation/hyperpigmentation, scar, recurrence of lesion. Liquid nitrogen cryosurgery is applied to 3 lesions left neck to produce a freeze injury.                Follow up in about 6 months (around 6/14/2024).

## 2023-12-19 LAB
FINAL PATHOLOGIC DIAGNOSIS: NORMAL
GROSS: NORMAL
Lab: NORMAL
MICROSCOPIC EXAM: NORMAL

## 2023-12-19 NOTE — PROGRESS NOTES
The biopsy from his face was a skin cancer, he will need mohs surgery.    5 d ago   Final Pathologic Diagnosis Skin, left temple, shave biopsy:  -NON-KERATINIZING INVASIVE SQUAMOUS CELL CARCINOMA, EXTENDING TO THE PERIPHERAL AND DEEP BIOPSY EDGES

## 2023-12-20 DIAGNOSIS — E11.9 DIABETES MELLITUS WITHOUT COMPLICATION: ICD-10-CM

## 2023-12-20 RX ORDER — GLUCOSAM/CHON-MSM1/C/MANG/BOSW 500-416.6
TABLET ORAL
Qty: 100 EACH | Refills: 3 | Status: CANCELLED | OUTPATIENT
Start: 2023-12-20

## 2023-12-20 NOTE — TELEPHONE ENCOUNTER
----- Message from Amy Dockery sent at 12/20/2023 10:21 AM CST -----  Contact: Self  [phone]  Requesting an RX refill or new RX.    Is this a refill or new RX: refill    RX name and strength (copy/paste from chart):  TRUEPLUS LANCETS 30 gauge Misc and TRUE METRIX GLUCOSE TEST STRIP Strp     Is this a 30 day or 90 day RX:     Pharmacy name and phone # (copy/paste from chart):      [facility] - [place], [place] - [address]  [address] [place] [address]  Phone: [phone] Fax: [phone]     Pt is requesting a call back.

## 2023-12-20 NOTE — TELEPHONE ENCOUNTER
No care due was identified.  Health [facility] Embedded Care Due Messages. Reference number: [id number].   12/20/2023 10:28:02 AM CST

## 2023-12-22 DIAGNOSIS — E11.9 TYPE 2 DIABETES MELLITUS WITHOUT COMPLICATION, WITHOUT LONG-TERM CURRENT USE OF INSULIN: Primary | ICD-10-CM

## 2023-12-22 DIAGNOSIS — E11.9 DIABETES MELLITUS WITHOUT COMPLICATION: ICD-10-CM

## 2023-12-22 RX ORDER — GLUCOSAM/CHON-MSM1/C/MANG/BOSW 500-416.6
TABLET ORAL
Qty: 100 EACH | Refills: 3 | Status: SHIPPED | OUTPATIENT
Start: 2023-12-22

## 2023-12-22 NOTE — TELEPHONE ENCOUNTER
No care due was identified.  Health [facility] Embedded Care Due Messages. Reference number: [id number].   12/22/2023 3:27:52 PM CST

## 2023-12-26 ENCOUNTER — TELEPHONE (OUTPATIENT)
Dept: INTERNAL MEDICINE | Facility: CLINIC | Age: 78
End: 2023-12-26
Payer: MEDICARE

## 2023-12-26 NOTE — TELEPHONE ENCOUNTER
Spoke to patient, he did get his rx's however, he wasn't given the same amount of refills as he normally does. I advised him when he needs more refills to please call the office

## 2024-01-09 ENCOUNTER — TELEPHONE (OUTPATIENT)
Dept: DERMATOLOGY | Facility: CLINIC | Age: 79
End: 2024-01-09
Payer: MEDICARE

## 2024-01-10 ENCOUNTER — PROCEDURE VISIT (OUTPATIENT)
Dept: DERMATOLOGY | Facility: CLINIC | Age: 79
End: 2024-01-10
Payer: MEDICARE

## 2024-01-10 VITALS
BODY MASS INDEX: 26.85 KG/M2 | HEIGHT: 67 IN | SYSTOLIC BLOOD PRESSURE: 117 MMHG | DIASTOLIC BLOOD PRESSURE: 67 MMHG | WEIGHT: 171.06 LBS | HEART RATE: 53 BPM

## 2024-01-10 DIAGNOSIS — C44.329 SQUAMOUS CELL CARCINOMA OF SKIN OF LEFT TEMPLE: Primary | ICD-10-CM

## 2024-01-10 PROCEDURE — 17311 MOHS 1 STAGE H/N/HF/G: CPT | Mod: HCNC,51,S$GLB, | Performed by: DERMATOLOGY

## 2024-01-10 PROCEDURE — 13131 CMPLX RPR F/C/C/M/N/AX/G/H/F: CPT | Mod: HCNC,S$GLB,, | Performed by: DERMATOLOGY

## 2024-01-10 PROCEDURE — 99499 UNLISTED E&M SERVICE: CPT | Mod: HCNC,S$GLB,, | Performed by: DERMATOLOGY

## 2024-01-10 NOTE — PROGRESS NOTES
PROCEDURE: Mohs' Micrographic Surgery    INDICATION: Location in mask areas of face including central face, nose, eyelids, eyebrows, lips, chin, preauricular, temple, and ear. Biopsy-proven skin cancer of cosmetically and functionally important areas, including head, neck, genital, hand, foot, or areas known for having difficulty in healing, such as the lower anterior legs. Tumors with aggressive clinical behavior (rapidly growing, greater than 1 cm in diameter).    REFERRING PROVIDER: Vanessa Menon M.D.    CASE NUMBER:     ANESTHETIC: 2.5 cc 0.5% Lidocaine with Epi 1:200,000 mixed 1:1 with 0.5% Bupivacaine    SURGICAL PREP: Hibiclens    SURGEON: Yolie Baptiste MD    ASSISTANTS: Michelle Cerna PA-C and Livia Block Surg Rivas    PREOPERATIVE DIAGNOSIS: squamous cell carcinoma- invasive    POSTOPERATIVE DIAGNOSIS: squamous cell carcinoma    PATHOLOGIC DIAGNOSIS: squamous cell carcinoma- invasive    HISTOLOGY OF SPECIMENS IN FIRST STAGE:   Tumor Type:  No tumor seen.    STAGES OF MOHS' SURGERY PERFORMED: 1    TUMOR-FREE PLANE ACHIEVED: Yes    HEMOSTASIS: electrocoagulation     SPECIMENS: 2     LOCATION: left temple. Location verified with Dr. Menon's clinical photograph. Patient also verified location with hand held mirror.    INITIAL LESION SIZE: 0.5 x 0.5 cm    FINAL DEFECT SIZE: 0.8 x 0.8 cm    WOUND REPAIR/DISPOSITION: The patient tolerated Mohs' Micrographic Surgery for a squamous cell carcinoma very well. When the tumor was completely removed, a repair of the surgical defect was undertaken.    PROCEDURE: Complex Linear Repair    INDICATION: Status post Mohs' Micrographic Surgery for squamous cell carcinoma.    CASE NUMBER:     SURGEON: Yolie Baptiste MD    ASSISTANTS: Michelle Cerna PA-C and Livia Block Surg Rivas    ANESTHETIC: 1.5 cc 1% Lidocaine with Epinephrine 1:100,000    SURGICAL PREP: Hibiclens, prepped by Livia Block Surg Tech    LOCATION: left temple    DEFECT SIZE: 0.8 x 0.8 cm    WOUND  "REPAIR/DISPOSITION:  After the patient's carcinoma had been completely removed with Mohs' Micrographic Surgery, a repair of the surgical defect was undertaken. The patient was returned to the operating suite where the area of left temple was prepped, draped, and anesthetized in the usual sterile fashion. The wound was widely undermined in all directions. The wound was undermined to a distance at least the maximum width of the defect as measured perpendicular to the closure line along at least one entire edge of the defect, in this case 1 cm. Then, electrocoagulation was used to obtain meticulous hemostasis. 4-0 Vicryl buried vertical mattress sutures were placed into the subcutaneous and dermal plane to close the wound and fernando the cutaneous wound edge. Bilateral dog ears were identified and were removed by a standard Burow's triangle technique. The cutaneous wound edges were closed using running 5-0 fast absorbing gut suture.    The patient tolerated the procedure well.    The area was cleaned and dressed appropriately and the patient was given wound care instructions. Advised patient to contact our office with any questions or concerns regarding this area. Patient verbally stated understanding.    LENGTH OF REPAIR: 2.3 cm    Vitals:    01/10/24 1218 01/10/24 1352   BP: 135/78 117/67   BP Location: Right arm Left arm   Patient Position: Sitting Lying   BP Method: Medium (Automatic) Medium (Automatic)   Pulse: (!) 54 (!) 53   Weight: 77.6 kg (171 lb 1.2 oz)    Height: 5' 7" (1.702 m)          "

## 2024-03-13 ENCOUNTER — LAB VISIT (OUTPATIENT)
Dept: LAB | Facility: HOSPITAL | Age: 79
End: 2024-03-13
Attending: INTERNAL MEDICINE
Payer: MEDICARE

## 2024-03-13 ENCOUNTER — PATIENT MESSAGE (OUTPATIENT)
Dept: INTERNAL MEDICINE | Facility: CLINIC | Age: 79
End: 2024-03-13
Payer: MEDICARE

## 2024-03-13 DIAGNOSIS — Z98.61 POST PTCA: ICD-10-CM

## 2024-03-13 DIAGNOSIS — Z12.5 PROSTATE CANCER SCREENING: ICD-10-CM

## 2024-03-13 DIAGNOSIS — I25.10 CORONARY ARTERY DISEASE INVOLVING NATIVE CORONARY ARTERY OF NATIVE HEART WITHOUT ANGINA PECTORIS: ICD-10-CM

## 2024-03-13 DIAGNOSIS — I10 ESSENTIAL HYPERTENSION: ICD-10-CM

## 2024-03-13 DIAGNOSIS — E11.9 TYPE 2 DIABETES MELLITUS WITHOUT COMPLICATION, WITHOUT LONG-TERM CURRENT USE OF INSULIN: Primary | ICD-10-CM

## 2024-03-13 DIAGNOSIS — E78.00 HYPERCHOLESTEREMIA: Chronic | ICD-10-CM

## 2024-03-13 LAB
ALBUMIN SERPL BCP-MCNC: 3.9 G/DL (ref 3.5–5.2)
ALP SERPL-CCNC: 70 U/L (ref 55–135)
ALT SERPL W/O P-5'-P-CCNC: 46 U/L (ref 10–44)
ANION GAP SERPL CALC-SCNC: 5 MMOL/L (ref 8–16)
AST SERPL-CCNC: 37 U/L (ref 10–40)
BILIRUB SERPL-MCNC: 0.6 MG/DL (ref 0.1–1)
BUN SERPL-MCNC: 23 MG/DL (ref 8–23)
CALCIUM SERPL-MCNC: 9.5 MG/DL (ref 8.7–10.5)
CHLORIDE SERPL-SCNC: 107 MMOL/L (ref 95–110)
CHOLEST SERPL-MCNC: 99 MG/DL (ref 120–199)
CHOLEST/HDLC SERPL: 2.8 {RATIO} (ref 2–5)
CO2 SERPL-SCNC: 26 MMOL/L (ref 23–29)
CREAT SERPL-MCNC: 1.1 MG/DL (ref 0.5–1.4)
EST. GFR  (NO RACE VARIABLE): >60 ML/MIN/1.73 M^2
GLUCOSE SERPL-MCNC: 200 MG/DL (ref 70–110)
HDLC SERPL-MCNC: 36 MG/DL (ref 40–75)
HDLC SERPL: 36.4 % (ref 20–50)
LDLC SERPL CALC-MCNC: 46.6 MG/DL (ref 63–159)
NONHDLC SERPL-MCNC: 63 MG/DL
POTASSIUM SERPL-SCNC: 4.3 MMOL/L (ref 3.5–5.1)
PROT SERPL-MCNC: 6.8 G/DL (ref 6–8.4)
SODIUM SERPL-SCNC: 138 MMOL/L (ref 136–145)
TRIGL SERPL-MCNC: 82 MG/DL (ref 30–150)

## 2024-03-13 PROCEDURE — 80061 LIPID PANEL: CPT | Mod: HCNC | Performed by: INTERNAL MEDICINE

## 2024-03-13 PROCEDURE — 36415 COLL VENOUS BLD VENIPUNCTURE: CPT | Mod: HCNC | Performed by: INTERNAL MEDICINE

## 2024-03-13 PROCEDURE — 80053 COMPREHEN METABOLIC PANEL: CPT | Mod: HCNC | Performed by: INTERNAL MEDICINE

## 2024-03-13 NOTE — TELEPHONE ENCOUNTER
Ana, please let pt know that the lab today was ordered by Dr Mccoy: Lipid/CMP only.  I agree he needs other lab the week before he sees me 5/2: CBC/TSH/HgbA1C/CMP.  I just placed the lab orders-can you please schedule these.  Thanks so much

## 2024-03-27 ENCOUNTER — TELEPHONE (OUTPATIENT)
Dept: INTERNAL MEDICINE | Facility: CLINIC | Age: 79
End: 2024-03-27
Payer: MEDICARE

## 2024-03-27 DIAGNOSIS — M47.816 OSTEOARTHRITIS OF LUMBAR SPINE, UNSPECIFIED SPINAL OSTEOARTHRITIS COMPLICATION STATUS: Primary | ICD-10-CM

## 2024-03-27 NOTE — TELEPHONE ENCOUNTER
Pt calls with c/o LBP; + Dx of Lumbar OA/DDD by past lumbar X-rays.  PT referral placed and pt has a RTc Appt scheduled for follow up.

## 2024-04-17 DIAGNOSIS — M51.36 DDD (DEGENERATIVE DISC DISEASE), LUMBAR: Primary | ICD-10-CM

## 2024-04-21 DIAGNOSIS — E11.9 TYPE 2 DIABETES MELLITUS WITHOUT COMPLICATION, WITHOUT LONG-TERM CURRENT USE OF INSULIN: ICD-10-CM

## 2024-04-21 DIAGNOSIS — E11.9 DIABETES MELLITUS WITHOUT COMPLICATION: ICD-10-CM

## 2024-04-21 RX ORDER — GLUCOSAM/CHON-MSM1/C/MANG/BOSW 500-416.6
TABLET ORAL
Qty: 300 EACH | Refills: 3 | Status: SHIPPED | OUTPATIENT
Start: 2024-04-21

## 2024-04-21 NOTE — TELEPHONE ENCOUNTER
Refill Decision Note   Drake Barraza  is requesting a refill authorization.  Brief Assessment and Rationale for Refill:  Approve     Medication Therapy Plan:        Comments:     Note composed:10:17 AM 04/21/2024

## 2024-04-21 NOTE — TELEPHONE ENCOUNTER
No care due was identified.  [facility] Embedded Care Due Messages. Reference number: [id number].   4/21/2024 4:05:26 AM CDT

## 2024-04-26 ENCOUNTER — LAB VISIT (OUTPATIENT)
Dept: LAB | Facility: HOSPITAL | Age: 79
End: 2024-04-26
Attending: INTERNAL MEDICINE
Payer: MEDICARE

## 2024-04-26 DIAGNOSIS — E11.9 TYPE 2 DIABETES MELLITUS WITHOUT COMPLICATION, WITHOUT LONG-TERM CURRENT USE OF INSULIN: ICD-10-CM

## 2024-04-26 DIAGNOSIS — I10 ESSENTIAL HYPERTENSION: ICD-10-CM

## 2024-04-26 LAB
ALBUMIN SERPL BCP-MCNC: 3.9 G/DL (ref 3.5–5.2)
ALP SERPL-CCNC: 82 U/L (ref 55–135)
ALT SERPL W/O P-5'-P-CCNC: 51 U/L (ref 10–44)
ANION GAP SERPL CALC-SCNC: 10 MMOL/L (ref 8–16)
AST SERPL-CCNC: 44 U/L (ref 10–40)
BASOPHILS # BLD AUTO: 0.05 K/UL (ref 0–0.2)
BASOPHILS NFR BLD: 0.7 % (ref 0–1.9)
BILIRUB SERPL-MCNC: 0.6 MG/DL (ref 0.1–1)
BUN SERPL-MCNC: 19 MG/DL (ref 8–23)
CALCIUM SERPL-MCNC: 10 MG/DL (ref 8.7–10.5)
CHLORIDE SERPL-SCNC: 104 MMOL/L (ref 95–110)
CO2 SERPL-SCNC: 24 MMOL/L (ref 23–29)
CREAT SERPL-MCNC: 1.1 MG/DL (ref 0.5–1.4)
DIFFERENTIAL METHOD BLD: ABNORMAL
EOSINOPHIL # BLD AUTO: 0.2 K/UL (ref 0–0.5)
EOSINOPHIL NFR BLD: 2.7 % (ref 0–8)
ERYTHROCYTE [DISTWIDTH] IN BLOOD BY AUTOMATED COUNT: 12.6 % (ref 11.5–14.5)
EST. GFR  (NO RACE VARIABLE): >60 ML/MIN/1.73 M^2
ESTIMATED AVG GLUCOSE: 237 MG/DL (ref 68–131)
GLUCOSE SERPL-MCNC: 249 MG/DL (ref 70–110)
HBA1C MFR BLD: 9.9 % (ref 4–5.6)
HCT VFR BLD AUTO: 45.8 % (ref 40–54)
HGB BLD-MCNC: 14.9 G/DL (ref 14–18)
IMM GRANULOCYTES # BLD AUTO: 0.02 K/UL (ref 0–0.04)
IMM GRANULOCYTES NFR BLD AUTO: 0.3 % (ref 0–0.5)
LYMPHOCYTES # BLD AUTO: 3.1 K/UL (ref 1–4.8)
LYMPHOCYTES NFR BLD: 46.3 % (ref 18–48)
MCH RBC QN AUTO: 30.7 PG (ref 27–31)
MCHC RBC AUTO-ENTMCNC: 32.5 G/DL (ref 32–36)
MCV RBC AUTO: 94 FL (ref 82–98)
MONOCYTES # BLD AUTO: 0.8 K/UL (ref 0.3–1)
MONOCYTES NFR BLD: 12.1 % (ref 4–15)
NEUTROPHILS # BLD AUTO: 2.5 K/UL (ref 1.8–7.7)
NEUTROPHILS NFR BLD: 37.9 % (ref 38–73)
NRBC BLD-RTO: 0 /100 WBC
PLATELET # BLD AUTO: 177 K/UL (ref 150–450)
PMV BLD AUTO: 9.8 FL (ref 9.2–12.9)
POTASSIUM SERPL-SCNC: 5 MMOL/L (ref 3.5–5.1)
PROT SERPL-MCNC: 6.9 G/DL (ref 6–8.4)
RBC # BLD AUTO: 4.85 M/UL (ref 4.6–6.2)
SODIUM SERPL-SCNC: 138 MMOL/L (ref 136–145)
TSH SERPL DL<=0.005 MIU/L-ACNC: 0.8 UIU/ML (ref 0.4–4)
WBC # BLD AUTO: 6.71 K/UL (ref 3.9–12.7)

## 2024-04-26 PROCEDURE — 83036 HEMOGLOBIN GLYCOSYLATED A1C: CPT | Performed by: INTERNAL MEDICINE

## 2024-04-26 PROCEDURE — 80053 COMPREHEN METABOLIC PANEL: CPT | Performed by: INTERNAL MEDICINE

## 2024-04-26 PROCEDURE — 36415 COLL VENOUS BLD VENIPUNCTURE: CPT | Performed by: INTERNAL MEDICINE

## 2024-04-26 PROCEDURE — 85025 COMPLETE CBC W/AUTO DIFF WBC: CPT | Performed by: INTERNAL MEDICINE

## 2024-04-26 PROCEDURE — 84443 ASSAY THYROID STIM HORMONE: CPT | Performed by: INTERNAL MEDICINE

## 2024-04-29 DIAGNOSIS — E03.4 HYPOTHYROIDISM DUE TO ACQUIRED ATROPHY OF THYROID: ICD-10-CM

## 2024-04-29 DIAGNOSIS — I25.10 CORONARY ARTERY DISEASE, UNSPECIFIED VESSEL OR LESION TYPE, UNSPECIFIED WHETHER ANGINA PRESENT, UNSPECIFIED WHETHER NATIVE OR TRANSPLANTED HEART: ICD-10-CM

## 2024-04-29 NOTE — TELEPHONE ENCOUNTER
No care due was identified.  Health [facility] Embedded Care Due Messages. Reference number: [id number].   4/29/2024 11:28:58 AM CDT

## 2024-04-30 ENCOUNTER — TELEPHONE (OUTPATIENT)
Dept: INTERNAL MEDICINE | Facility: CLINIC | Age: 79
End: 2024-04-30
Payer: MEDICARE

## 2024-04-30 RX ORDER — ROSUVASTATIN CALCIUM 20 MG/1
TABLET, COATED ORAL
Qty: 90 TABLET | Refills: 0 | Status: SHIPPED | OUTPATIENT
Start: 2024-04-30

## 2024-04-30 RX ORDER — LEVOTHYROXINE SODIUM 125 UG/1
TABLET ORAL
Qty: 90 TABLET | Refills: 0 | Status: SHIPPED | OUTPATIENT
Start: 2024-04-30

## 2024-04-30 NOTE — TELEPHONE ENCOUNTER
----- Message from Viviane Valdes sent at 4/29/2024 11:17 AM CDT -----  Contact: Pt  [phone]  Would like to receive medical advice.  Would they like a call back or a response via [facility]:  Call Back  Additional information:      Pt is calling to get an update on his prescription for his CPAP machine that would have been sent over about a week ago from [facility]. They were needing an updated prescription and he was told by them that they had reached out to the office for it. He just wants to make sure that is sent over.

## 2024-04-30 NOTE — TELEPHONE ENCOUNTER
Spoke to pt and he states that [facility] was suppose to fax us CPAP supply Rx to sign. I told him that we didn't receive the fax. I told him I would call them to verify the fax number and have them fax it to me again  He ISABEL    I called [facility] and spoke to Stacy to verity fax number and she had the incorrect one  I gave her the correct fax number and she said she would have the CPAP dept refax it to us

## 2024-04-30 NOTE — TELEPHONE ENCOUNTER
Refill Decision Note   Drake Barraza  is requesting a refill authorization.  Brief Assessment and Rationale for Refill:  Approve     Medication Therapy Plan: FOVS 5/2/24      Comments:     Note composed:12:02 PM 04/30/2024

## 2024-05-02 ENCOUNTER — TELEPHONE (OUTPATIENT)
Dept: INTERNAL MEDICINE | Facility: CLINIC | Age: 79
End: 2024-05-02
Payer: MEDICARE

## 2024-05-02 ENCOUNTER — LAB VISIT (OUTPATIENT)
Dept: LAB | Facility: HOSPITAL | Age: 79
End: 2024-05-02
Attending: INTERNAL MEDICINE
Payer: MEDICARE

## 2024-05-02 ENCOUNTER — OFFICE VISIT (OUTPATIENT)
Dept: INTERNAL MEDICINE | Facility: CLINIC | Age: 79
End: 2024-05-02
Payer: MEDICARE

## 2024-05-02 VITALS
WEIGHT: 169.88 LBS | SYSTOLIC BLOOD PRESSURE: 126 MMHG | TEMPERATURE: 98 F | HEIGHT: 67 IN | DIASTOLIC BLOOD PRESSURE: 60 MMHG | HEART RATE: 62 BPM | RESPIRATION RATE: 18 BRPM | BODY MASS INDEX: 26.66 KG/M2 | OXYGEN SATURATION: 97 %

## 2024-05-02 DIAGNOSIS — I10 ESSENTIAL HYPERTENSION: ICD-10-CM

## 2024-05-02 DIAGNOSIS — N52.9 IMPOTENCE: ICD-10-CM

## 2024-05-02 DIAGNOSIS — E03.4 HYPOTHYROIDISM DUE TO ACQUIRED ATROPHY OF THYROID: ICD-10-CM

## 2024-05-02 DIAGNOSIS — E11.9 TYPE 2 DIABETES MELLITUS WITHOUT COMPLICATION, WITHOUT LONG-TERM CURRENT USE OF INSULIN: Primary | ICD-10-CM

## 2024-05-02 DIAGNOSIS — E11.9 TYPE 2 DIABETES MELLITUS WITHOUT COMPLICATION, WITHOUT LONG-TERM CURRENT USE OF INSULIN: ICD-10-CM

## 2024-05-02 DIAGNOSIS — Z12.5 PROSTATE CANCER SCREENING: ICD-10-CM

## 2024-05-02 DIAGNOSIS — I70.0 ATHEROSCLEROSIS OF AORTA: ICD-10-CM

## 2024-05-02 DIAGNOSIS — Z12.11 COLON CANCER SCREENING: ICD-10-CM

## 2024-05-02 DIAGNOSIS — R79.89 ELEVATED LFTS: ICD-10-CM

## 2024-05-02 DIAGNOSIS — G47.33 OSA (OBSTRUCTIVE SLEEP APNEA): ICD-10-CM

## 2024-05-02 DIAGNOSIS — I25.10 CORONARY ARTERY DISEASE INVOLVING NATIVE CORONARY ARTERY OF NATIVE HEART WITHOUT ANGINA PECTORIS: ICD-10-CM

## 2024-05-02 LAB
ALBUMIN/CREAT UR: 22 UG/MG (ref 0–30)
CREAT UR-MCNC: 82 MG/DL (ref 23–375)
MICROALBUMIN UR DL<=1MG/L-MCNC: 18 UG/ML

## 2024-05-02 PROCEDURE — 1159F MED LIST DOCD IN RCRD: CPT | Mod: CPTII,S$GLB,, | Performed by: INTERNAL MEDICINE

## 2024-05-02 PROCEDURE — 3078F DIAST BP <80 MM HG: CPT | Mod: CPTII,S$GLB,, | Performed by: INTERNAL MEDICINE

## 2024-05-02 PROCEDURE — 82043 UR ALBUMIN QUANTITATIVE: CPT | Performed by: INTERNAL MEDICINE

## 2024-05-02 PROCEDURE — 3074F SYST BP LT 130 MM HG: CPT | Mod: CPTII,S$GLB,, | Performed by: INTERNAL MEDICINE

## 2024-05-02 PROCEDURE — 99215 OFFICE O/P EST HI 40 MIN: CPT | Mod: S$GLB,,, | Performed by: INTERNAL MEDICINE

## 2024-05-02 PROCEDURE — 99999 PR PBB SHADOW E&M-EST. PATIENT-LVL V: CPT | Mod: PBBFAC,,, | Performed by: INTERNAL MEDICINE

## 2024-05-02 PROCEDURE — 1101F PT FALLS ASSESS-DOCD LE1/YR: CPT | Mod: CPTII,S$GLB,, | Performed by: INTERNAL MEDICINE

## 2024-05-02 PROCEDURE — 3288F FALL RISK ASSESSMENT DOCD: CPT | Mod: CPTII,S$GLB,, | Performed by: INTERNAL MEDICINE

## 2024-05-02 PROCEDURE — 1126F AMNT PAIN NOTED NONE PRSNT: CPT | Mod: CPTII,S$GLB,, | Performed by: INTERNAL MEDICINE

## 2024-05-02 RX ORDER — SILDENAFIL 100 MG/1
100 TABLET, FILM COATED ORAL DAILY PRN
Qty: 30 TABLET | Refills: 1 | Status: SHIPPED | OUTPATIENT
Start: 2024-05-02 | End: 2025-05-02

## 2024-05-02 RX ORDER — METFORMIN HYDROCHLORIDE 500 MG/1
500 TABLET, EXTENDED RELEASE ORAL
Qty: 90 TABLET | Refills: 1 | Status: SHIPPED | OUTPATIENT
Start: 2024-05-02 | End: 2025-05-02

## 2024-05-02 NOTE — PROGRESS NOTES
DATE: 5/2/2024  PATIENT: Drake Barraza    Supervising Physician: Ariel Bower M.D.    CHIEF COMPLAINT: low back pain    HISTORY:  Drake Barraza is a 79 y.o. male with a pmhx of DM II here for initial evaluation of low back pain (Back - 0, Leg - 0).   The pain has been present for years, worsening over time. The patient describes the pain as discomfort.  The pain is worse with standing straight up and improved by leaning. There is negative associated numbness and tingling. There is negative subjective weakness. Prior treatments have included no PT, ESIs, surgery.    The patient denies myelopathic symptoms such as handwriting changes or difficulty with buttons/coins/keys. Denies perineal paresthesias, bowel/bladder dysfunction.    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    Inferior NSTEMI     MICHELLE on CPAP     Home CPAP at 9cm/Face Mask    Squamous cell carcinoma of skin of left temple 01/10/2024    left temple    Thyroid disease      Past Surgical History:   Procedure Laterality Date    COLONOSCOPY N/A 7/27/2016    Procedure: COLONOSCOPY;  Surgeon: Ariel Cabral MD;  Location: [facility] (4TH FLR);  Service: Endoscopy;  Laterality: N/A;    ENDOSCOPIC CARPAL TUNNEL RELEASE Left 11/30/2022    Procedure: RELEASE, CARPAL TUNNEL, ENDOSCOPIC - left;  Surgeon: Mark Beach MD;  Location: [facility] OR;  Service: Orthopedics;  Laterality: Left;    HERNIA REPAIR  2006    JOINT REPLACEMENT      TOTAL KNEE ARTHROPLASTY Right 7/22/2019    Procedure: REPLACEMENT-KNEE-TOTAL;  Surgeon: John L. [facility] Jr., MD;  Location: [facility] 2ND FLR;  Service: Orthopedics;   Laterality: Right;    TOTAL KNEE ARTHROPLASTY Left 10/5/2020    Procedure: ARTHROPLASTY, KNEE, TOTAL Jayy NexGen Cemented Tibia;  Surgeon: Chris Israel MD;  Location: [facility];  Service: Orthopedics;  Laterality: Left;       Medications:   Current Outpatient Medications on File Prior to Visit   Medication Sig Dispense Refill    aspirin (ECOTRIN) 81 MG EC tablet Take 1 tablet (81 mg total) by mouth 2 (two) times daily. (Patient taking differently: Take 81 mg by mouth once daily.) 60 tablet 0    blood sugar diagnostic (TRUE METRIX GLUCOSE TEST STRIP) Strp TEST SUGAR TWICE DAILY BEFORE MEALS 200 strip 3    blood-glucose meter kit Check glucose 1-2x/day after meals 1 each 0    CINNAMON BARK (CINNAMON ORAL) Take 1 capsule by mouth once daily.      ezetimibe (ZETIA) 10 mg tablet Take 1 tablet (10 mg total) by mouth once daily. 90 tablet 3    levothyroxine (SYNTHROID) 125 MCG tablet TAKE 1 TABLET EVERY MORNING ON AN EMPTY STOMACH 90 tablet 0    metoprolol tartrate (LOPRESSOR) 25 MG tablet TAKE 1 TABLET TWICE DAILY 180 tablet 3    multivitamin (THERAGRAN) per tablet Take 1 tablet by mouth once daily.      nitroGLYCERIN (NITROSTAT) 0.4 MG SL tablet Place 1 tablet (0.4 mg total) under the tongue every 5 (five) minutes as needed for Chest pain. 100 tablet 2    omega-3 fatty acids-vitamin E 1,000 mg Cap Take 1 capsule by mouth Daily.        ramipriL (ALTACE) 10 MG capsule TAKE 1 CAPSULE EVERY DAY 90 capsule 3    rosuvastatin (CRESTOR) 20 MG tablet TAKE 1 TABLET EVERY DAY 90 tablet 0    sildenafiL (VIAGRA) 100 MG tablet Take 1 tablet (100 mg total) by mouth daily as needed for Erectile Dysfunction. 30 tablet 0    TRUEPLUS LANCETS 30 gauge Misc USE TO TEST BLOOD SUGAR TWICE DAILY BEFORE MEALS 2-3 DAYS A WEEK 300 each 3    TURMERIC ROOT EXTRACT ORAL Take 1 capsule by mouth.      UBIDECARENONE (COENZYME Q10) 100 mg Tab Take 100 mg by mouth once daily.      vitamin D 185 MG Tab Take 185 mg by mouth once daily.       No current  facility-administered medications on file prior to visit.       Social History:   Social History     Socioeconomic History    Marital status:    Occupational History    Occupation: Retired     Employer: OTHER   Tobacco Use    Smoking status: Never     Passive exposure: Never    Smokeless tobacco: Never   Substance and Sexual Activity    Alcohol use: Yes     Comment: a few glasses of wine a week    Drug use: No   Social History Narrative    Mr Barraza is  to Tiff; he is retired from [employer]; he has 1 grown son, Paul who is  and living in [place]-no kids     Social Determinants of Health     Financial Resource Strain: Low Risk  (4/26/2024)    Overall Financial Resource Strain (CARDIA)     Difficulty of Paying Living Expenses: Not hard at all   Food Insecurity: No Food Insecurity (4/26/2024)    Hunger Vital Sign     Worried About Running Out of Food in the Last Year: Never true     Ran Out of Food in the Last Year: Never true   Transportation Needs: No Transportation Needs (4/26/2024)    PRAPARE - Transportation     Lack of Transportation (Medical): No     Lack of Transportation (Non-Medical): No   Physical Activity: Insufficiently Active (4/26/2024)    Exercise Vital Sign     Days of Exercise per Week: 3 days     Minutes of Exercise per Session: 10 min   Stress: No Stress Concern Present (4/26/2024)    [place] [place] [employer] - Occupational Stress Questionnaire     Feeling of Stress : Not at all   Housing Stability: Low Risk  (12/7/2023)    Housing Stability Vital Sign     Unable to Pay for Housing in the Last Year: No     Number of Places Lived in the Last Year: 1     Unstable Housing in the Last Year: No       REVIEW OF SYSTEMS:  Constitution: Negative. Negative for chills, fever and night sweats.   Cardiovascular: Negative for chest pain and syncope.   Respiratory: Negative for cough and shortness of breath.   Gastrointestinal: See HPI. Negative for nausea/vomiting. Negative for  abdominal pain.  Genitourinary: See HPI. Negative for discoloration or dysuria.  Skin: Negative for dry skin, itching and rash.   Hematologic/Lymphatic: Negative for bleeding problem. Does not bruise/bleed easily.   Musculoskeletal: Negative for falls and muscle weakness.   Neurological: See HPI. No seizures.   Endocrine: Negative for polydipsia, polyphagia and polyuria.   Allergic/Immunologic: Negative for hives and persistent infections.     EXAM:  There were no vitals taken for this visit.    General: The patient is a very pleasant 79 y.o. male in no apparent distress, the patient is oriented to person, place and time.  Psych: Normal mood and affect  HEENT: Vision grossly intact, hearing intact to the spoken word.  Lungs: Respirations unlabored.  Gait: Normal station and gait, no difficulty with toe or heel walk.   Skin: Dorsal lumbar skin negative for rashes, lesions, hairy patches and surgical scars. There is negative lumbar tenderness to palpation.  Range of motion: Lumbar range of motion is acceptable.  Spinal Balance: Global saggital and coronal spinal balance acceptable, not significant for scoliosis and kyphosis.  Musculoskeletal: No pain with the range of motion of the bilateral hips. No trochanteric tenderness to palpation.  Vascular: Bilateral lower extremities warm and well perfused, dorsalis pedis pulses 2+ bilaterally.  Neurological: Normal strength and tone in all major motor groups in the bilateral lower extremities. Normal sensation to light touch in the L2-S1 dermatomes bilaterally.  Deep tendon reflexes symmetric 2+ in the bilateral lower extremities.  Negative Babinski bilaterally. Straight leg raise negative bilaterally.    IMAGING:      Today I personally reviewed AP, Lat and Flex/Ex  upright L-spine films that demonstrate moderate degenerative changes with grade I anterolisthesis of L4 on L5.      There is no height or weight on file to calculate BMI.    Hemoglobin A1C   Date Value Ref Range  Status   04/26/2024 9.9 (H) 4.0 - 5.6 % Final     Comment:     ADA Screening Guidelines:  5.7-6.4%  Consistent with prediabetes  >or=6.5%  Consistent with diabetes    High levels of fetal hemoglobin interfere with the HbA1C  assay. Heterozygous hemoglobin variants (HbS, HgC, etc)do  not significantly interfere with this assay.   However, presence of multiple variants may affect accuracy.     05/10/2023 6.9 (H) 4.0 - 5.6 % Final     Comment:     ADA Screening Guidelines:  5.7-6.4%  Consistent with prediabetes  >or=6.5%  Consistent with diabetes    High levels of fetal hemoglobin interfere with the HbA1C  assay. Heterozygous hemoglobin variants (HbS, HgC, etc)do  not significantly interfere with this assay.   However, presence of multiple variants may affect accuracy.     11/03/2022 7.1 (H) 4.0 - 5.6 % Final     Comment:     ADA Screening Guidelines:  5.7-6.4%  Consistent with prediabetes  >or=6.5%  Consistent with diabetes    High levels of fetal hemoglobin interfere with the HbA1C  assay. Heterozygous hemoglobin variants (HbS, HgC, etc)do  not significantly interfere with this assay.   However, presence of multiple variants may affect accuracy.             ASSESSMENT/PLAN:    There are no diagnoses linked to this encounter.    Today we discussed at length all of the different treatment options including anti-inflammatories, acetaminophen, rest, ice, heat, physical therapy including strengthening and stretching exercises, home exercises, ROM, aerobic conditioning, aqua therapy, other modalities including ultrasound, massage, and dry needling, epidural steroid injections and finally surgical intervention.      Pt presents with chronic low back pain. Will send PT orders to [place]. Pt will fu if pain persists.

## 2024-05-02 NOTE — TELEPHONE ENCOUNTER
----- Message from Stefany Michelle sent at 5/2/2024  2:15 PM CDT -----  Contact: [phone] ext [phone]  Rossana with [facility] is calling to see if the fax has been received she is asking for someone to call her to let her know if this has come through and she is fixing to fax again please advise

## 2024-05-02 NOTE — TELEPHONE ENCOUNTER
ROSARIO Tracy at [facility] to fax the cpap supply order for  to sign. I let her know the pt is here in the office today looking for this to be done today

## 2024-05-02 NOTE — PROGRESS NOTES
Subjective:       Patient ID: Drake Barraza is a 79 y.o. male.    Chief Complaint:   Follow-up, Hypertension, Hypothyroidism, Hyperlipidemia, Sleep Apnea, and Coronary Artery Disease    HPI: Mr Barraza presents for follow up DM/HLD and Health Maintenance Issues  He is concerned about abnormal lab with recent start of Zetia in December  He is active with 2 part time jobs but no regular exercise program    DM II: Med Tx 1-Cinnamon tabs only         Accuchecks:AM 200s, PM None; He thinks the Zetia(started 12/23) has increased his glucoses         Diet Diary: Breakfast:Grits/Eggs/Coffee,   Lunch: Pasta/Diet Tea  Dinner: 1&1/2 sandwiches/Wine         Snacks: Ice Cream or Milk/Cookies         Weight(5/23)= 173  Weight(Today)=169    HTN: Med Tx 1-Altace 10mg QD, 2-Lopressor 25mg BID         Home BPs: 120s/60s    CAD/HLD: Med Tx 1- Crestor 20mg QD,2-Zetia 10mg QD(Added 12/23 by Dr Mccoy) 3-Baby ASA 81mg QD    MICHELLE: +Home CPAP at 9 cm pressure/FFM/Wears nightly about 8 hours    Past Medical, Surgical, Social History: Please see as stated in Epic chart which has been reviewed.    Current Outpatient Medications   Medication Sig Dispense Refill    blood sugar diagnostic (TRUE METRIX GLUCOSE TEST STRIP) Strp TEST SUGAR TWICE DAILY BEFORE MEALS 200 strip 3    CINNAMON BARK (CINNAMON ORAL) Take 1 capsule by mouth once daily.      levothyroxine (SYNTHROID) 125 MCG tablet TAKE 1 TABLET EVERY MORNING ON AN EMPTY STOMACH 90 tablet 0    metoprolol tartrate (LOPRESSOR) 25 MG tablet TAKE 1 TABLET TWICE DAILY 180 tablet 3    multivitamin (THERAGRAN) per tablet Take 1 tablet by mouth once daily.      nitroGLYCERIN (NITROSTAT) 0.4 MG SL tablet Place 1 tablet (0.4 mg total) under the tongue every 5 (five) minutes as needed for Chest pain. 100 tablet 2    omega-3 fatty acids-vitamin E 1,000 mg Cap Take 1 capsule by mouth Daily.        ramipriL (ALTACE) 10 MG capsule TAKE 1 CAPSULE EVERY DAY 90 capsule 3    rosuvastatin (CRESTOR) 20 MG  tablet TAKE 1 TABLET EVERY DAY 90 tablet 0    TRUEPLUS LANCETS 30 gauge Misc USE TO TEST BLOOD SUGAR TWICE DAILY BEFORE MEALS 2-3 DAYS A WEEK 300 each 3    TURMERIC ROOT EXTRACT ORAL Take 1 capsule by mouth.      UBIDECARENONE (COENZYME Q10) 100 mg Tab Take 100 mg by mouth once daily.      vitamin D 185 MG Tab Take 185 mg by mouth once daily.      aspirin (ECOTRIN) 81 MG EC tablet Take 1 tablet (81 mg total) by mouth 2 (two) times daily. (Patient taking differently: Take 81 mg by mouth once daily.) 60 tablet 0    blood-glucose meter kit Check glucose 1-2x/day after meals 1 each 0    ezetimibe (ZETIA) 10 mg tablet Take 1 tablet (10 mg total) by mouth once daily. 90 tablet 3    metFORMIN (GLUCOPHAGE-XR) 500 MG ER 24hr tablet Take 1 tablet (500 mg total) by mouth daily with breakfast. 90 tablet 1    sildenafiL (VIAGRA) 100 MG tablet Take 1 tablet (100 mg total) by mouth daily as needed for Erectile Dysfunction. 30 tablet 1     No current facility-administered medications for this visit.       Review of Systems   Respiratory:  Positive for apnea.    Musculoskeletal:  Positive for back pain.   All other systems reviewed and are negative.      Objective:      Lab Results   Component Value Date    WBC 6.71 04/26/2024    HGB 14.9 04/26/2024    HCT 45.8 04/26/2024     04/26/2024    CHOL 99 (L) 03/13/2024    TRIG 82 03/13/2024    HDL 36 (L) 03/13/2024    ALT 50 (H) 05/02/2024    AST 43 (H) 05/02/2024     04/26/2024    K 5.0 04/26/2024     04/26/2024    CREATININE 1.1 04/26/2024    BUN 19 04/26/2024    CO2 24 04/26/2024    TSH 0.795 04/26/2024    PSA 0.80 05/10/2023    INR 0.9 09/22/2020    GLUF 104 04/03/2004    HGBA1C 9.8 (H) 05/02/2024     Physical Exam  Vitals reviewed.   Constitutional:       Appearance: Normal appearance.   Cardiovascular:      Rate and Rhythm: Normal rate and regular rhythm.      Heart sounds: Normal heart sounds.   Pulmonary:      Breath sounds: Normal breath sounds.   Abdominal:      " General: Bowel sounds are normal.      Palpations: Abdomen is soft. There is no mass.      Tenderness: There is no abdominal tenderness.   Musculoskeletal:      Right lower leg: No edema.      Left lower leg: No edema.   Neurological:      Mental Status: He is alert.           Vital Signs  Temp: 97.9 °F (36.6 °C)  Temp Source: Other (see comments)  Pulse: 62  Resp: 18  SpO2: 97 %  BP: 126/60  BP Location: Right arm  Patient Position: Sitting  Pain Score: 0-No pain  Height and Weight  Height: 5' 7" (170.2 cm)  Weight: 77 kg (169 lb 13.8 oz)  BSA (Calculated - sq m): 1.91 sq meters  BMI (Calculated): 26.6  Weight in (lb) to have BMI = 25: 159.3    Assessment:       1. Type 2 diabetes mellitus without complication, without long-term current use of insulin    2. Essential hypertension    3. Coronary artery disease involving native coronary artery of native heart without angina pectoris    4. Hypothyroidism due to acquired atrophy of thyroid    5. MICHELLE (obstructive sleep apnea)    6. Atherosclerosis of aorta    7. Elevated LFTs    8. Colon cancer screening    9. Prostate cancer screening    10. Erectile dysfunction        Plan:     Health Maintenance   Topic Date Due    Shingles Vaccine (1 of 2) Never done    Eye Exam  05/01/2024    Hemoglobin A1c  08/02/2024    Lipid Panel  03/13/2025    Aspirin/Antiplatelet Therapy  05/02/2025    TETANUS VACCINE  07/06/2025    Hepatitis C Screening  Completed        Drake was seen today for follow-up, hypertension, hypothyroidism, hyperlipidemia, sleep apnea and coronary artery disease.    Diagnoses and all orders for this visit:    Type 2 diabetes mellitus without complication, without long-term current use of insulin/uncontrolled with increase in HgbA1C to 9.9 from 6.9%  -     Hemoglobin A1C; Future/Today to confirm accuracy of lab  -     metFORMIN (GLUCOPHAGE-XR) 500 MG ER 24hr tablet; Take 1 tablet (500 mg total) by mouth daily with breakfast.  -     Microalbumin/Creatinine Ratio, " Urine; Future  -     Comprehensive Metabolic Panel; Future  -     Hemoglobin A1C; Future    Essential hypertension/controlled        -     Continue: Med Tx 1-Altace 10mg QD, 2-Lopressor 25mg BID  -     Comprehensive Metabolic Panel; Future    Coronary artery disease involving native coronary artery of native heart without angina pectoris        -     Continue: 1- Crestor 20mg QD, 2- ASA 81mg QD        -     If HgbA1C still high on repeat , recommend a trial off Zetia  -     Comprehensive Metabolic Panel; Future  -     Lipid Panel; Future    Hypothyroidism due to acquired atrophy of thyroid/controlled on Synthroid 125ug QD    MICHELLE (obstructive sleep apnea)/controlled       -     Continue:  +Home CPAP at 9 cm pressure/FFM    Atherosclerosis of aorta       -    continue to follow guidelines for control of cardiovascular risk factors including:  Essential hypertension, hyperlipidemia, diabetes mellitus, etcetera    Elevated LFTs  -     Hepatic Function Panel; Future/repeat today to confirm accuracy    Colon cancer screening  -     Cologuard Screening (Multitarget Stool DNA); Future  -     Cologuard Screening (Multitarget Stool DNA)    Prostate cancer screening  -     PSA, Screening; Future    Erectile dysfunction  -     sildenafiL (VIAGRA) 100 MG tablet; Take 1 tablet (100 mg total) by mouth daily as needed for Erectile Dysfunction.    Health maintenance        -     return to clinic times 4-5 months with one-week prior fasting lab or see patient sooner if needed

## 2024-05-04 ENCOUNTER — TELEPHONE (OUTPATIENT)
Dept: INTERNAL MEDICINE | Facility: CLINIC | Age: 79
End: 2024-05-04
Payer: MEDICARE

## 2024-05-04 NOTE — TELEPHONE ENCOUNTER
Spoke with Mr Christi ie Review of Lab(5/1):  AbaQ3B=9.8% and AST/ALT improved to 43/50-baseline. Spoke with Dr Mccoy and will try OFF Zetia till RTC x 4 months with prior lab. Pt to start Metformin ER today.

## 2024-05-06 ENCOUNTER — OFFICE VISIT (OUTPATIENT)
Dept: ORTHOPEDICS | Facility: CLINIC | Age: 79
End: 2024-05-06
Payer: MEDICARE

## 2024-05-06 ENCOUNTER — HOSPITAL ENCOUNTER (OUTPATIENT)
Dept: RADIOLOGY | Facility: HOSPITAL | Age: 79
Discharge: HOME | End: 2024-05-06
Attending: ORTHOPAEDIC SURGERY
Payer: MEDICARE

## 2024-05-06 VITALS — BODY MASS INDEX: 26.64 KG/M2 | WEIGHT: 169.75 LBS | HEIGHT: 67 IN

## 2024-05-06 DIAGNOSIS — M51.36 DDD (DEGENERATIVE DISC DISEASE), LUMBAR: ICD-10-CM

## 2024-05-06 DIAGNOSIS — M51.36 DDD (DEGENERATIVE DISC DISEASE), LUMBAR: Primary | ICD-10-CM

## 2024-05-06 PROCEDURE — 1101F PT FALLS ASSESS-DOCD LE1/YR: CPT | Mod: CPTII,S$GLB,, | Performed by: ORTHOPAEDIC SURGERY

## 2024-05-06 PROCEDURE — 99214 OFFICE O/P EST MOD 30 MIN: CPT | Mod: S$GLB,,, | Performed by: ORTHOPAEDIC SURGERY

## 2024-05-06 PROCEDURE — 72110 X-RAY EXAM L-2 SPINE 4/>VWS: CPT | Mod: 26,,, | Performed by: RADIOLOGY

## 2024-05-06 PROCEDURE — 3288F FALL RISK ASSESSMENT DOCD: CPT | Mod: CPTII,S$GLB,, | Performed by: ORTHOPAEDIC SURGERY

## 2024-05-06 PROCEDURE — 1126F AMNT PAIN NOTED NONE PRSNT: CPT | Mod: CPTII,S$GLB,, | Performed by: ORTHOPAEDIC SURGERY

## 2024-05-06 PROCEDURE — 72110 X-RAY EXAM L-2 SPINE 4/>VWS: CPT | Mod: TC

## 2024-05-06 PROCEDURE — 1159F MED LIST DOCD IN RCRD: CPT | Mod: CPTII,S$GLB,, | Performed by: ORTHOPAEDIC SURGERY

## 2024-05-06 PROCEDURE — 99999 PR PBB SHADOW E&M-EST. PATIENT-LVL III: CPT | Mod: PBBFAC,,, | Performed by: ORTHOPAEDIC SURGERY

## 2024-05-09 ENCOUNTER — TELEPHONE (OUTPATIENT)
Dept: INTERNAL MEDICINE | Facility: CLINIC | Age: 79
End: 2024-05-09
Payer: MEDICARE

## 2024-05-09 NOTE — TELEPHONE ENCOUNTER
----- Message from Rika Rausch sent at 5/9/2024 11:02 AM CDT -----  Contact: [phone] [phone]   [facility] would like a call back to discuss the status on a order request for Cpap supply for the patient. Please call  from  [facility] [phone] Ext#[phone]

## 2024-05-09 NOTE — TELEPHONE ENCOUNTER
Called Rossana at [facility] and she said she hasn't received the fax for pt's CPAP supplies.   I told her I faxed it on 5/2 and I will fax it again now

## 2024-06-06 LAB — NONINV COLON CA DNA+OCC BLD SCRN STL QL: NEGATIVE

## 2024-06-10 ENCOUNTER — PATIENT MESSAGE (OUTPATIENT)
Dept: INTERNAL MEDICINE | Facility: CLINIC | Age: 79
End: 2024-06-10
Payer: MEDICARE

## 2024-06-18 NOTE — TELEPHONE ENCOUNTER
Follow up from [facility]/Dr Mccoy recommends ok to change lipitor 40 to crestor 20mg tab. Spoke with Mr Barraza and will change lipitor 40 to crestor 20 and erx into [employer].  He has f/u lab scheduled with Dr Mccoy in November.   Detail Level: Zone Photo Preface (Leave Blank If You Do Not Want): Photographs were obtained today

## 2024-08-09 DIAGNOSIS — I25.10 CORONARY ARTERY DISEASE, UNSPECIFIED VESSEL OR LESION TYPE, UNSPECIFIED WHETHER ANGINA PRESENT, UNSPECIFIED WHETHER NATIVE OR TRANSPLANTED HEART: ICD-10-CM

## 2024-08-09 DIAGNOSIS — E03.4 HYPOTHYROIDISM DUE TO ACQUIRED ATROPHY OF THYROID: ICD-10-CM

## 2024-08-09 RX ORDER — LEVOTHYROXINE SODIUM 125 UG/1
TABLET ORAL
Qty: 90 TABLET | Refills: 2 | Status: SHIPPED | OUTPATIENT
Start: 2024-08-09

## 2024-08-09 RX ORDER — ROSUVASTATIN CALCIUM 20 MG/1
TABLET, COATED ORAL
Qty: 90 TABLET | Refills: 2 | Status: SHIPPED | OUTPATIENT
Start: 2024-08-09

## 2024-08-11 RX ORDER — RAMIPRIL 10 MG/1
CAPSULE ORAL
Qty: 90 CAPSULE | Refills: 3 | Status: SHIPPED | OUTPATIENT
Start: 2024-08-11

## 2024-09-26 ENCOUNTER — LAB VISIT (OUTPATIENT)
Dept: LAB | Facility: HOSPITAL | Age: 79
End: 2024-09-26
Attending: INTERNAL MEDICINE
Payer: MEDICARE

## 2024-09-26 DIAGNOSIS — I10 ESSENTIAL HYPERTENSION: ICD-10-CM

## 2024-09-26 DIAGNOSIS — Z12.5 PROSTATE CANCER SCREENING: ICD-10-CM

## 2024-09-26 DIAGNOSIS — E11.9 TYPE 2 DIABETES MELLITUS WITHOUT COMPLICATION, WITHOUT LONG-TERM CURRENT USE OF INSULIN: ICD-10-CM

## 2024-09-26 DIAGNOSIS — I25.10 CORONARY ARTERY DISEASE INVOLVING NATIVE CORONARY ARTERY OF NATIVE HEART WITHOUT ANGINA PECTORIS: ICD-10-CM

## 2024-09-26 LAB
ALBUMIN SERPL BCP-MCNC: 4.3 G/DL (ref 3.5–5.2)
ALP SERPL-CCNC: 85 U/L (ref 55–135)
ALT SERPL W/O P-5'-P-CCNC: 33 U/L (ref 10–44)
ANION GAP SERPL CALC-SCNC: 10 MMOL/L (ref 8–16)
AST SERPL-CCNC: 32 U/L (ref 10–40)
BILIRUB SERPL-MCNC: 0.7 MG/DL (ref 0.1–1)
BUN SERPL-MCNC: 20 MG/DL (ref 8–23)
CALCIUM SERPL-MCNC: 10.7 MG/DL (ref 8.7–10.5)
CHLORIDE SERPL-SCNC: 106 MMOL/L (ref 95–110)
CHOLEST SERPL-MCNC: 126 MG/DL (ref 120–199)
CHOLEST/HDLC SERPL: 2.9 {RATIO} (ref 2–5)
CO2 SERPL-SCNC: 25 MMOL/L (ref 23–29)
COMPLEXED PSA SERPL-MCNC: 0.73 NG/ML (ref 0–4)
CREAT SERPL-MCNC: 1.1 MG/DL (ref 0.5–1.4)
EST. GFR  (NO RACE VARIABLE): >60 ML/MIN/1.73 M^2
ESTIMATED AVG GLUCOSE: 163 MG/DL (ref 68–131)
GLUCOSE SERPL-MCNC: 155 MG/DL (ref 70–110)
HBA1C MFR BLD: 7.3 % (ref 4–5.6)
HDLC SERPL-MCNC: 44 MG/DL (ref 40–75)
HDLC SERPL: 34.9 % (ref 20–50)
LDLC SERPL CALC-MCNC: 68.4 MG/DL (ref 63–159)
NONHDLC SERPL-MCNC: 82 MG/DL
POTASSIUM SERPL-SCNC: 4.8 MMOL/L (ref 3.5–5.1)
PROT SERPL-MCNC: 7.4 G/DL (ref 6–8.4)
SODIUM SERPL-SCNC: 141 MMOL/L (ref 136–145)
TRIGL SERPL-MCNC: 68 MG/DL (ref 30–150)

## 2024-09-26 PROCEDURE — 80061 LIPID PANEL: CPT | Mod: HCNC | Performed by: INTERNAL MEDICINE

## 2024-09-26 PROCEDURE — 84153 ASSAY OF PSA TOTAL: CPT | Mod: HCNC | Performed by: INTERNAL MEDICINE

## 2024-09-26 PROCEDURE — 83036 HEMOGLOBIN GLYCOSYLATED A1C: CPT | Mod: HCNC | Performed by: INTERNAL MEDICINE

## 2024-09-26 PROCEDURE — 80053 COMPREHEN METABOLIC PANEL: CPT | Mod: HCNC | Performed by: INTERNAL MEDICINE

## 2024-09-26 PROCEDURE — 36415 COLL VENOUS BLD VENIPUNCTURE: CPT | Mod: HCNC | Performed by: INTERNAL MEDICINE

## 2024-10-03 ENCOUNTER — OFFICE VISIT (OUTPATIENT)
Dept: INTERNAL MEDICINE | Facility: CLINIC | Age: 79
End: 2024-10-03
Payer: MEDICARE

## 2024-10-03 ENCOUNTER — PATIENT OUTREACH (OUTPATIENT)
Dept: ADMINISTRATIVE | Facility: HOSPITAL | Age: 79
End: 2024-10-03
Payer: MEDICARE

## 2024-10-03 VITALS
RESPIRATION RATE: 18 BRPM | SYSTOLIC BLOOD PRESSURE: 120 MMHG | DIASTOLIC BLOOD PRESSURE: 60 MMHG | OXYGEN SATURATION: 98 % | HEART RATE: 60 BPM | BODY MASS INDEX: 26.89 KG/M2 | WEIGHT: 167.31 LBS | HEIGHT: 66 IN | TEMPERATURE: 97 F

## 2024-10-03 DIAGNOSIS — E03.4 HYPOTHYROIDISM DUE TO ACQUIRED ATROPHY OF THYROID: ICD-10-CM

## 2024-10-03 DIAGNOSIS — I10 ESSENTIAL HYPERTENSION: ICD-10-CM

## 2024-10-03 DIAGNOSIS — I25.10 CORONARY ARTERY DISEASE, UNSPECIFIED VESSEL OR LESION TYPE, UNSPECIFIED WHETHER ANGINA PRESENT, UNSPECIFIED WHETHER NATIVE OR TRANSPLANTED HEART: ICD-10-CM

## 2024-10-03 DIAGNOSIS — E11.9 TYPE 2 DIABETES MELLITUS WITHOUT COMPLICATION, WITHOUT LONG-TERM CURRENT USE OF INSULIN: Primary | ICD-10-CM

## 2024-10-03 DIAGNOSIS — N52.9 IMPOTENCE: ICD-10-CM

## 2024-10-03 PROCEDURE — 3074F SYST BP LT 130 MM HG: CPT | Mod: HCNC,CPTII,S$GLB, | Performed by: INTERNAL MEDICINE

## 2024-10-03 PROCEDURE — 1159F MED LIST DOCD IN RCRD: CPT | Mod: HCNC,CPTII,S$GLB, | Performed by: INTERNAL MEDICINE

## 2024-10-03 PROCEDURE — 1101F PT FALLS ASSESS-DOCD LE1/YR: CPT | Mod: HCNC,CPTII,S$GLB, | Performed by: INTERNAL MEDICINE

## 2024-10-03 PROCEDURE — 3288F FALL RISK ASSESSMENT DOCD: CPT | Mod: HCNC,CPTII,S$GLB, | Performed by: INTERNAL MEDICINE

## 2024-10-03 PROCEDURE — 3078F DIAST BP <80 MM HG: CPT | Mod: HCNC,CPTII,S$GLB, | Performed by: INTERNAL MEDICINE

## 2024-10-03 PROCEDURE — 99214 OFFICE O/P EST MOD 30 MIN: CPT | Mod: HCNC,S$GLB,, | Performed by: INTERNAL MEDICINE

## 2024-10-03 PROCEDURE — 99999 PR PBB SHADOW E&M-EST. PATIENT-LVL IV: CPT | Mod: PBBFAC,HCNC,, | Performed by: INTERNAL MEDICINE

## 2024-10-03 PROCEDURE — 1126F AMNT PAIN NOTED NONE PRSNT: CPT | Mod: HCNC,CPTII,S$GLB, | Performed by: INTERNAL MEDICINE

## 2024-10-03 RX ORDER — SILDENAFIL 100 MG/1
100 TABLET, FILM COATED ORAL DAILY PRN
Qty: 30 TABLET | Refills: 1 | Status: SHIPPED | OUTPATIENT
Start: 2024-10-03 | End: 2025-10-03

## 2024-10-03 NOTE — PROGRESS NOTES
Subjective:       Patient ID: Drake Barraza is a 79 y.o. male.    Chief Complaint:   Follow-up and Diabetes    HPI: Mr Barraza presents for follow up DM and Chronic Issues    DM II: Med Tx 1-Glucophage XR 500mg QD/Taking it 3-4 x/week-was concerned it may have caused a rash at 1st; He is doing fine on such now         Accuchecks: ,160,170         Diet Diary:Breakfast: Pancakes,coffee Lunch: 1/2 Ham sandwich, Dinner: Chicken/sausage gumbo, potato salad-Not a typical day/'Had [employer] [facility]/Dinner         Weight(5/24)= 169  Weight(Today)=167      HTN: Med Tx 1-Altace 10mg QD, 2-Lopressor 25mg BID         Home BPs:      CAD/HLD: Med Tx 1- Crestor 20mg QD,2-Baby ASA 81mg QD     MICHELLE: +Home CPAP at 9 cm pressure/FFM/Wears nightly about 8 hours    Past Medical, Surgical, Social History: Please see as stated in Epic chart which has been reviewed.    Current Outpatient Medications   Medication Sig Dispense Refill    aspirin (ECOTRIN) 81 MG EC tablet Take 1 tablet (81 mg total) by mouth 2 (two) times daily. (Patient taking differently: Take 81 mg by mouth once daily.) 60 tablet 0    blood sugar diagnostic (TRUE METRIX GLUCOSE TEST STRIP) Strp TEST SUGAR TWICE DAILY BEFORE MEALS 200 strip 3    blood-glucose meter kit Check glucose 1-2x/day after meals 1 each 0    CINNAMON BARK (CINNAMON ORAL) Take 1 capsule by mouth once daily.      ezetimibe (ZETIA) 10 mg tablet Take 1 tablet (10 mg total) by mouth once daily. 90 tablet 3    levothyroxine (SYNTHROID) 125 MCG tablet TAKE 1 TABLET EVERY MORNING ON AN EMPTY STOMACH 90 tablet 2    metFORMIN (GLUCOPHAGE-XR) 500 MG ER 24hr tablet Take 1 tablet (500 mg total) by mouth daily with breakfast. 90 tablet 1    metoprolol tartrate (LOPRESSOR) 25 MG tablet TAKE 1 TABLET TWICE DAILY 180 tablet 3    multivitamin (THERAGRAN) per tablet Take 1 tablet by mouth once daily.      nitroGLYCERIN (NITROSTAT) 0.4 MG SL tablet Place 1 tablet (0.4 mg total) under the tongue every 5  (five) minutes as needed for Chest pain. 100 tablet 2    omega-3 fatty acids-vitamin E 1,000 mg Cap Take 1 capsule by mouth Daily.        ramipriL (ALTACE) 10 MG capsule TAKE 1 CAPSULE EVERY DAY 90 capsule 3    rosuvastatin (CRESTOR) 20 MG tablet TAKE 1 TABLET EVERY DAY 90 tablet 2    sildenafiL (VIAGRA) 100 MG tablet Take 1 tablet (100 mg total) by mouth daily as needed for Erectile Dysfunction. 30 tablet 1    TRUEPLUS LANCETS 30 gauge Misc USE TO TEST BLOOD SUGAR TWICE DAILY BEFORE MEALS 2-3 DAYS A WEEK 300 each 3    TURMERIC ROOT EXTRACT ORAL Take 1 capsule by mouth.      UBIDECARENONE (COENZYME Q10) 100 mg Tab Take 100 mg by mouth once daily.      vitamin D 185 MG Tab Take 185 mg by mouth once daily.       No current facility-administered medications for this visit.       Review of Systems   Constitutional:  Negative for appetite change, fatigue and fever.   HENT:  Negative for congestion, postnasal drip, sinus pressure and trouble swallowing.    Eyes:  Negative for pain and visual disturbance.   Respiratory:  Negative for cough, chest tightness, shortness of breath and wheezing.    Cardiovascular:  Negative for chest pain, palpitations and leg swelling.   Gastrointestinal:  Negative for abdominal pain, blood in stool, constipation, diarrhea, nausea and vomiting.   Endocrine: Negative for cold intolerance and heat intolerance.   Genitourinary:  Negative for difficulty urinating, dysuria and hematuria.        No BPH symptoms   Musculoskeletal:  Negative for arthralgias, back pain and joint swelling.   Skin:  Negative for color change and rash.   Neurological:  Negative for dizziness, syncope, weakness, numbness and headaches.        No Focal Neurological abnormalities   Hematological:  Negative for adenopathy.   Psychiatric/Behavioral:  Negative for sleep disturbance.         No Anxiety/Depression symptoms   All other systems reviewed and are negative.      Objective:      Lab Results   Component Value Date     "WBC 6.71 04/26/2024    HGB 14.9 04/26/2024    HCT 45.8 04/26/2024     04/26/2024    CHOL 126 09/26/2024    TRIG 68 09/26/2024    HDL 44 09/26/2024    ALT 33 09/26/2024    AST 32 09/26/2024     09/26/2024    K 4.8 09/26/2024     09/26/2024    CREATININE 1.1 09/26/2024    BUN 20 09/26/2024    CO2 25 09/26/2024    TSH 0.795 04/26/2024    PSA 0.73 09/26/2024    INR 0.9 09/22/2020    GLUF 104 04/03/2004    HGBA1C 7.3 (H) 09/26/2024     Physical Exam  Vitals reviewed.   Constitutional:       Appearance: Normal appearance.   HENT:      Head: Normocephalic and atraumatic.   Cardiovascular:      Rate and Rhythm: Regular rhythm. Bradycardia present.      Heart sounds: Normal heart sounds.      Comments: VHR=54  Pulmonary:      Breath sounds: Normal breath sounds.   Abdominal:      General: Bowel sounds are normal.      Palpations: Abdomen is soft. There is no mass.      Tenderness: There is no abdominal tenderness.   Musculoskeletal:      Right lower leg: No edema.      Left lower leg: No edema.   Neurological:      General: No focal deficit present.      Mental Status: He is alert.   Psychiatric:         Mood and Affect: Mood normal.           Health Maintenance:  Last Colonoscopy: Last Colonoscopy completed on 7/27/2016    Last PSA: 9/26/24-Normal          Vital Signs  Temp: 97.4 °F (36.3 °C)  Temp Source: Temporal  Pulse: 60  Resp: 18  SpO2: 98 %  BP: 120/60  BP Location: Left arm  Patient Position: Sitting  Pain Score: 0-No pain  Height and Weight  Height: 5' 6" (167.6 cm)  Weight: 75.9 kg (167 lb 5.3 oz)  BSA (Calculated - sq m): 1.88 sq meters  BMI (Calculated): 27  Weight in (lb) to have BMI = 25: 154.6    Assessment:       1. Type 2 diabetes mellitus without complication, without long-term current use of insulin    2. Essential hypertension    3. Coronary artery disease, unspecified vessel or lesion type, unspecified whether angina present, unspecified whether native or transplanted heart    4. " Hypothyroidism due to acquired atrophy of thyroid    5. Erectile dysfunction        Plan:     Health Maintenance   Topic Date Due    Shingles Vaccine (1 of 2) Never done    Eye Exam  05/01/2024    Hemoglobin A1c  03/26/2025    Aspirin/Antiplatelet Therapy  05/02/2025    TETANUS VACCINE  07/06/2025    Lipid Panel  09/26/2025    Hepatitis C Screening  Completed        Drake was seen today for follow-up and diabetes.    Diagnoses and all orders for this visit:    Type 2 diabetes mellitus without complication, without long-term current use of insulin/much improved with HgbA1C down to 7.3% from 9.8%        -     Continue: DAILY Glucophage XR 500mg QD  -     Comprehensive Metabolic Panel; Future  -     Hemoglobin A1C; Future    Essential hypertension/controlled        -     Continue: Med Tx 1-Altace 10mg QD, 2-Lopressor 25mg BID    Coronary artery disease, unspecified vessel or lesion type, unspecified whether angina present, unspecified whether native or transplanted heart        -     Continue: Med Tx 1- Crestor 20mg QD,2-Baby ASA 81mg QD  -     Comprehensive Metabolic Panel; Future  -     Lipid Panel; Future    Hypothyroidism due to acquired atrophy of thyroid/On Synthroid 125ug QD        -      Check TSH with RTC lab     Erectile dysfunction  -     sildenafiL (VIAGRA) 100 MG tablet; Take 1 tablet (100 mg total) by mouth daily as needed for Erectile Dysfunction.    Health Maintenance         -     RTC x 6 months with 1 week prior fasting lab

## 2024-10-03 NOTE — LETTER
AUTHORIZATION FOR RELEASE OF   CONFIDENTIAL INFORMATION        We are seeing Drake Barraza, date of birth 1945, in the clinic at [facility]. Catherine Middleton MD is the patient's PCP. Drake Barraza has an outstanding lab/procedure at the time we reviewed his chart. In order to help keep his health information updated, he has authorized us to request the following medical record(s):        (  )  MAMMOGRAM                                      (  )  COLONOSCOPY      (  )  PAP SMEAR                                          (  )  OUTSIDE LAB RESULTS     (  )  DEXA SCAN                                          (  x)  EYE EXAM            (  )  FOOT EXAM                                          (  )  ENTIRE RECORD     (  )  OUTSIDE IMMUNIZATIONS                 (  )  _______________         Please fax records to [facility], [place], Fayne M., MD, [phone]     If you have any questions, please contact Erica at [phone].           Patient Name: Drake Barraza  : 1945  Patient Phone #: [phone]

## 2024-10-05 PROBLEM — R29.898 WEAKNESS OF LEFT LOWER EXTREMITY: Status: RESOLVED | Noted: 2020-10-07 | Resolved: 2024-10-05

## 2024-10-28 RX ORDER — METOPROLOL TARTRATE 25 MG/1
TABLET, FILM COATED ORAL
Qty: 180 TABLET | Refills: 3 | Status: SHIPPED | OUTPATIENT
Start: 2024-10-28

## 2024-10-30 DIAGNOSIS — E11.9 TYPE 2 DIABETES MELLITUS WITHOUT COMPLICATION, WITHOUT LONG-TERM CURRENT USE OF INSULIN: ICD-10-CM

## 2024-10-30 RX ORDER — METFORMIN HYDROCHLORIDE 500 MG/1
500 TABLET, EXTENDED RELEASE ORAL
Qty: 90 TABLET | Refills: 1 | Status: SHIPPED | OUTPATIENT
Start: 2024-10-30

## 2024-10-31 DIAGNOSIS — E11.9 TYPE 2 DIABETES MELLITUS WITHOUT COMPLICATION, WITHOUT LONG-TERM CURRENT USE OF INSULIN: ICD-10-CM

## 2024-10-31 DIAGNOSIS — E11.9 DIABETES MELLITUS WITHOUT COMPLICATION: ICD-10-CM

## 2024-10-31 RX ORDER — GLUCOSAM/CHON-MSM1/C/MANG/BOSW 500-416.6
TABLET ORAL
Qty: 200 EACH | Refills: 3 | Status: SHIPPED | OUTPATIENT
Start: 2024-10-31

## 2024-11-10 ENCOUNTER — PATIENT MESSAGE (OUTPATIENT)
Dept: INTERNAL MEDICINE | Facility: CLINIC | Age: 79
End: 2024-11-10
Payer: MEDICARE

## 2024-11-10 DIAGNOSIS — E11.9 TYPE 2 DIABETES MELLITUS WITHOUT COMPLICATION, WITHOUT LONG-TERM CURRENT USE OF INSULIN: ICD-10-CM

## 2024-11-10 DIAGNOSIS — E11.9 DIABETES MELLITUS WITHOUT COMPLICATION: ICD-10-CM

## 2024-11-11 ENCOUNTER — PATIENT MESSAGE (OUTPATIENT)
Dept: CARDIOLOGY | Facility: CLINIC | Age: 79
End: 2024-11-11
Payer: MEDICARE

## 2024-11-11 RX ORDER — GLUCOSAM/CHON-MSM1/C/MANG/BOSW 500-416.6
TABLET ORAL
Qty: 200 EACH | Refills: 3 | Status: SHIPPED | OUTPATIENT
Start: 2024-11-11

## 2024-11-19 ENCOUNTER — OFFICE VISIT (OUTPATIENT)
Dept: CARDIOLOGY | Facility: CLINIC | Age: 79
End: 2024-11-19
Payer: MEDICARE

## 2024-11-19 VITALS
DIASTOLIC BLOOD PRESSURE: 70 MMHG | BODY MASS INDEX: 26.23 KG/M2 | HEART RATE: 64 BPM | HEIGHT: 67 IN | SYSTOLIC BLOOD PRESSURE: 136 MMHG | WEIGHT: 167.13 LBS

## 2024-11-19 DIAGNOSIS — I25.2 OLD MYOCARDIAL INFARCT: ICD-10-CM

## 2024-11-19 DIAGNOSIS — Z98.61 POST PTCA: ICD-10-CM

## 2024-11-19 DIAGNOSIS — E78.49 OTHER HYPERLIPIDEMIA: ICD-10-CM

## 2024-11-19 DIAGNOSIS — I25.10 CORONARY ARTERY DISEASE INVOLVING NATIVE CORONARY ARTERY OF NATIVE HEART WITHOUT ANGINA PECTORIS: Primary | ICD-10-CM

## 2024-11-19 DIAGNOSIS — I10 PRIMARY HYPERTENSION: ICD-10-CM

## 2024-11-19 PROCEDURE — 99213 OFFICE O/P EST LOW 20 MIN: CPT | Mod: S$GLB,,, | Performed by: INTERNAL MEDICINE

## 2024-11-19 PROCEDURE — 3288F FALL RISK ASSESSMENT DOCD: CPT | Mod: CPTII,S$GLB,, | Performed by: INTERNAL MEDICINE

## 2024-11-19 PROCEDURE — 99999 PR PBB SHADOW E&M-EST. PATIENT-LVL III: CPT | Mod: PBBFAC,,, | Performed by: INTERNAL MEDICINE

## 2024-11-19 PROCEDURE — 3075F SYST BP GE 130 - 139MM HG: CPT | Mod: CPTII,S$GLB,, | Performed by: INTERNAL MEDICINE

## 2024-11-19 PROCEDURE — 3078F DIAST BP <80 MM HG: CPT | Mod: CPTII,S$GLB,, | Performed by: INTERNAL MEDICINE

## 2024-11-19 PROCEDURE — 1101F PT FALLS ASSESS-DOCD LE1/YR: CPT | Mod: CPTII,S$GLB,, | Performed by: INTERNAL MEDICINE

## 2024-11-19 PROCEDURE — 1126F AMNT PAIN NOTED NONE PRSNT: CPT | Mod: CPTII,S$GLB,, | Performed by: INTERNAL MEDICINE

## 2024-11-19 NOTE — PROGRESS NOTES
Subjective:   Chief Complaint:  Drake Barraza is a 80 y.o. male who presents for follow-up of Coronary Artery Disease      Problem List and HPI:   CAD   Inf MI 2003   RCA and LAD stents 2003   Hypercholesteremia   Hypertension   Sleep apnea on CPAP   Hypothyroidism   Diabetes type II-diet controlled    He does not report angina or shortness of breath with exertion.   He stays active working in his yard, but does not exercise.      Review of patient's allergies indicates:   Allergen Reactions    Adhesive Blisters    Hydrocodone-acetaminophen Rash     Other reaction(s): constipated        Current Outpatient Medications   Medication Sig    aspirin (ECOTRIN) 81 MG EC tablet Take 1 tablet (81 mg total) by mouth 2 (two) times daily.    blood sugar diagnostic (TRUE METRIX GLUCOSE TEST STRIP) Strp TEST SUGAR TWICE DAILY BEFORE MEALS    blood-glucose meter kit Check glucose 1-2x/day after meals    CINNAMON BARK (CINNAMON ORAL) Take 1 capsule by mouth once daily.    ezetimibe (ZETIA) 10 mg tablet Take 1 tablet (10 mg total) by mouth once daily.    levothyroxine (SYNTHROID) 125 MCG tablet TAKE 1 TABLET EVERY MORNING ON AN EMPTY STOMACH    metFORMIN (GLUCOPHAGE-XR) 500 MG ER 24hr tablet Take 1 tablet (500 mg total) by mouth daily with breakfast.    metoprolol tartrate (LOPRESSOR) 25 MG tablet TAKE 1 TABLET TWICE DAILY    multivitamin (THERAGRAN) per tablet Take 1 tablet by mouth once daily.    nitroGLYCERIN (NITROSTAT) 0.4 MG SL tablet Place 1 tablet (0.4 mg total) under the tongue every 5 (five) minutes as needed for Chest pain.    omega-3 fatty acids-vitamin E 1,000 mg Cap Take 1 capsule by mouth Daily.      ramipriL (ALTACE) 10 MG capsule TAKE 1 CAPSULE EVERY DAY    rosuvastatin (CRESTOR) 20 MG tablet TAKE 1 TABLET EVERY DAY    sildenafiL (VIAGRA) 100 MG tablet Take 1 tablet (100 mg total) by mouth daily as needed for Erectile Dysfunction.    TRUEPLUS LANCETS 30 gauge Misc USE TO TEST BLOOD SUGAR TWICE DAILY BEFORE  "MEALS 2-3 DAYS A WEEK    TURMERIC ROOT EXTRACT ORAL Take 1 capsule by mouth.    UBIDECARENONE (COENZYME Q10) 100 mg Tab Take 100 mg by mouth once daily.    vitamin D 185 MG Tab Take 185 mg by mouth once daily.       Social history:  Drake Barraza  reports that he has never smoked. He has never been exposed to tobacco smoke. He has never used smokeless tobacco. He reports current alcohol use. He reports that he does not use drugs.      Objective:   /70   Pulse 64   Ht 5' 7" (1.702 m)   Wt 75.8 kg (167 lb 1.7 oz)   BMI 26.17 kg/m²    Physical Exam  Constitutional:       Appearance: He is well-developed.      Comments:      HENT:      Head: Normocephalic and atraumatic.   Neck:      Vascular: No carotid bruit or JVD.   Cardiovascular:      Rate and Rhythm: Normal rate and regular rhythm.      Pulses:           Radial pulses are 2+ on the right side and 2+ on the left side.        Posterior tibial pulses are 2+ on the right side and 2+ on the left side.      Heart sounds: S1 normal and S2 normal. No murmur heard.     No gallop.   Pulmonary:      Effort: Pulmonary effort is normal.      Breath sounds: No wheezing or rales.   Chest:      Chest wall: There is no dullness to percussion.   Abdominal:      Palpations: Abdomen is soft. There is no hepatomegaly or splenomegaly.      Tenderness: There is no abdominal tenderness.   Musculoskeletal:      Right lower leg: No edema.      Left lower leg: No edema.   Skin:     General: Skin is warm and dry.      Findings: No bruising.      Nails: There is no clubbing.   Neurological:      Mental Status: He is alert and oriented to person, place, and time.      Gait: Gait normal.   Psychiatric:         Speech: Speech normal.         Behavior: Behavior normal.         Thought Content: Thought content normal.         Judgment: Judgment normal.         ECG today - unchanged.      Assessment and Plan:       ICD-10-CM ICD-9-CM   1. Coronary artery disease involving native " coronary artery of native heart without angina pectoris  I25.10 414.01   2. 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 S/P RCA and LAD stents 2003  Z98.61 V45.82   4. Primary hypertension  I10 401.9   5. Other hyperlipidemia  E78.49 272.4        CAD stable. No angina.  BP well controlled. No change in meds.  LDL at goal. No change in meds.     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

## 2024-11-21 LAB
OHS QRS DURATION: 82 MS
OHS QTC CALCULATION: 410 MS

## 2025-02-04 ENCOUNTER — PATIENT MESSAGE (OUTPATIENT)
Dept: CARDIOLOGY | Facility: CLINIC | Age: 80
End: 2025-02-04
Payer: MEDICARE

## 2025-02-05 ENCOUNTER — TELEPHONE (OUTPATIENT)
Dept: INTERNAL MEDICINE | Facility: CLINIC | Age: 80
End: 2025-02-05
Payer: MEDICARE

## 2025-02-05 DIAGNOSIS — E03.4 HYPOTHYROIDISM DUE TO ACQUIRED ATROPHY OF THYROID: ICD-10-CM

## 2025-02-05 DIAGNOSIS — I10 ESSENTIAL HYPERTENSION: Primary | ICD-10-CM

## 2025-02-05 DIAGNOSIS — E11.9 DIABETES MELLITUS WITHOUT COMPLICATION: ICD-10-CM

## 2025-02-05 DIAGNOSIS — I25.10 CORONARY ARTERY DISEASE, UNSPECIFIED VESSEL OR LESION TYPE, UNSPECIFIED WHETHER ANGINA PRESENT, UNSPECIFIED WHETHER NATIVE OR TRANSPLANTED HEART: ICD-10-CM

## 2025-02-05 RX ORDER — RAMIPRIL 10 MG/1
10 CAPSULE ORAL DAILY
Qty: 90 CAPSULE | Refills: 3 | Status: SHIPPED | OUTPATIENT
Start: 2025-02-05

## 2025-02-05 RX ORDER — ROSUVASTATIN CALCIUM 20 MG/1
20 TABLET, COATED ORAL DAILY
Qty: 90 TABLET | Refills: 2 | Status: SHIPPED | OUTPATIENT
Start: 2025-02-05

## 2025-02-05 RX ORDER — METOPROLOL TARTRATE 25 MG/1
25 TABLET, FILM COATED ORAL 2 TIMES DAILY
Qty: 180 TABLET | Refills: 3 | Status: SHIPPED | OUTPATIENT
Start: 2025-02-05

## 2025-02-05 RX ORDER — LEVOTHYROXINE SODIUM 125 UG/1
TABLET ORAL
Qty: 90 TABLET | Refills: 0 | Status: SHIPPED | OUTPATIENT
Start: 2025-02-05

## 2025-02-05 NOTE — TELEPHONE ENCOUNTER
Provider Staff:  Action required for this patient    Requires labs      Please see care gap opportunities below in Care Due Message.    Thanks!  [facility]     Appointments      Date Provider   Last Visit   10/3/2024 Catherine Middleton MD   Next Visit   4/17/2025 Catherine Middleton MD     Refill Decision Note   Drake Barraza  is requesting a refill authorization.  Brief Assessment and Rationale for Refill:  Approve     Medication Therapy Plan:         Comments:     Note composed:2:50 PM 02/05/2025

## 2025-02-05 NOTE — TELEPHONE ENCOUNTER
----- Message from Bria sent at 2/4/2025  2:15 PM CST -----  Name of Who is Calling:TONY MENDES [[id number]]        What is the request in detail:Pt would like a callback from the office in regards to discussing insurance change and where to start sending prescriptions to.Please advise thank you       Can the clinic reply by [facility]:        What Number to Call Back [facility]:Telephone Information:  Mobile          [phone]

## 2025-02-05 NOTE — TELEPHONE ENCOUNTER
Spoke to pt and he needs Rx's to go to [facility]  Levothyroxine and Rosuvastatin    Pt needs Rx's to go to new mail order pharmacy    LOV 10/3/24

## 2025-02-05 NOTE — TELEPHONE ENCOUNTER
Care Due:                  Date            Visit Type   Department     Provider  --------------------------------------------------------------------------------                                EP -                              PRIMARY      [facility] INTERNAL  Last Visit: 10-      CARE ([facility])   [facility]       Catherine   [facility] [place]                               -                              PRIMARY      [facility] INTERNAL  Next Visit: 04-      CARE ([facility])   [facility]                                                            Last  Test          Frequency    Reason                     Performed    Due Date  --------------------------------------------------------------------------------    HBA1C.......  6 months...  metFORMIN................  09- 03-    [facility] Embedded Care Due Messages. Reference number: [id number].   2/05/2025 2:41:56 PM CST   Glycopyrrolate Counseling:  I discussed with the patient the risks of glycopyrrolate including but not limited to skin rash, drowsiness, dry mouth, difficulty urinating, and blurred vision.

## 2025-02-06 ENCOUNTER — TELEPHONE (OUTPATIENT)
Dept: INTERNAL MEDICINE | Facility: CLINIC | Age: 80
End: 2025-02-06
Payer: MEDICARE

## 2025-02-09 NOTE — TELEPHONE ENCOUNTER
Rossana- would you please add a TSH and CBC to pt's lab scheduled 4/10/25 before my visit.  Thanks

## 2025-02-25 ENCOUNTER — OFFICE VISIT (OUTPATIENT)
Dept: DERMATOLOGY | Facility: CLINIC | Age: 80
End: 2025-02-25
Payer: MEDICARE

## 2025-02-25 DIAGNOSIS — L82.1 SEBORRHEIC KERATOSES: ICD-10-CM

## 2025-02-25 DIAGNOSIS — L91.8 CUTANEOUS SKIN TAGS: ICD-10-CM

## 2025-02-25 DIAGNOSIS — D48.5 NEOPLASM OF UNCERTAIN BEHAVIOR OF SKIN: Primary | ICD-10-CM

## 2025-02-25 DIAGNOSIS — L81.4 LENTIGINES: ICD-10-CM

## 2025-02-25 PROCEDURE — 1159F MED LIST DOCD IN RCRD: CPT | Mod: CPTII,S$GLB,, | Performed by: DERMATOLOGY

## 2025-02-25 PROCEDURE — 1126F AMNT PAIN NOTED NONE PRSNT: CPT | Mod: CPTII,S$GLB,, | Performed by: DERMATOLOGY

## 2025-02-25 PROCEDURE — 99999 PR PBB SHADOW E&M-EST. PATIENT-LVL II: CPT | Mod: PBBFAC,,, | Performed by: DERMATOLOGY

## 2025-02-25 PROCEDURE — 3288F FALL RISK ASSESSMENT DOCD: CPT | Mod: CPTII,S$GLB,, | Performed by: DERMATOLOGY

## 2025-02-25 PROCEDURE — 1160F RVW MEDS BY RX/DR IN RCRD: CPT | Mod: CPTII,S$GLB,, | Performed by: DERMATOLOGY

## 2025-02-25 PROCEDURE — 11102 TANGNTL BX SKIN SINGLE LES: CPT | Mod: S$GLB,,, | Performed by: DERMATOLOGY

## 2025-02-25 PROCEDURE — 99214 OFFICE O/P EST MOD 30 MIN: CPT | Mod: 25,S$GLB,, | Performed by: DERMATOLOGY

## 2025-02-25 PROCEDURE — 1101F PT FALLS ASSESS-DOCD LE1/YR: CPT | Mod: CPTII,S$GLB,, | Performed by: DERMATOLOGY

## 2025-02-25 PROCEDURE — 88305 TISSUE EXAM BY PATHOLOGIST: CPT | Performed by: PATHOLOGY

## 2025-02-25 NOTE — PROGRESS NOTES
Subjective:      Patient ID:  Drake Barraza is a 79 y.o. male who presents for   Chief Complaint   Patient presents with    Skin Check     UBSE     New spot on nose which bleeds some, he had the scc removed from his temple mohs surgery.        Review of Systems   Constitutional:  Negative for fever, chills, weight loss, weight gain, fatigue, night sweats and malaise.   Skin:  Positive for activity-related sunscreen use and wears hat. Negative for itching.   Hematologic/Lymphatic: Does not bruise/bleed easily.       Objective:   Physical Exam   Constitutional: He appears well-developed and well-nourished. No distress.   Neurological: He is alert and oriented to person, place, and time. He is not disoriented.   Psychiatric: He has a normal mood and affect.   Skin:   Areas Examined (abnormalities noted in diagram):   Scalp / Hair Palpated and Inspected  Head / Face Inspection Performed  Neck Inspection Performed  Chest / Axilla Inspection Performed  Abdomen Inspection Performed  Back Inspection Performed  RUE Inspected  LUE Inspection Performed                     Diagram Legend     Erythematous scaling macule/papule c/w actinic keratosis       Vascular papule c/w angioma      Pigmented verrucoid papule/plaque c/w seborrheic keratosis      Yellow umbilicated papule c/w sebaceous hyperplasia      Irregularly shaped tan macule c/w lentigo     1-2 mm smooth white papules consistent with Milia      Movable subcutaneous cyst with punctum c/w epidermal inclusion cyst      Subcutaneous movable cyst c/w pilar cyst      Firm pink to brown papule c/w dermatofibroma      Pedunculated fleshy papule(s) c/w skin tag(s)      Evenly pigmented macule c/w junctional nevus     Mildly variegated pigmented, slightly irregular-bordered macule c/w mildly atypical nevus      Flesh colored to evenly pigmented papule c/w intradermal nevus       Pink pearly papule/plaque c/w basal cell carcinoma      Erythematous hyperkeratotic cursted  "plaque c/w SCC      Surgical scar with no sign of skin cancer recurrence      Open and closed comedones      Inflammatory papules and pustules      Verrucoid papule consistent consistent with wart     Erythematous eczematous patches and plaques     Dystrophic onycholytic nail with subungual debris c/w onychomycosis     Umbilicated papule    Erythematous-base heme-crusted tan verrucoid plaque consistent with inflamed seborrheic keratosis     Erythematous Silvery Scaling Plaque c/w Psoriasis     See annotation      Assessment / Plan:      Pathology Orders:       Normal Orders This Visit    Specimen to Pathology, Dermatology     Comments:    Number of Specimens:->1  ------------------------->-------------------------  Spec 1 Procedure:->Biopsy  Spec 1 Clinical Impression:->bcc  Spec 1 Source:->left nasal bridge    Questions:    Procedure Type: Dermatology and skin neoplasms    Number of Specimens: 1    ------------------------: -------------------------    Spec 1 Procedure: Biopsy    Spec 1 Clinical Impression: bcc    Spec 1 Source: left nasal bridge    Release to patient:           Neoplasm of uncertain behavior of skin  -     Specimen to Pathology, Dermatology  Shave biopsy performed after verbal consent including risk of infection, scar, recurrence, need for additional treatment of site. Area prepped with alcohol, anesthetized with 1% lidocaine with epinephrine. . Hemostasis achieved with monsels. No complications. Dressing applied. Wound care explained. Will need further rx if + ca       Seborrheic keratoses  Seborrheic keratosis scattered, told benign no treatment needed.  Brochure provided.     Cutaneous skin tags  Call if desires removal    Lentigines  The "ABCD" rules to observe pigmented lesions were reviewed.  History of skin cancer  Area(s) of previous NMSC evaluated with no signs of recurrence    Recommend daily sun protection/avoidance and use of at least SPF 30, broad spectrum sunscreen (OTC drug). "                Follow up in about 1 year (around 2/25/2026).

## 2025-02-27 LAB
FINAL PATHOLOGIC DIAGNOSIS: NORMAL
GROSS: NORMAL
Lab: NORMAL
MICROSCOPIC EXAM: NORMAL

## 2025-02-28 ENCOUNTER — RESULTS FOLLOW-UP (OUTPATIENT)
Dept: DERMATOLOGY | Facility: CLINIC | Age: 80
End: 2025-02-28
Payer: MEDICARE

## 2025-02-28 NOTE — PROGRESS NOTES
The biopsy from his nose was a skin cancer, he will need mohs surgery.  1. Skin, left nasal bridge, shave biopsy:   - IN SITU AND INVASIVE SQUAMOUS CELL CARCINOMA.   - THE TUMOR EXTENDS TO THE DEEP AND LATERAL BIOPSY MARGINS.

## 2025-03-05 DIAGNOSIS — D04.39 SQUAMOUS CELL CARCINOMA IN SITU (SCCIS) OF SKIN OF NOSE: Primary | ICD-10-CM

## 2025-03-24 DIAGNOSIS — Z00.00 ENCOUNTER FOR MEDICARE ANNUAL WELLNESS EXAM: ICD-10-CM

## 2025-04-01 ENCOUNTER — PATIENT MESSAGE (OUTPATIENT)
Dept: DERMATOLOGY | Facility: CLINIC | Age: 80
End: 2025-04-01
Payer: MEDICARE

## 2025-04-02 ENCOUNTER — PROCEDURE VISIT (OUTPATIENT)
Dept: DERMATOLOGY | Facility: CLINIC | Age: 80
End: 2025-04-02
Payer: MEDICARE

## 2025-04-02 VITALS — SYSTOLIC BLOOD PRESSURE: 147 MMHG | HEART RATE: 56 BPM | DIASTOLIC BLOOD PRESSURE: 81 MMHG

## 2025-04-02 DIAGNOSIS — C44.321 SQUAMOUS CELL CARCINOMA OF SKIN OF NOSE: ICD-10-CM

## 2025-04-02 PROCEDURE — 14060 TIS TRNFR E/N/E/L 10 SQ CM/<: CPT | Mod: S$GLB,,, | Performed by: DERMATOLOGY

## 2025-04-02 PROCEDURE — 17311 MOHS 1 STAGE H/N/HF/G: CPT | Mod: 51,S$GLB,, | Performed by: DERMATOLOGY

## 2025-04-02 PROCEDURE — 99499 UNLISTED E&M SERVICE: CPT | Mod: S$GLB,,, | Performed by: DERMATOLOGY

## 2025-04-02 NOTE — PROGRESS NOTES
PROCEDURE: Mohs' Micrographic Surgery    INDICATION: Location in mask areas of face including central face, nose, eyelids, eyebrows, lips, chin, preauricular, temple, and ear. Biopsy-proven skin cancer of cosmetically and functionally important areas, including head, neck, genital, hand, foot, or areas known for having difficulty in healing, such as the lower anterior legs. Tumor with ill-defined borders.    REFERRING PROVIDER: Vanessa Menon M.D.    CASE NUMBER:     ANESTHETIC: 2 cc 0.5% Lidocaine with Epi 1:200,000 mixed 1:1 with 0.5% Bupivacaine    SURGICAL PREP: Hibiclens    SURGEON: Yolie Baptiste MD    ASSISTANTS: Michelle Cerna PA-C and Livia Block Surg Rivas    PREOPERATIVE DIAGNOSIS: squamous cell carcinoma- invasive    POSTOPERATIVE DIAGNOSIS: squamous cell carcinoma- invasive, moderately differentiated    PATHOLOGIC DIAGNOSIS: squamous cell carcinoma- invasive, moderately differentiated    HISTOLOGY OF SPECIMENS IN FIRST STAGE:   Debulking tumor confirms invasive, moderately differentiated squamous cell carcinoma.  Tumor Type:  No tumor seen.    STAGES OF MOHS' SURGERY PERFORMED: 1    TUMOR-FREE PLANE ACHIEVED: Yes    HEMOSTASIS: electrocoagulation     SPECIMENS: 2     LOCATION: left nasal bridge. Location verified with Dr. Menon's clinical photograph. Patient also verified location with hand held mirror.    INITIAL LESION SIZE: 0.5 x 0.6 cm    FINAL DEFECT SIZE: 0.9 x 1.0 cm    WOUND REPAIR/DISPOSITION: The patient tolerated Mohs' Micrographic Surgery for a squamous cell carcinoma very well. When the tumor was completely removed, a repair of the surgical defect was undertaken.        PROCEDURE: Perialar Crescentic Advancement Flap (Adjacent Tissue Transfer)    INDICATION: Status post Mohs' Micrographic Surgery for squamous cell carcinoma.    CASE NUMBER:     ANESTHETIC: 1.5 cc 1% Lidocaine with Epinephrine 1:100,000    SURGICAL PREP: Hibiclens, prepped by Livia Block Surg Tech    SURGEON:  Yolie Baptiste MD    ASSISTANTS: Michelle Cerna PA-C and Livia Block, Surg Tech    LOCATION: left nasal bridge    DEFECT SIZE: 0.9 x 1.0 cm    WOUND REPAIR/DISPOSITION:  After the patient's carcinoma had been completely removed with Mohs' Micrographic Surgery, a repair of the surgical defect was undertaken. The patient was returned to the operating suite where the area of left nasal bridge was prepped, draped, and anesthetized in the usual sterile fashion. A perialar crescentic advancement flap was designed in the inferolateral surrounding tissue of the left nasal bridge along the nasal groove. A Burow's triangle was drawn in superiorly to help with tissue movement. Then with a #15 blade the flap was incised and the Burow's triangle was excised, the area was widely undermined in the subcutaneous tissue plane. Then, electrocoagulation was used to obtain meticulous hemostasis. A 5-0 Vicryl pexing suture was performed by attaching the underside of the most medial aspect of the flap to the left nasal bridge. The flap was then advanced in by a complex closure. Then, 5-0 Vicryl buried vertical mattress sutures were placed into the subcutaneous and dermal plane to close the wound and fernando the cutaneous wound edge. The flap was then trimmed to fit the defect. The cutaneous wound edges were closed using interrupted 5-0 Prolene sutures.    The patient tolerated the procedure well.    The area was cleaned and dressed appropriately and the patient was given wound care instructions, as well as an appointment for follow-up evaluation and suture removal in 7 days.    SIZE OF FLAP: 4 cm squared    Vitals:    04/02/25 0842 04/02/25 1120   BP: (!) 158/78 (!) 147/81   BP Location: Left arm Left arm   Patient Position: Sitting Sitting   Pulse: (!) 56 (!) (P) 53

## 2025-04-09 DIAGNOSIS — E03.4 HYPOTHYROIDISM DUE TO ACQUIRED ATROPHY OF THYROID: ICD-10-CM

## 2025-04-09 RX ORDER — LEVOTHYROXINE SODIUM 125 UG/1
TABLET ORAL
Qty: 90 TABLET | Refills: 0 | Status: SHIPPED | OUTPATIENT
Start: 2025-04-09

## 2025-04-09 NOTE — TELEPHONE ENCOUNTER
Refill Decision Note   Drake Hansonjosue  is requesting a refill authorization.  Brief Assessment and Rationale for Refill:  Approve     Medication Therapy Plan: FLOS; TSH-WNL      Comments:     Note composed:10:05 AM 04/09/2025

## 2025-04-09 NOTE — TELEPHONE ENCOUNTER
Care Due:                  Date            Visit Type   Department     Provider  --------------------------------------------------------------------------------                                EP -                              PRIMARY      [facility] INTERNAL  Last Visit: 10-      CARE ([facility])   [facility]       [facility]                               -                              PRIMARY      [facility] INTERNAL  Next Visit: 04-      CARE ([facility])   [facility]                                                            Last  Test          Frequency    Reason                     Performed    Due Date  --------------------------------------------------------------------------------    HBA1C.......  6 months...  metFORMIN................  09- 03-    [facility] Embedded Care Due Messages. Reference number: [id number].   4/09/2025 4:46:34 AM CDT

## 2025-04-10 ENCOUNTER — LAB VISIT (OUTPATIENT)
Dept: LAB | Facility: HOSPITAL | Age: 80
End: 2025-04-10
Attending: INTERNAL MEDICINE
Payer: MEDICARE

## 2025-04-10 ENCOUNTER — OFFICE VISIT (OUTPATIENT)
Dept: DERMATOLOGY | Facility: CLINIC | Age: 80
End: 2025-04-10
Payer: MEDICARE

## 2025-04-10 DIAGNOSIS — Z09 POSTOP CHECK: Primary | ICD-10-CM

## 2025-04-10 DIAGNOSIS — E03.4 HYPOTHYROIDISM DUE TO ACQUIRED ATROPHY OF THYROID: ICD-10-CM

## 2025-04-10 DIAGNOSIS — I10 ESSENTIAL HYPERTENSION: ICD-10-CM

## 2025-04-10 DIAGNOSIS — E11.9 TYPE 2 DIABETES MELLITUS WITHOUT COMPLICATION, WITHOUT LONG-TERM CURRENT USE OF INSULIN: ICD-10-CM

## 2025-04-10 DIAGNOSIS — I25.10 CORONARY ARTERY DISEASE, UNSPECIFIED VESSEL OR LESION TYPE, UNSPECIFIED WHETHER ANGINA PRESENT, UNSPECIFIED WHETHER NATIVE OR TRANSPLANTED HEART: ICD-10-CM

## 2025-04-10 LAB
ABSOLUTE EOSINOPHIL (OHS): 0.23 K/UL
ABSOLUTE MONOCYTE (OHS): 0.93 K/UL (ref 0.3–1)
ABSOLUTE NEUTROPHIL COUNT (OHS): 3.23 K/UL (ref 1.8–7.7)
ALBUMIN SERPL BCP-MCNC: 3.9 G/DL (ref 3.5–5.2)
ALP SERPL-CCNC: 83 UNIT/L (ref 40–150)
ALT SERPL W/O P-5'-P-CCNC: 36 UNIT/L (ref 10–44)
ANION GAP (OHS): 9 MMOL/L (ref 8–16)
AST SERPL-CCNC: 31 UNIT/L (ref 11–45)
BASOPHILS # BLD AUTO: 0.07 K/UL
BASOPHILS NFR BLD AUTO: 0.9 %
BILIRUB SERPL-MCNC: 0.7 MG/DL (ref 0.1–1)
BUN SERPL-MCNC: 24 MG/DL (ref 8–23)
CALCIUM SERPL-MCNC: 9.4 MG/DL (ref 8.7–10.5)
CHLORIDE SERPL-SCNC: 105 MMOL/L (ref 95–110)
CHOLEST SERPL-MCNC: 131 MG/DL (ref 120–199)
CHOLEST/HDLC SERPL: 3.6 {RATIO} (ref 2–5)
CO2 SERPL-SCNC: 24 MMOL/L (ref 23–29)
CREAT SERPL-MCNC: 1.1 MG/DL (ref 0.5–1.4)
EAG (OHS): 183 MG/DL (ref 68–131)
ERYTHROCYTE [DISTWIDTH] IN BLOOD BY AUTOMATED COUNT: 12.7 % (ref 11.5–14.5)
GFR SERPLBLD CREATININE-BSD FMLA CKD-EPI: >60 ML/MIN/1.73/M2
GLUCOSE SERPL-MCNC: 183 MG/DL (ref 70–110)
HBA1C MFR BLD: 8 % (ref 4–5.6)
HCT VFR BLD AUTO: 44.5 % (ref 40–54)
HDLC SERPL-MCNC: 36 MG/DL (ref 40–75)
HDLC SERPL: 27.5 % (ref 20–50)
HGB BLD-MCNC: 14.9 GM/DL (ref 14–18)
IMM GRANULOCYTES # BLD AUTO: 0.03 K/UL (ref 0–0.04)
IMM GRANULOCYTES NFR BLD AUTO: 0.4 % (ref 0–0.5)
LDLC SERPL CALC-MCNC: 68 MG/DL (ref 63–159)
LYMPHOCYTES # BLD AUTO: 3.06 K/UL (ref 1–4.8)
MCH RBC QN AUTO: 31.6 PG (ref 27–31)
MCHC RBC AUTO-ENTMCNC: 33.5 G/DL (ref 32–36)
MCV RBC AUTO: 95 FL (ref 82–98)
NONHDLC SERPL-MCNC: 95 MG/DL
NUCLEATED RBC (/100WBC) (OHS): 0 /100 WBC
PLATELET # BLD AUTO: 198 K/UL (ref 150–450)
PMV BLD AUTO: 9.5 FL (ref 9.2–12.9)
POTASSIUM SERPL-SCNC: 4.5 MMOL/L (ref 3.5–5.1)
PROT SERPL-MCNC: 7.2 GM/DL (ref 6–8.4)
RBC # BLD AUTO: 4.71 M/UL (ref 4.6–6.2)
RELATIVE EOSINOPHIL (OHS): 3 %
RELATIVE LYMPHOCYTE (OHS): 40.5 % (ref 18–48)
RELATIVE MONOCYTE (OHS): 12.3 % (ref 4–15)
RELATIVE NEUTROPHIL (OHS): 42.9 % (ref 38–73)
SODIUM SERPL-SCNC: 138 MMOL/L (ref 136–145)
TRIGL SERPL-MCNC: 135 MG/DL (ref 30–150)
TSH SERPL-ACNC: 0.66 UIU/ML (ref 0.4–4)
WBC # BLD AUTO: 7.55 K/UL (ref 3.9–12.7)

## 2025-04-10 PROCEDURE — 1159F MED LIST DOCD IN RCRD: CPT | Mod: CPTII,S$GLB,, | Performed by: DERMATOLOGY

## 2025-04-10 PROCEDURE — 83036 HEMOGLOBIN GLYCOSYLATED A1C: CPT

## 2025-04-10 PROCEDURE — 85025 COMPLETE CBC W/AUTO DIFF WBC: CPT

## 2025-04-10 PROCEDURE — 82465 ASSAY BLD/SERUM CHOLESTEROL: CPT

## 2025-04-10 PROCEDURE — 99999 PR PBB SHADOW E&M-EST. PATIENT-LVL III: CPT | Mod: PBBFAC,,, | Performed by: DERMATOLOGY

## 2025-04-10 PROCEDURE — 84443 ASSAY THYROID STIM HORMONE: CPT

## 2025-04-10 PROCEDURE — 1126F AMNT PAIN NOTED NONE PRSNT: CPT | Mod: CPTII,S$GLB,, | Performed by: DERMATOLOGY

## 2025-04-10 PROCEDURE — 36415 COLL VENOUS BLD VENIPUNCTURE: CPT

## 2025-04-10 PROCEDURE — 99024 POSTOP FOLLOW-UP VISIT: CPT | Mod: S$GLB,,, | Performed by: DERMATOLOGY

## 2025-04-10 PROCEDURE — 1160F RVW MEDS BY RX/DR IN RCRD: CPT | Mod: CPTII,S$GLB,, | Performed by: DERMATOLOGY

## 2025-04-10 PROCEDURE — 84450 TRANSFERASE (AST) (SGOT): CPT

## 2025-04-10 NOTE — PROGRESS NOTES
80 y.o. male patient is here for suture removal following Mohs' surgery.    Patient reports no problems left nasal bridge.    WOUND PE:  The left nasal bridge sutures intact. Wound healing well. Good skin edges. No signs or symptoms of infection.      IMPRESSION:  Healing operative site from Mohs' surgery. 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    PLAN:  Sutures removed today by Fina Perez. Steri-strips applied.  Continue wound care.  Keep moist with Aquaphor.    RTC:  In 1 month

## 2025-04-14 ENCOUNTER — RESULTS FOLLOW-UP (OUTPATIENT)
Dept: INTERNAL MEDICINE | Facility: CLINIC | Age: 80
End: 2025-04-14

## 2025-04-17 ENCOUNTER — OFFICE VISIT (OUTPATIENT)
Dept: INTERNAL MEDICINE | Facility: CLINIC | Age: 80
End: 2025-04-17
Payer: MEDICARE

## 2025-04-17 VITALS
OXYGEN SATURATION: 97 % | TEMPERATURE: 99 F | DIASTOLIC BLOOD PRESSURE: 58 MMHG | BODY MASS INDEX: 26.42 KG/M2 | HEART RATE: 63 BPM | SYSTOLIC BLOOD PRESSURE: 110 MMHG | RESPIRATION RATE: 18 BRPM | WEIGHT: 168.31 LBS | HEIGHT: 67 IN

## 2025-04-17 DIAGNOSIS — I10 ESSENTIAL HYPERTENSION: ICD-10-CM

## 2025-04-17 DIAGNOSIS — E11.65 TYPE 2 DIABETES MELLITUS WITH HYPERGLYCEMIA, WITHOUT LONG-TERM CURRENT USE OF INSULIN: Primary | ICD-10-CM

## 2025-04-17 DIAGNOSIS — E03.4 HYPOTHYROIDISM DUE TO ACQUIRED ATROPHY OF THYROID: ICD-10-CM

## 2025-04-17 DIAGNOSIS — M21.42 PES PLANUS OF LEFT FOOT: ICD-10-CM

## 2025-04-17 DIAGNOSIS — I25.10 CORONARY ARTERY DISEASE, UNSPECIFIED VESSEL OR LESION TYPE, UNSPECIFIED WHETHER ANGINA PRESENT, UNSPECIFIED WHETHER NATIVE OR TRANSPLANTED HEART: ICD-10-CM

## 2025-04-17 DIAGNOSIS — G47.33 OSA (OBSTRUCTIVE SLEEP APNEA): ICD-10-CM

## 2025-04-17 PROCEDURE — 3288F FALL RISK ASSESSMENT DOCD: CPT | Mod: CPTII,S$GLB,, | Performed by: INTERNAL MEDICINE

## 2025-04-17 PROCEDURE — 99999 PR PBB SHADOW E&M-EST. PATIENT-LVL V: CPT | Mod: PBBFAC,,, | Performed by: INTERNAL MEDICINE

## 2025-04-17 PROCEDURE — 1101F PT FALLS ASSESS-DOCD LE1/YR: CPT | Mod: CPTII,S$GLB,, | Performed by: INTERNAL MEDICINE

## 2025-04-17 PROCEDURE — 1126F AMNT PAIN NOTED NONE PRSNT: CPT | Mod: CPTII,S$GLB,, | Performed by: INTERNAL MEDICINE

## 2025-04-17 PROCEDURE — 1159F MED LIST DOCD IN RCRD: CPT | Mod: CPTII,S$GLB,, | Performed by: INTERNAL MEDICINE

## 2025-04-17 PROCEDURE — G2211 COMPLEX E/M VISIT ADD ON: HCPCS | Mod: S$GLB,,, | Performed by: INTERNAL MEDICINE

## 2025-04-17 PROCEDURE — 3074F SYST BP LT 130 MM HG: CPT | Mod: CPTII,S$GLB,, | Performed by: INTERNAL MEDICINE

## 2025-04-17 PROCEDURE — 99215 OFFICE O/P EST HI 40 MIN: CPT | Mod: S$GLB,,, | Performed by: INTERNAL MEDICINE

## 2025-04-17 PROCEDURE — 3078F DIAST BP <80 MM HG: CPT | Mod: CPTII,S$GLB,, | Performed by: INTERNAL MEDICINE

## 2025-04-17 RX ORDER — SEMAGLUTIDE 0.68 MG/ML
0.5 INJECTION, SOLUTION SUBCUTANEOUS
Qty: 2 ML | Refills: 6 | Status: SHIPPED | OUTPATIENT
Start: 2025-04-17

## 2025-04-17 NOTE — PROGRESS NOTES
Subjective:       Patient ID: Drake Barraza is a 80 y.o. male.    Chief Complaint:   Follow-up, Diabetes, Hypertension, and Hyperlipidemia    HPI: Mr Barraza presents for follow up DM,HTN, and Chronic Issues  S/P Mohs/SCCA Nose(4/2/25)/Dr Baptiste-Did well    DM II: Med Tx 1-Glucophage XR 500mg QD/Taking it daily and no problems         Accuchecks: 180s, High of 200         Diet Diary:Breakfast: Biscuits/sausage/Coffee, Lunch: Pork, potatoes, Diet Tea, Dinner:         Soup and small ham sandwich, Diet Tea, Snacks: Chocolate         Weight(10/24)= 167  Weight(Today)=168 lbs     HTN: Med Tx 1-Altace 10mg QD, 2-Lopressor 25mg BID         Home BPs:      CAD/HLD: Med Tx 1- Crestor 20mg QD,2-Baby ASA 81mg QD     MICHELLE: +Home CPAP at 9 cm pressure/FFM/Wears nightly about 8-9 hours    History of Present Illness    CHIEF COMPLAINT:  Mr. Barraza presents today for follow up of poorly controlled diabetes.    DIABETES:  He reports current blood sugar readings in the 180s with occasional spikes reaching 200. A1c has increased from 7.3 to 8.0. He takes Metformin XR 500mg daily in the morning with reported lack of efficacy.    DIET:  His typical daily diet consists of breakfast with homemade biscuits and sausage patties accompanied by coffee. Lunch includes tenderloin with mashed potatoes and diet tea, though he occasionally skips lunch when busy. Dinner comprises vegetable soup with chicken, ham sandwich on whole grain bread, and diet tea.    MUSCULOSKELETAL:  He reports decreased activity due to knee numbness, which affects his ability to work in the yard. He requires a chair nearby to sit down due to this numbness.    SURGICAL HISTORY:  He underwent Mohs surgery/nose on April 2nd.    CURRENT MEDICATIONS:  He takes Altace (Ramipril) 10mg daily, Rosuvastatin 20mg daily, and baby aspirin daily.      ROS:  Constitutional: +diminished activity  Respiratory: -exertional dyspnea  Genitourinary: -frequency  Neurological:  +numbness  Endocrine: -excessive urination           Past Medical, Surgical, Social History: Please see as stated in Epic chart which has been reviewed.    Current Medications[1]    Review of Systems   Constitutional:  Negative for appetite change, fatigue and fever.   HENT:  Negative for congestion, postnasal drip, sinus pressure and trouble swallowing.    Eyes:  Negative for pain and visual disturbance.   Respiratory:  Negative for cough, chest tightness, shortness of breath and wheezing.    Cardiovascular:  Negative for chest pain, palpitations and leg swelling.   Gastrointestinal:  Negative for abdominal pain, blood in stool, constipation, diarrhea, nausea and vomiting.   Endocrine: Negative for cold intolerance and heat intolerance.   Genitourinary:  Negative for difficulty urinating, dysuria and hematuria.        No BPH symptoms   Musculoskeletal:  Positive for arthralgias. Negative for back pain and joint swelling.        Left ankle pain/No trauma   Skin:  Negative for color change and rash.   Neurological:  Negative for dizziness, syncope, weakness, numbness and headaches.        No Focal Neurological abnormalities   Hematological:  Negative for adenopathy.   Psychiatric/Behavioral:  Negative for sleep disturbance.         No Anxiety/Depression symptoms       Objective:      Lab Results   Component Value Date    WBC 7.55 04/10/2025    HGB 14.9 04/10/2025    HCT 44.5 04/10/2025     04/10/2025    CHOL 131 04/10/2025    TRIG 135 04/10/2025    HDL 36 (L) 04/10/2025    ALT 36 04/10/2025    AST 31 04/10/2025     04/10/2025    K 4.5 04/10/2025     04/10/2025    CREATININE 1.1 04/10/2025    BUN 24 (H) 04/10/2025    CO2 24 04/10/2025    TSH 0.664 04/10/2025    PSA 0.73 09/26/2024    INR 0.9 09/22/2020    GLUF 104 04/03/2004    HGBA1C 8.0 (H) 04/10/2025     Physical Exam  Vitals reviewed.   Constitutional:       General: He is not in acute distress.     Appearance: He is well-developed.   HENT:       "Head: Normocephalic and atraumatic.      Mouth/Throat:      Pharynx: No oropharyngeal exudate.   Eyes:      Conjunctiva/sclera: Conjunctivae normal.   Neck:      Thyroid: No thyromegaly.      Vascular: No JVD.      Comments: No masses    Cardiovascular:      Rate and Rhythm: Normal rate and regular rhythm.      Pulses: Normal pulses.      Heart sounds: No murmur heard.     No gallop.   Pulmonary:      Effort: Pulmonary effort is normal. No respiratory distress.      Breath sounds: Normal breath sounds. No wheezing.   Chest:      Chest wall: No tenderness.   Abdominal:      General: Bowel sounds are normal. There is no distension.      Palpations: Abdomen is soft. There is no mass.      Tenderness: There is no abdominal tenderness.      Comments: No Organomegaly   Musculoskeletal:         General: Tenderness and deformity present. Normal range of motion.      Cervical back: Normal range of motion and neck supple.      Right lower leg: Edema present.      Left lower leg: Edema present.      Comments: +Trace edema bilateral pre-tibial areas > Left  Left foot + flat foot deformity,+ Swelling around the medial ankle   Lymphadenopathy:      Cervical: No cervical adenopathy.   Skin:     General: Skin is warm and dry.   Neurological:      Mental Status: He is alert and oriented to person, place, and time.      Cranial Nerves: No cranial nerve deficit.   Psychiatric:         Mood and Affect: Mood normal.         Judgment: Judgment normal.           Health Maintenance:  Last Colonoscopy: Last Colonoscopy completed on 7/27/2016    Last PSA: 9/2024- Normal          Vitals:    04/17/25 1252   BP: (!) 110/58   BP Location: Left arm   Patient Position: Sitting   Pulse: 63   Resp: 18   Temp: 98.6 °F (37 °C)   TempSrc: Other (see comments)   SpO2: 97%   Weight: 76.4 kg (168 lb 5.1 oz)   Height: 5' 7" (1.702 m)          Assessment:       1. Type 2 diabetes mellitus with hyperglycemia, without long-term current use of insulin    2. " Essential hypertension    3. Coronary artery disease, unspecified vessel or lesion type, unspecified whether angina present, unspecified whether native or transplanted heart    4. Hypothyroidism due to acquired atrophy of thyroid    5. MICHELLE (obstructive sleep apnea)    6. Pes planus of left foot        Plan:     Health Maintenance   Topic Date Due    Shingles Vaccine (1 of 2) Never done    RSV Vaccine (Age 60+ and Pregnant patients) (1 - 1-dose 75+ series) Never done    Diabetic Eye Exam  05/01/2024    Influenza Vaccine (1) 09/01/2024    COVID-19 Vaccine (4 - 2024-25 season) 09/01/2024    Diabetes Urine Screening  05/02/2025    Aspirin/Antiplatelet Therapy  05/02/2025    TETANUS VACCINE  07/06/2025    Hemoglobin A1c  07/10/2025    Lipid Panel  04/10/2026    Hepatitis C Screening  Completed    Pneumococcal Vaccines (Age 50+)  Completed        Drake was seen today for follow-up, diabetes, hypertension and hyperlipidemia.    Diagnoses and all orders for this visit:    Type 2 diabetes mellitus with hyperglycemia, without long-term current use of insulin/uncontrolled on Metformin ER 500mg QD alone  -     Start semaglutide (OZEMPIC) 0.25 mg or 0.5 mg (2 mg/3 mL) pen injector; Inject 0.5 mg into the skin every 7 days.  -     CMP, Hemoglobin A1C; Future    Essential hypertension/controlled        -     Continue: Med Tx 1-Altace 10mg QD, 2-Lopressor 25mg BID  -     Comprehensive Metabolic Panel; Future  -     CBC Auto Differential; Future    Coronary artery disease, unspecified vessel or lesion type, unspecified whether angina present, unspecified whether native or transplanted heart        -     Continue: Med Tx 1- Crestor 20mg QD,2-Baby ASA 81mg QD        -     Comprehensive Metabolic Panel; Future  -     Lipid Panel; Future    Hypothyroidism due to acquired atrophy of thyroid/controlled on Synthroid 125 mcg q.day    MICHELLE (obstructive sleep apnea)/controlled on CPAP as above  -     CPAP/BIPAP SUPPLIES    Pes planus of left  foot        -     ' have encouraged patient to go to the [employer] or other medical DME store and have inserts made an ordered for his shoes to be worn daily        -     ' have recommended daily he will stretching exercises        -     will refer to Podiatry if any problems or no improvement    Health maintenance        -      pneumonia 20 booster to be given        -      return to clinic times 5-6 months with one-week prior fasting lab or see patient sooner if needed      Assessment & Plan    TYPE 2 DIABETES MELLITUS:   Monitored patient's morning glucose levels, which are in the 180s, with the highest being around 200.   Noted that levels are sometimes lower if the patient eats late at night.   Evaluated that the current treatment is not effective enough, as the A1c has significantly increased from 7.3 to 8.0.   Assessed different treatment options including increasing Metformin dosage or adding new medications like Ozempic or Jardiance.   Prescribed Ozempic (semaglutide) 0.25 mg subcutaneous injection weekly for 2 weeks, then increase to 0.5 mg weekly for glycemic control.   Instructed the patient to continue Metformin  mg daily in the morning.   Explained mechanism of action for Ozempic: adheres to stomach receptors, signals fullness to brain.   Discussed mechanism for Jardiance: stimulates kidney to excrete excess glucose through urine.   Educated on initial increased urination, especially when glucose is elevated.   Advised on risk of yeast infections, particularly in women.   Informed about potential side effects of Ozempic: nausea, constipation.   Instructed the patient to contact the office if experiencing any side effects from Ozempic.   Evaluated that the current Metformin treatment is not effective enough in controlling blood sugar.   Continued Metformin  mg daily in the morning.   Prescribed Ozempic in addition to Metformin, starting with a low dose of 0.25 mg for 2 weeks, then increasing  to 0.5 mg.    HYPERTENSION:   Continued Altace (Ramipril) 10 mg daily for hypertension.    HYPERLIPIDEMIA:   Evaluated that cholesterol levels have increased across the board.   Total cholesterol, triglycerides, and LDL have increased, while HDL has decreased.   Continued Rosuvastatin 20 mg daily for hyperlipidemia.    CARDIAC HEALTH:   Prescribed Ozempic (semaglutide) 0.25 mg subcutaneous injection weekly for 2 weeks, then increase to 0.5 mg weekly for cardiac health.   Proposed Jardiance (empagliflozin) as an alternative for cardiac benefits.           This note was generated with the assistance of ambient listening technology. Verbal consent was obtained by the patient and accompanying visitor(s) for the recording of patient appointment to facilitate this note. I attest to having reviewed and edited the generated note for accuracy, though some syntax or spelling errors may persist. Please contact the author of this note for any clarification.           [1]   Current Outpatient Medications   Medication Sig Dispense Refill    blood sugar diagnostic (TRUE METRIX GLUCOSE TEST STRIP) Strp TEST SUGAR TWICE DAILY BEFORE MEALS 200 strip 3    CINNAMON BARK (CINNAMON ORAL) Take 1 capsule by mouth once daily.      levothyroxine (SYNTHROID) 125 MCG tablet TAKE 1 TABLET BY MOUTH EVERY  MORNING ON AN EMPTY STOMACH 90 tablet 0    metFORMIN (GLUCOPHAGE-XR) 500 MG ER 24hr tablet Take 1 tablet (500 mg total) by mouth daily with breakfast. 90 tablet 1    metoprolol tartrate (LOPRESSOR) 25 MG tablet Take 1 tablet (25 mg total) by mouth 2 (two) times daily. 180 tablet 3    multivitamin (THERAGRAN) per tablet Take 1 tablet by mouth once daily.      nitroGLYCERIN (NITROSTAT) 0.4 MG SL tablet Place 1 tablet (0.4 mg total) under the tongue every 5 (five) minutes as needed for Chest pain. 100 tablet 2    omega-3 fatty acids-vitamin E 1,000 mg Cap Take 1 capsule by mouth Daily.        ramipriL (ALTACE) 10 MG capsule Take 1 capsule (10 mg  total) by mouth once daily. 90 capsule 3    rosuvastatin (CRESTOR) 20 MG tablet Take 1 tablet (20 mg total) by mouth once daily. 90 tablet 2    sildenafiL (VIAGRA) 100 MG tablet Take 1 tablet (100 mg total) by mouth daily as needed for Erectile Dysfunction. 30 tablet 1    TRUEPLUS LANCETS 30 gauge Misc USE TO TEST BLOOD SUGAR TWICE DAILY BEFORE MEALS 2-3 DAYS A WEEK 200 each 3    TURMERIC ROOT EXTRACT ORAL Take 1 capsule by mouth.      UBIDECARENONE (COENZYME Q10) 100 mg Tab Take 100 mg by mouth once daily.      vitamin D 185 MG Tab Take 185 mg by mouth once daily.      aspirin (ECOTRIN) 81 MG EC tablet Take 1 tablet (81 mg total) by mouth 2 (two) times daily. 60 tablet 0    blood-glucose meter kit Check glucose 1-2x/day after meals 1 each 0    ezetimibe (ZETIA) 10 mg tablet Take 1 tablet (10 mg total) by mouth once daily. 90 tablet 3    semaglutide (OZEMPIC) 0.25 mg or 0.5 mg (2 mg/3 mL) pen injector Inject 0.5 mg into the skin every 7 days. 2 mL 6     No current facility-administered medications for this visit.

## 2025-04-20 PROBLEM — R26.9 ABNORMALITY OF GAIT AND MOBILITY: Status: RESOLVED | Noted: 2020-10-07 | Resolved: 2025-04-20

## 2025-05-08 ENCOUNTER — OFFICE VISIT (OUTPATIENT)
Dept: DERMATOLOGY | Facility: CLINIC | Age: 80
End: 2025-05-08
Payer: MEDICARE

## 2025-05-08 DIAGNOSIS — Z09 POSTOP CHECK: Primary | ICD-10-CM

## 2025-05-08 PROCEDURE — 1126F AMNT PAIN NOTED NONE PRSNT: CPT | Mod: CPTII,S$GLB,, | Performed by: DERMATOLOGY

## 2025-05-08 PROCEDURE — 99999 PR PBB SHADOW E&M-EST. PATIENT-LVL III: CPT | Mod: PBBFAC,,, | Performed by: DERMATOLOGY

## 2025-05-08 PROCEDURE — 1159F MED LIST DOCD IN RCRD: CPT | Mod: CPTII,S$GLB,, | Performed by: DERMATOLOGY

## 2025-05-08 PROCEDURE — 1160F RVW MEDS BY RX/DR IN RCRD: CPT | Mod: CPTII,S$GLB,, | Performed by: DERMATOLOGY

## 2025-05-08 PROCEDURE — 99024 POSTOP FOLLOW-UP VISIT: CPT | Mod: S$GLB,,, | Performed by: DERMATOLOGY

## 2025-05-08 NOTE — PROGRESS NOTES
80 y.o. male patient is here for wound check after surgery.      Patient reports no problems left nasal bridge. Pleased with results.     WOUND PE:  The left nasal bridge flap is well healed. Mild firmness and erythema of scar. No nodularity. Spitting sutures x1.      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  Recommended massage daily x 10-15 min per day to soften  Reassured patient that redness and firmness will fade with time.  Daily SPF.  Regular skin checks.    RTC:  In 3-6 months with Dr. Vanessa Menon for skin check or sooner if new concern arises.

## 2025-05-28 ENCOUNTER — TELEPHONE (OUTPATIENT)
Dept: INTERNAL MEDICINE | Facility: CLINIC | Age: 80
End: 2025-05-28
Payer: MEDICARE

## 2025-05-28 DIAGNOSIS — E11.65 TYPE 2 DIABETES MELLITUS WITH HYPERGLYCEMIA, WITHOUT LONG-TERM CURRENT USE OF INSULIN: Primary | ICD-10-CM

## 2025-05-28 RX ORDER — SEMAGLUTIDE 0.68 MG/ML
0.5 INJECTION, SOLUTION SUBCUTANEOUS
Qty: 3 ML | Refills: 4 | Status: SHIPPED | OUTPATIENT
Start: 2025-05-28

## 2025-05-28 NOTE — TELEPHONE ENCOUNTER
Spoke with pt who tolerated Ozempic well -last dose was the 0.5mg 1 week ago.  'Have erx'd in: Ozempic 0.5mg SQ QWeek to [facility] for now.

## 2025-05-28 NOTE — TELEPHONE ENCOUNTER
----- Message from Isabel sent at 5/28/2025 10:10 AM CDT -----  Contact: [phone]  [email or web address] Patient is calling for Medical Advice regarding: Patient need a call back from the staff about the ozenpic sample. Patient wants a call back or thru [facility]: call back Comments: Thank youPlease advise patient replies from provider may take up to 48 hours.

## 2025-06-03 ENCOUNTER — PATIENT MESSAGE (OUTPATIENT)
Dept: INTERNAL MEDICINE | Facility: CLINIC | Age: 80
End: 2025-06-03
Payer: MEDICARE

## 2025-06-08 DIAGNOSIS — E03.4 HYPOTHYROIDISM DUE TO ACQUIRED ATROPHY OF THYROID: ICD-10-CM

## 2025-06-09 RX ORDER — LEVOTHYROXINE SODIUM 125 UG/1
TABLET ORAL
Qty: 90 TABLET | Refills: 3 | Status: SHIPPED | OUTPATIENT
Start: 2025-06-09

## 2025-06-09 NOTE — TELEPHONE ENCOUNTER
No care due was identified.  [facility] Embedded Care Due Messages. Reference number: [id number].   6/08/2025 9:06:54 PM CDT

## 2025-06-09 NOTE — TELEPHONE ENCOUNTER
Refill Decision Note   Drake Barraza  is requesting a refill authorization.  Brief Assessment and Rationale for Refill:  Approve     Medication Therapy Plan:         Comments:     Note composed:6:44 AM 06/09/2025

## 2025-07-18 ENCOUNTER — PATIENT MESSAGE (OUTPATIENT)
Dept: INTERNAL MEDICINE | Facility: CLINIC | Age: 80
End: 2025-07-18
Payer: MEDICARE

## 2025-07-18 DIAGNOSIS — E11.65 TYPE 2 DIABETES MELLITUS WITH HYPERGLYCEMIA, WITHOUT LONG-TERM CURRENT USE OF INSULIN: Primary | ICD-10-CM

## 2025-07-20 RX ORDER — LANCETS
EACH MISCELLANEOUS
Qty: 200 EACH | Refills: 3 | Status: SHIPPED | OUTPATIENT
Start: 2025-07-20

## 2025-07-20 RX ORDER — INSULIN PUMP SYRINGE, 3 ML
EACH MISCELLANEOUS
Qty: 1 EACH | Refills: 0 | Status: SHIPPED | OUTPATIENT
Start: 2025-07-20 | End: 2026-07-20

## 2025-07-20 NOTE — TELEPHONE ENCOUNTER
LOV with Catherine Middleton MD , 4/17/2025  Reports that his current testing supplies is no longer covered by his ins. New order for meter with supplies pended with notation to fill ins preferred brand

## 2025-07-20 NOTE — TELEPHONE ENCOUNTER
Care Due:                  Date            Visit Type   Department     Provider  --------------------------------------------------------------------------------                                EP -                              PRIMARY      [facility] INTERNAL  Last Visit: 04-      CARE ([facility])   [facility]       [facility]                              PRIMARY      [facility] INTERNAL  Next Visit: 09-      CARE ([facility])   [facility]                                                            Last  Test          Frequency    Reason                     Performed    Due Date  --------------------------------------------------------------------------------    HBA1C.......  6 months...  metFORMIN, semaglutide...  04-   10-    [facility] Embedded Care Due Messages. Reference number: [id number].   7/20/2025 8:23:44 AM CDT

## 2025-08-28 ENCOUNTER — LAB VISIT (OUTPATIENT)
Dept: LAB | Facility: HOSPITAL | Age: 80
End: 2025-08-28
Attending: INTERNAL MEDICINE
Payer: MEDICARE

## 2025-08-28 DIAGNOSIS — I25.10 CORONARY ARTERY DISEASE, UNSPECIFIED VESSEL OR LESION TYPE, UNSPECIFIED WHETHER ANGINA PRESENT, UNSPECIFIED WHETHER NATIVE OR TRANSPLANTED HEART: ICD-10-CM

## 2025-08-28 DIAGNOSIS — E11.65 TYPE 2 DIABETES MELLITUS WITH HYPERGLYCEMIA, WITHOUT LONG-TERM CURRENT USE OF INSULIN: ICD-10-CM

## 2025-08-28 DIAGNOSIS — I10 ESSENTIAL HYPERTENSION: ICD-10-CM

## 2025-08-28 LAB
ABSOLUTE EOSINOPHIL (OHS): 0.2 K/UL
ABSOLUTE MONOCYTE (OHS): 0.93 K/UL (ref 0.3–1)
ABSOLUTE NEUTROPHIL COUNT (OHS): 3.6 K/UL (ref 1.8–7.7)
ALBUMIN SERPL BCP-MCNC: 4.1 G/DL (ref 3.5–5.2)
ALP SERPL-CCNC: 82 UNIT/L (ref 40–150)
ALT SERPL W/O P-5'-P-CCNC: 34 UNIT/L (ref 0–55)
ANION GAP (OHS): 8 MMOL/L (ref 8–16)
AST SERPL-CCNC: 38 UNIT/L (ref 0–50)
BASOPHILS # BLD AUTO: 0.06 K/UL
BASOPHILS NFR BLD AUTO: 0.8 %
BILIRUB SERPL-MCNC: 0.6 MG/DL (ref 0.1–1)
BUN SERPL-MCNC: 18 MG/DL (ref 8–23)
CALCIUM SERPL-MCNC: 9.7 MG/DL (ref 8.7–10.5)
CHLORIDE SERPL-SCNC: 105 MMOL/L (ref 95–110)
CHOLEST SERPL-MCNC: 110 MG/DL (ref 120–199)
CHOLEST/HDLC SERPL: 3.1 {RATIO} (ref 2–5)
CO2 SERPL-SCNC: 25 MMOL/L (ref 23–29)
CREAT SERPL-MCNC: 1 MG/DL (ref 0.5–1.4)
EAG (OHS): 140 MG/DL (ref 68–131)
ERYTHROCYTE [DISTWIDTH] IN BLOOD BY AUTOMATED COUNT: 12.8 % (ref 11.5–14.5)
GFR SERPLBLD CREATININE-BSD FMLA CKD-EPI: >60 ML/MIN/1.73/M2
GLUCOSE SERPL-MCNC: 139 MG/DL (ref 70–110)
HBA1C MFR BLD: 6.5 % (ref 4–5.6)
HCT VFR BLD AUTO: 43.7 % (ref 40–54)
HDLC SERPL-MCNC: 36 MG/DL (ref 40–75)
HDLC SERPL: 32.7 % (ref 20–50)
HGB BLD-MCNC: 14.5 GM/DL (ref 14–18)
IMM GRANULOCYTES # BLD AUTO: 0.01 K/UL (ref 0–0.04)
IMM GRANULOCYTES NFR BLD AUTO: 0.1 % (ref 0–0.5)
LDLC SERPL CALC-MCNC: 48.4 MG/DL (ref 63–159)
LYMPHOCYTES # BLD AUTO: 2.41 K/UL (ref 1–4.8)
MCH RBC QN AUTO: 30.7 PG (ref 27–31)
MCHC RBC AUTO-ENTMCNC: 33.2 G/DL (ref 32–36)
MCV RBC AUTO: 92 FL (ref 82–98)
NONHDLC SERPL-MCNC: 74 MG/DL
NUCLEATED RBC (/100WBC) (OHS): 0 /100 WBC
PLATELET # BLD AUTO: 172 K/UL (ref 150–450)
PMV BLD AUTO: 9.7 FL (ref 9.2–12.9)
POTASSIUM SERPL-SCNC: 4.4 MMOL/L (ref 3.5–5.1)
PROT SERPL-MCNC: 6.8 GM/DL (ref 6–8.4)
RBC # BLD AUTO: 4.73 M/UL (ref 4.6–6.2)
RELATIVE EOSINOPHIL (OHS): 2.8 %
RELATIVE LYMPHOCYTE (OHS): 33.4 % (ref 18–48)
RELATIVE MONOCYTE (OHS): 12.9 % (ref 4–15)
RELATIVE NEUTROPHIL (OHS): 50 % (ref 38–73)
SODIUM SERPL-SCNC: 138 MMOL/L (ref 136–145)
TRIGL SERPL-MCNC: 128 MG/DL (ref 30–150)
WBC # BLD AUTO: 7.21 K/UL (ref 3.9–12.7)

## 2025-08-28 PROCEDURE — 82465 ASSAY BLD/SERUM CHOLESTEROL: CPT

## 2025-08-28 PROCEDURE — 36415 COLL VENOUS BLD VENIPUNCTURE: CPT

## 2025-08-28 PROCEDURE — 85025 COMPLETE CBC W/AUTO DIFF WBC: CPT

## 2025-08-28 PROCEDURE — 80053 COMPREHEN METABOLIC PANEL: CPT

## 2025-08-28 PROCEDURE — 83036 HEMOGLOBIN GLYCOSYLATED A1C: CPT

## 2025-09-01 DIAGNOSIS — I25.10 CORONARY ARTERY DISEASE, UNSPECIFIED VESSEL OR LESION TYPE, UNSPECIFIED WHETHER ANGINA PRESENT, UNSPECIFIED WHETHER NATIVE OR TRANSPLANTED HEART: ICD-10-CM

## 2025-09-02 RX ORDER — ROSUVASTATIN CALCIUM 20 MG/1
20 TABLET, COATED ORAL DAILY
Qty: 90 TABLET | Refills: 2 | Status: SHIPPED | OUTPATIENT
Start: 2025-09-02

## 2025-09-04 ENCOUNTER — OFFICE VISIT (OUTPATIENT)
Dept: INTERNAL MEDICINE | Facility: CLINIC | Age: 80
End: 2025-09-04
Payer: MEDICARE

## 2025-09-04 ENCOUNTER — PATIENT OUTREACH (OUTPATIENT)
Dept: ADMINISTRATIVE | Facility: HOSPITAL | Age: 80
End: 2025-09-04
Payer: MEDICARE

## 2025-09-04 VITALS
DIASTOLIC BLOOD PRESSURE: 76 MMHG | HEART RATE: 85 BPM | HEIGHT: 67 IN | SYSTOLIC BLOOD PRESSURE: 120 MMHG | BODY MASS INDEX: 25.12 KG/M2 | RESPIRATION RATE: 16 BRPM | OXYGEN SATURATION: 98 % | WEIGHT: 160.06 LBS | TEMPERATURE: 98 F

## 2025-09-04 DIAGNOSIS — G47.33 OSA (OBSTRUCTIVE SLEEP APNEA): ICD-10-CM

## 2025-09-04 DIAGNOSIS — E11.65 TYPE 2 DIABETES MELLITUS WITH HYPERGLYCEMIA, WITHOUT LONG-TERM CURRENT USE OF INSULIN: Primary | ICD-10-CM

## 2025-09-04 DIAGNOSIS — I25.10 CORONARY ARTERY DISEASE INVOLVING NATIVE CORONARY ARTERY OF NATIVE HEART WITHOUT ANGINA PECTORIS: ICD-10-CM

## 2025-09-04 DIAGNOSIS — E03.4 HYPOTHYROIDISM DUE TO ACQUIRED ATROPHY OF THYROID: ICD-10-CM

## 2025-09-04 DIAGNOSIS — I10 ESSENTIAL HYPERTENSION: ICD-10-CM

## 2025-09-04 LAB
ALBUMIN/CREAT UR: 19.8 UG/MG
CREAT UR-MCNC: 81 MG/DL (ref 23–375)
MICROALBUMIN UR-MCNC: 16 UG/ML

## 2025-09-04 PROCEDURE — 99999 PR PBB SHADOW E&M-EST. PATIENT-LVL V: CPT | Mod: PBBFAC,,, | Performed by: INTERNAL MEDICINE

## 2025-09-04 PROCEDURE — 82043 UR ALBUMIN QUANTITATIVE: CPT | Performed by: INTERNAL MEDICINE

## 2025-09-04 PROCEDURE — 99214 OFFICE O/P EST MOD 30 MIN: CPT | Mod: S$GLB,,, | Performed by: INTERNAL MEDICINE

## 2025-09-04 RX ORDER — BLOOD-GLUCOSE METER
EACH MISCELLANEOUS 2 TIMES DAILY
COMMUNITY
Start: 2025-07-20

## 2025-09-04 RX ORDER — ASPIRIN 81 MG/1
81 TABLET ORAL 2 TIMES DAILY
Qty: 30 TABLET | Refills: 12 | Status: SHIPPED | OUTPATIENT
Start: 2025-09-04 | End: 2025-10-04

## (undated) DEVICE — PULSAVAC ZIMMER

## (undated) DEVICE — BANDAGE ESMARK 6X12

## (undated) DEVICE — APPLICATOR STRL COT 2INNR 6IN

## (undated) DEVICE — KIT IRR SUCTION HND PIECE

## (undated) DEVICE — GLOVE BIOGEL SKINSENSE PI 8.0

## (undated) DEVICE — TOURNIQUET SB QC DP 34X4IN

## (undated) DEVICE — SEE MEDLINE ITEM 154981

## (undated) DEVICE — SEE MEDLINE ITEM 157144

## (undated) DEVICE — DRAPE STERI INSTRUMENT 1018

## (undated) DEVICE — PUMP COLD THERAPY

## (undated) DEVICE — BLADE RECIP RIBBED

## (undated) DEVICE — STOCKINETTE DBL PLY ST 4X

## (undated) DEVICE — SLING ARM LARGE FOAM STRAP

## (undated) DEVICE — WRAP PROTECTIVE LEG POS STRL

## (undated) DEVICE — DRESSING AQUACEL AG ADV 3.5X12

## (undated) DEVICE — BOWL CEMENT

## (undated) DEVICE — BLADE SAGITTAL 18 X 1.27 X 90M

## (undated) DEVICE — SEE MEDLINE ITEM 157117

## (undated) DEVICE — KIT TOTAL KNEE TKOFG

## (undated) DEVICE — ELECTRODE BLD 1 INCH TEFLON

## (undated) DEVICE — PAD CAST SPECIALIST STRL 6

## (undated) DEVICE — SUT VICRYL 4-0 RB1 27IN UD

## (undated) DEVICE — KIT ENDO CARPEL TUNNAL SINGLE

## (undated) DEVICE — PAD CAST 2 IN X 4YDS STERILE

## (undated) DEVICE — SEE MEDLINE ITEM 146298

## (undated) DEVICE — SUT MONOCRYL 3-0 SH U/D

## (undated) DEVICE — SUT 2/0 36IN COATED VICRYL

## (undated) DEVICE — DRESSING TRANS 4X4 TEGADERM

## (undated) DEVICE — DRESSING XEROFORM FOIL PK 1X8

## (undated) DEVICE — SEE MEDLINE ITEM 157131

## (undated) DEVICE — MARKER SKIN STND TIP BLUE BARR

## (undated) DEVICE — BIT DRILL PIN TROCAR 3.2MM
Type: IMPLANTABLE DEVICE | Site: KNEE | Status: NON-FUNCTIONAL
Removed: 2019-07-22

## (undated) DEVICE — SUT 1 36IN COATED VICRYL UN

## (undated) DEVICE — TOWEL OR XRAY WHITE 17X26IN

## (undated) DEVICE — BLADE SAG 18.0X1.27X100

## (undated) DEVICE — ALCOHOL 70% ISOP W/GREEN 16OZ

## (undated) DEVICE — BLADE SURG CARBON STEEL #10

## (undated) DEVICE — SOL IRR NACL .9% 3000ML

## (undated) DEVICE — SEE L#120831

## (undated) DEVICE — PADDING CAST 6IN DELTA ROLL

## (undated) DEVICE — SUT VICRYL BR 1 GEN 27 CT-1

## (undated) DEVICE — HOOD T-5 TEAR AWAY STERILE

## (undated) DEVICE — SEE MEDLINE ITEM 152548

## (undated) DEVICE — UNDERGLOVES BIOGEL PI SIZE 8

## (undated) DEVICE — SCREW HEX HEAD
Type: IMPLANTABLE DEVICE | Site: KNEE | Status: NON-FUNCTIONAL
Removed: 2019-07-22

## (undated) DEVICE — GLOVE BIOGEL PI MICRO SZ 7.5

## (undated) DEVICE — PAD COLD THERAPY KNEE WRAP ON

## (undated) DEVICE — Device

## (undated) DEVICE — MASK FLYTE HOOD PEEL AWAY

## (undated) DEVICE — GOWN SURGICAL XX LARGE X LONG

## (undated) DEVICE — ELECTRODE REM PLYHSV RETURN 9

## (undated) DEVICE — SET CEMENT (SCULP)

## (undated) DEVICE — SYR 30CC LUER LOCK

## (undated) DEVICE — SEE MEDLINE ITEM 152529

## (undated) DEVICE — ADHESIVE DERMABOND ADVANCED

## (undated) DEVICE — SYR ONLY LUER LOCK 20CC

## (undated) DEVICE — SEE MEDLINE ITEM 146271

## (undated) DEVICE — SCREW HEX HEAD 3.5X38MM
Type: IMPLANTABLE DEVICE | Site: KNEE | Status: NON-FUNCTIONAL
Removed: 2019-07-22

## (undated) DEVICE — KIT ANTIFOG W/SPONG & FLUID

## (undated) DEVICE — SUT 4-0 ETHILON 18 PS-2

## (undated) DEVICE — GAUZE SPONGE 4X4 12PLY

## (undated) DEVICE — BANDAGE MATRIX HK LOOP 2IN 5YD

## (undated) DEVICE — TOURNIQUET SB QC DP 18X4IN

## (undated) DEVICE — SUT VICRYL 3-0 27 SH

## (undated) DEVICE — BANDAGE ACE ELASTIC 6"

## (undated) DEVICE — BANDAGE ESMARK ELASTIC ST 4X9

## (undated) DEVICE — DRAPE U SPLIT SHEET 54X76IN

## (undated) DEVICE — SEE MEDLINE ITEM 152487

## (undated) DEVICE — SUT MONOCYRL 4-0 PS2 UND

## (undated) DEVICE — BRUSH SURGICAL SCRUB STERILE

## (undated) DEVICE — SEE MEDLINE ITEM 146270

## (undated) DEVICE — SOL BETADINE 5%

## (undated) DEVICE — TAPE SURG DURAPORE 2 X10YD

## (undated) DEVICE — BLADE SAG 13.0 X1.27X90

## (undated) DEVICE — MIXER BONE CEMENT

## (undated) DEVICE — SUT CTD VICRYL 0 UND BR CPX

## (undated) DEVICE — NDL 18GA X1 1/2 REG BEVEL